# Patient Record
Sex: MALE | Race: WHITE | NOT HISPANIC OR LATINO | Employment: OTHER | URBAN - METROPOLITAN AREA
[De-identification: names, ages, dates, MRNs, and addresses within clinical notes are randomized per-mention and may not be internally consistent; named-entity substitution may affect disease eponyms.]

---

## 2017-01-03 DIAGNOSIS — D33.3 UNILATERAL VESTIBULAR SCHWANNOMA: ICD-10-CM

## 2017-01-03 NOTE — TELEPHONE ENCOUNTER
----- Message from Payal Rodriguez sent at 1/3/2017 12:46 PM CST -----  Refill: tramadol (ULTRAM) 50 mg tablet    Please call patient at [phone] Thank you!

## 2017-01-04 RX ORDER — TRAMADOL HYDROCHLORIDE 50 MG/1
50 TABLET ORAL 3 TIMES DAILY
Qty: 90 TABLET | Refills: 0 | Status: SHIPPED | OUTPATIENT
Start: 2017-01-04 | End: 2017-02-16 | Stop reason: SDUPTHER

## 2017-01-10 ENCOUNTER — OFFICE VISIT (OUTPATIENT)
Dept: FAMILY MEDICINE | Facility: CLINIC | Age: 82
End: 2017-01-10
Payer: MEDICARE

## 2017-01-10 ENCOUNTER — HOSPITAL ENCOUNTER (OUTPATIENT)
Dept: RADIOLOGY | Facility: HOSPITAL | Age: 82
Discharge: HOME | End: 2017-01-10
Attending: INTERNAL MEDICINE
Payer: MEDICARE

## 2017-01-10 VITALS
DIASTOLIC BLOOD PRESSURE: 60 MMHG | WEIGHT: 213 LBS | OXYGEN SATURATION: 98 % | SYSTOLIC BLOOD PRESSURE: 118 MMHG | HEART RATE: 78 BPM | BODY MASS INDEX: 27.34 KG/M2 | TEMPERATURE: 99 F | HEIGHT: 74 IN

## 2017-01-10 DIAGNOSIS — R60.9 EDEMA, UNSPECIFIED TYPE: ICD-10-CM

## 2017-01-10 DIAGNOSIS — R05.3 CHRONIC COUGH: Primary | ICD-10-CM

## 2017-01-10 DIAGNOSIS — F33.0 MAJOR DEPRESSIVE DISORDER, RECURRENT EPISODE, MILD: ICD-10-CM

## 2017-01-10 DIAGNOSIS — E03.9 HYPOTHYROIDISM, UNSPECIFIED TYPE: ICD-10-CM

## 2017-01-10 DIAGNOSIS — R73.9 HYPERGLYCEMIA: ICD-10-CM

## 2017-01-10 DIAGNOSIS — G89.29 CHRONIC INTRACTABLE HEADACHE, UNSPECIFIED HEADACHE TYPE: ICD-10-CM

## 2017-01-10 DIAGNOSIS — D33.3 UNILATERAL VESTIBULAR SCHWANNOMA: ICD-10-CM

## 2017-01-10 DIAGNOSIS — E78.5 HYPERLIPIDEMIA, UNSPECIFIED HYPERLIPIDEMIA TYPE: ICD-10-CM

## 2017-01-10 DIAGNOSIS — G20.C PARKINSONISM, UNSPECIFIED PARKINSONISM TYPE: ICD-10-CM

## 2017-01-10 DIAGNOSIS — F39 MOOD DISORDER: ICD-10-CM

## 2017-01-10 DIAGNOSIS — S39.012A LUMBOSACRAL STRAIN, INITIAL ENCOUNTER: ICD-10-CM

## 2017-01-10 DIAGNOSIS — R51.9 CHRONIC INTRACTABLE HEADACHE, UNSPECIFIED HEADACHE TYPE: ICD-10-CM

## 2017-01-10 DIAGNOSIS — H47.013 OPTIC NEUROPATHY, ISCHEMIC, BILATERAL: ICD-10-CM

## 2017-01-10 DIAGNOSIS — E55.9 VITAMIN D DEFICIENCY DISEASE: ICD-10-CM

## 2017-01-10 DIAGNOSIS — I10 ESSENTIAL HYPERTENSION: ICD-10-CM

## 2017-01-10 PROCEDURE — 1160F RVW MEDS BY RX/DR IN RCRD: CPT | Mod: S$GLB,,, | Performed by: INTERNAL MEDICINE

## 2017-01-10 PROCEDURE — 1157F ADVNC CARE PLAN IN RCRD: CPT | Mod: S$GLB,,, | Performed by: INTERNAL MEDICINE

## 2017-01-10 PROCEDURE — 99215 OFFICE O/P EST HI 40 MIN: CPT | Mod: S$GLB,,, | Performed by: INTERNAL MEDICINE

## 2017-01-10 PROCEDURE — 1125F AMNT PAIN NOTED PAIN PRSNT: CPT | Mod: S$GLB,,, | Performed by: INTERNAL MEDICINE

## 2017-01-10 PROCEDURE — 1159F MED LIST DOCD IN RCRD: CPT | Mod: S$GLB,,, | Performed by: INTERNAL MEDICINE

## 2017-01-10 PROCEDURE — 71020 XR CHEST PA AND LATERAL: CPT | Mod: 26,,, | Performed by: RADIOLOGY

## 2017-01-10 PROCEDURE — 99499 UNLISTED E&M SERVICE: CPT | Mod: S$GLB,,, | Performed by: INTERNAL MEDICINE

## 2017-01-10 PROCEDURE — 99999 PR PBB SHADOW E&M-EST. PATIENT-LVL IV: CPT | Mod: PBBFAC,,, | Performed by: INTERNAL MEDICINE

## 2017-01-10 PROCEDURE — 71020 XR CHEST PA AND LATERAL: CPT | Mod: TC,PO

## 2017-01-10 NOTE — PROGRESS NOTES
Chief complaint, cough, back pain leg swelling, establish care    87-year-old white male who my mother was his treating psychiatrist in the past.  He is here with his wife.  Prior PCP has since retired.  One issue is her chronic cough.  For over 6 months he does produce thick mucus at random times during the day and night.  Milk products seem to worsen the mucus.  He also has a chronic frontal headache.  We discussed the possibility of underlying sinus problems.  Of is not worse when laying down to suggest CHF.  He has not had a chest x-ray in over 2 years.  No shortness of breath.  He also has some pain across the lower back.  It's worse after he exercises at lately he's been doing some exercises and stretching in the bed.  Seems to be better as he moves around or applies heat.  He's had some trace edema lately but not really a big problem.  We reviewed all his labs which are due for manual standpoint regarding his medications, hypothyroidism.  He does continue to have his morning hallucinations and today he did see football players in the room.  Doesn't appear to be in distress to him.  He does not currently really have any neurology follow-up in the last [facility] neurologist was about 2 years ago.    .    Review of systems: No fevers or chills, no wheeze or shortness of breath, no symptoms of CHF, no sciatica, no other current other myalgias or arthralgias.    PAST SURGERIES:  Total knee replacements bilaterally.                                                                                                     PAST MEDICAL HISTORY:    Hypertension.   Hyperlipidemia.    Depression.           Generalized anxiety ([facility]).    Hypothyroidism.    Osteoarthritis.    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  Optic neuropathy, right eye.  Right-sided trigeminal neuralgia, seen by neurology, pain management and neurosurgery  Chronic constipation   Evaluation by [facility] for NPH,  apparently no response to spinal tap   Chronic vertigo   Vitamin D deficiency   Parkinsonism by [facility] evaluation    Morning hallucinations, possibly related to Seroquel                                                                                                           SOCIAL HISTORY:  He is  and lives with his spouse.  They have adult   children in the area and grandchildren.  He has never been a cigarette       smoker.      Vitals as above, general, no distress, walks with a walker, hard of hearing  HEENT: Conjunctivae clear doesn't of clear and moist, neck supple, thyroid nonpalpable  Respiratory effort is good, lungs are clear  Heart regular rate and rhythm without murmurs gallops rubs, +1 ankle edema only equally on both legs.  Musculoskeletal: Subjective location of his pain is in the lower paraspinals bilaterally but not really reproducible today.  He has good full flexion but did not get him to try and bend over and touch his toes given his ataxia and balance problems.  His legs are little bit stiff but hasn't negative straight leg testing on both sides.  His spine is nontender.    Prior labs, x-ray reports, history, problem list and her medical chart notes reviewed and summarized as above    Ifeanyi was seen today for back pain, foot swelling, cough and mucus.    Diagnoses and all orders for this visit:    Chronic cough, Chest x-ray to rule out pulmonary sources which I doubt given the normal chest exam.  Consider reflux but more so consider some chronic sinusitis given the chronic frontal headache, the chronic mucus production and cough etc.  Discussed the possibility of seeing ENT but we will try to find some source on a sinus CT prior to that referral.   -     Brain natriuretic peptide; Future  -     X-Ray Chest PA And Lateral; Future    Lumbosacral strain, initial encounter, minor strain and will have him try to apply heat prior to doing his stretching and minimizes exercises  stretching for a period of time    Edema, unspecified type, minimal at this point, follow, do not think there is any CHF    Unilateral vestibular schwannoma    Major depressive disorder, recurrent episode, mild    Mood disorder, appears chronic and stable    Vitamin D deficiency disease, reassess  -     Vitamin D; Future    Parkinsonism, unspecified Parkinsonism type, noted by his last neurology assessment and given his shuffling gait, might need reassessment.  I don't see any tremor    Hypothyroidism, unspecified type, reassess  -     TSH; Future  -     T4, free; Future    Essential hypertension, chronic and stable  -     Comprehensive metabolic panel; Future  -     CBC auto differential; Future    Hyperlipidemia, unspecified hyperlipidemia type, on statin    Optic neuropathy, ischemic, bilateral, followed by ophthalmology, keep follow-up with ophthalmology given the visual elucidations to make sure there is no developing ocular degeneration etc.    Hyperglycemia  -     Hemoglobin A1c; Future    Chronic intractable headache, unspecified headache type  -     CT Sinuses without Contrast; Future    Total time over 45 minutes with over 50% counseling.

## 2017-01-11 ENCOUNTER — TELEPHONE (OUTPATIENT)
Dept: FAMILY MEDICINE | Facility: CLINIC | Age: 82
End: 2017-01-11

## 2017-01-12 NOTE — TELEPHONE ENCOUNTER
Call wife- recent labs all look great except the Vit D is still low like the last time. Try to get 2000 units of D-3  A day       the cxr is all clear

## 2017-01-14 ENCOUNTER — TELEPHONE (OUTPATIENT)
Dept: FAMILY MEDICINE | Facility: CLINIC | Age: 82
End: 2017-01-14

## 2017-01-14 NOTE — TELEPHONE ENCOUNTER
----- Message from Aliza Short sent at 1/12/2017  1:07 PM CST -----  Contact: Spouse [phone]  Patient spouse would like to speak to a nurse about his test results. Please call thank you

## 2017-01-18 ENCOUNTER — HOSPITAL ENCOUNTER (OUTPATIENT)
Dept: RADIOLOGY | Facility: HOSPITAL | Age: 82
Discharge: HOME | End: 2017-01-18
Attending: INTERNAL MEDICINE
Payer: MEDICARE

## 2017-01-18 ENCOUNTER — TELEPHONE (OUTPATIENT)
Dept: FAMILY MEDICINE | Facility: CLINIC | Age: 82
End: 2017-01-18

## 2017-01-18 DIAGNOSIS — R51.9 HEADACHE, UNSPECIFIED HEADACHE TYPE: ICD-10-CM

## 2017-01-18 DIAGNOSIS — J32.9 SINUSITIS, UNSPECIFIED CHRONICITY, UNSPECIFIED LOCATION: Primary | ICD-10-CM

## 2017-01-18 DIAGNOSIS — G89.29 CHRONIC INTRACTABLE HEADACHE, UNSPECIFIED HEADACHE TYPE: ICD-10-CM

## 2017-01-18 DIAGNOSIS — R51.9 CHRONIC INTRACTABLE HEADACHE, UNSPECIFIED HEADACHE TYPE: ICD-10-CM

## 2017-01-18 DIAGNOSIS — R93.0 ABNORMAL CT SCAN, SINUS: ICD-10-CM

## 2017-01-18 PROCEDURE — 70486 CT MAXILLOFACIAL W/O DYE: CPT | Mod: 26,,, | Performed by: RADIOLOGY

## 2017-01-18 PROCEDURE — 70486 CT MAXILLOFACIAL W/O DYE: CPT | Mod: TC

## 2017-01-18 NOTE — TELEPHONE ENCOUNTER
Call patient and wife        The recent sinus CT DOES show some central sinus infection.  Dr. ROYAL recommends getting the opinion of ENT.  Is there any preference for ENT such as at the main campus?  We may be able to have you see ENT Dr. Steward at the [facility]

## 2017-01-27 ENCOUNTER — NURSE TRIAGE (OUTPATIENT)
Dept: ADMINISTRATIVE | Facility: CLINIC | Age: 82
End: 2017-01-27

## 2017-01-28 ENCOUNTER — HOSPITAL ENCOUNTER (EMERGENCY)
Facility: OTHER | Age: 82
Discharge: HOME | End: 2017-01-28
Attending: EMERGENCY MEDICINE
Payer: MEDICARE

## 2017-01-28 VITALS
HEIGHT: 74 IN | SYSTOLIC BLOOD PRESSURE: 154 MMHG | DIASTOLIC BLOOD PRESSURE: 84 MMHG | BODY MASS INDEX: 27.34 KG/M2 | OXYGEN SATURATION: 99 % | TEMPERATURE: 99 F | HEART RATE: 72 BPM | WEIGHT: 213 LBS | RESPIRATION RATE: 18 BRPM

## 2017-01-28 DIAGNOSIS — R11.2 NON-INTRACTABLE VOMITING WITH NAUSEA, UNSPECIFIED VOMITING TYPE: Primary | ICD-10-CM

## 2017-01-28 DIAGNOSIS — R14.0 ABDOMINAL DISTENSION: ICD-10-CM

## 2017-01-28 DIAGNOSIS — K59.00 CONSTIPATION, UNSPECIFIED CONSTIPATION TYPE: ICD-10-CM

## 2017-01-28 LAB
ALLENS TEST: ABNORMAL
DELSYS: ABNORMAL
HCO3 UR-SCNC: 30.7 MMOL/L (ref 24–28)
LDH SERPL L TO P-CCNC: 1.76 MMOL/L (ref 0.5–2.2)
PCO2 BLDA: 53.4 MMHG (ref 35–45)
PH SMN: 7.37 [PH] (ref 7.35–7.45)
PO2 BLDA: 31 MMHG (ref 40–60)
POC B-TYPE NATRIURETIC PEPTIDE: 303 PG/ML (ref 0–100)
POC BE: 5 MMOL/L
POC CARDIAC TROPONIN I: 0.01 NG/ML
POC PTINR: 1 (ref 0.9–1.2)
POC PTWBT: 12.3 SEC (ref 9.7–14.3)
POC SATURATED O2: 56 % (ref 95–100)
POC TCO2: 32 MMOL/L (ref 24–29)
SAMPLE: ABNORMAL
SAMPLE: NORMAL
SAMPLE: NORMAL
SITE: ABNORMAL

## 2017-01-28 PROCEDURE — 96374 THER/PROPH/DIAG INJ IV PUSH: CPT

## 2017-01-28 PROCEDURE — 25500020 PHARM REV CODE 255: Performed by: EMERGENCY MEDICINE

## 2017-01-28 PROCEDURE — 84484 ASSAY OF TROPONIN QUANT: CPT

## 2017-01-28 PROCEDURE — 82150 ASSAY OF AMYLASE: CPT

## 2017-01-28 PROCEDURE — 25000003 PHARM REV CODE 250: Performed by: EMERGENCY MEDICINE

## 2017-01-28 PROCEDURE — 85025 COMPLETE CBC W/AUTO DIFF WBC: CPT

## 2017-01-28 PROCEDURE — 99284 EMERGENCY DEPT VISIT MOD MDM: CPT | Mod: 25

## 2017-01-28 PROCEDURE — 63600175 PHARM REV CODE 636 W HCPCS: Performed by: EMERGENCY MEDICINE

## 2017-01-28 PROCEDURE — 85610 PROTHROMBIN TIME: CPT

## 2017-01-28 PROCEDURE — 25000003 PHARM REV CODE 250

## 2017-01-28 PROCEDURE — 80053 COMPREHEN METABOLIC PANEL: CPT

## 2017-01-28 PROCEDURE — 83880 ASSAY OF NATRIURETIC PEPTIDE: CPT

## 2017-01-28 PROCEDURE — 96361 HYDRATE IV INFUSION ADD-ON: CPT

## 2017-01-28 RX ORDER — MAG HYDROX/ALUMINUM HYD/SIMETH 200-200-20
SUSPENSION, ORAL (FINAL DOSE FORM) ORAL
Status: COMPLETED
Start: 2017-01-28 | End: 2017-01-28

## 2017-01-28 RX ORDER — SENNOSIDES 8.6 MG/1
1 TABLET ORAL 2 TIMES DAILY
Qty: 30 TABLET | Refills: 1 | Status: SHIPPED | OUTPATIENT
Start: 2017-01-28 | End: 2018-06-14

## 2017-01-28 RX ORDER — ONDANSETRON 2 MG/ML
4 INJECTION INTRAMUSCULAR; INTRAVENOUS
Status: COMPLETED | OUTPATIENT
Start: 2017-01-28 | End: 2017-01-28

## 2017-01-28 RX ORDER — ONDANSETRON 4 MG/1
4 TABLET, ORALLY DISINTEGRATING ORAL EVERY 8 HOURS PRN
Qty: 12 TABLET | Refills: 0 | Status: SHIPPED | OUTPATIENT
Start: 2017-01-28 | End: 2018-06-14

## 2017-01-28 RX ORDER — BUTALBITAL, ACETAMINOPHEN AND CAFFEINE 50; 325; 40 MG/1; MG/1; MG/1
1 CAPSULE ORAL EVERY 6 HOURS PRN
Qty: 20 CAPSULE | Refills: 0 | Status: SHIPPED | OUTPATIENT
Start: 2017-01-28 | End: 2017-02-05 | Stop reason: SDUPTHER

## 2017-01-28 RX ORDER — ONDANSETRON 4 MG/1
4 TABLET, ORALLY DISINTEGRATING ORAL EVERY 6 HOURS PRN
Qty: 15 TABLET | Refills: 0 | Status: SHIPPED | OUTPATIENT
Start: 2017-01-28 | End: 2017-07-20 | Stop reason: SDUPTHER

## 2017-01-28 RX ORDER — LIDOCAINE HYDROCHLORIDE 20 MG/ML
SOLUTION OROPHARYNGEAL
Status: COMPLETED
Start: 2017-01-28 | End: 2017-01-28

## 2017-01-28 RX ORDER — DICYCLOMINE HYDROCHLORIDE 10 MG/5ML
SOLUTION ORAL
Status: COMPLETED
Start: 2017-01-28 | End: 2017-01-28

## 2017-01-28 RX ADMIN — SODIUM CHLORIDE 500 ML: 0.9 INJECTION, SOLUTION INTRAVENOUS at 11:01

## 2017-01-28 RX ADMIN — IOHEXOL 100 ML: 350 INJECTION, SOLUTION INTRAVENOUS at 12:01

## 2017-01-28 RX ADMIN — ALUMINUM HYDROXIDE, MAGNESIUM HYDROXIDE, AND SIMETHICONE: 200; 200; 20 SUSPENSION ORAL at 10:01

## 2017-01-28 RX ADMIN — ONDANSETRON 4 MG: 2 INJECTION, SOLUTION INTRAMUSCULAR; INTRAVENOUS at 09:01

## 2017-01-28 RX ADMIN — Medication: at 10:01

## 2017-01-28 RX ADMIN — DICYCLOMINE HYDROCHLORIDE: 10 SOLUTION ORAL at 10:01

## 2017-01-28 RX ADMIN — LIDOCAINE HYDROCHLORIDE: 20 SOLUTION ORAL; TOPICAL at 09:01

## 2017-01-28 RX ADMIN — SODIUM CHLORIDE, SODIUM LACTATE, POTASSIUM CHLORIDE, AND CALCIUM CHLORIDE 500 ML: .6; .31; .03; .02 INJECTION, SOLUTION INTRAVENOUS at 09:01

## 2017-01-28 NOTE — ED AVS SNAPSHOT
[facility] EMERGENCY DEPARTMENT  [address] [facility] [address] [facility] [address]               Ifeanyi Rutherford   2017  8:17 AM   ED    Description:  Male : 1929   Department:  [facility] Emergency Department           Your Care was Coordinated By:     Provider Role From To    Renny Gillis MD Attending Provider 17 0828 --      Reason for Visit     Vomiting     Constipation           Diagnoses this Visit        Comments    Non-intractable vomiting with nausea, unspecified vomiting type    -  Primary     Abdominal distension         Constipation, unspecified constipation type           ED Disposition     ED Disposition Condition Comment    Discharge             To Do List           Follow-up Information     Follow up with Jason Levy MD. Schedule an appointment as soon as possible for a visit in 2 days.    Specialty:  Internal Medicine    Why:  to follow up ED visit    Contact information:    [address]  [facility] [address]  [phone]          Follow up with [facility] Emergency Department.    Specialty:  Emergency Medicine    Why:  As needed, If symptoms worsen    Contact information:    [address] [facility] [address]  [phone]       These Medications        Disp Refills Start End    ondansetron (ZOFRAN-ODT) 4 MG TbDL 12 tablet 0 2017     Take 1 tablet (4 mg total) by mouth every 8 (eight) hours as needed (nausea/vomiting). - Oral    Pharmacy: [address] Ph #: [phone]       senna (SENNA) 8.6 mg tablet 30 tablet 1 2017     Take 1 tablet by mouth 2 (two) times daily. - Oral    Pharmacy: [address] Ph #: [phone]       ondansetron (ZOFRAN-ODT) 4 MG TbDL 15 tablet 0 2017     Take 1 tablet (4 mg total) by mouth every 6 (six) hours as needed (nausea). - Oral    Pharmacy: [address]  Ph #: [phone]       butalbital-acetaminophen-caff -40 mg (CAPACET) -40 mg Cap 20 capsule 0 1/28/2017 2/27/2017    Take 1 capsule by mouth every 6 (six) hours as needed (headache/facial pain). - Oral    Pharmacy: [facility] [place] - [place], [address] Ph #: [phone]         [facility] On Call     [facility] On Call Nurse Care Line - 24/7 Assistance  Registered nurses in the [facility] On Call Center provide clinical advisement, health education, appointment booking, and other advisory services.  Call for this free service at [phone].             Medications           Message regarding Medications     Verify the changes and/or additions to your medication regime listed below are the same as discussed with your clinician today.  If any of these changes or additions are incorrect, please notify your healthcare provider.        START taking these NEW medications        Refills    ondansetron (ZOFRAN-ODT) 4 MG TbDL 0    Sig: Take 1 tablet (4 mg total) by mouth every 8 (eight) hours as needed (nausea/vomiting).    Class: Print    Route: Oral    senna (SENNA) 8.6 mg tablet 1    Sig: Take 1 tablet by mouth 2 (two) times daily.    Class: Print    Route: Oral    ondansetron (ZOFRAN-ODT) 4 MG TbDL 0    Sig: Take 1 tablet (4 mg total) by mouth every 6 (six) hours as needed (nausea).    Class: Print    Route: Oral    butalbital-acetaminophen-caff -40 mg (CAPACET) -40 mg Cap 0    Sig: Take 1 capsule by mouth every 6 (six) hours as needed (headache/facial pain).    Class: Print    Route: Oral      These medications were administered today        Dose Freq    lactated ringers bolus 500 mL 500 mL ED 1 Time    Sig: Inject 500 mLs into the vein ED 1 Time.    Class: Normal    Route: Intravenous    ondansetron injection 4 mg 4 mg ED 1 Time    Sig: Inject 4 mg into the vein ED 1 Time.    Class: Normal    Route: Intravenous    (pyxis) gi cocktail (mylanta 30 mL, lidocaine 2 % viscous  10 mL, dicyclomine 10 mL) 50 mL  ED 1 Time    Sig: Take by mouth ED 1 Time.    Class: Normal    Route: Oral    aluminum-magnesium hydroxide-simethicone (MAALOX) 200-200-20 mg/5 mL suspension      Notes to Pharmacy: Created by cabinet override    dicyclomine (BENTYL) 10 mg/5 mL syrup      Notes to Pharmacy: Created by cabinet override    lidocaine HCl 2% (XYLOCAINE) 2 % oral solution      Notes to Pharmacy: Created by cabinet override    sodium chloride 0.9% bolus 500 mL 500 mL ED 1 Time    Sig: Inject 500 mLs into the vein ED 1 Time.    Class: Normal    Route: Intravenous    omnipaque 350 iohexol 100 mL 100 mL IMG once as needed    Sig: Inject 100 mLs into the vein ONCE PRN for contrast.    Class: Normal    Route: Intravenous    omnipaque 350 iohexol 350 mg iodine/mL      Notes to Pharmacy: Created by cabinet override           Verify that the below list of medications is an accurate representation of the medications you are currently taking.  If none reported, the list may be blank. If incorrect, please contact your healthcare provider. Carry this list with you in case of emergency.           Current Medications     acetaminophen (TYLENOL) 325 MG tablet Take 2 tablets (650 mg total) by mouth every 6 (six) hours as needed for Pain.    AUROGUARD 5.4-1.4 % Drop Place 3 drops in ear(s) daily as needed (To left ear).     butalbital-acetaminophen-caff -40 mg (CAPACET) -40 mg Cap Take 1 capsule by mouth every 6 (six) hours as needed (headache/facial pain).    fluticasone (FLONASE) 50 mcg/actuation nasal spray 2 sprays by Each Nare route once daily.    gabapentin (NEURONTIN) 300 MG capsule Take 1 capsule (300 mg total) by mouth 3 (three) times daily.    levothyroxine (SYNTHROID) 75 MCG tablet TAKE 1 TABLET BEFORE BREAKFAST    lisinopril (PRINIVIL,ZESTRIL) 40 MG tablet Take 1 tablet (40 mg total) by mouth once daily.    ondansetron (ZOFRAN-ODT) 4 MG TbDL Take 1 tablet (4 mg total) by mouth every 8 (eight) hours  "as needed (nausea/vomiting).    ondansetron (ZOFRAN-ODT) 4 MG TbDL Take 1 tablet (4 mg total) by mouth every 6 (six) hours as needed (nausea).    oxycodone-acetaminophen (PERCOCET) 7.5-325 mg per tablet Take 1 tablet by mouth every 4 (four) hours as needed for Pain.    polyethylene glycol (GLYCOLAX) 17 gram/dose powder Take 17 g by mouth once daily.    pravastatin (PRAVACHOL) 40 MG tablet Take 1 tablet (40 mg total) by mouth once daily.    quetiapine (SEROQUEL) 50 MG tablet Take 1 tablet (50 mg total) by mouth every evening.    senna (SENNA) 8.6 mg tablet Take 1 tablet by mouth 2 (two) times daily.    tramadol (ULTRAM) 50 mg tablet Take 1 tablet (50 mg total) by mouth 3 (three) times daily.    triamcinolone acetonide 0.1% (KENALOG) 0.1 % cream Apply to the affected area twice a day    venlafaxine (EFFEXOR-XR) 75 MG 24 hr capsule Two capsules daily           Clinical Reference Information           Your Vitals Were     BP Pulse Temp Resp Height Weight    168/75 71 99 °F (37.2 °C) (Temporal) 14 6' 2" (1.88 m) 96.6 kg (213 lb)    SpO2 BMI             97% 27.35 kg/m2         Allergies as of 1/28/2017        Reactions    Carbamazepine Swelling    Demerol [Meperidine] Other (See Comments)    hallucinations    Morphine Nausea Only      Immunizations Administered on Date of Encounter - 1/28/2017     None      ED Micro, Lab, POCT     Start Ordered       Status Ordering Provider    01/28/17 1301 01/28/17 1300  POCT Influenza A/B  Once      Completed     01/28/17 1112 01/28/17 1112  POCT B-type natriuretic peptide (BNP)  Once      Final result     01/28/17 1106 01/28/17 1106  Troponin ISTAT  Once      Final result     01/28/17 0944 01/28/17 0944  ISTAT PROCEDURE  Once      Final result     01/28/17 0939 01/28/17 0939  ISTAT PROCEDURE  Once      Final result     01/28/17 0857 01/28/17 0856  POCT URINALYSIS W/O SCOPE  Once      Completed     01/28/17 0856 01/28/17 0856  POCT amylase  Once      Completed     01/28/17 0856 " "01/28/17 0856  POCT Troponin  Once      Completed     01/28/17 0856 01/28/17 0856  POCT B-type natriuretic peptide (BNP)  Once      Completed     01/28/17 0856 01/28/17 0856  POCT Protime-INR  Once      Completed     01/28/17 0855 01/28/17 0856  POCT CBC  Once      Acknowledged     01/28/17 0855 01/28/17 0856  POCT CMP  Once      Completed     01/28/17 0855 01/28/17 0856  POCT urinalysis dipstick (Culture if indicated)  Once      Acknowledged       ED Imaging Orders     Start Ordered       Status Ordering Provider    01/28/17 1128 01/28/17 1128  CT Abdomen Pelvis With Contrast  1 time imaging      Final result     01/28/17 0856 01/28/17 0856    1 time imaging,   Status:  Canceled      Canceled     01/28/17 0855 01/28/17 0856  X-ray chest PA and lateral  1 time imaging     Comments:  Abdominal pain    Final result     01/28/17 0855 01/28/17 0856  X-Ray Abdomen Flat And Erect  1 time imaging      Final result         Discharge Instructions         Vomiting (Adult)  Vomiting is a common symptom that may be due to different causes. These include gastroenteritis ("stomach flu"), food poisoning and gastritis. There are other more serious causes of vomiting which may be hard to diagnose early in the illness. Therefore, it is important to watch for the warning signs listed below.  The main danger from repeated vomiting is dehydration. This is due to excess loss of water and minerals from the body. When this occurs, body fluids must be replaced.  Home care  · If symptoms are severe, rest at home for the next 24 hours.  · Because your symptoms may be from an infection, wash your hands frequently and well, and use alcohol-based  to avoid spreading the infection to others.  · Wash your hands for at least 20 seconds. Hum the happy birthday song twice for the correct length of time.  · Wash your hands after using the toilet, before and after preparing food, before eating food, after changing a diaper, cleaning a wound, " caring for a sick person, and blowing your nose, coughing, or sneezing. You should also wash your hands after caring for someone who is sick, touching pet food, or treats, and touching an animal, or animal waste.  · You may use acetaminophen or NSAID medicines like ibuprofen or naproxen to control fever, unless another medicine was prescribed. If you have chronic liver or kidney disease or ever had a stomach ulcer or GI bleeding, talk with your doctor before using these medicines. Aspirin should never be used in anyone under 18 years of age who is ill with a fever. It may cause severe liver damage. Don't use NSAID medicines if you are already taking one for another condition (like arthritis) or are on aspirin (such as for heart disease, or after a stroke)  · Avoid tobacco and alcohol use, which may worsen your symptoms.  · If medicines for vomiting were prescribed, take as directed.  · Once vomiting stops, then follow these guidelines:  During the first 12 to 24 hours follow the diet below:  · Fruit juices. Apple, grape juice, clear fruit drinks, and electrolyte replacement drinks.  · Beverages. Soft drinks without caffeine; mineral water (plain or flavored), decaffeinated tea and coffee.  · Soups. Clear broth, consommé and bouillon  · Desserts. Plain gelatin, popsicles and fruit juice bars. As you feel better, you may add 6-8 ounces of yogurt per day.  During the next 24 hours you may add the following to the above:  · Hot cereal, plain toast, bread, rolls, crackers  · Plain noodles, rice, mashed potatoes, chicken noodle or rice soup  · Unsweetened canned fruit (avoid pineapple), bananas  · Limit caffeine and chocolate. No spices or seasonings except salt.  During the next 24 hours:  Gradually resume a normal diet, as you feel better and your symptoms lessen.  Follow-up care  Follow up with your healthcare provider, or as advised.  When to seek medical advice  Call your healthcare provider right away if any of  these occur:  · Constant right-sided lower abdominal pain or increasing general abdominal pain  · Continued vomiting (unable to keep liquids down) for 24 hours  · Frequent diarrhea (more than 5 times a day); blood (red or black color) or mucus in diarrhea  · Reduced urine output or extreme thirst  · Weakness, dizziness or fainting  · Unusually drowsy or confused  · Fever of 100.4°F (38°C) oral or higher, or as directed  · Yellow color of the eyes or skin  © 6510-1786 [employer]. [address] [place] [address]. All rights reserved. This information is not intended as a substitute for professional medical care. Always follow your healthcare professional's instructions.      Continue all current constipation treatments.    Constipation (Adult)  Constipation means that you have bowel movements that are less frequent than usual. Stools often become very hard and difficult to pass.  Constipation is very common. At some point in life it affects almost everyone. Since everyone's bowel habits are different, what is constipation to one person may not be to another. Your healthcare provider may do tests to diagnose constipation. It depends on what he or she finds when evaluating you.    Symptoms of constipation include:  · Abdominal pain  · Bloating  · Vomiting  · Painful bowel movements  · Itching, swelling, bleeding, or pain around the anus  Causes  Constipation can have many causes. These include:  · Diet low in fiber  · Too much dairy  · Not drinking enough liquids  · Lack of exercise or physical activity. This is especially true for older adults.  · Changes in lifestyle or daily routine, including pregnancy, aging, work, and travel  · Frequent use or misuse of laxatives  · Ignoring the urge to have a bowel movement or delaying it until later  · Medicines, such as certain prescription pain medicines, iron supplements, antacids, certain antidepressants, and calcium supplements  · Diseases like  irritable bowel syndrome, bowel obstructions, stroke, diabetes, thyroid disease, Parkinson disease, hemorrhoids, and colon cancer  Complications  Potential complications of constipation can include:  · Hemorrhoids  · Rectal bleeding from hemorrhoids or anal fissures (skin tears)  · Hernias  · Dependency on laxatives  · Chronic constipation  · Fecal impaction  · Bowel obstruction or perforation  Home care  All treatment should be done after talking with your healthcare provider. This is especially true if you have another medical problems, are taking prescription medicines, or are an older adult. Treatment most often involves lifestyle changes. You may also need medicines. Your healthcare provider will tell you which will work best for you. Follow the advice below to help avoid this problem in the future.  Lifestyle changes  These lifestyle changes can help prevent constipation:  · Diet. Eat a high-fiber diet, with fresh fruit and vegetables, and reduce dairy intake, meats, and processed foods  · Fluids. It's important to get enough fluids each day. Drink plenty of water when you eat more fiber. If you are on diet that limits the amount of fluid you can have, talk about this with your healthcare provider.  · Regular exercise. Check with your healthcare provider first.  Medications  Take any medicines as directed. Some laxatives are safe to use only every now and then. Others can be taken on a regular basis. Talk with your doctor or pharmacist if you have questions.  Prescription pain medicines can cause constipation. If you are taking this kind of medicine, ask your healthcare provider if you should also take a stool softener.  Medicines you may take to treat constipation include:  · Fiber supplements  · Stool softeners  · Laxatives  · Enemas  · Rectal suppositories  Follow-up care  Follow up with your healthcare provider if symptoms don't get better in the next few days. You may need to have more tests or see a  specialist.  Call 911  Call 911 if any of these occur:  · Trouble breathing  · Stiff, rigid abdomen that is severely painful to touch  · Confusion  · Fainting or loss of consciousness  · Rapid heart rate  · Chest pain  When to seek medical advice  Call your healthcare provider right away if any of these occur:  · Fever over 100.4°F (38°C)  · Failure to resume normal bowel movements  · Pain in your abdomen or back gets worse  · Nausea or vomiting  · Swelling in your abdomen  · Blood in the stool  · Black, tarry stool  · Involuntary weight loss  · Weakness  © 4834-0673 [employer]. [address] [place] [address]. All rights reserved. This information is not intended as a substitute for professional medical care. Always follow your healthcare professional's instructions.          Treating Constipation  Constipation is a common and often uncomfortable problem. Constipation means you have bowel movements fewer than 3 times per week, or strain to pass hard, dry stool. It can last a short time. Or it can be a problem that never seems to go away. The good news is that it can often be treated and controlled.    Eat more fiber  One of the best ways to help treat constipation is to increase your fiber intake. You can do this either through diet or by using fiber supplements. Fiber (in whole grains, fruits, and vegetables) adds bulk and absorbs water to soften the stool. This helps the stool pass through the colon more easily. When you increase your fiber intake, do it slowly to avoid side effects such as bloating. Also increase the amount of water that you drink. Eating more of the following foods can add fiber to your diet.  · High-fiber cereals  · Whole grains, bran, and brown rice  · Vegetables such as carrots, broccoli, and greens  · Fresh fruits (especially apples, pears, and dried fruits like raisins and apricots)  · Nuts and legumes (especially beans such as lentils, kidney beans, and lima  beans)  Get physically active  Exercise helps improve the working of your colon which helps ease constipation. Try to get some physical activity every day. If you havent been active for a while, talk to your healthcare provider before starting again.  Laxatives  Your healthcare provider may suggest an over-the-counter product to help ease your constipation. He or she may suggest the use of bulk-forming agents or laxatives. The use of laxatives, if used as directed, is common and safe. Follow directions carefully when using them. See your healthcare provider for new-onset constipation, or long-term constipation, to rule out other causes such as medicines or thyroid disease.  © 5772-4071 [employer]. [address] [place] [address], [place] [address]. All rights reserved. This information is not intended as a substitute for professional medical care. Always follow your healthcare professional's instructions.          Smoking Cessation     If you would like to quit smoking:   You may be eligible for free services if you are a [place] resident and started smoking cigarettes before September 1, 1988.  Call the Smoking Cessation Trust ([facility]) toll free at [phone] or [phone].   Call 6-388-QUIT-NOW if you do not meet the above criteria.             [facility] Emergency Department complies with applicable Federal civil rights laws and does not discriminate on the basis of race, color, national origin, age, disability, or sex.        Language Assistance Services     ATTENTION: Language assistance services are available, free of charge. Please call [phone].      ATENCIÓN: Si habla español, tiene a melo disposición servicios gratuitos de asistencia lingüística. Llame al [phone].     CHÚ Ý: N?u b?n nói Ti?ng Vi?t, có các d?ch v? h? tr? ngôn ng? mi?n phí dành cho b?n. G?i s? [phone].

## 2017-01-28 NOTE — ED PROVIDER NOTES
Encounter Date: 1/28/2017       History     Chief Complaint   Patient presents with    Vomiting     Vomiting since yesterday and unable to tolerate food or drink. Wife reports persistent vomiting all food material until this Am when he started to vomitied a yellow substance. Patient unable to hold BP medications this AM also. No fever no diarrhea     Constipation     constipation x 3 days     Review of patient's allergies indicates:   Allergen Reactions    Carbamazepine Swelling    Demerol [meperidine] Other (See Comments)     hallucinations    Morphine Nausea Only     HPI Comments: Patient presenting with abdominal distention, nausea and vomiting and no bowel movement for the past 3 days.  His wife reports that his last full meal was 2-1/2 nights ago after which he has had increasing amounts of nausea and vomiting, at least 6-8 episodes yesterday, which progressed from digested food to straight bile.  He also reports abdominal distention, chronic constipation, but does report he passed gas as recent as last night.    Past surgical history for open cholecystectomy.  Past medical history of urinary incontinence, hypertension, hyperlipidemia.    Due to his nausea and vomiting, he was unable to take his blood pressure medication today and is hypertensive on arrival.  He denies any chest pain or shortness of breath associated with his nausea and vomiting.    The history is provided by the patient, the spouse, a relative and medical records.     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    Spinal stenosis      Trigeminal neuralgia pain 1/20/2015    Vertigo 9/11/2012    Vestibular schwannoma 6/4/2015     Past Medical History Pertinent Negatives   Diagnosis Date Noted    Malnutrition 2/27/2015    Parkinson disease 2/27/2015    Renal dialysis status(V45.11) 2/27/2015    Stroke 2/27/2015    Tobacco dependence 2/27/2015    Type II or unspecified type diabetes mellitus without mention of complication, not stated as uncontrolled 2/27/2015     Past Surgical History   Procedure Laterality Date    Bilateral total knee arthroplasties      Eye surgery      Tonsillectomy      Gallbladder surgery      Thyroidectomy      Cholecystectomy       Family History   Problem Relation Age of Onset    Heart disease Mother     Suicide Neg Hx     Schizophrenia Neg Hx      Social History   Substance Use Topics    Smoking status: Former Smoker    Smokeless tobacco: Never Used    Alcohol use No     Review of Systems   Constitutional: Positive for appetite change (Due to vomiting). Negative for fever.   HENT: Positive for sore throat (since vomiting).    Respiratory: Negative for shortness of breath.    Cardiovascular: Negative for chest pain.   Gastrointestinal: Positive for abdominal distention, abdominal pain, nausea and vomiting.   Genitourinary: Positive for enuresis. Negative for dysuria and flank pain.   Musculoskeletal: Negative for back pain.   Skin: Negative for rash.   Neurological: Negative for weakness and headaches.   Hematological: Does not bruise/bleed easily.       Physical Exam   Initial Vitals   BP Pulse Resp Temp SpO2   -- -- -- -- --            Physical Exam    Nursing note and vitals reviewed.  Constitutional: He appears well-developed and well-nourished. He is not diaphoretic.   Hypertensive, but afebrile elderly white male.   HENT:   Head: Normocephalic and atraumatic.   Eyes: EOM are normal.   Neck: Normal range of motion. Neck supple.   Pulmonary/Chest: Breath sounds normal. No respiratory distress.    Abdominal: Soft. Bowel sounds are normal. He exhibits distension. Mass: suprapubic, mid line circular mass. There is no rigidity, no rebound, no guarding and no CVA tenderness.       Musculoskeletal: Normal range of motion. He exhibits no edema.   Neurological: He is alert and oriented to person, place, and time.   Skin: Skin is warm and dry.   Psychiatric: He has a normal mood and affect.         ED Course   Procedures  Labs Reviewed   ISTAT PROCEDURE - Abnormal; Notable for the following:        Result Value    POC PCO2 53.4 (*)     POC PO2 31 (*)     POC HCO3 30.7 (*)     POC SATURATED O2 56 (*)     POC TCO2 32 (*)     All other components within normal limits   TROPONIN ISTAT   POCT URINALYSIS W/O SCOPE   POCT CMP   POCT AMYLASE   POCT TROPONIN   POCT B-TYPE NATRIURETIC PEPTIDE (BNP)   POCT PROTIME-INR   ISTAT PROCEDURE   POCT B-TYPE NATRIURETIC PEPTIDE (BNP)   POCT RAPID INFLUENZA A/B             Medical Decision Making:   Initial Assessment:   Elderly male patient presenting with 3 days of nausea and vomiting and no bowel movement.  Differential Diagnosis:   Acute abdominal obstruction, diverticulitis, mesenteric ischemia, gastroenteritis, ileus, appendicitis, opiate-induced constipation, colitis NOS.  ED Management:  After extensive ED workup, including the CT with results below, there was no serious, life-threatening etiology to the patient's nausea and vomiting.  At the end of his workup, a fluid swab was also sent to rule out that as a potential confounder, which was negative.  He was symptomatically improved with ED interventions.    I discussed at length with the patient, his son and his spouse regarding the importance of a strict bowel regime, the risks of fecal impaction and bowel obstruction, especially in the patient's age demographic and the fact that he uses some pain medication.  I also specifically gave strict return precautions and instructed the patient and his wife to follow up closely with  outpatient care.  He was discharged in stable condition with outpatient follow-up.    Renny Gillis MD,   Emergency Medicine  01/29/2017 7:33 PM    (This note was written with voice recognition software.  Please excuse any grammatical errors.)                    ED Course   Value Comment By Time   X-Ray Abdomen Flat And Erect No evidence of acute obstruction, no air-fluid levels. Renny Gillis MD 01/28 0932    Lactate is 1.76.INR is 1 Renny Gillis MD 01/28 0948    CBC significant for leukocytosis of 11.5 with 81.3% granulocytes Renny Gillis MD 01/28 0958    Urinalysis negative for nitrites and leukocytes, positive for moderate blood and trace ketones Renny Gillis MD 01/28 1138    , troponin not elevated. Renny Gillis MD 01/28 1203     Imaging Results         CT Abdomen Pelvis With Contrast (Final result) Result time:  01/28/17 12:51:53    Final result by Interface, Rad Results In (01/28/17 12:51:53)    Narrative:    Study Desc:   CT ABDOMEN PELVIS WITH CONTRAST  Clinical History: Abdominal pain, evaluate for diverticulitis     TECHNIQUE: Sequential transaxial images were obtained with a multidetector helical CT   after intravenous administration of iodinated contrast.     Coronal and sagittal reconstructions were performed.     Total DLP = Not provided     COMPARISON: None     FINDINGS:     CT ABDOMEN AND PELVIS WITH CONTRAST:     There is mild bibasilar subsegmental atelectasis.  The visualized lung bases are   otherwise clear and there are no effusions.  There is a tiny hiatal hernia.     There are bilateral renal cysts measuring up to 1.7 cm in the right and 4.4 cm of the   left.  The liver, spleen, pancreas, kidneys, and adrenals appear within normal limits.    The patient is status post cholecystectomy.     There is no free intraperitoneal air or fluid.  There are no distended loops of small   bowel.  No mesenteric adenopathy is appreciated.     There are a few scattered  diverticula throughout the colon.  The rectum, sigmoid, and   urinary bladder are otherwise unremarkable.  There is no free pelvic fluid or pelvic   lymphadenopathy.     The appendix is normal in caliber with no surrounding inflammatory changes.     There are degenerative changes throughout the lumbar spine with intervertebral disc space   loss of height and anterior osteophytes.  Bony structures are unremarkable.  Mild soft   atherosclerotic plaque is noted throughout the abdominal aorta.     IMPRESSION:  1.  No CT evidence of acute intraperitoneal process.  2.  Mild diverticulosis.  3.  Bilateral renal cysts as described.  4.  Status post cholecystectomy.  5.  Tiny hiatal hernia.     SL 24;  Signed by: Jakub Bishop M.D.  2017-01-28 12:51:51 [CAST]            X-ray chest PA and lateral (Final result) Result time:  01/28/17 09:36:44    Final result by Interface, Rad Results In (01/28/17 09:36:44)    Narrative:    Study Desc:   XR CHEST PA AND LATERAL  History: Vomiting.     COMPARISON: None.     TECHNIQUE: PA and lateral view(s) of the chest     FINDINGS:  Lungs are clear. No pleural effusion or pneumothorax.     Heart size within normal limits. No significant mediastinal abnormality.     No acute osseous abnormality identified.     IMPRESSION:  No acute cardiopulmonary disease identified.     SL: 24 Signed by: Giovanny Cohen MD  2017-01-28 09:36:42            X-Ray Abdomen Flat And Erect (Final result) Result time:  01/28/17 09:35:54    Final result by Interface, Rad Results In (01/28/17 09:35:54)    Narrative:    Study Desc:   XR ABDOMEN FLAT AND ERECT  History: Vomiting.     Findings:  Supine and upright views the abdomen.  No comparison.     Bowel gas pattern is nonobstructive.  No evidence of pneumoperitoneum.  No radiopaque   foreign bodies.  Diffuse osteopenia.  Degenerative changes in the lumbar spine and hips.     Impression:  No acute radiographic abnormality of the abdomen.     SL: 24 Signed by:  Giovanny Cohen MD  2017-01-28 09:35:48                Clinical Impression:   The primary encounter diagnosis was Non-intractable vomiting with nausea, unspecified vomiting type. Diagnoses of Abdominal distension and Constipation, unspecified constipation type were also pertinent to this visit.          Renny Gillis MD  01/29/17 1933

## 2017-01-28 NOTE — DISCHARGE INSTRUCTIONS
"  Vomiting (Adult)  Vomiting is a common symptom that may be due to different causes. These include gastroenteritis ("stomach flu"), food poisoning and gastritis. There are other more serious causes of vomiting which may be hard to diagnose early in the illness. Therefore, it is important to watch for the warning signs listed below.  The main danger from repeated vomiting is dehydration. This is due to excess loss of water and minerals from the body. When this occurs, body fluids must be replaced.  Home care  · If symptoms are severe, rest at home for the next 24 hours.  · Because your symptoms may be from an infection, wash your hands frequently and well, and use alcohol-based  to avoid spreading the infection to others.  · Wash your hands for at least 20 seconds. Hum the happy birthday song twice for the correct length of time.  · Wash your hands after using the toilet, before and after preparing food, before eating food, after changing a diaper, cleaning a wound, caring for a sick person, and blowing your nose, coughing, or sneezing. You should also wash your hands after caring for someone who is sick, touching pet food, or treats, and touching an animal, or animal waste.  · You may use acetaminophen or NSAID medicines like ibuprofen or naproxen to control fever, unless another medicine was prescribed. If you have chronic liver or kidney disease or ever had a stomach ulcer or GI bleeding, talk with your doctor before using these medicines. Aspirin should never be used in anyone under 18 years of age who is ill with a fever. It may cause severe liver damage. Don't use NSAID medicines if you are already taking one for another condition (like arthritis) or are on aspirin (such as for heart disease, or after a stroke)  · Avoid tobacco and alcohol use, which may worsen your symptoms.  · If medicines for vomiting were prescribed, take as directed.  · Once vomiting stops, then follow these guidelines:  During " the first 12 to 24 hours follow the diet below:  · Fruit juices. Apple, grape juice, clear fruit drinks, and electrolyte replacement drinks.  · Beverages. Soft drinks without caffeine; mineral water (plain or flavored), decaffeinated tea and coffee.  · Soups. Clear broth, consommé and bouillon  · Desserts. Plain gelatin, popsicles and fruit juice bars. As you feel better, you may add 6-8 ounces of yogurt per day.  During the next 24 hours you may add the following to the above:  · Hot cereal, plain toast, bread, rolls, crackers  · Plain noodles, rice, mashed potatoes, chicken noodle or rice soup  · Unsweetened canned fruit (avoid pineapple), bananas  · Limit caffeine and chocolate. No spices or seasonings except salt.  During the next 24 hours:  Gradually resume a normal diet, as you feel better and your symptoms lessen.  Follow-up care  Follow up with your healthcare provider, or as advised.  When to seek medical advice  Call your healthcare provider right away if any of these occur:  · Constant right-sided lower abdominal pain or increasing general abdominal pain  · Continued vomiting (unable to keep liquids down) for 24 hours  · Frequent diarrhea (more than 5 times a day); blood (red or black color) or mucus in diarrhea  · Reduced urine output or extreme thirst  · Weakness, dizziness or fainting  · Unusually drowsy or confused  · Fever of 100.4°F (38°C) oral or higher, or as directed  · Yellow color of the eyes or skin  © 6466-4841 [employer]. [address] [place] [address], [place] [address]. All rights reserved. This information is not intended as a substitute for professional medical care. Always follow your healthcare professional's instructions.      Continue all current constipation treatments.    Constipation (Adult)  Constipation means that you have bowel movements that are less frequent than usual. Stools often become very hard and difficult to pass.  Constipation is very common. At some point in  life it affects almost everyone. Since everyone's bowel habits are different, what is constipation to one person may not be to another. Your healthcare provider may do tests to diagnose constipation. It depends on what he or she finds when evaluating you.    Symptoms of constipation include:  · Abdominal pain  · Bloating  · Vomiting  · Painful bowel movements  · Itching, swelling, bleeding, or pain around the anus  Causes  Constipation can have many causes. These include:  · Diet low in fiber  · Too much dairy  · Not drinking enough liquids  · Lack of exercise or physical activity. This is especially true for older adults.  · Changes in lifestyle or daily routine, including pregnancy, aging, work, and travel  · Frequent use or misuse of laxatives  · Ignoring the urge to have a bowel movement or delaying it until later  · Medicines, such as certain prescription pain medicines, iron supplements, antacids, certain antidepressants, and calcium supplements  · Diseases like irritable bowel syndrome, bowel obstructions, stroke, diabetes, thyroid disease, Parkinson disease, hemorrhoids, and colon cancer  Complications  Potential complications of constipation can include:  · Hemorrhoids  · Rectal bleeding from hemorrhoids or anal fissures (skin tears)  · Hernias  · Dependency on laxatives  · Chronic constipation  · Fecal impaction  · Bowel obstruction or perforation  Home care  All treatment should be done after talking with your healthcare provider. This is especially true if you have another medical problems, are taking prescription medicines, or are an older adult. Treatment most often involves lifestyle changes. You may also need medicines. Your healthcare provider will tell you which will work best for you. Follow the advice below to help avoid this problem in the future.  Lifestyle changes  These lifestyle changes can help prevent constipation:  · Diet. Eat a high-fiber diet, with fresh fruit and vegetables, and reduce  dairy intake, meats, and processed foods  · Fluids. It's important to get enough fluids each day. Drink plenty of water when you eat more fiber. If you are on diet that limits the amount of fluid you can have, talk about this with your healthcare provider.  · Regular exercise. Check with your healthcare provider first.  Medications  Take any medicines as directed. Some laxatives are safe to use only every now and then. Others can be taken on a regular basis. Talk with your doctor or pharmacist if you have questions.  Prescription pain medicines can cause constipation. If you are taking this kind of medicine, ask your healthcare provider if you should also take a stool softener.  Medicines you may take to treat constipation include:  · Fiber supplements  · Stool softeners  · Laxatives  · Enemas  · Rectal suppositories  Follow-up care  Follow up with your healthcare provider if symptoms don't get better in the next few days. You may need to have more tests or see a specialist.  Call 911  Call 911 if any of these occur:  · Trouble breathing  · Stiff, rigid abdomen that is severely painful to touch  · Confusion  · Fainting or loss of consciousness  · Rapid heart rate  · Chest pain  When to seek medical advice  Call your healthcare provider right away if any of these occur:  · Fever over 100.4°F (38°C)  · Failure to resume normal bowel movements  · Pain in your abdomen or back gets worse  · Nausea or vomiting  · Swelling in your abdomen  · Blood in the stool  · Black, tarry stool  · Involuntary weight loss  · Weakness  © 9165-1033 [employer]. [address] [place] [address], [place] [address]. All rights reserved. This information is not intended as a substitute for professional medical care. Always follow your healthcare professional's instructions.          Treating Constipation  Constipation is a common and often uncomfortable problem. Constipation means you have bowel movements fewer than 3 times per week, or  strain to pass hard, dry stool. It can last a short time. Or it can be a problem that never seems to go away. The good news is that it can often be treated and controlled.    Eat more fiber  One of the best ways to help treat constipation is to increase your fiber intake. You can do this either through diet or by using fiber supplements. Fiber (in whole grains, fruits, and vegetables) adds bulk and absorbs water to soften the stool. This helps the stool pass through the colon more easily. When you increase your fiber intake, do it slowly to avoid side effects such as bloating. Also increase the amount of water that you drink. Eating more of the following foods can add fiber to your diet.  · High-fiber cereals  · Whole grains, bran, and brown rice  · Vegetables such as carrots, broccoli, and greens  · Fresh fruits (especially apples, pears, and dried fruits like raisins and apricots)  · Nuts and legumes (especially beans such as lentils, kidney beans, and lima beans)  Get physically active  Exercise helps improve the working of your colon which helps ease constipation. Try to get some physical activity every day. If you havent been active for a while, talk to your healthcare provider before starting again.  Laxatives  Your healthcare provider may suggest an over-the-counter product to help ease your constipation. He or she may suggest the use of bulk-forming agents or laxatives. The use of laxatives, if used as directed, is common and safe. Follow directions carefully when using them. See your healthcare provider for new-onset constipation, or long-term constipation, to rule out other causes such as medicines or thyroid disease.  © 3685-6285 [employer]. [address] [place] [address], [place] [address]. All rights reserved. This information is not intended as a substitute for professional medical care. Always follow your healthcare professional's instructions.

## 2017-01-28 NOTE — TELEPHONE ENCOUNTER
Reason for Disposition   [1] SEVERE vomiting (e.g., 6 or more times/day) AND [2] present > 8 hours    Protocols used: ST VOMITING-A-    Patients wife states Ifeanyi started vomiting in the middle of the night last night after eating a hamburger but assumed it was indigestion. He had 3 more episodes of vomiting this morning and then the vomiting stopped. However, she states this evening he started vomiting again (3 more times) and the last time was a greenish/grey. Advised to have him seen in ED for further evaluation. She states she is going to call one of her children to bring him there. Please contact caller with any further care advice.

## 2017-01-30 LAB
INFLUENZA A ANTIGEN, POC: NEGATIVE
INFLUENZA B ANTIGEN, POC: NEGATIVE

## 2017-01-31 ENCOUNTER — TELEPHONE (OUTPATIENT)
Dept: FAMILY MEDICINE | Facility: CLINIC | Age: 82
End: 2017-01-31

## 2017-02-02 ENCOUNTER — TELEPHONE (OUTPATIENT)
Dept: FAMILY MEDICINE | Facility: CLINIC | Age: 82
End: 2017-02-02

## 2017-02-02 NOTE — TELEPHONE ENCOUNTER
----- Message from Payal Rodriguez sent at 1/31/2017  3:30 PM CST -----  Patient wants to speak with Ms Perez. He was seen in ED and is in pain. Please call at [phone] Thank you!

## 2017-02-02 NOTE — TELEPHONE ENCOUNTER
----- Message from Payal Rodriguez sent at 2/1/2017  8:35 AM CST -----  Patient's wife Krystal is calling. He needs an ED follow up and they only want to see Dr Levy. Please call at [phone] thank you!

## 2017-02-03 ENCOUNTER — TELEPHONE (OUTPATIENT)
Dept: FAMILY MEDICINE | Facility: CLINIC | Age: 82
End: 2017-02-03

## 2017-02-03 ENCOUNTER — OFFICE VISIT (OUTPATIENT)
Dept: FAMILY MEDICINE | Facility: CLINIC | Age: 82
End: 2017-02-03
Payer: MEDICARE

## 2017-02-03 VITALS
SYSTOLIC BLOOD PRESSURE: 128 MMHG | HEIGHT: 74 IN | OXYGEN SATURATION: 97 % | TEMPERATURE: 98 F | WEIGHT: 210.56 LBS | DIASTOLIC BLOOD PRESSURE: 60 MMHG | HEART RATE: 60 BPM | BODY MASS INDEX: 27.02 KG/M2

## 2017-02-03 DIAGNOSIS — K59.00 CONSTIPATION, UNSPECIFIED CONSTIPATION TYPE: Primary | ICD-10-CM

## 2017-02-03 DIAGNOSIS — G50.0 TRIGEMINAL NEURALGIA PAIN: ICD-10-CM

## 2017-02-03 DIAGNOSIS — H47.013 OPTIC NEUROPATHY, ISCHEMIC, BILATERAL: ICD-10-CM

## 2017-02-03 PROCEDURE — 99499 UNLISTED E&M SERVICE: CPT | Mod: S$GLB,,, | Performed by: NURSE PRACTITIONER

## 2017-02-03 PROCEDURE — 99214 OFFICE O/P EST MOD 30 MIN: CPT | Mod: S$GLB,,, | Performed by: NURSE PRACTITIONER

## 2017-02-03 PROCEDURE — 1160F RVW MEDS BY RX/DR IN RCRD: CPT | Mod: S$GLB,,, | Performed by: NURSE PRACTITIONER

## 2017-02-03 PROCEDURE — 1157F ADVNC CARE PLAN IN RCRD: CPT | Mod: S$GLB,,, | Performed by: NURSE PRACTITIONER

## 2017-02-03 PROCEDURE — 1159F MED LIST DOCD IN RCRD: CPT | Mod: S$GLB,,, | Performed by: NURSE PRACTITIONER

## 2017-02-03 PROCEDURE — 99999 PR PBB SHADOW E&M-EST. PATIENT-LVL V: CPT | Mod: PBBFAC,,, | Performed by: NURSE PRACTITIONER

## 2017-02-03 NOTE — PROGRESS NOTES
This dictation has been generated using Dragon Dictation some phonetic errors may occur.     Ifeanyi was seen today for hospital follow up.    Diagnoses and all orders for this visit:    Constipation, unspecified constipation type  -     Ambulatory referral to Gastroenterology    Trigeminal neuralgia pain    Optic neuropathy, ischemic, bilateral       Constipation due to pain med without relief from multiple meds and therapies.  Recent imaging was normal.  Refer to gastroenterology for their input on constipation and further meds.  Neuralgia and optic neuropathy.  Continue with pain meds.  Currently taking Fioricet with some relief.  Follow with primary care physician to discuss continuing.    Return if symptoms worsen or fail to improve.      ________________________________________________________________  ________________________________________________________________        Chief Complaint   Patient presents with    Hospital Follow Up     er     History of present illness  This 87 y.o. presents today for complaint of nausea vomiting and constipation.  Patient went to the emergency room recently.  It was the stand-alone emergency room here locally.  Due to concern about abdominal pain a CT of the abdomen was completed which was normal.  He had an x-ray of the abdomen which does not show free air.  I reviewed the images with him today.  We reviewed his labs which were generally unremarkable.  The BNP was elevated from baseline however patient is not symptomatic.  No shortness of breath or dyspnea on exertion.  His concern is about the ongoing constipation.  Nausea and vomiting has resolved.  Patient did have a bowel movement yesterday however indicates that it had been about 7 days since last bowel movement.  He takes multiple medications over-the-counter therapies and dietary changes to help him with his bowels.  He does of course take the tramadol for pain.  This could be contributing.  Patient takes MiraLAX and  Colace to help and have bowel movements.  In addition to that he has been using senna and eating prunes fruits and vegetables.  He does drink about 2 bottles of water daily discussed increasing his fluid intake.  Review of systems  No fever or chills  No chest pain or shortness of breath.  No dyspnea on exertion.  No nausea vomiting or diarrhea.    Denies new back pain  Patient denies a rash.  Continues with the headache issues.  Fioricet as been helpful.    Past medical and social history reviewed.    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    Spinal stenosis     Trigeminal neuralgia pain 1/20/2015    Vertigo 9/11/2012    Vestibular schwannoma 6/4/2015       Past Surgical History   Procedure Laterality Date    Bilateral total knee arthroplasties      Eye surgery      Tonsillectomy      Gallbladder surgery      Thyroidectomy      Cholecystectomy         Family History   Problem Relation Age of Onset    Heart disease Mother     Suicide Neg Hx     Schizophrenia Neg Hx        Social History     Social History    Marital status:      Spouse name: N/A    Number of children: N/A    Years of education: N/A     Social History Main Topics    Smoking status: Former Smoker    Smokeless tobacco: Never Used    Alcohol use No    Drug use: No    Sexual activity: Yes     Partners: Female     Other Topics Concern    None     Social History Narrative       Current Outpatient Prescriptions   Medication Sig Dispense Refill    acetaminophen (TYLENOL) 325 MG tablet Take 2 tablets (650 mg total) by mouth every 6  (six) hours as needed for Pain.  0    AUROGUARD 5.4-1.4 % Drop Place 3 drops in ear(s) daily as needed (To left ear).       butalbital-acetaminophen-caff -40 mg (CAPACET) -40 mg Cap Take 1 capsule by mouth every 6 (six) hours as needed (headache/facial pain). 20 capsule 0    fluticasone (FLONASE) 50 mcg/actuation nasal spray 2 sprays by Each Nare route once daily. 1 Bottle 6    gabapentin (NEURONTIN) 300 MG capsule Take 1 capsule (300 mg total) by mouth 3 (three) times daily. (Patient taking differently: Take 200 mg by mouth 3 (three) times daily. ) 270 capsule 3    levothyroxine (SYNTHROID) 75 MCG tablet TAKE 1 TABLET BEFORE BREAKFAST 90 tablet 0    lisinopril (PRINIVIL,ZESTRIL) 40 MG tablet Take 1 tablet (40 mg total) by mouth once daily. 90 tablet 0    ondansetron (ZOFRAN-ODT) 4 MG TbDL Take 1 tablet (4 mg total) by mouth every 8 (eight) hours as needed (nausea/vomiting). 12 tablet 0    ondansetron (ZOFRAN-ODT) 4 MG TbDL Take 1 tablet (4 mg total) by mouth every 6 (six) hours as needed (nausea). 15 tablet 0    oxycodone-acetaminophen (PERCOCET) 7.5-325 mg per tablet Take 1 tablet by mouth every 4 (four) hours as needed for Pain. 30 tablet 0    polyethylene glycol (GLYCOLAX) 17 gram/dose powder Take 17 g by mouth once daily. 1530 g 12    pravastatin (PRAVACHOL) 40 MG tablet Take 1 tablet (40 mg total) by mouth once daily. 90 tablet 0    quetiapine (SEROQUEL) 50 MG tablet Take 1 tablet (50 mg total) by mouth every evening. 90 tablet 0    senna (SENNA) 8.6 mg tablet Take 1 tablet by mouth 2 (two) times daily. 30 tablet 1    tramadol (ULTRAM) 50 mg tablet Take 1 tablet (50 mg total) by mouth 3 (three) times daily. 90 tablet 0    triamcinolone acetonide 0.1% (KENALOG) 0.1 % cream Apply to the affected area twice a day 30 g 2    venlafaxine (EFFEXOR-XR) 75 MG 24 hr capsule Two capsules daily 180 capsule 0     No current facility-administered medications for this visit.        Review of  patient's allergies indicates:   Allergen Reactions    Carbamazepine Swelling    Demerol [meperidine] Other (See Comments)     hallucinations    Morphine Nausea Only       Physical examination  Vitals Reviewed  Gen. Well-dressed well-nourished no apparent distress  Skin warm dry and intact.  No rashes noted.  Neck is supple without adenopathy  Chest.  Respirations are even unlabored.  Lungs are clear to auscultation.  Cardiac regular rate and rhythm.  No chest wall adenopathy noted.  Abdomen is soft and not distended.  Bowel sounds are present.  No tenderness during palpation of the abdomen.  No Hepatosplenomegaly noted.  No hernia noted.  No CVA tenderness to percussion.    Neuro. Awake alert oriented x4.  Normal judgment and cognition noted.  Extremities no clubbing cyanosis or edema noted.     Call or return to clinic prn if these symptoms worsen or fail to improve as anticipated.

## 2017-02-03 NOTE — MR AVS SNAPSHOT
[facility]  [address]  [place] [address]  Phone: [phone]  Fax: [phone]                  Ifeanyi Rutherford   2/3/2017 11:00 AM   Office Visit    Description:  Male : 1929   Provider:  Avery Silva NP   Department:  [facility]           Reason for Visit     Hospital Follow Up           Diagnoses this Visit        Comments    Constipation, unspecified constipation type    -  Primary     Trigeminal neuralgia pain         Optic neuropathy, ischemic, bilateral                To Do List           Goals (5 Years of Data)     None      Follow-Up and Disposition     Return if symptoms worsen or fail to improve.      [facility] On Call     [facility] On Call Nurse Care Line -  Assistance  Registered nurses in the [facility] On Call Center provide clinical advisement, health education, appointment booking, and other advisory services.  Call for this free service at [phone].             Medications           Message regarding Medications     Verify the changes and/or additions to your medication regime listed below are the same as discussed with your clinician today.  If any of these changes or additions are incorrect, please notify your healthcare provider.             Verify that the below list of medications is an accurate representation of the medications you are currently taking.  If none reported, the list may be blank. If incorrect, please contact your healthcare provider. Carry this list with you in case of emergency.           Current Medications     acetaminophen (TYLENOL) 325 MG tablet Take 2 tablets (650 mg total) by mouth every 6 (six) hours as needed for Pain.    AUROGUARD 5.4-1.4 % Drop Place 3 drops in ear(s) daily as needed (To left ear).     butalbital-acetaminophen-caff -40 mg (CAPACET) -40 mg Cap Take 1 capsule by mouth every 6 (six) hours as needed (headache/facial pain).    fluticasone (FLONASE) 50 mcg/actuation nasal spray 2  "sprays by Each Nare route once daily.    gabapentin (NEURONTIN) 300 MG capsule Take 1 capsule (300 mg total) by mouth 3 (three) times daily.    levothyroxine (SYNTHROID) 75 MCG tablet TAKE 1 TABLET BEFORE BREAKFAST    lisinopril (PRINIVIL,ZESTRIL) 40 MG tablet Take 1 tablet (40 mg total) by mouth once daily.    ondansetron (ZOFRAN-ODT) 4 MG TbDL Take 1 tablet (4 mg total) by mouth every 8 (eight) hours as needed (nausea/vomiting).    ondansetron (ZOFRAN-ODT) 4 MG TbDL Take 1 tablet (4 mg total) by mouth every 6 (six) hours as needed (nausea).    oxycodone-acetaminophen (PERCOCET) 7.5-325 mg per tablet Take 1 tablet by mouth every 4 (four) hours as needed for Pain.    polyethylene glycol (GLYCOLAX) 17 gram/dose powder Take 17 g by mouth once daily.    pravastatin (PRAVACHOL) 40 MG tablet Take 1 tablet (40 mg total) by mouth once daily.    quetiapine (SEROQUEL) 50 MG tablet Take 1 tablet (50 mg total) by mouth every evening.    senna (SENNA) 8.6 mg tablet Take 1 tablet by mouth 2 (two) times daily.    tramadol (ULTRAM) 50 mg tablet Take 1 tablet (50 mg total) by mouth 3 (three) times daily.    triamcinolone acetonide 0.1% (KENALOG) 0.1 % cream Apply to the affected area twice a day    venlafaxine (EFFEXOR-XR) 75 MG 24 hr capsule Two capsules daily           Clinical Reference Information           Your Vitals Were     BP Pulse Temp Height Weight SpO2    128/60 (BP Location: Left arm, Patient Position: Sitting, BP Method: Manual) 60 97.8 °F (36.6 °C) (Oral) 6' 2" (1.88 m) 95.5 kg (210 lb 8.6 oz) 97%    BMI                27.03 kg/m2          Blood Pressure          Most Recent Value    BP  128/60      Allergies as of 2/3/2017     Carbamazepine    Demerol [Meperidine]    Morphine      Immunizations Administered on Date of Encounter - 2/3/2017     None      Orders Placed During Today's Visit      Normal Orders This Visit    Ambulatory referral to Gastroenterology       Instructions    Increase fluids.        Language " Assistance Services     ATTENTION: Language assistance services are available, free of charge. Please call [phone].      ATENCIÓN: Si habla dustin, tiene a melo disposición servicios gratuitos de asistencia lingüística. Llame al [phone].     CHÚ Ý: N?u b?n nói Ti?ng Vi?t, có các d?ch v? h? tr? ngôn ng? mi?n phí dành cho b?n. G?i s? [phone].         [facility] complies with applicable Federal civil rights laws and does not discriminate on the basis of race, color, national origin, age, disability, or sex.

## 2017-02-06 RX ORDER — BUTALBITAL, ACETAMINOPHEN AND CAFFEINE 50; 325; 40 MG/1; MG/1; MG/1
1 CAPSULE ORAL EVERY 6 HOURS PRN
Qty: 20 CAPSULE | Refills: 12 | Status: CANCELLED | OUTPATIENT
Start: 2017-02-06 | End: 2017-03-08

## 2017-02-06 RX ORDER — BUTALBITAL, ACETAMINOPHEN AND CAFFEINE 50; 325; 40 MG/1; MG/1; MG/1
1 CAPSULE ORAL EVERY 6 HOURS PRN
Qty: 20 CAPSULE | Refills: 12 | Status: SHIPPED | OUTPATIENT
Start: 2017-02-06 | End: 2017-03-08

## 2017-02-06 NOTE — TELEPHONE ENCOUNTER
----- Message from Alma White sent at 2/4/2017  9:09 AM CST -----  Contact: spouse  Pt wife calling to request a refill of butalbital-acetaminophen-caff -40 mg (CAPACET) -40 mg Cap. Pt was prescribed this in hospital. Pt can be reached at [phone].

## 2017-02-07 ENCOUNTER — TELEPHONE (OUTPATIENT)
Dept: FAMILY MEDICINE | Facility: CLINIC | Age: 82
End: 2017-02-07

## 2017-02-07 NOTE — TELEPHONE ENCOUNTER
----- Message from Sadia Handley sent at 2/7/2017  4:33 PM CST -----  Contact: Julia/Wife/[phone]  Julia is following up on patient's prescription:  butalbital-acetaminophen-caff -40 mg (CAPACET) -40 mg Cap. Thank you.

## 2017-02-08 NOTE — TELEPHONE ENCOUNTER
----- Message from Payal Rodriguez sent at 2/8/2017 11:02 AM CST -----  Julia is following up on patient's prescription:  butalbital-acetaminophen-caff -40 mg (CAPACET) -40 mg Cap. She would like to pick this up today. Thank you.

## 2017-02-16 DIAGNOSIS — D33.3 UNILATERAL VESTIBULAR SCHWANNOMA: ICD-10-CM

## 2017-02-17 RX ORDER — TRAMADOL HYDROCHLORIDE 50 MG/1
50 TABLET ORAL 3 TIMES DAILY
Qty: 90 TABLET | Refills: 5 | Status: SHIPPED | OUTPATIENT
Start: 2017-02-17 | End: 2017-03-09 | Stop reason: SDUPTHER

## 2017-02-22 ENCOUNTER — TELEPHONE (OUTPATIENT)
Dept: NEUROSURGERY | Facility: CLINIC | Age: 82
End: 2017-02-22

## 2017-02-22 NOTE — TELEPHONE ENCOUNTER
SW PT & SW MANAGER OF NEUROSURGERY, THE [facility] PHARM[facility] HAS LOCATED A VENDOR AND IS CURRENTLY WORKING OUT THE DETAILS. WILL CONTACT PT WHEN IT IS AVAILABLE.

## 2017-02-22 NOTE — TELEPHONE ENCOUNTER
----- Message from Brigida Lucero sent at 2/21/2017  1:43 PM CST -----  Contact: krystal(wife)[phone]  Krystal would like to speak with nurse regarding an injection for pt.pls call

## 2017-02-24 DIAGNOSIS — I10 ESSENTIAL HYPERTENSION: ICD-10-CM

## 2017-02-26 RX ORDER — LISINOPRIL 40 MG/1
TABLET ORAL
Qty: 90 TABLET | Refills: 1 | Status: SHIPPED | OUTPATIENT
Start: 2017-02-26 | End: 2017-03-21 | Stop reason: SDUPTHER

## 2017-02-27 DIAGNOSIS — G89.29 CHRONIC FACIAL PAIN: Primary | ICD-10-CM

## 2017-02-27 DIAGNOSIS — R51.9 CHRONIC FACIAL PAIN: Primary | ICD-10-CM

## 2017-02-27 RX ORDER — HYDROCODONE BITARTRATE AND ACETAMINOPHEN 5; 325 MG/1; MG/1
1 TABLET ORAL EVERY 6 HOURS PRN
Qty: 120 TABLET | Refills: 0 | Status: SHIPPED | OUTPATIENT
Start: 2017-02-27 | End: 2017-03-09

## 2017-03-09 ENCOUNTER — OFFICE VISIT (OUTPATIENT)
Dept: FAMILY MEDICINE | Facility: CLINIC | Age: 82
End: 2017-03-09
Payer: MEDICARE

## 2017-03-09 VITALS
HEART RATE: 67 BPM | BODY MASS INDEX: 26.85 KG/M2 | WEIGHT: 209.19 LBS | HEIGHT: 74 IN | OXYGEN SATURATION: 97 % | TEMPERATURE: 99 F | DIASTOLIC BLOOD PRESSURE: 60 MMHG | SYSTOLIC BLOOD PRESSURE: 110 MMHG

## 2017-03-09 DIAGNOSIS — R05.9 COUGH: ICD-10-CM

## 2017-03-09 DIAGNOSIS — R53.81 DEBILITY: ICD-10-CM

## 2017-03-09 DIAGNOSIS — G50.0 TRIGEMINAL NEURALGIA PAIN: Primary | ICD-10-CM

## 2017-03-09 DIAGNOSIS — D33.3 UNILATERAL VESTIBULAR SCHWANNOMA: ICD-10-CM

## 2017-03-09 DIAGNOSIS — J30.2 SEASONAL ALLERGIC RHINITIS, UNSPECIFIED ALLERGIC RHINITIS TRIGGER: ICD-10-CM

## 2017-03-09 PROCEDURE — 1157F ADVNC CARE PLAN IN RCRD: CPT | Mod: S$GLB,,, | Performed by: INTERNAL MEDICINE

## 2017-03-09 PROCEDURE — 99499 UNLISTED E&M SERVICE: CPT | Mod: S$GLB,,, | Performed by: INTERNAL MEDICINE

## 2017-03-09 PROCEDURE — 1126F AMNT PAIN NOTED NONE PRSNT: CPT | Mod: S$GLB,,, | Performed by: INTERNAL MEDICINE

## 2017-03-09 PROCEDURE — 99999 PR PBB SHADOW E&M-EST. PATIENT-LVL III: CPT | Mod: PBBFAC,,, | Performed by: INTERNAL MEDICINE

## 2017-03-09 PROCEDURE — 1160F RVW MEDS BY RX/DR IN RCRD: CPT | Mod: S$GLB,,, | Performed by: INTERNAL MEDICINE

## 2017-03-09 PROCEDURE — 1159F MED LIST DOCD IN RCRD: CPT | Mod: S$GLB,,, | Performed by: INTERNAL MEDICINE

## 2017-03-09 PROCEDURE — 99215 OFFICE O/P EST HI 40 MIN: CPT | Mod: S$GLB,,, | Performed by: INTERNAL MEDICINE

## 2017-03-09 RX ORDER — TRAMADOL HYDROCHLORIDE 50 MG/1
50 TABLET ORAL 3 TIMES DAILY
Qty: 90 TABLET | Refills: 5 | Status: SHIPPED | OUTPATIENT
Start: 2017-03-09 | End: 2017-08-30 | Stop reason: SDUPTHER

## 2017-03-09 RX ORDER — BUTALBITAL, ACETAMINOPHEN AND CAFFEINE 50; 325; 40 MG/1; MG/1; MG/1
TABLET ORAL
COMMUNITY
Start: 2017-02-08 | End: 2017-03-09

## 2017-03-09 NOTE — PROGRESS NOTES
Chief complaint, follow-up on headache, cough        87-year-old white male with a chronic cough.  His wife helps clarify the history.  He does not really cough throughout the whole day but several times per day he will cough up some thick mucus.  In between these episodes he does not cough and has no other respiratory symptoms.  He does have rhinitis and suspected ALLERGIES.  He continues on Flonase.  Wife did try 2 days and Zyrtec but we discussed using Claritin or Allegra instead.  He has no glaucoma or urinary retention issues at this time.    He also has a headache between his eyes has been very chronic over a year.  It is worse when he goes in crowds and so he was wondering if it was anxiety.  The headache is there 90% of the day for years.  Seems to be between the eyes and above the nose.  Sometimes his nose will run with the headache.  He does have trigeminal neuralgia and I wonder if there is some involvement with this as well.  His headache is worse at the end of the day.  He only has vision out of the left eye and is supposed to wear a patch on the right eye.  Everything is blurry out of the right eye and we did discuss that perhaps the headache worsens due to vision strain and he should probably try wearing the patch.  We discussed keeping a count at home of how many times he coughs up the mucus without the use of the antihistamine and then compared after the antihistamine is added.  We discussed that will not be resolution of the mucus production but possibly improvement.    For the trigeminal neuralgia he is taking gabapentin 300 mg 3 times per day.  We discussed increasing the nighttime dose to 400, 500 or 600 and the wife does have some 100 mg pills at home.  We may thereafter increase further if indeed there is some response.  Again we are looking for improvement not resolution.  For his headache he was given hydrocodone by neurosurgery but he did not tolerate it well after a few doses.  Also given  Fioricet at one time and did not tolerate that as well.  Only medication he seems to tolerate as the tramadol and I will provide him with refills since he had difficulty getting a refill one time.    For the cough we also did a CT of the sinus which showed ethmoid thickening but I reviewed the ENT note    Did not feel there is any significant ENT or sinusitis issues going on.  We discussed that given that he really does not have a persistent cough I don't think a CT of the chest is indicated since the chest x-ray was normal and they agree.  It does not have the pattern of an infection or bronchitis causing persistent mucus production.  All these issues above reviewed at great length today and patient was counseled.Total time over 45 minutes with over 50% counseling.    .    Review of systems: No fevers or chills, no wheeze or shortness of breath, no symptoms of CHF, no sciatica, no other current other myalgias or arthralgias.    PAST SURGERIES:  Total knee replacements bilaterally.                                                                                                     PAST MEDICAL HISTORY:    Hypertension.   Hyperlipidemia.    Depression.           Generalized anxiety ([facility]).    Hypothyroidism.    Osteoarthritis.    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  Optic neuropathy, right eye.  Right-sided trigeminal neuralgia, seen by neurology, pain management and neurosurgery  Chronic constipation   Evaluation by [place] [facility] for NPH, apparently no response to spinal tap   Chronic vertigo   Vitamin D deficiency   Parkinsonism by [facility] evaluation    Morning hallucinations, possibly related to Seroquel                                                                                                           SOCIAL HISTORY:  He is  and lives with his spouse.  They have adult   children in the area and grandchildren.  He has never been a cigarette        smoker.      Vitals as above, general, no distress, walks with a walker, hard of hearing  HEENT: Conjunctivae clear doesn't of clear and moist, neck supple, thyroid nonpalpable, no reproducible headache pain to percussion across the 4 head      Labs, CT scan, outside ENT records, internal records reviewed as well    Ifeanyi was seen today for cough, nasal congestion and fatigue.    Diagnoses and all orders for this visit:    Trigeminal neuralgia pain, increase gabapentin and if indeed there is improvement in his central 4 head headache, that may suggest there is some involvement with trigeminal neuralgia.  They are in the process of pursuing trigeminal injection.  Continue tramadol for the pain    Unilateral vestibular schwannoma  -     tramadol (ULTRAM) 50 mg tablet; Take 1 tablet (50 mg total) by mouth 3 (three) times daily.    Cough, sporadic mucus production I don't think there is a pulmonary source but consider CT of the chest.  Both rhinitis.    Seasonal allergic rhinitis, unspecified allergic rhinitis trigger    Debility, they need a new rolling walker and a prescription will be sent    Other orders  -     Cancel: Prostate Specific Antigen, Diagnostic; Future

## 2017-03-20 DIAGNOSIS — R21 RASH: ICD-10-CM

## 2017-03-20 RX ORDER — TRIAMCINOLONE ACETONIDE 1 MG/G
CREAM TOPICAL
Qty: 30 G | Refills: 2 | Status: SHIPPED | OUTPATIENT
Start: 2017-03-20 | End: 2019-09-18

## 2017-03-20 NOTE — TELEPHONE ENCOUNTER
----- Message from Griselda Damon sent at 3/20/2017  9:06 AM CDT -----  Contact: Self  Refill : triamcinolone acetonide 0.1% (KENALOG) 0.1 % cream            [place] [place] [address]

## 2017-03-21 DIAGNOSIS — E78.5 HYPERLIPIDEMIA, UNSPECIFIED HYPERLIPIDEMIA TYPE: ICD-10-CM

## 2017-03-21 DIAGNOSIS — I10 ESSENTIAL HYPERTENSION: ICD-10-CM

## 2017-03-21 RX ORDER — LEVOTHYROXINE SODIUM 75 UG/1
TABLET ORAL
Qty: 90 TABLET | Refills: 0 | Status: SHIPPED | OUTPATIENT
Start: 2017-03-21 | End: 2017-03-23 | Stop reason: SDUPTHER

## 2017-03-21 RX ORDER — LISINOPRIL 40 MG/1
40 TABLET ORAL DAILY
Qty: 90 TABLET | Refills: 1 | Status: SHIPPED | OUTPATIENT
Start: 2017-03-21 | End: 2017-03-23 | Stop reason: SDUPTHER

## 2017-03-21 RX ORDER — PRAVASTATIN SODIUM 40 MG/1
40 TABLET ORAL DAILY
Qty: 90 TABLET | Refills: 0 | Status: SHIPPED | OUTPATIENT
Start: 2017-03-21 | End: 2017-03-23 | Stop reason: SDUPTHER

## 2017-03-23 DIAGNOSIS — E78.5 HYPERLIPIDEMIA, UNSPECIFIED HYPERLIPIDEMIA TYPE: ICD-10-CM

## 2017-03-23 DIAGNOSIS — I10 ESSENTIAL HYPERTENSION: ICD-10-CM

## 2017-03-23 NOTE — TELEPHONE ENCOUNTER
----- Message from Luz Loaiza sent at 3/23/2017 11:43 AM CDT -----  Contact: Krystal/angeline  PA required for pts recently sent refills w/ [employer] mail order. Fax number [phone]  Lisinopril  Synthroid  PRAVACHOL    Please contact Krystal at [phone]

## 2017-03-24 RX ORDER — LEVOTHYROXINE SODIUM 75 UG/1
TABLET ORAL
Qty: 90 TABLET | Refills: 0 | Status: SHIPPED | OUTPATIENT
Start: 2017-03-24 | End: 2017-05-31 | Stop reason: SDUPTHER

## 2017-03-24 RX ORDER — LISINOPRIL 40 MG/1
40 TABLET ORAL DAILY
Qty: 90 TABLET | Refills: 1 | Status: SHIPPED | OUTPATIENT
Start: 2017-03-24 | End: 2017-07-10 | Stop reason: SDUPTHER

## 2017-03-24 RX ORDER — PRAVASTATIN SODIUM 40 MG/1
40 TABLET ORAL DAILY
Qty: 90 TABLET | Refills: 0 | Status: SHIPPED | OUTPATIENT
Start: 2017-03-24 | End: 2017-05-31 | Stop reason: SDUPTHER

## 2017-03-24 NOTE — TELEPHONE ENCOUNTER
----- Message from Marla Mckenna sent at 3/24/2017 10:48 AM CDT -----  Contact: self  Pt prescriptions was cancelled at [facility]. Please call [phone]. Thanks

## 2017-03-27 RX ORDER — TRIAMCINOLONE ACETONIDE 1 MG/G
CREAM TOPICAL
Qty: 453.6 G | Refills: 1 | Status: SHIPPED | OUTPATIENT
Start: 2017-03-27 | End: 2018-02-26 | Stop reason: SDUPTHER

## 2017-04-04 ENCOUNTER — TELEPHONE (OUTPATIENT)
Dept: FAMILY MEDICINE | Facility: CLINIC | Age: 82
End: 2017-04-04

## 2017-04-04 NOTE — TELEPHONE ENCOUNTER
----- Message from Luz Loaiza sent at 4/4/2017 11:29 AM CDT -----  Contact: Krystal/spouse  Krystal has a question about pt's gabapentin dosage. Please contact her at [phone] around 3:30p.    Thanks

## 2017-04-04 NOTE — TELEPHONE ENCOUNTER
Stated,  is taking (Gabapentin 100 mg 2 capsule 3 times daily). Total 600 mg. Medication makes him very sleepy. Would like medication decreased. No other symptoms reported. Please advise.

## 2017-04-19 ENCOUNTER — OFFICE VISIT (OUTPATIENT)
Dept: FAMILY MEDICINE | Facility: CLINIC | Age: 82
End: 2017-04-19
Payer: MEDICARE

## 2017-04-19 VITALS
SYSTOLIC BLOOD PRESSURE: 130 MMHG | HEIGHT: 74 IN | HEART RATE: 74 BPM | TEMPERATURE: 98 F | BODY MASS INDEX: 26.86 KG/M2 | WEIGHT: 209.31 LBS | OXYGEN SATURATION: 94 % | DIASTOLIC BLOOD PRESSURE: 62 MMHG

## 2017-04-19 DIAGNOSIS — R60.9 EDEMA, UNSPECIFIED TYPE: ICD-10-CM

## 2017-04-19 DIAGNOSIS — M54.5 MIDLINE LOW BACK PAIN, UNSPECIFIED CHRONICITY, WITH SCIATICA PRESENCE UNSPECIFIED: Primary | ICD-10-CM

## 2017-04-19 DIAGNOSIS — K59.09 OTHER CONSTIPATION: ICD-10-CM

## 2017-04-19 PROCEDURE — 1125F AMNT PAIN NOTED PAIN PRSNT: CPT | Mod: S$GLB,,,

## 2017-04-19 PROCEDURE — 1157F ADVNC CARE PLAN IN RCRD: CPT | Mod: S$GLB,,,

## 2017-04-19 PROCEDURE — 99214 OFFICE O/P EST MOD 30 MIN: CPT | Mod: S$GLB,,,

## 2017-04-19 PROCEDURE — 1159F MED LIST DOCD IN RCRD: CPT | Mod: S$GLB,,,

## 2017-04-19 PROCEDURE — 99999 PR PBB SHADOW E&M-EST. PATIENT-LVL III: CPT | Mod: PBBFAC,,,

## 2017-04-19 PROCEDURE — 1160F RVW MEDS BY RX/DR IN RCRD: CPT | Mod: S$GLB,,,

## 2017-04-19 RX ORDER — BACLOFEN 10 MG/1
10 TABLET ORAL 3 TIMES DAILY
Qty: 45 TABLET | Refills: 11 | Status: SHIPPED | OUTPATIENT
Start: 2017-04-19 | End: 2017-07-20

## 2017-04-19 RX ORDER — LACTULOSE 10 G/15ML
SOLUTION ORAL; RECTAL
Qty: 473 ML | Refills: 5 | Status: SHIPPED | OUTPATIENT
Start: 2017-04-19 | End: 2017-07-20

## 2017-04-19 NOTE — MR AVS SNAPSHOT
[facility]  [address] [place] [address]  Phone: [phone]  Fax: [phone]                  Ifeanyi Rutherford   2017 3:20 PM   Office Visit    Description:  Male : 1929   Provider:  Jose Elias Bender Jr., MD   Department:  [facility]           Reason for Visit     Back Pain     Foot Swelling           Diagnoses this Visit        Comments    Midline low back pain, unspecified chronicity, with sciatica presence unspecified    -  Primary     Other constipation         Edema, unspecified type                To Do List           Future Appointments        Provider Department Dept Phone    2017 1:00 PM Jason Levy MD [facility] [phone]    2017 2:00 PM Avery Salazar MD [facility] [phone]      Goals (5 Years of Data)     None       These Medications        Disp Refills Start End    lactulose (CHRONULAC) 10 gram/15 mL solution 473 mL 5 2017     Two tbsp tid prn constipation    Pharmacy: [address] Ph #: [phone]       baclofen (LIORESAL) 10 MG tablet 45 tablet 11 2017    Take 1 tablet (10 mg total) by mouth 3 (three) times daily. - Oral    Pharmacy: [address] Ph #: [phone]         [facility]     [facility] Care [facility] -  Assistance  Unless otherwise directed by your provider, please contact [facility], our nurse care line that is available for  assistance.     Registered nurses in the [facility] provide: appointment scheduling, clinical advisement, health education, and other advisory services.  Call: [phone] (toll free)               Medications           Message regarding Medications     Verify the changes and/or additions to your medication regime listed below are the same as discussed with your clinician today.  If  any of these changes or additions are incorrect, please notify your healthcare provider.        START taking these NEW medications        Refills    lactulose (CHRONULAC) 10 gram/15 mL solution 5    Sig: Two tbsp tid prn constipation    Class: Normal    baclofen (LIORESAL) 10 MG tablet 11    Sig: Take 1 tablet (10 mg total) by mouth 3 (three) times daily.    Class: Normal    Route: Oral           Verify that the below list of medications is an accurate representation of the medications you are currently taking.  If none reported, the list may be blank. If incorrect, please contact your healthcare provider. Carry this list with you in case of emergency.           Current Medications     acetaminophen (TYLENOL) 325 MG tablet Take 2 tablets (650 mg total) by mouth every 6 (six) hours as needed for Pain.    AUROGUARD 5.4-1.4 % Drop Place 3 drops in ear(s) daily as needed (To left ear).     gabapentin (NEURONTIN) 300 MG capsule Take 1 capsule (300 mg total) by mouth 3 (three) times daily.    levothyroxine (SYNTHROID) 75 MCG tablet TAKE 1 TABLET BEFORE BREAKFAST    lisinopril (PRINIVIL,ZESTRIL) 40 MG tablet Take 1 tablet (40 mg total) by mouth once daily.    ondansetron (ZOFRAN-ODT) 4 MG TbDL Take 1 tablet (4 mg total) by mouth every 8 (eight) hours as needed (nausea/vomiting).    polyethylene glycol (GLYCOLAX) 17 gram/dose powder Take 17 g by mouth once daily.    pravastatin (PRAVACHOL) 40 MG tablet Take 1 tablet (40 mg total) by mouth once daily.    quetiapine (SEROQUEL) 50 MG tablet Take 1 tablet (50 mg total) by mouth every evening.    senna (SENNA) 8.6 mg tablet Take 1 tablet by mouth 2 (two) times daily.    tramadol (ULTRAM) 50 mg tablet Take 1 tablet (50 mg total) by mouth 3 (three) times daily.    triamcinolone acetonide 0.1% (KENALOG) 0.1 % cream Apply to the affected area twice a day    triamcinolone acetonide 0.1% (KENALOG) 0.1 % cream APPLY TO AFFECTED AREA TWICE DAILY    venlafaxine (EFFEXOR-XR) 75 MG 24 hr  "capsule Two capsules daily    baclofen (LIORESAL) 10 MG tablet Take 1 tablet (10 mg total) by mouth 3 (three) times daily.    fluticasone (FLONASE) 50 mcg/actuation nasal spray 2 sprays by Each Nare route once daily.    lactulose (CHRONULAC) 10 gram/15 mL solution Two tbsp tid prn constipation    ondansetron (ZOFRAN-ODT) 4 MG TbDL Take 1 tablet (4 mg total) by mouth every 6 (six) hours as needed (nausea).           Clinical Reference Information           Your Vitals Were     BP Pulse Temp Height Weight SpO2    130/62 (BP Location: Right arm, Patient Position: Sitting, BP Method: Manual) 74 97.9 °F (36.6 °C) 6' 2" (1.88 m) 95 kg (209 lb 5.2 oz) 94%    BMI                26.88 kg/m2          Blood Pressure          Most Recent Value    BP  130/62      Allergies as of 4/19/2017     Carbamazepine    Demerol [Meperidine]    Morphine      Immunizations Administered on Date of Encounter - 4/19/2017     None      Language Assistance Services     ATTENTION: Language assistance services are available, free of charge. Please call [phone].      ATENCIÓN: Si habla español, tiene a melo disposición servicios gratuitos de asistencia lingüística. Llame al [phone].     SHARON Ý: N?u b?n nói Ti?ng Vi?t, có các d?ch v? h? tr? ngôn ng? mi?n phí dành cho b?n. G?i s? [phone].         [facility] complies with applicable Federal civil rights laws and does not discriminate on the basis of race, color, national origin, age, disability, or sex.        "

## 2017-04-19 NOTE — PROGRESS NOTES
Chief Complaint   Patient presents with    Back Pain    Foot Swelling       HPI  Ifeanyi Rutherford is a 87 y.o. male who presents to the office today mostly because he has constipation.  This has been a chronic problem for a while, he does have a couple of pain problems, that is he has spinal stenosis, he also has acoustic neuroma of the right ear that gives him some facial pain as well as headache.  The patient uses tramadol, and very occasionally might use Norco.  Patient has tried multiple products for the constipation, including MiraLAX, which he takes twice a day but it doesn't help him very much.  No blood per rectum.    Also, the patient had some excruciating back pain recently, wonders if this could possibly be related to any constipation.  Having spinal stenosis alone may be enough of a problem not withstanding bowel problems.  There is been no specific traumatic injury.  Patient has never had back surgery.  There is no radiation of the pain to either lower extremity.  Patient has, by the way, had bilateral knee replacements.    Patient has been having a little bit more lower extremity edema that usual, there is no pain, patient does not have shortness of breath, CHF or significant renal dysfunction.    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    Chronic headache 9/12/2014    Dementia     worked up for NPH at  and had taps with no improvement    Depression     Hyperlipidemia     Hypertension     Hypothyroidism     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    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    Spinal stenosis     Trigeminal neuralgia pain 1/20/2015    Vertigo 9/11/2012    Vestibular schwannoma 6/4/2015       PAST SURGICAL HISTORY:  Past Surgical History:   Procedure Laterality Date    bilateral total knee arthroplasties       CHOLECYSTECTOMY      EYE SURGERY      GALLBLADDER SURGERY      THYROIDECTOMY      TONSILLECTOMY         SOCIAL HISTORY:  Social History     Social History    Marital status:      Spouse name: N/A    Number of children: N/A    Years of education: N/A     Occupational History    Not on file.     Social History Main Topics    Smoking status: Former Smoker    Smokeless tobacco: Never Used    Alcohol use No    Drug use: No    Sexual activity: Yes     Partners: Female     Other Topics Concern    Not on file     Social History Narrative    No narrative on file       FAMILY HISTORY:  Family History   Problem Relation Age of Onset    Heart disease Mother     Suicide Neg Hx     Schizophrenia Neg Hx        ALLERGIES AND MEDICATIONS: updated and reviewed.  Review of patient's allergies indicates:   Allergen Reactions    Carbamazepine Swelling    Demerol [meperidine] Other (See Comments)     hallucinations    Morphine Nausea Only     Current Outpatient Prescriptions   Medication Sig Dispense Refill    acetaminophen (TYLENOL) 325 MG tablet Take 2 tablets (650 mg total) by mouth every 6 (six) hours as needed for Pain.  0    AUROGUARD 5.4-1.4 % Drop Place 3 drops in ear(s) daily as needed (To left ear).       gabapentin (NEURONTIN) 300 MG capsule Take 1 capsule (300 mg total) by mouth 3 (three) times daily. (Patient taking differently: Take 200 mg by mouth 3 (three) times daily. ) 270 capsule 3    levothyroxine (SYNTHROID) 75 MCG tablet TAKE 1 TABLET BEFORE BREAKFAST 90 tablet 0    lisinopril (PRINIVIL,ZESTRIL) 40 MG tablet Take 1 tablet (40 mg total) by mouth once daily. 90 tablet 1    ondansetron (ZOFRAN-ODT) 4 MG TbDL Take 1 tablet (4 mg total) by mouth every 8 (eight) hours as needed (nausea/vomiting). 12 tablet 0    polyethylene glycol (GLYCOLAX) 17 gram/dose powder Take 17 g by mouth once daily. 1530 g 12    pravastatin (PRAVACHOL) 40 MG tablet Take 1 tablet (40 mg total) by mouth once  "daily. 90 tablet 0    quetiapine (SEROQUEL) 50 MG tablet Take 1 tablet (50 mg total) by mouth every evening. 90 tablet 0    senna (SENNA) 8.6 mg tablet Take 1 tablet by mouth 2 (two) times daily. 30 tablet 1    tramadol (ULTRAM) 50 mg tablet Take 1 tablet (50 mg total) by mouth 3 (three) times daily. 90 tablet 5    triamcinolone acetonide 0.1% (KENALOG) 0.1 % cream Apply to the affected area twice a day 30 g 2    triamcinolone acetonide 0.1% (KENALOG) 0.1 % cream APPLY TO AFFECTED AREA TWICE DAILY 453.6 g 1    venlafaxine (EFFEXOR-XR) 75 MG 24 hr capsule Two capsules daily 180 capsule 0    baclofen (LIORESAL) 10 MG tablet Take 1 tablet (10 mg total) by mouth 3 (three) times daily. 45 tablet 11    fluticasone (FLONASE) 50 mcg/actuation nasal spray 2 sprays by Each Nare route once daily. 1 Bottle 6    lactulose (CHRONULAC) 10 gram/15 mL solution Two tbsp tid prn constipation 473 mL 5    ondansetron (ZOFRAN-ODT) 4 MG TbDL Take 1 tablet (4 mg total) by mouth every 6 (six) hours as needed (nausea). 15 tablet 0     No current facility-administered medications for this visit.        ROS  Review of Systems    Physical Exam  Vitals:    04/19/17 1530   BP: 130/62   Pulse: 74   Temp: 97.9 °F (36.6 °C)    Body mass index is 26.88 kg/(m^2).  Weight: 95 kg (209 lb 5.2 oz)   Height: 6' 2" (188 cm)     Physical Exam   Constitutional: He is oriented to person, place, and time. He appears well-developed and well-nourished. No distress.   Abdominal: Soft. Bowel sounds are normal. He exhibits no distension and no mass. There is no tenderness.   Musculoskeletal: He exhibits edema (there is probably 1-2 plus lower extremity edema.).   Patient uses a walker in order to try to keep his balance and not fall.   Neurological: He is alert and oriented to person, place, and time.   Psychiatric: He has a normal mood and affect. His behavior is normal.       Health Maintenance       Date Due Completion Date    TETANUS VACCINE 7/4/1947 " ---    Zoster Vaccine 7/4/1989 ---    PROSTATE-SPECIFIC ANTIGEN 4/26/2013 4/26/2012    Pneumococcal (65+) (2 of 2 - PPSV23) 4/12/2017 4/12/2016    Lipid Panel 12/3/2020 12/3/2015          Assessment & Plan    1. Midline low back pain, unspecified chronicity, with sciatica presence unspecified  Patient has plenty of pain medication, I'm suggesting some muscle relaxant, heat/ice, avoid aggravating activity, but the most important thing is that the patient does not fall.  His safety is of utmost importance.  - baclofen (LIORESAL) 10 MG tablet; Take 1 tablet (10 mg total) by mouth 3 (three) times daily.  Dispense: 45 tablet; Refill: 11    2. Other constipation  Patient asked if Metamucil might be helpful.  It might be.  I would continue with the MiraLAX, for 2 or 3 times a week we will try the following:  - lactulose (CHRONULAC) 10 gram/15 mL solution; Two tbsp tid prn constipation  Dispense: 473 mL; Refill: 5  One of the newer medications, such as Linzess, might be helpful in the future when the patient follows up with his PCP.    3. Edema, unspecified type  Reassured.  There is no reason to add more medications for the patient's regimen, nor do I want to have the patient traveling to the bathroom too much given his significant dizziness/vertigo and gait impairments.      No Follow-up on file.   The patient's wife, Krystal, agreed with this plan of care.  The patient will be best served follow-up with his primary care physician for further evaluation if needed.

## 2017-05-02 ENCOUNTER — LAB VISIT (OUTPATIENT)
Dept: LAB | Facility: HOSPITAL | Age: 82
End: 2017-05-02
Attending: INTERNAL MEDICINE
Payer: MEDICARE

## 2017-05-02 ENCOUNTER — OFFICE VISIT (OUTPATIENT)
Dept: FAMILY MEDICINE | Facility: CLINIC | Age: 82
End: 2017-05-02
Payer: MEDICARE

## 2017-05-02 VITALS
SYSTOLIC BLOOD PRESSURE: 128 MMHG | DIASTOLIC BLOOD PRESSURE: 78 MMHG | TEMPERATURE: 98 F | HEIGHT: 74 IN | WEIGHT: 207.88 LBS | BODY MASS INDEX: 26.68 KG/M2 | HEART RATE: 78 BPM | OXYGEN SATURATION: 96 %

## 2017-05-02 DIAGNOSIS — I70.0 ATHEROSCLEROSIS OF AORTA: ICD-10-CM

## 2017-05-02 DIAGNOSIS — E03.9 HYPOTHYROIDISM, UNSPECIFIED TYPE: ICD-10-CM

## 2017-05-02 DIAGNOSIS — F33.0 MAJOR DEPRESSIVE DISORDER, RECURRENT EPISODE, MILD: ICD-10-CM

## 2017-05-02 DIAGNOSIS — N18.3 CKD (CHRONIC KIDNEY DISEASE), STAGE 3 (MODERATE): ICD-10-CM

## 2017-05-02 DIAGNOSIS — E55.9 VITAMIN D DEFICIENCY DISEASE: ICD-10-CM

## 2017-05-02 DIAGNOSIS — I10 ESSENTIAL HYPERTENSION: ICD-10-CM

## 2017-05-02 DIAGNOSIS — F39 MOOD DISORDER: ICD-10-CM

## 2017-05-02 DIAGNOSIS — R05.3 CHRONIC COUGH: ICD-10-CM

## 2017-05-02 DIAGNOSIS — Z00.00 ROUTINE MEDICAL EXAM: Primary | ICD-10-CM

## 2017-05-02 DIAGNOSIS — K59.09 CHRONIC CONSTIPATION: ICD-10-CM

## 2017-05-02 DIAGNOSIS — G20.C PARKINSONISM, UNSPECIFIED PARKINSONISM TYPE: ICD-10-CM

## 2017-05-02 DIAGNOSIS — G50.0 TRIGEMINAL NEURALGIA: ICD-10-CM

## 2017-05-02 LAB
25(OH)D3+25(OH)D2 SERPL-MCNC: 24 NG/ML
ANION GAP SERPL CALC-SCNC: 11 MMOL/L
BUN SERPL-MCNC: 19 MG/DL
CALCIUM SERPL-MCNC: 8.9 MG/DL
CHLORIDE SERPL-SCNC: 106 MMOL/L
CO2 SERPL-SCNC: 27 MMOL/L
CREAT SERPL-MCNC: 1.3 MG/DL
EST. GFR  (AFRICAN AMERICAN): 56.7 ML/MIN/1.73 M^2
EST. GFR  (NON AFRICAN AMERICAN): 49.1 ML/MIN/1.73 M^2
GLUCOSE SERPL-MCNC: 100 MG/DL
POTASSIUM SERPL-SCNC: 4.5 MMOL/L
SODIUM SERPL-SCNC: 144 MMOL/L

## 2017-05-02 PROCEDURE — 99397 PER PM REEVAL EST PAT 65+ YR: CPT | Mod: 25,S$GLB,, | Performed by: INTERNAL MEDICINE

## 2017-05-02 PROCEDURE — 1160F RVW MEDS BY RX/DR IN RCRD: CPT | Mod: S$GLB,,, | Performed by: INTERNAL MEDICINE

## 2017-05-02 PROCEDURE — 1157F ADVNC CARE PLAN IN RCRD: CPT | Mod: S$GLB,,, | Performed by: INTERNAL MEDICINE

## 2017-05-02 PROCEDURE — 99214 OFFICE O/P EST MOD 30 MIN: CPT | Mod: 25,S$GLB,, | Performed by: INTERNAL MEDICINE

## 2017-05-02 PROCEDURE — 99999 PR PBB SHADOW E&M-EST. PATIENT-LVL III: CPT | Mod: PBBFAC,,, | Performed by: INTERNAL MEDICINE

## 2017-05-02 PROCEDURE — 99499 UNLISTED E&M SERVICE: CPT | Mod: S$GLB,,, | Performed by: INTERNAL MEDICINE

## 2017-05-02 PROCEDURE — 36415 COLL VENOUS BLD VENIPUNCTURE: CPT | Mod: PO

## 2017-05-02 PROCEDURE — 82306 VITAMIN D 25 HYDROXY: CPT

## 2017-05-02 PROCEDURE — 80048 BASIC METABOLIC PNL TOTAL CA: CPT

## 2017-05-02 PROCEDURE — 1159F MED LIST DOCD IN RCRD: CPT | Mod: S$GLB,,, | Performed by: INTERNAL MEDICINE

## 2017-05-02 RX ORDER — MONTELUKAST SODIUM 10 MG/1
10 TABLET ORAL NIGHTLY
Qty: 30 TABLET | Refills: 12 | Status: SHIPPED | OUTPATIENT
Start: 2017-05-02 | End: 2017-06-01

## 2017-05-02 RX ORDER — FLUTICASONE PROPIONATE 50 MCG
2 SPRAY, SUSPENSION (ML) NASAL DAILY
Qty: 1 BOTTLE | Refills: 12 | Status: SHIPPED | OUTPATIENT
Start: 2017-05-02 | End: 2017-06-01

## 2017-05-02 RX ORDER — LUBIPROSTONE 24 UG/1
24 CAPSULE ORAL 2 TIMES DAILY WITH MEALS
Qty: 60 CAPSULE | Refills: 6 | Status: SHIPPED | OUTPATIENT
Start: 2017-05-02 | End: 2017-07-20

## 2017-05-02 NOTE — PROGRESS NOTES
Chief complaint, physical exam        87-year-old white male here for his physical exam.  He is here with his wife.  He's active as his medical issues allowed using a walker.  Regarding cancer screening, based on his age prostate and colon screening are no longer age-appropriate    ROS:   CONST: weight stable. EYES: no vision change. ENT: no sore throat. CV: no chest pain w/ exertion. RESP: no shortness of breath. GI: no nausea, vomiting, diarrhea. No dysphagia. : no urinary issues. MUSCULOSKELETAL: no new myalgias or arthralgias. SKIN: no new changes. NEURO: no focal deficits. PSYCH: no new issues. ENDOCRINE: no polyuria. HEME: no lymph nodes. ALLERGY: no general pruritis.    PAST SURGERIES:  Total knee replacements bilaterally.                                                                                                     PAST MEDICAL HISTORY:    Hypertension.   Hyperlipidemia.    Depression.           Generalized anxiety ([facility]).    Hypothyroidism.    Osteoarthritis.    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  Optic neuropathy, right eye.  Right-sided trigeminal neuralgia, seen by neurology, pain management and neurosurgery  Chronic constipation   Evaluation by [facility] for NPH, apparently no response to spinal tap   Chronic vertigo   Vitamin D deficiency   Parkinsonism by [facility] evaluation    Morning hallucinations, possibly related to Seroquel                                                                                                           SOCIAL HISTORY:  He is  and lives with his spouse.  They have adult   children in the area and grandchildren.  He has never been a cigarette       smoker.      Vitals as above,  Gen: no distress  EYES: conjunctiva clear, non-icteric, PERRL  ENT: nose clear, nasal mucosa normal, oropharynx clear and moist, teeth good  NECK:supple, thyroid non-palpable  RESP: effort is good, lungs clear  CV: heart RRR w/o  murmur, gallops or rubs; no carotid bruits, no edema  GI: abdomen soft, non-distended, non-tender, no hepatosplenomegaly  MS: gait with walker, no clubbing or cyanosis of the digits  SKIN: no rashes, warm to touch      Labs reviewed    Ifeanyi was seen today for cough.    Diagnoses and all orders for this visit:    Routine medical exam, labs reviewed, patient to remain as active as possible, currently still no indication to pursue any prostate or colon cancer screening                                              Additional evaluation and management issues:    Additionally, patient has numerous other medical issues to address.  We reviewed his prior labs from within the last 6 months and he is euthyroid on current thyroid supplement.  His blood pressure runs excellent.  His depression is under good control with current medications.  He has parkinsonism which does slow him down as well as chronic vertigo and he is doing well using a walker.  He's taking some vitamin D supplements and we will reassess next    Regarding his chronic cough, he continues to have mucus collecting in the back of the throat leading to a cough but is not a chronic respiratory cough or shortness of breath.  Apparently sinusitis was ruled out by an ENT evaluation is CT scan.  His chest x-rays unremarkable.  It still sounds very much like ALLERGIES and he thinks it's postnasal drip.  He's currently taking Claritin.  He tolerated Flonase in the past but ran out and we will restart.  Does not appear he tried Singulair and we discussed triple therapy and assessing for improvement.    Regarding trigeminal neuralgia he's taking the gabapentin morning and evening and is sleepy all day until about 3 PM so we discussed holding the morning gabapentin and assess.  He takes tramadol for the pain and we might need to assess if that's causing sedation.    He has atherosclerosis of aorta noted.  He has chronic kidney disease which I will reassess  today.    Another major issue is chronic constipation.  He has a bowel movement every 4 days.  This is been a lifelong problem.  They're doing the MiraLAX twice a day but then on the fourth 80 does still need to take a Dulcolax laxative to go to the bathroom.  We discussed using the MiraLAX 3 times per day.  He was given lactulose but that caused cramps.  It looks like he was on a prescription in the past twice daily  in September but they can't remember there actually tried her to not so we will represcribed and see if it's covered--amitiza.  All these issues reviewed and patient counseled an evaluation and management we based upon time counselingTotal time over 25 minutes with over 50% counseling.             assessment and plan:      Hypothyroidism, unspecified type, Euthyroid    Essential hypertension, Chronic and stable    Major depressive disorder, recurrent episode, mild, Chronic and stable    Parkinsonism, unspecified Parkinsonism type    Vitamin D deficiency disease  -     Vitamin D; Future    Chronic cough, Suspect ALLERGIES, restart triple therapy    Trigeminal neuralgia, Possible side effects of gabapentin    Atherosclerosis of aorta    Mood disorder    CKD (chronic kidney disease), stage 3 (moderate)Reassess  -     Basic metabolic panel; Future    Chronic constipation, Chronic problem, escalate MiraLAX and try other prescription medication        Other orders  -     lubiprostone (AMITIZA) 24 MCG Cap; Take 1 capsule (24 mcg total) by mouth 2 (two) times daily with meals.  -     montelukast (SINGULAIR) 10 mg tablet; Take 1 tablet (10 mg total) by mouth every evening.  -     fluticasone (FLONASE) 50 mcg/actuation nasal spray; 2 sprays by Each Nare route once daily.

## 2017-05-03 ENCOUNTER — TELEPHONE (OUTPATIENT)
Dept: FAMILY MEDICINE | Facility: CLINIC | Age: 82
End: 2017-05-03

## 2017-05-03 NOTE — TELEPHONE ENCOUNTER
Call wife in patient with results    The recent lab work showed that the kidneys and electrolytes are all normal.    The vitamin D level is still low so you need to increase whatever supplement you are taking.  Typically we can go up by 2193-7726 units above what you are taking

## 2017-05-18 ENCOUNTER — TELEPHONE (OUTPATIENT)
Dept: FAMILY MEDICINE | Facility: CLINIC | Age: 82
End: 2017-05-18

## 2017-05-18 NOTE — TELEPHONE ENCOUNTER
----- Message from Vivian Rodriguez sent at 5/18/2017  9:10 AM CDT -----  Contact: Self   Patient says he has stomach pains . Please call to advise at [phone]

## 2017-05-18 NOTE — TELEPHONE ENCOUNTER
Call wife, if indeed had a recent bowel movement and feels that he cannot pass the bowels at this point, likely needs to attack the constipation from the bottom, may be getting a degree of impaction.  They may want to try enemas and glycerin suppositories or Dulcolax suppositories but definitely probably try the enema      If in severe pain at the bottom and cannot pass bowels, also sometimes need to go to the emergency room to be disimpacted

## 2017-05-18 NOTE — TELEPHONE ENCOUNTER
----- Message from Sadia Handley sent at 5/18/2017 10:31 AM CDT -----  Contact: Self/[phone]  Patient would like the staff to call him after two today regarding his previous message. Thank you.

## 2017-05-18 NOTE — TELEPHONE ENCOUNTER
Stated, still constipated. Last BM was this morning (not formed. Just small amount of liquid). Taking Miralax and a stool softener twice daily. He rated his stomach pain a 10. He has been constipated for over a week. Has been hydrating as much as he can. He simply can not go. Feels that something is wrong. No other symptoms reported. Please advise.

## 2017-05-31 DIAGNOSIS — E78.5 HYPERLIPIDEMIA, UNSPECIFIED HYPERLIPIDEMIA TYPE: ICD-10-CM

## 2017-06-01 RX ORDER — PRAVASTATIN SODIUM 40 MG/1
TABLET ORAL
Qty: 90 TABLET | Refills: 0 | Status: SHIPPED | OUTPATIENT
Start: 2017-06-01 | End: 2017-07-10 | Stop reason: SDUPTHER

## 2017-06-01 RX ORDER — LEVOTHYROXINE SODIUM 75 UG/1
TABLET ORAL
Qty: 90 TABLET | Refills: 0 | Status: SHIPPED | OUTPATIENT
Start: 2017-06-01 | End: 2017-07-10 | Stop reason: SDUPTHER

## 2017-06-02 ENCOUNTER — OFFICE VISIT (OUTPATIENT)
Dept: OPHTHALMOLOGY | Facility: CLINIC | Age: 82
End: 2017-06-02
Payer: MEDICARE

## 2017-06-02 DIAGNOSIS — Z96.1 PSEUDOPHAKIA: ICD-10-CM

## 2017-06-02 DIAGNOSIS — H04.123 DRY EYE SYNDROME, BILATERAL: Primary | ICD-10-CM

## 2017-06-02 DIAGNOSIS — H52.7 REFRACTIVE ERROR: ICD-10-CM

## 2017-06-02 DIAGNOSIS — H52.531: ICD-10-CM

## 2017-06-02 DIAGNOSIS — H47.013 OPTIC NEUROPATHY, ISCHEMIC, BILATERAL: ICD-10-CM

## 2017-06-02 DIAGNOSIS — D31.31 CHOROIDAL NEVUS OF RIGHT EYE: ICD-10-CM

## 2017-06-02 PROCEDURE — 92014 COMPRE OPH EXAM EST PT 1/>: CPT | Mod: S$GLB,,, | Performed by: OPHTHALMOLOGY

## 2017-06-02 PROCEDURE — 99999 PR PBB SHADOW E&M-EST. PATIENT-LVL II: CPT | Mod: PBBFAC,,, | Performed by: OPHTHALMOLOGY

## 2017-06-03 PROBLEM — H52.531: Status: ACTIVE | Noted: 2017-06-03

## 2017-06-03 NOTE — PROGRESS NOTES
Subjective:       Patient ID: Ifeanyi Rutherford is a 87 y.o. male.    Chief Complaint: Dry Eye (SULLY)    HPI  Review of Systems    Objective:      Physical Exam    Assessment:       1. Dry eye syndrome, bilateral    2. Accommodation spasm, right    3. Optic neuropathy, ischemic, bilateral    4. Choroidal nevus of right eye    5. Refractive error    6. Pseudophakia        Plan:       SULLY-Causing blurry vision OS. Needs more AT's.     Accommodative spasm OD-Causing HA's around OD.  NAION OD>OS-Stable.   C. Nevus OD-Stable.  RE-Pt wants MRx.        AT's.  Give MRx OS.  RTC 1 yr.

## 2017-06-15 ENCOUNTER — OFFICE VISIT (OUTPATIENT)
Dept: FAMILY MEDICINE | Facility: CLINIC | Age: 82
End: 2017-06-15
Payer: MEDICARE

## 2017-06-15 VITALS
WEIGHT: 206.38 LBS | DIASTOLIC BLOOD PRESSURE: 70 MMHG | HEART RATE: 70 BPM | OXYGEN SATURATION: 97 % | TEMPERATURE: 99 F | SYSTOLIC BLOOD PRESSURE: 114 MMHG | HEIGHT: 74 IN | BODY MASS INDEX: 26.49 KG/M2

## 2017-06-15 DIAGNOSIS — G50.0 TRIGEMINAL NEURALGIA PAIN: Primary | ICD-10-CM

## 2017-06-15 DIAGNOSIS — R51.9 CHRONIC INTRACTABLE HEADACHE, UNSPECIFIED HEADACHE TYPE: ICD-10-CM

## 2017-06-15 DIAGNOSIS — G89.29 CHRONIC INTRACTABLE HEADACHE, UNSPECIFIED HEADACHE TYPE: ICD-10-CM

## 2017-06-15 DIAGNOSIS — D33.3 VESTIBULAR SCHWANNOMA: ICD-10-CM

## 2017-06-15 PROCEDURE — 1159F MED LIST DOCD IN RCRD: CPT | Mod: S$GLB,,, | Performed by: NURSE PRACTITIONER

## 2017-06-15 PROCEDURE — 1125F AMNT PAIN NOTED PAIN PRSNT: CPT | Mod: S$GLB,,, | Performed by: NURSE PRACTITIONER

## 2017-06-15 PROCEDURE — 99999 PR PBB SHADOW E&M-EST. PATIENT-LVL V: CPT | Mod: PBBFAC,,, | Performed by: NURSE PRACTITIONER

## 2017-06-15 PROCEDURE — 99214 OFFICE O/P EST MOD 30 MIN: CPT | Mod: S$GLB,,, | Performed by: NURSE PRACTITIONER

## 2017-06-15 PROCEDURE — 3008F BODY MASS INDEX DOCD: CPT | Mod: S$GLB,,, | Performed by: NURSE PRACTITIONER

## 2017-06-15 PROCEDURE — 1157F ADVNC CARE PLAN IN RCRD: CPT | Mod: S$GLB,,, | Performed by: NURSE PRACTITIONER

## 2017-06-15 NOTE — PATIENT INSTRUCTIONS
Trigeminal Neuralgia  You have trigeminal neuralgia. This is pain caused by irritation of the trigeminal nerve on your face. Symptoms include sudden, sharp pain in your head or face. It may feel like an electric shock. It can last for several seconds or minutes. It usually happens on only 1 side of your face. Pain may be triggered by things like moving your jaw or a touch on the skin of your face. The pain may be caused by something irritating the trigeminal nerve, such as a blood vessel pressing against it. But the exact cause of this problem often isnt known. Although it can be quite painful, the condition isnt dangerous.  Trigeminal neuralgia is often treated with medicines. These include anti-seizure medicines or antidepressants. Certain other treatments may also help. In some cases, you may need surgery.    Home care  Your healthcare provider may prescribe medicines to help relieve and prevent pain. Take all medicines as directed. Please note that it may take several changes in dose and medicines before the right combination is found that controls the pain.  General care:  · Plan to rest at home today.  · Avoid any specific activities that seem to trigger the pain.  · Over the next few weeks, keep a pain diary. Write down when your symptoms happen and how they feel. Certain activities such as touching your face, chewing, talking, or brushing your teeth may bring on the pain. Cold air can also trigger the pain. Make sure you write down any triggers and discuss these with your healthcare provider. This will help guide treatment.  Follow-up care  Follow up with your healthcare provider, or as advised. If you were referred to a neurologist, be sure to make an appointment.  For more information on your condition, visit:  · [employer] www.fpa-support.org  When to seek medical advice  Call your healthcare provider right away if any of these occur:  · Fever of 100.4°F (38°C) or higher, or as  advised  · Headache with very stiff neck  · You arent able to keep liquids down (repeated vomiting)  · Extreme drowsiness or confusion  · Dizziness or fainting  · A new feeling of weakness or numbness or tingling in your arm, leg, or face  · Difficulty speaking or seeing  Date Last Reviewed: 8/1/2016  © 3031-2668 [employer]. [address] [place] [address], [place] [address]. All rights reserved. This information is not intended as a substitute for professional medical care. Always follow your healthcare professional's instructions.

## 2017-07-10 DIAGNOSIS — I10 ESSENTIAL HYPERTENSION: ICD-10-CM

## 2017-07-10 DIAGNOSIS — E78.5 HYPERLIPIDEMIA, UNSPECIFIED HYPERLIPIDEMIA TYPE: ICD-10-CM

## 2017-07-10 RX ORDER — LEVOTHYROXINE SODIUM 75 UG/1
TABLET ORAL
Qty: 90 TABLET | Refills: 0 | Status: SHIPPED | OUTPATIENT
Start: 2017-07-10 | End: 2017-07-10 | Stop reason: SDUPTHER

## 2017-07-10 RX ORDER — PRAVASTATIN SODIUM 40 MG/1
TABLET ORAL
Qty: 90 TABLET | Refills: 0 | Status: SHIPPED | OUTPATIENT
Start: 2017-07-10 | End: 2017-07-10 | Stop reason: SDUPTHER

## 2017-07-10 RX ORDER — LISINOPRIL 40 MG/1
TABLET ORAL
Qty: 90 TABLET | Refills: 1 | Status: SHIPPED | OUTPATIENT
Start: 2017-07-10 | End: 2018-01-15 | Stop reason: SDUPTHER

## 2017-07-10 RX ORDER — PRAVASTATIN SODIUM 40 MG/1
TABLET ORAL
Qty: 90 TABLET | Refills: 0 | Status: SHIPPED | OUTPATIENT
Start: 2017-07-10 | End: 2017-08-28 | Stop reason: SDUPTHER

## 2017-07-10 RX ORDER — LEVOTHYROXINE SODIUM 75 UG/1
TABLET ORAL
Qty: 90 TABLET | Refills: 0 | Status: SHIPPED | OUTPATIENT
Start: 2017-07-10 | End: 2017-08-28 | Stop reason: SDUPTHER

## 2017-07-13 ENCOUNTER — TELEPHONE (OUTPATIENT)
Dept: FAMILY MEDICINE | Facility: CLINIC | Age: 82
End: 2017-07-13

## 2017-07-13 NOTE — TELEPHONE ENCOUNTER
----- Message from Marcellus Matos sent at 7/12/2017  5:29 PM CDT -----  Contact: Self   Pt called to get an urgent appt to discuss medication change and would like a callback [phone].

## 2017-07-13 NOTE — TELEPHONE ENCOUNTER
Patient has been taking the tramadol and gabapentin together and wakes up in the morning with a really bad headache. He is asking if these two should be taken together and now he is having the sinus issue and does not know what he can take for it. Please advise

## 2017-07-13 NOTE — TELEPHONE ENCOUNTER
Sometimes tramadol can trigger a headache. With sinus issues also consider sinus headache. He needs an apt for assessment to determine.

## 2017-07-15 DIAGNOSIS — K59.09 CHRONIC CONSTIPATION: Primary | ICD-10-CM

## 2017-07-15 NOTE — TELEPHONE ENCOUNTER
----- Message from Iesha Vasques sent at 7/14/2017  1:07 PM CDT -----  Contact: [facility] mail order  Pt requesting a  script for Polyethylene powder 4 cans. Pls call [facility] order. Thanks.....CHELY

## 2017-07-16 RX ORDER — POLYETHYLENE GLYCOL 3350 17 G/17G
17 POWDER, FOR SOLUTION ORAL DAILY
Qty: 1530 G | Refills: 4 | Status: SHIPPED | OUTPATIENT
Start: 2017-07-16 | End: 2018-04-10 | Stop reason: SDUPTHER

## 2017-07-20 ENCOUNTER — OFFICE VISIT (OUTPATIENT)
Dept: NEUROLOGY | Facility: CLINIC | Age: 82
End: 2017-07-20
Payer: MEDICARE

## 2017-07-20 VITALS
HEIGHT: 74 IN | HEART RATE: 64 BPM | BODY MASS INDEX: 26.49 KG/M2 | SYSTOLIC BLOOD PRESSURE: 130 MMHG | WEIGHT: 206.38 LBS | DIASTOLIC BLOOD PRESSURE: 70 MMHG

## 2017-07-20 DIAGNOSIS — M79.2 NEURALGIA: Primary | ICD-10-CM

## 2017-07-20 DIAGNOSIS — G50.0 TRIGEMINAL NEURALGIA PAIN: ICD-10-CM

## 2017-07-20 DIAGNOSIS — K13.79 MOUTH PAIN: ICD-10-CM

## 2017-07-20 PROCEDURE — 99215 OFFICE O/P EST HI 40 MIN: CPT | Mod: S$GLB,,, | Performed by: NEUROLOGICAL SURGERY

## 2017-07-20 PROCEDURE — 1157F ADVNC CARE PLAN IN RCRD: CPT | Mod: S$GLB,,, | Performed by: NEUROLOGICAL SURGERY

## 2017-07-20 PROCEDURE — 1126F AMNT PAIN NOTED NONE PRSNT: CPT | Mod: S$GLB,,, | Performed by: NEUROLOGICAL SURGERY

## 2017-07-20 PROCEDURE — 1159F MED LIST DOCD IN RCRD: CPT | Mod: S$GLB,,, | Performed by: NEUROLOGICAL SURGERY

## 2017-07-20 PROCEDURE — 99499 UNLISTED E&M SERVICE: CPT | Mod: S$GLB,,, | Performed by: NEUROLOGICAL SURGERY

## 2017-07-20 PROCEDURE — 99999 PR PBB SHADOW E&M-EST. PATIENT-LVL II: CPT | Mod: PBBFAC,,, | Performed by: NEUROLOGICAL SURGERY

## 2017-07-20 RX ORDER — OXYCODONE AND ACETAMINOPHEN 7.5; 325 MG/1; MG/1
1 TABLET ORAL EVERY 4 HOURS PRN
COMMUNITY
End: 2018-02-26

## 2017-07-20 RX ORDER — LIDOCAINE HYDROCHLORIDE 20 MG/ML
SOLUTION OROPHARYNGEAL EVERY 6 HOURS
Qty: 100 ML | Refills: 3 | Status: SHIPPED | OUTPATIENT
Start: 2017-07-20 | End: 2020-08-13 | Stop reason: SDUPTHER

## 2017-07-20 NOTE — LETTER
July 23, 2017      Ashleigh Garcia, NP  [address] [facility] [address]           [facility] [address] [facility] [address]  [facility] [address]  Phone: [phone]  Fax: [phone]          Patient: Ifeanyi Rutherford   MR Number: [id number]   YOB: 1929   Date of Visit: 7/20/2017       Dear Ashleigh Garcia:    Thank you for referring Ifeanyi Rutherford to me for evaluation. Attached you will find relevant portions of my assessment and plan of care.    If you have questions, please do not hesitate to call me. I look forward to following Ifeanyi Rutherford along with you.    Sincerely,        Enclosure  CC:  No Recipients    If you would like to receive this communication electronically, please contact [email or web address] or [phone] to request more information on [employer] access.    For providers and/or their staff who would like to refer a patient to [facility], please contact us through our one-stop-shop provider referral line, Jerilyn Millan, at [phone].    If you feel you have received this communication in error or would no longer like to receive these types of communications, please e-mail [email or web address]

## 2017-07-21 ENCOUNTER — TELEPHONE (OUTPATIENT)
Dept: NEUROLOGY | Facility: CLINIC | Age: 82
End: 2017-07-21

## 2017-07-21 RX ORDER — PREGABALIN 75 MG/1
75 CAPSULE ORAL 2 TIMES DAILY
Qty: 60 CAPSULE | Refills: 6 | Status: SHIPPED | OUTPATIENT
Start: 2017-07-21 | End: 2017-09-01

## 2017-07-21 NOTE — TELEPHONE ENCOUNTER
----- Message from Milena Esparza sent at 7/21/2017  2:39 PM CDT -----  Contact: Mrs Rutherford-spouse  Pt's spouse states the pharmacy never received the request for the Lyrica    Mrs RutherfordCsxbms-[phone]

## 2017-07-26 RX ORDER — QUETIAPINE FUMARATE 50 MG/1
50 TABLET, FILM COATED ORAL NIGHTLY
Qty: 90 TABLET | Refills: 0 | Status: SHIPPED | OUTPATIENT
Start: 2017-07-26 | End: 2017-08-28 | Stop reason: SDUPTHER

## 2017-07-30 PROBLEM — K13.79 MOUTH PAIN: Status: ACTIVE | Noted: 2017-07-30

## 2017-07-30 NOTE — PROGRESS NOTES
Chief Complaint   Patient presents with    Facial Pain        Ifeanyi Rutherford is a 88 y.o. male with a history of multiple medical diagnoses as listed below that presents for evaluation of pain in the mouth. He says that he has been diagnosed with trigeminal neuraliga in the past but has felt that in the last several months the pain has been in the area of the gums that has been different than the pain that he has in the past. He feels that the pain that he now has feels likea soreness and and aching sensation in the mouth. He feels the pain constantly but feels some mild relief in the pain that he has when he chews or when he grinds his dentures along his gums. He says that he has been seen by a dentist in the past and has not had any recommendations as to how to control the pain that he has been experiencing. He has a history of DLB which his wife says overall is stable.    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    Spinal stenosis     Trigeminal neuralgia pain 1/20/2015    Vertigo 9/11/2012    Vestibular schwannoma 6/4/2015       PAST SURGICAL HISTORY:  Past Surgical History:   Procedure Laterality Date    bilateral total knee arthroplasties      CATARACT EXTRACTION W/  INTRAOCULAR LENS IMPLANT Bilateral     CHOLECYSTECTOMY      EYE SURGERY      GALLBLADDER SURGERY      THYROIDECTOMY      TONSILLECTOMY         SOCIAL HISTORY:  Social History     Social History    Marital status:      Spouse name: N/A    Number of children: N/A     Years of education: N/A     Occupational History    Not on file.     Social History Main Topics    Smoking status: Former Smoker    Smokeless tobacco: Never Used    Alcohol use No    Drug use: No    Sexual activity: Yes     Partners: Female     Other Topics Concern    Not on file     Social History Narrative    No narrative on file       FAMILY HISTORY:  Family History   Problem Relation Age of Onset    Heart disease Mother     Suicide Neg Hx     Schizophrenia Neg Hx     Amblyopia Neg Hx     Blindness Neg Hx     Cataracts Neg Hx     Glaucoma Neg Hx     Macular degeneration Neg Hx     Retinal detachment Neg Hx     Strabismus Neg Hx        ALLERGIES AND MEDICATIONS: updated and reviewed.  Review of patient's allergies indicates:   Allergen Reactions    Carbamazepine Swelling    Demerol [meperidine] Other (See Comments)     hallucinations    Morphine Nausea Only     Current Outpatient Prescriptions   Medication Sig Dispense Refill    oxycodone-acetaminophen (PERCOCET) 7.5-325 mg per tablet Take 1 tablet by mouth every 4 (four) hours as needed for Pain.      acetaminophen (TYLENOL) 325 MG tablet Take 2 tablets (650 mg total) by mouth every 6 (six) hours as needed for Pain.  0    AUROGUARD 5.4-1.4 % Drop Place 3 drops in ear(s) daily as needed (To left ear).       fluticasone (FLONASE) 50 mcg/actuation nasal spray 2 sprays by Each Nare route once daily. 1 Bottle 6    gabapentin (NEURONTIN) 300 MG capsule Take 1 capsule (300 mg total) by mouth 3 (three) times daily. (Patient taking differently: Take 200 mg by mouth 3 (three) times daily. ) 270 capsule 3    levothyroxine (SYNTHROID) 75 MCG tablet TAKE 1 TABLET BEFORE BREAKFAST 90 tablet 0    lidocaine HCl 2% (LIDOCAINE VISCOUS) 2 % Soln by Mucous Membrane route every 6 (six) hours. 100 mL 3    lisinopril (PRINIVIL,ZESTRIL) 40 MG tablet TAKE 1 TABLET EVERY DAY 90 tablet 1    ondansetron (ZOFRAN-ODT) 4 MG TbDL Take 1 tablet (4 mg total) by  mouth every 8 (eight) hours as needed (nausea/vomiting). 12 tablet 0    polyethylene glycol (GLYCOLAX) 17 gram/dose powder Take 17 g by mouth once daily. 1530 g 4    pravastatin (PRAVACHOL) 40 MG tablet TAKE 1 TABLET EVERY DAY 90 tablet 0    pregabalin (LYRICA) 75 MG capsule Take 1 capsule (75 mg total) by mouth 2 (two) times daily. 60 capsule 6    quetiapine (SEROQUEL) 50 MG tablet Take 1 tablet (50 mg total) by mouth every evening. 90 tablet 0    senna (SENNA) 8.6 mg tablet Take 1 tablet by mouth 2 (two) times daily. 30 tablet 1    tramadol (ULTRAM) 50 mg tablet Take 1 tablet (50 mg total) by mouth 3 (three) times daily. 90 tablet 5    triamcinolone acetonide 0.1% (KENALOG) 0.1 % cream Apply to the affected area twice a day 30 g 2    triamcinolone acetonide 0.1% (KENALOG) 0.1 % cream APPLY TO AFFECTED AREA TWICE DAILY 453.6 g 1    venlafaxine (EFFEXOR-XR) 75 MG 24 hr capsule Two capsules daily 180 capsule 0     No current facility-administered medications for this visit.        Review of Systems   Constitutional: Negative for activity change, fatigue and unexpected weight change.   HENT: Positive for mouth sores. Negative for trouble swallowing and voice change.    Eyes: Negative for photophobia, pain and visual disturbance.   Respiratory: Negative for apnea and shortness of breath.    Cardiovascular: Negative for chest pain and palpitations.   Gastrointestinal: Negative for constipation, nausea and vomiting.   Genitourinary: Negative for difficulty urinating.   Musculoskeletal: Negative for arthralgias, back pain, gait problem, myalgias and neck pain.   Skin: Negative for color change and rash.   Neurological: Negative for dizziness, seizures, syncope, speech difficulty, weakness, light-headedness, numbness and headaches.   Psychiatric/Behavioral: Negative for agitation, behavioral problems and confusion.       Neurologic Exam     Mental Status   Oriented to person, place, and time.   Registration:  recalls 3 of 3 objects.   Attention: normal. Concentration: normal.   Speech: speech is normal   Level of consciousness: alert  Knowledge: good.     Cranial Nerves     CN II   Visual fields full to confrontation.   Right visual field deficit: none  Left visual field deficit: none     CN III, IV, VI   Pupils are equal, round, and reactive to light.  Extraocular motions are normal.   Right pupil: Size: 3 mm. Shape: regular. Accommodation: intact.   Left pupil: Size: 3 mm. Shape: regular. Accommodation: intact.   CN III: no CN III palsy  CN VI: no CN VI palsy  Nystagmus: none   Diplopia: none  Ophthalmoparesis: none  Upgaze: normal  Downgaze: normal  Conjugate gaze: present    CN V   Facial sensation intact.   Right facial sensation deficit: none  Left facial sensation deficit: none    CN VII   Facial expression full, symmetric.   Right facial weakness: none  Left facial weakness: none    CN VIII   CN VIII normal.     CN IX, X   CN IX normal.   CN X normal.   Palate: symmetric    CN XI   CN XI normal.   Right sternocleidomastoid strength: normal  Left sternocleidomastoid strength: normal  Right trapezius strength: normal  Left trapezius strength: normal    CN XII   CN XII normal.   Tongue deviation: none    Motor Exam   Muscle bulk: normal  Overall muscle tone: normal  Right arm tone: normal  Left arm tone: normal  Right leg tone: normal  Left leg tone: normal    Strength   Strength 5/5 throughout.     Sensory Exam   Right arm light touch: normal  Left arm light touch: normal  Right leg light touch: normal  Left leg light touch: normal  Right arm vibration: normal  Left arm vibration: normal  Right leg vibration: normal  Left leg vibration: normal  Right arm proprioception: normal  Left arm proprioception: normal  Right leg proprioception: normal  Left leg proprioception: normal  Right arm pinprick: normal  Left arm pinprick: normal  Right leg pinprick: normal  Left leg pinprick: normal    Gait, Coordination, and  "Reflexes     Gait  Gait: normal    Coordination   Romberg: negative  Finger to nose coordination: normal  Heel to shin coordination: normal  Tandem walking coordination: normal    Tremor   Resting tremor: absent    Reflexes   Right brachioradialis: 2+  Left brachioradialis: 2+  Right biceps: 2+  Left biceps: 2+  Right triceps: 2+  Left triceps: 2+  Right patellar: 2+  Left patellar: 2+  Right achilles: 2+  Left achilles: 2+  Right plantar: normal  Left plantar: normal      Physical Exam   Constitutional: He is oriented to person, place, and time. He appears well-developed and well-nourished.   HENT:   Head: Normocephalic and atraumatic.   Eyes: EOM are normal. Pupils are equal, round, and reactive to light.   Cardiovascular: Intact distal pulses.    Pulmonary/Chest: Effort normal. No respiratory distress.   Musculoskeletal: Normal range of motion.   Neurological: He is alert and oriented to person, place, and time. He has normal strength. He has a normal Finger-Nose-Finger Test, a normal Heel to Shin Test, a normal Romberg Test and a normal Tandem Gait Test. Gait normal.   Reflex Scores:       Tricep reflexes are 2+ on the right side and 2+ on the left side.       Bicep reflexes are 2+ on the right side and 2+ on the left side.       Brachioradialis reflexes are 2+ on the right side and 2+ on the left side.       Patellar reflexes are 2+ on the right side and 2+ on the left side.       Achilles reflexes are 2+ on the right side and 2+ on the left side.  Skin: Skin is warm and dry.   Psychiatric: He has a normal mood and affect. His speech is normal and behavior is normal.       Vitals:    07/20/17 1411   BP: 130/70   BP Location: Right arm   Patient Position: Sitting   BP Method: Manual   Pulse: 64   Weight: 93.6 kg (206 lb 5.6 oz)   Height: 6' 2" (1.88 m)       Assessment & Plan:    Problem List Items Addressed This Visit     Trigeminal neuralgia pain    Mouth pain    Overview     Patient has a frequent movement in " "the mouth (?tic) where he chews and grind his teeth. The movement he says is the only thing that he can do to relieve his pain in his mouth. Very likely that these movements in his dentures are causing the "sores" and pain in his mouth. This coupled with his TN is his primary concern today.         Relevant Medications    lidocaine HCl 2% (LIDOCAINE VISCOUS) 2 % Soln    pregabalin (LYRICA) 75 MG capsule      Other Visit Diagnoses     Neuralgia    -  Primary    Relevant Medications    pregabalin (LYRICA) 75 MG capsule          Follow-up: Return in about 3 months (around 10/20/2017).  More than 50% of this 45 minute encounter was spent in counseling and coordinating care.    "

## 2017-08-04 ENCOUNTER — CLINICAL SUPPORT (OUTPATIENT)
Dept: FAMILY MEDICINE | Facility: CLINIC | Age: 82
End: 2017-08-04
Payer: MEDICARE

## 2017-08-04 DIAGNOSIS — Z23 NEED FOR VACCINATION: Primary | ICD-10-CM

## 2017-08-04 PROCEDURE — 90471 IMMUNIZATION ADMIN: CPT | Mod: GA,S$GLB,, | Performed by: INTERNAL MEDICINE

## 2017-08-04 PROCEDURE — 90715 TDAP VACCINE 7 YRS/> IM: CPT | Mod: GA,S$GLB,, | Performed by: INTERNAL MEDICINE

## 2017-08-17 ENCOUNTER — TELEPHONE (OUTPATIENT)
Dept: FAMILY MEDICINE | Facility: CLINIC | Age: 82
End: 2017-08-17

## 2017-08-17 NOTE — TELEPHONE ENCOUNTER
----- Message from Payal Rodriguez sent at 8/17/2017 10:09 AM CDT -----  Patient is wanting to be seen tomorrow. He states he cant sleep at night with his tumor. Please call at [phone] thank you!

## 2017-08-28 DIAGNOSIS — D33.3 UNILATERAL VESTIBULAR SCHWANNOMA: ICD-10-CM

## 2017-08-28 DIAGNOSIS — E78.5 HYPERLIPIDEMIA, UNSPECIFIED HYPERLIPIDEMIA TYPE: ICD-10-CM

## 2017-08-28 RX ORDER — LEVOTHYROXINE SODIUM 75 UG/1
TABLET ORAL
Qty: 90 TABLET | Refills: 0 | Status: SHIPPED | OUTPATIENT
Start: 2017-08-28 | End: 2017-12-06 | Stop reason: SDUPTHER

## 2017-08-28 RX ORDER — TRAMADOL HYDROCHLORIDE 50 MG/1
TABLET ORAL
Qty: 90 TABLET | Status: CANCELLED | OUTPATIENT
Start: 2017-08-28

## 2017-08-28 RX ORDER — QUETIAPINE FUMARATE 50 MG/1
TABLET, FILM COATED ORAL
Qty: 90 TABLET | Refills: 0 | Status: SHIPPED | OUTPATIENT
Start: 2017-08-28 | End: 2017-12-06 | Stop reason: SDUPTHER

## 2017-08-28 RX ORDER — PRAVASTATIN SODIUM 40 MG/1
TABLET ORAL
Qty: 90 TABLET | Refills: 0 | Status: SHIPPED | OUTPATIENT
Start: 2017-08-28 | End: 2017-09-11 | Stop reason: SDUPTHER

## 2017-08-29 DIAGNOSIS — D33.3 UNILATERAL VESTIBULAR SCHWANNOMA: ICD-10-CM

## 2017-08-29 DIAGNOSIS — G50.0 TRIGEMINAL NEURALGIA: ICD-10-CM

## 2017-08-29 RX ORDER — GABAPENTIN 300 MG/1
300 CAPSULE ORAL 3 TIMES DAILY
Qty: 270 CAPSULE | Refills: 3 | Status: CANCELLED | OUTPATIENT
Start: 2017-08-29 | End: 2018-08-29

## 2017-08-30 RX ORDER — TRAMADOL HYDROCHLORIDE 50 MG/1
50 TABLET ORAL 3 TIMES DAILY
Qty: 90 TABLET | Refills: 5 | Status: CANCELLED | OUTPATIENT
Start: 2017-08-30

## 2017-08-30 RX ORDER — TRAMADOL HYDROCHLORIDE 50 MG/1
50 TABLET ORAL 3 TIMES DAILY
Qty: 90 TABLET | Refills: 5 | Status: SHIPPED | OUTPATIENT
Start: 2017-08-30 | End: 2018-02-06 | Stop reason: SDUPTHER

## 2017-08-30 NOTE — TELEPHONE ENCOUNTER
----- Message from Ivet Nathan sent at 8/30/2017  1:06 PM CDT -----  Contact: Mrs. Rutherford  Following up on patient refill for tramadol. Patient can be reached at [phone].    thanks

## 2017-08-30 NOTE — TELEPHONE ENCOUNTER
----- Message from Sadia Handley sent at 8/29/2017  3:32 PM CDT -----  Contact: Spouse/[phone]  Patient's spouse is following up on a refill request for tramadol (ULTRAM) 50 mg tablet. Thank you.

## 2017-09-01 ENCOUNTER — OFFICE VISIT (OUTPATIENT)
Dept: FAMILY MEDICINE | Facility: CLINIC | Age: 82
End: 2017-09-01
Payer: MEDICARE

## 2017-09-01 ENCOUNTER — TELEPHONE (OUTPATIENT)
Dept: FAMILY MEDICINE | Facility: CLINIC | Age: 82
End: 2017-09-01

## 2017-09-01 VITALS
HEIGHT: 74 IN | TEMPERATURE: 98 F | OXYGEN SATURATION: 96 % | BODY MASS INDEX: 26.14 KG/M2 | SYSTOLIC BLOOD PRESSURE: 132 MMHG | DIASTOLIC BLOOD PRESSURE: 62 MMHG | WEIGHT: 203.69 LBS | HEART RATE: 66 BPM

## 2017-09-01 DIAGNOSIS — R35.0 URINE FREQUENCY: ICD-10-CM

## 2017-09-01 DIAGNOSIS — E03.9 HYPOTHYROIDISM, UNSPECIFIED TYPE: ICD-10-CM

## 2017-09-01 DIAGNOSIS — R51.9 CHRONIC INTRACTABLE HEADACHE, UNSPECIFIED HEADACHE TYPE: Primary | ICD-10-CM

## 2017-09-01 DIAGNOSIS — F39 MOOD DISORDER: ICD-10-CM

## 2017-09-01 DIAGNOSIS — G89.29 CHRONIC INTRACTABLE HEADACHE, UNSPECIFIED HEADACHE TYPE: Primary | ICD-10-CM

## 2017-09-01 DIAGNOSIS — R32 URINARY INCONTINENCE, UNSPECIFIED TYPE: ICD-10-CM

## 2017-09-01 DIAGNOSIS — I10 ESSENTIAL HYPERTENSION: ICD-10-CM

## 2017-09-01 PROCEDURE — 1126F AMNT PAIN NOTED NONE PRSNT: CPT | Mod: S$GLB,,, | Performed by: INTERNAL MEDICINE

## 2017-09-01 PROCEDURE — 1159F MED LIST DOCD IN RCRD: CPT | Mod: S$GLB,,, | Performed by: INTERNAL MEDICINE

## 2017-09-01 PROCEDURE — 99499 UNLISTED E&M SERVICE: CPT | Mod: S$GLB,,, | Performed by: INTERNAL MEDICINE

## 2017-09-01 PROCEDURE — 99214 OFFICE O/P EST MOD 30 MIN: CPT | Mod: S$GLB,,, | Performed by: INTERNAL MEDICINE

## 2017-09-01 PROCEDURE — 3008F BODY MASS INDEX DOCD: CPT | Mod: S$GLB,,, | Performed by: INTERNAL MEDICINE

## 2017-09-01 PROCEDURE — 99999 PR PBB SHADOW E&M-EST. PATIENT-LVL IV: CPT | Mod: PBBFAC,,, | Performed by: INTERNAL MEDICINE

## 2017-09-01 PROCEDURE — 1157F ADVNC CARE PLAN IN RCRD: CPT | Mod: S$GLB,,, | Performed by: INTERNAL MEDICINE

## 2017-09-01 NOTE — PROGRESS NOTES
Chief complaint, discuss several issues        88-year-old white male   He is here with his wife.  Patient has a long history of urinary incontinence and wears diapers.  Has been an increase strong odor.  He does not have urgency.  He has been producing a lot more urine than usual more so at night and sometimes wetting the bed.  We discussed checking for UTI.  He has been having some morning headaches but does have a long history of chronic headaches.  Does not appear to relate any new medications or withdrawal of medications.  3 of 4 nights lately he's been very restless and had a get up and walk around but this has not yet developed into a pattern we discussed possible restless leg syndrome and it doesn't appear to be the case at this time.  He has back on the gabapentin having tried 2 doses of Lyrica and he felt it made him more shaky but that did not correlate with the nighttime restlessness as above.  He does continue to take 12.5 mg of Seroquel at night which was given to him when he has some delirium and they thought he had dementia which was not the case.  It seems to help him sleep and we discussed trying 25 mg but watching for sedation and other side effects.  He also continues to have his morning hallucinations but it is when he first awakens in the morning and in the secondary to they're gone.  He sees people in his room on occasion but is not disturbing.  He does have poor vision and we discussed it can sometimes be associated with that but since they resolve so promptly I suspect this is somewhat related to hypnagogic hallucinations.  All these issues discussed in great detail with patient and the wifeTotal time over 25 minutes with over 50% counseling.    ROS:   CONST: weight stable. EYES: no vision change. ENT: no sore throat. CV: no chest pain w/ exertion. RESP: no shortness of breath. GI: no nausea, vomiting, diarrhea. No dysphagia. : no urinary issues. MUSCULOSKELETAL: no new myalgias or  arthralgias. SKIN: no new changes. NEURO: no focal deficits. PSYCH: no new issues. ENDOCRINE: no polyuria. HEME: no lymph nodes. ALLERGY: no general pruritis.    PAST SURGERIES:  Total knee replacements bilaterally.                                                                                                     PAST MEDICAL HISTORY:    Hypertension.   Hyperlipidemia.    Depression.           Generalized anxiety ([facility]).    Hypothyroidism.    Osteoarthritis.    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  Optic neuropathy, right eye.  Right-sided trigeminal neuralgia, seen by neurology, pain management and neurosurgery  Chronic constipation   Evaluation by [facility] for NPH, apparently no response to spinal tap   Chronic vertigo   Vitamin D deficiency   Parkinsonism by [facility] evaluation    Morning hallucinations, possibly related to Seroquel, possibly hypnagogic                                                                                                           SOCIAL HISTORY:  He is  and lives with his spouse.  They have adult   children in the area and grandchildren.  He has never been a cigarette       smoker.      Vitals as above,  Gen: no distress  Exam otherwise deferred            Ifeanyi was seen today for questions.    Diagnoses and all orders for this visit:    Chronic intractable headache, unspecified headache type, recent morning headaches are somewhat nonspecific, consider evaluation for sleep apnea, patient does report he was referred for sleep study in the past but could not sleep    Essential hypertension, chronic and stable    Hypothyroidism, unspecified type, labs reviewed and stable    Mood disorder, stable at present, we will increase the Seroquel to assess if indeed he can sleep better, less nighttime agitation and so forth but not for any psychiatric knee.  They will watch for worsening hallucinations in the morning which could be a  side effect but not quite sure.  Apparently in the history of may well have been thought to have been the Seroquel at night    Urine frequency, with the increased volume, increase odor we will assess for UTI and they were given the proper equipment to obtain a sample at home and bring back next week  -     Urinalysis; Future  -     Urine culture; Future    Urinary incontinence, unspecified type

## 2017-09-05 ENCOUNTER — LAB VISIT (OUTPATIENT)
Dept: LAB | Facility: HOSPITAL | Age: 82
End: 2017-09-05
Attending: INTERNAL MEDICINE
Payer: MEDICARE

## 2017-09-05 ENCOUNTER — TELEPHONE (OUTPATIENT)
Dept: FAMILY MEDICINE | Facility: CLINIC | Age: 82
End: 2017-09-05

## 2017-09-05 DIAGNOSIS — R35.0 URINE FREQUENCY: ICD-10-CM

## 2017-09-05 LAB
BILIRUB UR QL STRIP: NEGATIVE
CLARITY UR REFRACT.AUTO: CLEAR
COLOR UR AUTO: YELLOW
GLUCOSE UR QL STRIP: NEGATIVE
HGB UR QL STRIP: ABNORMAL
KETONES UR QL STRIP: NEGATIVE
LEUKOCYTE ESTERASE UR QL STRIP: NEGATIVE
NITRITE UR QL STRIP: NEGATIVE
PH UR STRIP: 5 [PH] (ref 5–8)
PROT UR QL STRIP: NEGATIVE
SP GR UR STRIP: 1.02 (ref 1–1.03)
URN SPEC COLLECT METH UR: ABNORMAL
UROBILINOGEN UR STRIP-ACNC: NEGATIVE EU/DL

## 2017-09-05 PROCEDURE — 87086 URINE CULTURE/COLONY COUNT: CPT

## 2017-09-05 PROCEDURE — 81003 URINALYSIS AUTO W/O SCOPE: CPT

## 2017-09-05 NOTE — TELEPHONE ENCOUNTER
Wife dropped off urine specimen over the weekend to check for UTI so she will be anxious about results    The recent urine looks good and clear of infection.  Only a trace amount of blood was seen and this is actually less than he has had previously.      Dr. ROYAL says there likely is NO benefit to treating for a urinary tract infection at this time.  Has there been any change in the urination over the weekend

## 2017-09-06 LAB
BACTERIA UR CULT: NORMAL
BACTERIA UR CULT: NORMAL

## 2017-09-11 DIAGNOSIS — F32.A DEPRESSION, UNSPECIFIED DEPRESSION TYPE: ICD-10-CM

## 2017-09-11 DIAGNOSIS — E78.5 HYPERLIPIDEMIA, UNSPECIFIED HYPERLIPIDEMIA TYPE: ICD-10-CM

## 2017-09-11 RX ORDER — PRAVASTATIN SODIUM 40 MG/1
TABLET ORAL
Qty: 90 TABLET | Refills: 0 | Status: SHIPPED | OUTPATIENT
Start: 2017-09-11 | End: 2017-12-06 | Stop reason: SDUPTHER

## 2017-09-12 RX ORDER — VENLAFAXINE HYDROCHLORIDE 75 MG/1
CAPSULE, EXTENDED RELEASE ORAL
Qty: 180 CAPSULE | Refills: 3 | Status: SHIPPED | OUTPATIENT
Start: 2017-09-12 | End: 2018-06-11 | Stop reason: SDUPTHER

## 2017-09-12 NOTE — TELEPHONE ENCOUNTER
----- Message from Alma White sent at 9/12/2017 10:25 AM CDT -----  Contact: spouse  Pt wife calling to request a refill of venlafaxine (EFFEXOR-XR) 75 MG 24 hr capsule. Please send to [facility]. Pt can be reached at [phone].

## 2017-09-27 DIAGNOSIS — G50.0 TRIGEMINAL NEURALGIA: ICD-10-CM

## 2017-09-28 RX ORDER — GABAPENTIN 300 MG/1
300 CAPSULE ORAL 3 TIMES DAILY
Qty: 270 CAPSULE | Refills: 3 | Status: SHIPPED | OUTPATIENT
Start: 2017-09-28 | End: 2018-09-18 | Stop reason: SDUPTHER

## 2017-10-09 ENCOUNTER — OFFICE VISIT (OUTPATIENT)
Dept: FAMILY MEDICINE | Facility: CLINIC | Age: 82
End: 2017-10-09
Payer: MEDICARE

## 2017-10-09 ENCOUNTER — LAB VISIT (OUTPATIENT)
Dept: LAB | Facility: HOSPITAL | Age: 82
End: 2017-10-09
Payer: MEDICARE

## 2017-10-09 ENCOUNTER — TELEPHONE (OUTPATIENT)
Dept: FAMILY MEDICINE | Facility: CLINIC | Age: 82
End: 2017-10-09

## 2017-10-09 VITALS
SYSTOLIC BLOOD PRESSURE: 142 MMHG | TEMPERATURE: 98 F | HEIGHT: 74 IN | HEART RATE: 68 BPM | DIASTOLIC BLOOD PRESSURE: 60 MMHG | WEIGHT: 205.69 LBS | BODY MASS INDEX: 26.4 KG/M2 | OXYGEN SATURATION: 96 %

## 2017-10-09 DIAGNOSIS — M79.89 SWELLING OF LOWER EXTREMITY: Primary | ICD-10-CM

## 2017-10-09 DIAGNOSIS — M79.604 ACUTE PAIN OF RIGHT LOWER EXTREMITY: ICD-10-CM

## 2017-10-09 DIAGNOSIS — M79.604 LOWER EXTREMITY PAIN, RIGHT: Primary | ICD-10-CM

## 2017-10-09 DIAGNOSIS — M79.604 LOWER EXTREMITY PAIN, RIGHT: ICD-10-CM

## 2017-10-09 DIAGNOSIS — M79.89 SWELLING OF RIGHT LOWER EXTREMITY: ICD-10-CM

## 2017-10-09 LAB
BASOPHILS # BLD AUTO: 0.07 K/UL
BASOPHILS NFR BLD: 1.1 %
D DIMER PPP IA.FEU-MCNC: 0.59 MG/L FEU
DIFFERENTIAL METHOD: NORMAL
EOSINOPHIL # BLD AUTO: 0.4 K/UL
EOSINOPHIL NFR BLD: 6.6 %
ERYTHROCYTE [DISTWIDTH] IN BLOOD BY AUTOMATED COUNT: 12.3 %
HCT VFR BLD AUTO: 44 %
HGB BLD-MCNC: 14.2 G/DL
LYMPHOCYTES # BLD AUTO: 1.8 K/UL
LYMPHOCYTES NFR BLD: 27.6 %
MCH RBC QN AUTO: 30 PG
MCHC RBC AUTO-ENTMCNC: 32.3 G/DL
MCV RBC AUTO: 93 FL
MONOCYTES # BLD AUTO: 0.7 K/UL
MONOCYTES NFR BLD: 11 %
NEUTROPHILS # BLD AUTO: 3.5 K/UL
NEUTROPHILS NFR BLD: 53.5 %
PLATELET # BLD AUTO: 215 K/UL
PMV BLD AUTO: 10.3 FL
RBC # BLD AUTO: 4.73 M/UL
WBC # BLD AUTO: 6.52 K/UL

## 2017-10-09 PROCEDURE — 85379 FIBRIN DEGRADATION QUANT: CPT

## 2017-10-09 PROCEDURE — 99999 PR PBB SHADOW E&M-EST. PATIENT-LVL IV: CPT | Mod: PBBFAC,,, | Performed by: NURSE PRACTITIONER

## 2017-10-09 PROCEDURE — 36415 COLL VENOUS BLD VENIPUNCTURE: CPT | Mod: PO

## 2017-10-09 PROCEDURE — 99214 OFFICE O/P EST MOD 30 MIN: CPT | Mod: S$GLB,,, | Performed by: NURSE PRACTITIONER

## 2017-10-09 PROCEDURE — 85025 COMPLETE CBC W/AUTO DIFF WBC: CPT

## 2017-10-09 NOTE — TELEPHONE ENCOUNTER
Minimal elevation of his labs.  This might be within normal limits for this patient however we are obligated to do an ultrasound of his right leg.  I will order that STAT. Please call and arrange.

## 2017-10-09 NOTE — PROGRESS NOTES
This dictation has been generated using Dragon Dictation some phonetic errors may occur.     Ifeanyi was seen today for leg pain.    Diagnoses and all orders for this visit:    Lower extremity pain, right  -     CBC auto differential; Future  -     D dimer, quantitative; Future    Swelling of right lower extremity  -     CBC auto differential; Future  -     D dimer, quantitative; Future      Right leg pain.  No evidence of cellulitis.  Rule out DVT d-dimer as above.  I'll review results and address accordingly.  Continue tramadol and take Tylenol for pain.    Return if symptoms worsen or fail to improve.      ________________________________________________________________  ________________________________________________________________        Chief Complaint   Patient presents with    Leg Pain     History of present illness  This 88 y.o. presents today for complaint of right lower leg pain.  Patient indicates right lower leg pain.  His wife gives part of the history due to dementia and hard of hearing.  Patient notes pain was more severe this a.m. but improved after taking tramadol.  Patient denies a history of injury.  His wife also notes no history of injury.  She is concerned about a blood clot.  Lower extremity edema noted.  Review of systems  No fever or chills  No chest pain or shortness of breath  No abnormal bruising or bleeding.    Past medical and social history reviewed.    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    Spinal stenosis     Trigeminal neuralgia  pain 1/20/2015    Vertigo 9/11/2012    Vestibular schwannoma 6/4/2015       Past Surgical History:   Procedure Laterality Date    bilateral total knee arthroplasties      CATARACT EXTRACTION W/  INTRAOCULAR LENS IMPLANT Bilateral     CHOLECYSTECTOMY      EYE SURGERY      GALLBLADDER SURGERY      THYROIDECTOMY      TONSILLECTOMY         Family History   Problem Relation Age of Onset    Heart disease Mother     Suicide Neg Hx     Schizophrenia Neg Hx     Amblyopia Neg Hx     Blindness Neg Hx     Cataracts Neg Hx     Glaucoma Neg Hx     Macular degeneration Neg Hx     Retinal detachment Neg Hx     Strabismus Neg Hx        Social History     Social History    Marital status:      Spouse name: N/A    Number of children: N/A    Years of education: N/A     Social History Main Topics    Smoking status: Former Smoker    Smokeless tobacco: Never Used    Alcohol use No    Drug use: No    Sexual activity: Yes     Partners: Female     Other Topics Concern    None     Social History Narrative    None       Current Outpatient Prescriptions   Medication Sig Dispense Refill    acetaminophen (TYLENOL) 325 MG tablet Take 2 tablets (650 mg total) by mouth every 6 (six) hours as needed for Pain.  0    AUROGUARD 5.4-1.4 % Drop Place 3 drops in ear(s) daily as needed (To left ear).       fluticasone (FLONASE) 50 mcg/actuation nasal spray 2 sprays by Each Nare route once daily. 1 Bottle 6    gabapentin (NEURONTIN) 300 MG capsule Take 1 capsule (300 mg total) by mouth 3 (three) times daily. 270 capsule 3    levothyroxine (SYNTHROID) 75 MCG tablet TAKE 1 TABLET BEFORE BREAKFAST 90 tablet 0    lidocaine HCl 2% (LIDOCAINE VISCOUS) 2 % Soln by Mucous Membrane route every 6 (six) hours. 100 mL 3    lisinopril (PRINIVIL,ZESTRIL) 40 MG tablet TAKE 1 TABLET EVERY DAY 90 tablet 1    ondansetron (ZOFRAN-ODT) 4 MG TbDL Take 1 tablet (4 mg total) by mouth every 8 (eight) hours as needed (nausea/vomiting). 12  tablet 0    oxycodone-acetaminophen (PERCOCET) 7.5-325 mg per tablet Take 1 tablet by mouth every 4 (four) hours as needed for Pain.      polyethylene glycol (GLYCOLAX) 17 gram/dose powder Take 17 g by mouth once daily. 1530 g 4    pravastatin (PRAVACHOL) 40 MG tablet TAKE 1 TABLET EVERY DAY 90 tablet 0    quetiapine (SEROQUEL) 50 MG tablet TAKE 1 TABLET EVERY EVENING 90 tablet 0    senna (SENNA) 8.6 mg tablet Take 1 tablet by mouth 2 (two) times daily. 30 tablet 1    tramadol (ULTRAM) 50 mg tablet Take 1 tablet (50 mg total) by mouth 3 (three) times daily. 90 tablet 5    triamcinolone acetonide 0.1% (KENALOG) 0.1 % cream Apply to the affected area twice a day 30 g 2    triamcinolone acetonide 0.1% (KENALOG) 0.1 % cream APPLY TO AFFECTED AREA TWICE DAILY 453.6 g 1    venlafaxine (EFFEXOR-XR) 75 MG 24 hr capsule Two capsules daily 180 capsule 3     No current facility-administered medications for this visit.        Review of patient's allergies indicates:   Allergen Reactions    Carbamazepine Swelling    Demerol [meperidine] Other (See Comments)     hallucinations    Morphine Nausea Only       Physical examination  Vitals Reviewed  Gen. Well-dressed well-nourished  Skin warm dry and intact.  No rashes noted.  HEENT.  TM intact bilateral with normal light reflex.  No mastoid tenderness during percussion.  Nares patent bilateral.  Pharynx is unremarkable.  No maxillary or frontal sinus tenderness when percussed.    Neck is supple without adenopathy  Chest.  Respirations are even unlabored.  Lungs are clear to auscultation.  Cardiac regular rate and rhythm.  No chest wall adenopathy noted.  No respiratory distress.  Neuro. Awake alert oriented x4.  Normal judgment and cognition noted.  Extremities no clubbing or cyanosis noted.  Trace edema noted bilateral lower extremity slightly more on the right than the left.  Homans sign is negative.  Some effusion noted to the right knee.  No bruising observed.  No  localized warmth palpable.  Using wheelchair for transfer.    Call or return to clinic prn if these symptoms worsen or fail to improve as anticipated.

## 2017-10-10 ENCOUNTER — HOSPITAL ENCOUNTER (OUTPATIENT)
Dept: RADIOLOGY | Facility: HOSPITAL | Age: 82
Discharge: HOME | End: 2017-10-10
Attending: NURSE PRACTITIONER
Payer: MEDICARE

## 2017-10-10 ENCOUNTER — TELEPHONE (OUTPATIENT)
Dept: FAMILY MEDICINE | Facility: CLINIC | Age: 82
End: 2017-10-10

## 2017-10-10 DIAGNOSIS — M79.89 SWELLING OF LOWER EXTREMITY: ICD-10-CM

## 2017-10-10 DIAGNOSIS — M79.604 ACUTE PAIN OF RIGHT LOWER EXTREMITY: ICD-10-CM

## 2017-10-10 PROCEDURE — 93970 EXTREMITY STUDY: CPT | Mod: TC

## 2017-10-10 PROCEDURE — 93970 EXTREMITY STUDY: CPT | Mod: 26,,, | Performed by: RADIOLOGY

## 2017-10-10 NOTE — TELEPHONE ENCOUNTER
Call patient.  As I had anticipated, the Ultrasound did not show a blood clot.  That is good news.  Continue Tylenol and tramadol for the pain.  He needs to prop up his feet for an hour a day above the heart level.  He can do this in a recliner with pillows or lay down in the bed with pillows and prop up his legs.

## 2017-10-20 ENCOUNTER — OFFICE VISIT (OUTPATIENT)
Dept: FAMILY MEDICINE | Facility: CLINIC | Age: 82
End: 2017-10-20
Payer: MEDICARE

## 2017-10-20 VITALS
WEIGHT: 206.13 LBS | DIASTOLIC BLOOD PRESSURE: 58 MMHG | OXYGEN SATURATION: 96 % | HEIGHT: 74 IN | SYSTOLIC BLOOD PRESSURE: 122 MMHG | HEART RATE: 72 BPM | TEMPERATURE: 98 F | BODY MASS INDEX: 26.45 KG/M2

## 2017-10-20 DIAGNOSIS — R51.9 CHRONIC NONINTRACTABLE HEADACHE, UNSPECIFIED HEADACHE TYPE: ICD-10-CM

## 2017-10-20 DIAGNOSIS — R40.0 SOMNOLENCE: Primary | ICD-10-CM

## 2017-10-20 DIAGNOSIS — G50.0 TRIGEMINAL NEURALGIA PAIN: ICD-10-CM

## 2017-10-20 DIAGNOSIS — I10 ESSENTIAL HYPERTENSION: ICD-10-CM

## 2017-10-20 DIAGNOSIS — G89.29 CHRONIC NONINTRACTABLE HEADACHE, UNSPECIFIED HEADACHE TYPE: ICD-10-CM

## 2017-10-20 DIAGNOSIS — R32 URINARY INCONTINENCE, UNSPECIFIED TYPE: ICD-10-CM

## 2017-10-20 PROCEDURE — 99999 PR PBB SHADOW E&M-EST. PATIENT-LVL III: CPT | Mod: PBBFAC,,, | Performed by: INTERNAL MEDICINE

## 2017-10-20 PROCEDURE — 99499 UNLISTED E&M SERVICE: CPT | Mod: S$GLB,,, | Performed by: INTERNAL MEDICINE

## 2017-10-20 PROCEDURE — 99215 OFFICE O/P EST HI 40 MIN: CPT | Mod: S$GLB,,, | Performed by: INTERNAL MEDICINE

## 2017-10-20 NOTE — PROGRESS NOTES
Chief complaint, discuss several issues        88-year-old white male   He is here with his wife.  Primary issue is feeling very tired in the morning.  This is a very chronic problem.  Somewhat difficult for him to describe.  He does have a long history of headaches in the morning but that's not a problem he is referring to.  It's more of a sleepiness and sometimes he goes back to bed.  Last all day until the early afternoon.  He is on multiple medications that could be contributing and we discussed all these issues at length.  It was entertained that it could be the gabapentin 300 mg again 3 times per day.  Neurology had suggested Lyrica but after one dose he had generalized very shakiness even while lying in bed and so has never taken it again.  He does take tramadol 2-3 times per day on a regular basis even when not having pain because he was told not to wait and really gets into pain.  We discussed this could be contributing as well and may well easily be able to be discontinued.  We will start by holding the nighttime Seroquel 25 mg.  This was given to him when they thought he was having dementia issues all hospitalized but that turned out not to be dementia.  Some of his morning hallucinations were thought to be the Seroquel as well and so these are all good reasons we can try to discontinue.  The Seroquel is possibly helping him with insomnia which is a very frequent problem.  If indeed he gets worsening insomnia written instructions for all this was given but also to try some Benadryl at night for which he has not had trouble in the past.  Lastly, we may try holding the gabapentin but it is less likely to be the cause since he does continue to take the same dose at noon and in the evening not associated with any tiredness or fatigue.  If gabapentin is healthy may need to watch for the return of his neuropathy/neuralgia.  All of these changes can be temporary.  Wife will give each change of couple of days to  assess.    We also did openly discussed all of his symptoms of morning fatigue and morning headaches could easily be sleep apnea.  Apparently he was thought that he might have sleep apnea was sent for sleep study was unable to sleep at all during sleep study his granddaughter works in the pulmonary/sleep clinic and we discussed with her today in the [place].  If needed, we possibly could attempt to get him a home sleep study.  He was worried about wearing the mask but he was advised today there is some nasal mass that he may well tolerate.    All these issues reviewed in great length today and we discussed it with other members of the medical staff and his family today.Total time over 45 minutes with over 50% counseling.      ROS:   CONST: weight stable.  Eating well.  Does continue to have incontinence where he cannot feel the urine coming out at night.  We discussed this could be BPH related and apparently he had tried Flomax in the past and cannot remember any particular problem with Flomax and this might be something we start in the future.  We also may have him seen by urology again since he may have seen one in the very distant past.    PAST SURGERIES:  Total knee replacements bilaterally.                                                                                                     PAST MEDICAL HISTORY:    Hypertension.   Hyperlipidemia.    Depression.           Generalized anxiety ([facility]).    Hypothyroidism.    Osteoarthritis.    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  Optic neuropathy, right eye.  Right-sided trigeminal neuralgia, seen by neurology, pain management and neurosurgery  Chronic constipation   Evaluation by [place] [facility] for NPH, apparently no response to spinal tap   Chronic vertigo   Vitamin D deficiency   Parkinsonism by [facility] evaluation    Morning hallucinations, possibly related to Seroquel, possibly hypnagogic                                                                                                            SOCIAL HISTORY:  He is  and lives with his spouse.  They have adult   children in the area and grandchildren.  He has never been a cigarette       smoker.      Vitals as above,  Gen: no distress  Exam otherwise deferred        Ifeanyi was seen today for fatigue.    Diagnoses and all orders for this visit:    Somnolence, multifactorial causes as above, consider underlying sleep apnea which may need testing but first we will try sequentially holding several medications.  First we will try eliminating the Seroquel 25 mg and also the tramadol which may or may not be needed at this time.  Thereafter we might try holding the gabapentin but that is less likely.    Chronic nonintractable headache, unspecified headache type, chronic, possibly sleep apnea related    Trigeminal neuralgia pain, potential issues with gabapentin    Essential hypertension, chronic and stable    Urinary incontinence, unspecified type, potential multifactorial causes, potential underlying BPH, may need urological workup, may relate to the multitude of medications and will assess while we adjust these medicines

## 2017-10-24 ENCOUNTER — TELEPHONE (OUTPATIENT)
Dept: FAMILY MEDICINE | Facility: CLINIC | Age: 82
End: 2017-10-24

## 2017-10-24 NOTE — TELEPHONE ENCOUNTER
----- Message from Iesha Vasques sent at 10/24/2017 12:29 PM CDT -----  Contact: wife  [phone]  Pt [phone] Thanks.......Juli

## 2017-10-24 NOTE — TELEPHONE ENCOUNTER
Okay to call in 5 refills of tramadol however it is listed in his current med list--looks like it was approved back in August and probably should've had 5 refills on it so may well have active refills

## 2017-11-29 ENCOUNTER — OFFICE VISIT (OUTPATIENT)
Dept: FAMILY MEDICINE | Facility: CLINIC | Age: 82
End: 2017-11-29
Payer: MEDICARE

## 2017-11-29 VITALS
WEIGHT: 205 LBS | HEART RATE: 70 BPM | OXYGEN SATURATION: 96 % | DIASTOLIC BLOOD PRESSURE: 68 MMHG | BODY MASS INDEX: 26.31 KG/M2 | TEMPERATURE: 98 F | HEIGHT: 74 IN | SYSTOLIC BLOOD PRESSURE: 118 MMHG

## 2017-11-29 DIAGNOSIS — R44.1 VISUAL HALLUCINATIONS: ICD-10-CM

## 2017-11-29 DIAGNOSIS — I10 ESSENTIAL HYPERTENSION: ICD-10-CM

## 2017-11-29 DIAGNOSIS — R51.9 CHRONIC NONINTRACTABLE HEADACHE, UNSPECIFIED HEADACHE TYPE: Primary | ICD-10-CM

## 2017-11-29 DIAGNOSIS — Z23 NEED FOR INFLUENZA VACCINATION: ICD-10-CM

## 2017-11-29 DIAGNOSIS — G89.29 CHRONIC NONINTRACTABLE HEADACHE, UNSPECIFIED HEADACHE TYPE: Primary | ICD-10-CM

## 2017-11-29 PROCEDURE — 99499 UNLISTED E&M SERVICE: CPT | Mod: S$GLB,,, | Performed by: INTERNAL MEDICINE

## 2017-11-29 PROCEDURE — 99215 OFFICE O/P EST HI 40 MIN: CPT | Mod: S$GLB,,, | Performed by: INTERNAL MEDICINE

## 2017-11-29 PROCEDURE — 99999 PR PBB SHADOW E&M-EST. PATIENT-LVL III: CPT | Mod: PBBFAC,,, | Performed by: INTERNAL MEDICINE

## 2017-11-29 NOTE — PROGRESS NOTES
Chief complaint, discuss several issues        88-year-old white male   He is here with his wife.  Primary issue is feeling very tired in the morning.  This is a very chronic problem.  Somewhat difficult for him to describe.  He does have a long history of headaches in the morning but that's not a problem he is referring to.  It's more of a sleepiness and sometimes he goes back to bed.  Last all day until the early afternoon.  Initially it was thought he perhaps had sleep apnea but was never able to sleep for a sleep study.  When he wakes up it's never actually a pain.  It actually feels like he needs to put his head down and it only occurs after taking his morning meds.  He does not wake up with the feeling.  He gets up he is practicing this takes his medications including gabapentin lisinopril Effexor.  He is on multiple medications that could be contributing and we discussed all these issues at length.  Being that it now seems that he cannot keep his head up I wonder if he is not having hypotension and they will check blood pressure before and after.  I gave him written instructions to spread all morning medicines out by 30-60 minutes and we will assess which one may be causing problem.  It seems less likely to be sleep apnea at this point.  It's never really a pain it's more of a funny feeling in the head.  .      At last visit we also discussed reducing the Seroquel which they've reduced to 12 point 5 at night.  He does feel that his morning hallucinations have worsened.  He sees people as if there are shadows.  They never speak to him.  They do go away in just a few seconds and only occur just upon awakening.  We discussed it could be an ocular problem since he only has vision out of one eye and does have an ophthalmology appointment and we discussed making sure it's not related to an ocular problem such as macular degeneration.  We discussed keeping the lights on in the morning.  Next    Patient thought he  might be having dementia but reassured him of the hallucination issues are likely dementia.  Wife notes he has repetitive at times but not in a fashion suspicious for Alzheimer's but we did discuss these to monitor.  One day in the evening he was somewhat confused about who his family members weren't where they lived.  He is very involved with his family and this is the unusual for him.  It has not persisted and discussed sundowning and to assess this becomes a pattern.    Given that symptoms appeared to be possibly worse with the reduction in Seroquel he discussed increasing back up to 25 mg and even going to 50 mg and assess if there is some him proven in the morning hallucinations..    All these issues reviewed in great length today and we discussed it with other members of the medical staff and his family today.Total time over 45 minutes with over 50% counseling.      ROS:   CONST: weight stable.  Eating well.  Does continue to have incontinence where he cannot feel the urine coming out at night.  We discussed this could be BPH related and apparently he had tried Flomax in the past and cannot remember any particular problem with Flomax and this might be something we start in the future.  We also may have him seen by urology again since he may have seen one in the very distant past.    PAST SURGERIES:  Total knee replacements bilaterally.                                                                                                     PAST MEDICAL HISTORY:    Hypertension.   Hyperlipidemia.    Depression.           Generalized anxiety ([facility]).    Hypothyroidism.    Osteoarthritis.    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  Optic neuropathy, right eye.  Right-sided trigeminal neuralgia, seen by neurology, pain management and neurosurgery  Chronic constipation   Evaluation by [place] [facility] for NPH, apparently no response to spinal tap   Chronic vertigo   Vitamin D deficiency    Parkinsonism by [facility] evaluation    Morning hallucinations, possibly related to Seroquel, possibly hypnagogic                                                                                                           SOCIAL HISTORY:  He is  and lives with his spouse.  They have adult   children in the area and grandchildren.  He has never been a cigarette       smoker.      Vitals as above,  Gen: no distress  Exam otherwise deferred    Ifeanyi was seen today for headache.    Diagnoses and all orders for this visit:    Chronic nonintractable headache, unspecified headache type, patient does need to keep the gabapentin for the trigeminal neuralgia.  His morning symptoms are not really a headache pain but more of a heavy feeling in the head and I think it could relate to some of his medication since it occurs not upon awakening but after taking meds.  It may well be hypotension and we will adjust accordingly.    Visual hallucinations, perhaps slightly worse with a reduction in [employer] also will try to increase his Seroquel and assess response.  He lab ocular problems ruled out.  Does not appear to be a psychiatric and clearly related to the few seconds upon awakening and may well be hypnagogic hallucinations    Essential hypertension, assess for overtreatment    Need for influenza vaccination  -     Influenza - High Dose (65+) (PF) (IM)    Other orders  -     Cancel: Pneumococcal Polysaccharide Vaccine (23 Valent) (SQ/IM) presumably is received his 23 in the past according to his wife.  It appears by records he is received a Prevnar

## 2017-12-06 DIAGNOSIS — E78.5 HYPERLIPIDEMIA, UNSPECIFIED HYPERLIPIDEMIA TYPE: ICD-10-CM

## 2017-12-06 RX ORDER — QUETIAPINE FUMARATE 50 MG/1
TABLET, FILM COATED ORAL
Qty: 90 TABLET | Refills: 0 | Status: SHIPPED | OUTPATIENT
Start: 2017-12-06 | End: 2018-02-07 | Stop reason: SDUPTHER

## 2017-12-06 RX ORDER — LEVOTHYROXINE SODIUM 75 UG/1
TABLET ORAL
Qty: 90 TABLET | Refills: 0 | Status: SHIPPED | OUTPATIENT
Start: 2017-12-06 | End: 2018-02-07 | Stop reason: SDUPTHER

## 2017-12-06 RX ORDER — PRAVASTATIN SODIUM 40 MG/1
TABLET ORAL
Qty: 90 TABLET | Refills: 0 | Status: SHIPPED | OUTPATIENT
Start: 2017-12-06 | End: 2018-02-07 | Stop reason: SDUPTHER

## 2018-01-15 DIAGNOSIS — I10 ESSENTIAL HYPERTENSION: ICD-10-CM

## 2018-01-16 RX ORDER — LISINOPRIL 40 MG/1
TABLET ORAL
Qty: 90 TABLET | Refills: 1 | Status: SHIPPED | OUTPATIENT
Start: 2018-01-16 | End: 2018-06-11 | Stop reason: SDUPTHER

## 2018-01-24 ENCOUNTER — OFFICE VISIT (OUTPATIENT)
Dept: FAMILY MEDICINE | Facility: CLINIC | Age: 83
End: 2018-01-24
Payer: MEDICARE

## 2018-01-24 VITALS
HEART RATE: 80 BPM | DIASTOLIC BLOOD PRESSURE: 72 MMHG | SYSTOLIC BLOOD PRESSURE: 130 MMHG | BODY MASS INDEX: 25.93 KG/M2 | WEIGHT: 202 LBS | OXYGEN SATURATION: 97 % | HEIGHT: 74 IN | TEMPERATURE: 98 F

## 2018-01-24 DIAGNOSIS — G20.C PARKINSONISM, UNSPECIFIED PARKINSONISM TYPE: ICD-10-CM

## 2018-01-24 DIAGNOSIS — G89.29 CHRONIC INTRACTABLE HEADACHE, UNSPECIFIED HEADACHE TYPE: ICD-10-CM

## 2018-01-24 DIAGNOSIS — F32.0 MILD MAJOR DEPRESSION: ICD-10-CM

## 2018-01-24 DIAGNOSIS — I70.0 ATHEROSCLEROSIS OF AORTA: ICD-10-CM

## 2018-01-24 DIAGNOSIS — R44.1 VISUAL HALLUCINATIONS: Primary | ICD-10-CM

## 2018-01-24 DIAGNOSIS — R32 URINARY INCONTINENCE, UNSPECIFIED TYPE: ICD-10-CM

## 2018-01-24 DIAGNOSIS — R51.9 CHRONIC INTRACTABLE HEADACHE, UNSPECIFIED HEADACHE TYPE: ICD-10-CM

## 2018-01-24 DIAGNOSIS — D33.3 VESTIBULAR SCHWANNOMA: ICD-10-CM

## 2018-01-24 PROCEDURE — 99214 OFFICE O/P EST MOD 30 MIN: CPT | Mod: S$GLB,,, | Performed by: INTERNAL MEDICINE

## 2018-01-24 PROCEDURE — 99999 PR PBB SHADOW E&M-EST. PATIENT-LVL III: CPT | Mod: PBBFAC,,, | Performed by: INTERNAL MEDICINE

## 2018-01-24 PROCEDURE — 99499 UNLISTED E&M SERVICE: CPT | Mod: S$GLB,,, | Performed by: INTERNAL MEDICINE

## 2018-01-24 RX ORDER — TRAZODONE HYDROCHLORIDE 50 MG/1
TABLET ORAL
Qty: 60 TABLET | Refills: 11 | Status: SHIPPED | OUTPATIENT
Start: 2018-01-24 | End: 2018-02-05

## 2018-01-24 NOTE — PROGRESS NOTES
Chief complaint, discuss medications        88-year-old white male   He is here with his wife.  . Previously we had recommended a trial off the Seroquel related to his morning hallucinations.  Many times will need weeks he sees people in the room.  Often this is a distressful situation and sometimes he has a look of fear on his face and his wife says it can sometimes distress him for up to 15 minutes.  He did hold the Seroquel 25 mg for 2 nights.  He was not able to sleep however and his blood pressures the subsequent 2 mornings were 212/76 and 194/87.  He had to take his Seroquel last night.  He slept late.  He can't remember if he had hallucinations this morning before the 2 nights that he did not sleep well off the Seroquel he did not have morning hallucinations.  Patient does find it very distressful not to sleep.  I did repeatedly discussed with him however that we need to find out if the morning hallucinations are related to the Seroquel before we can make other management decisions.  We can always give him something else for the insomnia.  He had tried and does not remember any problems with trazodone in the past so we will try a titrated dose of trazodone while again holding the Seroquel and making special note of whether hallucinations in the morning resolve.  If so, we can stop the Seroquel and use other medications for his insomnia.  It hallucinations continue, then we might need to get him back to see psychiatry to pick the next best occasion such as Risperdal and so forth.  I did openly discussed that that is not my best area of expertise to pick such medications especially coinciding with other medications for insomnia.    Patient also has a medicine taste in his mouth.  It sounds like he has mucus and dry mouth in the morning.  I reassured him about that.  He does seem to believe that the acoustic neuroma causing a lot of his problems.  I reassured him that the tumor is in the inner ear and is not  secreting things into his mouth or drying his mouth out.  I reassured him that the neuroma is not causing his hallucinations.    Another issue was that he would take all his medications in the morning and then feel very bad and a very unspecified way would have to go back to sleep.  The wife is now spreading out all his medications and his symptoms are improving.  He had some suspicion that he might have been having some hypotension related to all his medications at one time.      All these issues reviewed in great length today .Total time over 25 minutes with over 50% counseling.     the other issue pointed out is his continued increased urine amount in the diaper.  This is not new problem.  No symptoms of a UTI and we also again discussed obtaining urine would be difficult but we did obtain some in the past which was negative.      ROS:   CONST: weight stable.  Eating well.  Does continue to have incontinence where he cannot feel the urine coming out at night.  We discussed this could be BPH related and apparently he had tried Flomax in the past and cannot remember any particular problem with Flomax and this might be something we start in the future.  We also may have him seen by urology again since he may have seen one in the very distant past.    PAST SURGERIES:  Total knee replacements bilaterally.                                                                                                     PAST MEDICAL HISTORY:    Hypertension.   Hyperlipidemia.    Depression.           Generalized anxiety ([facility]).    Hypothyroidism.    Osteoarthritis.    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  Optic neuropathy, right eye.  Right-sided trigeminal neuralgia, seen by neurology, pain management and neurosurgery  Chronic constipation   Evaluation by [place] [facility] for NPH, apparently no response to spinal tap   Chronic vertigo   Vitamin D deficiency   Parkinsonism by [facility]  evaluation    Morning hallucinations, possibly related to Seroquel, possibly hypnagogic                                                                                                           SOCIAL HISTORY:  He is  and lives with his spouse.  They have adult   children in the area and grandchildren.  He has never been a cigarette       smoker.      Vitals as above,  Gen: no distress  Exam otherwise deferred    Ifeanyi was seen today for headache, blood pressure check and hallucinations.    Diagnoses and all orders for this visit:    Visual hallucinations, it is been suspected that this could be caused by the Seroquel.  Resolution without 2 nights of Seroquel I don't think his definitive and I would like him to hold the Seroquel for a longer period of time if possible and hopefully the trazodone will allow us to treat his insomnia and do this.  Discussed and clarify this plan of action at length and repeatedly.    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

## 2018-01-26 ENCOUNTER — NURSE TRIAGE (OUTPATIENT)
Dept: ADMINISTRATIVE | Facility: CLINIC | Age: 83
End: 2018-01-26

## 2018-01-26 NOTE — TELEPHONE ENCOUNTER
Patient's spouse stated that the patient was recently seen in clinic. PCP stopped Seroquel and started Trazadone but now patient haPs nausea and vomiting. Attempted to call the office however the office is closed. Informed the caller that I would send a message for the office to followup on Monday when they reopen. Instructed to call back with any additional problems and/or concerns    Reason for Disposition   Caller has URGENT medication question about med that PCP prescribed and triager unable to answer question    Protocols used: ST MEDICATION QUESTION CALL-A-AH

## 2018-01-27 NOTE — TELEPHONE ENCOUNTER
Reason for Disposition   Taking prescription medication that could cause nausea (e.g., narcotics/opiates, antibiotics, OCPs, many others)    Protocols used: [facility]-    Ifeanyi's wife calling on his behalf stating that Ifeanyi had taken trazadone for the first time last night and woke up this morning and vomited one time and has had nausea ever since today. She is wondering if she should give him trazadone tonight or if she should give him seroquel which he was taking every night for years prior to a day ago. She states pcp stopped seroquel due to patient's increasing hallucinations. On call provider, Dr. Yeung, recommended patient take 12.5 mg of seroquel instead of the 25 mg he was taking nightly before to see if that helps him sleep and hopefully not produce the hallucination symptoms. Wife agrees to plan and feels comfortable with this plan since she is worried the trazadone made him sick. On call provider further recommended that patient would need to be seen if nausea/vomiting persists despite stopping trazadone. Advised wife and she agrees to plan. Please contact caller with any further care advice.

## 2018-01-30 ENCOUNTER — TELEPHONE (OUTPATIENT)
Dept: FAMILY MEDICINE | Facility: CLINIC | Age: 83
End: 2018-01-30

## 2018-01-30 NOTE — TELEPHONE ENCOUNTER
Spoke with patient wife, she said they spoke to the doctor on call, who advised the patient to return to taking the seroquel.  On call doc suggested a quarter tablet when started the seroquel and then increase to a half tablet.  Wife reports that patient is sleeping well with the half tablet dose of seroquel.   Patient took the trazadone once and experienced vomitting and was nauseated the day after. Patient has discontinued use.

## 2018-01-30 NOTE — TELEPHONE ENCOUNTER
Please track down why message not addressed or message over and for 4 days.      Someone specific needs to be assigned to messaging next    Since his been 4 days, please call wife and apologize and ask current symptoms, ask how many doses of trazodone were tried and if nausea and vomiting were associated with each trial Or did patient only try the trazodone one time?    When he did try it, did the trazodone assist with sleep?

## 2018-01-30 NOTE — TELEPHONE ENCOUNTER
----- Message from Mariama Mac sent at 1/26/2018  9:05 AM CST -----  Contact: [phone]  The trazodone cause the pt nausea and vomiting and the pt wife wants to know what they need to do next Please call  at your earliest convenience.  Thanks !

## 2018-02-02 ENCOUNTER — TELEPHONE (OUTPATIENT)
Dept: NEUROSURGERY | Facility: CLINIC | Age: 83
End: 2018-02-02

## 2018-02-02 NOTE — TELEPHONE ENCOUNTER
----- Message from Kristyn Thompson sent at 2/2/2018 12:26 PM CST -----  Contact: Krystal (Wife) [phone]  Krystal called to speak to someone regarding the patient. He's in extreme pain. Please contact the patient to discuss further.

## 2018-02-02 NOTE — TELEPHONE ENCOUNTER
SW PTS WIFE, PAIN SYNDROME HAS RETURNED, APPT MADE FOR Monday AT 10:30AM TO DISCUSS & SCHED GLYCEROL INJECTION.

## 2018-02-05 ENCOUNTER — OFFICE VISIT (OUTPATIENT)
Dept: NEUROSURGERY | Facility: CLINIC | Age: 83
End: 2018-02-05
Payer: MEDICARE

## 2018-02-05 VITALS
TEMPERATURE: 98 F | HEIGHT: 74 IN | DIASTOLIC BLOOD PRESSURE: 71 MMHG | BODY MASS INDEX: 25.93 KG/M2 | SYSTOLIC BLOOD PRESSURE: 133 MMHG | WEIGHT: 202 LBS | HEART RATE: 70 BPM

## 2018-02-05 DIAGNOSIS — G50.0 TRIGEMINAL NEURALGIA OF RIGHT SIDE OF FACE: Primary | ICD-10-CM

## 2018-02-05 DIAGNOSIS — D33.3 RIGHT ACOUSTIC NEUROMA: ICD-10-CM

## 2018-02-05 PROCEDURE — 99213 OFFICE O/P EST LOW 20 MIN: CPT | Mod: S$GLB,,, | Performed by: NEUROLOGICAL SURGERY

## 2018-02-05 PROCEDURE — 1159F MED LIST DOCD IN RCRD: CPT | Mod: S$GLB,,, | Performed by: NEUROLOGICAL SURGERY

## 2018-02-05 PROCEDURE — 99499 UNLISTED E&M SERVICE: CPT | Mod: S$GLB,,, | Performed by: NEUROLOGICAL SURGERY

## 2018-02-05 PROCEDURE — 1125F AMNT PAIN NOTED PAIN PRSNT: CPT | Mod: S$GLB,,, | Performed by: NEUROLOGICAL SURGERY

## 2018-02-05 PROCEDURE — 3008F BODY MASS INDEX DOCD: CPT | Mod: S$GLB,,, | Performed by: NEUROLOGICAL SURGERY

## 2018-02-05 PROCEDURE — 99999 PR PBB SHADOW E&M-EST. PATIENT-LVL III: CPT | Mod: PBBFAC,,, | Performed by: NEUROLOGICAL SURGERY

## 2018-02-05 NOTE — PROGRESS NOTES
This office note has been dictated.  Ifeanyi Rutherford returned in neurosurgical followup to the office this morning.  I   last saw him on 08/15/16.  He is a now 88-year-old man who was diagnosed in 2009   with a right acoustic neuroma.  He underwent stereotactic radiosurgery in 2012   and then again in 2015.  The tumor reaches the right trigeminal nerve and he has   secondary trigeminal neuralgia associated with this primarily into the lower   jaw and sometimes into the maxilla.  He has continued to have facial pain.  This   is often somewhat sharp and lancinating but also has a more constant component.    Intensity of pain varies.  He has been taking gabapentin and tramadol and very   occasionally oxycodone for pain management.    On brief examination today, he is alert and cooperative.  He is quite hard of   hearing.  He has blindness in the right eye.  Speech and understanding are   generally good.    I again discussed trigeminal glycerol ganglionotomy with the patient and his   wife.  The patient does not have idiopathic trigeminal neuralgia and secondary   trigeminal neuralgia is generally not as responsive to intervention. However,   the procedure is fairly easily done and may provide some relief for him.  He has   not had an MRI scan to assess the acoustic neuroma in over two years.  I would   like to have this done first to be sure there are no adverse changes.  I will   see him back with the MRI of the brain and we can plan from there.      ROSALIA/KERA  dd: 02/05/2018 11:19:43 (CST)  td: 02/06/2018 06:58:22 (CST)  Doc ID   #[id number]  Job ID #[id number]    CC: Ifeanyi Rutherford

## 2018-02-06 DIAGNOSIS — D33.3 UNILATERAL VESTIBULAR SCHWANNOMA: ICD-10-CM

## 2018-02-06 DIAGNOSIS — G50.0 TRIGEMINAL NEURALGIA PAIN: ICD-10-CM

## 2018-02-06 DIAGNOSIS — D33.3 VESTIBULAR SCHWANNOMA: Primary | ICD-10-CM

## 2018-02-06 RX ORDER — TRAZODONE HYDROCHLORIDE 50 MG/1
TABLET ORAL
COMMUNITY
Start: 2018-01-24 | End: 2018-02-26

## 2018-02-06 NOTE — TELEPHONE ENCOUNTER
Spoke with patient's wife. She states that patient was seen by DR. Rodriguez in Neurosurgery. Patient has seen Dr. Rodriguez in the past and rather than see someone new (pain management), patient would rather see Dr. Rodriguez since he is familiar with him     Patient was seen yesterday 2-5-18 and will have glycerol injections     She is also requesting a refill for patient's Tramadol. He has enough medication for 3 days left    Please advise

## 2018-02-06 NOTE — TELEPHONE ENCOUNTER
----- Message from Mariama Mac sent at 2/2/2018 10:52 AM CST -----  Contact: [phone] Krystal   Pt wife has a question about pain management for the pt Please call  at your earliest convenience.  Thanks !

## 2018-02-07 DIAGNOSIS — D33.3 UNILATERAL VESTIBULAR SCHWANNOMA: ICD-10-CM

## 2018-02-07 DIAGNOSIS — E78.5 HYPERLIPIDEMIA, UNSPECIFIED HYPERLIPIDEMIA TYPE: ICD-10-CM

## 2018-02-07 RX ORDER — TRAMADOL HYDROCHLORIDE 50 MG/1
50 TABLET ORAL 3 TIMES DAILY
Qty: 90 TABLET | Refills: 5 | Status: SHIPPED | OUTPATIENT
Start: 2018-02-07 | End: 2018-09-04 | Stop reason: SDUPTHER

## 2018-02-07 NOTE — TELEPHONE ENCOUNTER
Call wife, please call in the tramadol with 5 refills so that she does not need to come in or double check if they wondered it to mail-order for local?    Advise that referral to Dr. Rodriguez and neurosurgery was put in

## 2018-02-07 NOTE — TELEPHONE ENCOUNTER
----- Message from Luz Loaiza sent at 2/6/2018  8:03 AM CST -----  Contact: Krystal/spouse  Refill request: tramadol (ULTRAM) 50 mg tablet    Sent to [facility] [address] [facility]    Krystal [phone]    Thanks

## 2018-02-08 ENCOUNTER — HOSPITAL ENCOUNTER (OUTPATIENT)
Dept: RADIOLOGY | Facility: HOSPITAL | Age: 83
Discharge: HOME | End: 2018-02-08
Attending: NEUROLOGICAL SURGERY
Payer: MEDICARE

## 2018-02-08 DIAGNOSIS — G50.0 TRIGEMINAL NEURALGIA OF RIGHT SIDE OF FACE: ICD-10-CM

## 2018-02-08 DIAGNOSIS — D33.3 RIGHT ACOUSTIC NEUROMA: ICD-10-CM

## 2018-02-08 PROCEDURE — A9585 GADOBUTROL INJECTION: HCPCS | Performed by: NEUROLOGICAL SURGERY

## 2018-02-08 PROCEDURE — 70553 MRI BRAIN STEM W/O & W/DYE: CPT | Mod: 26,,, | Performed by: RADIOLOGY

## 2018-02-08 PROCEDURE — 25500020 PHARM REV CODE 255: Performed by: NEUROLOGICAL SURGERY

## 2018-02-08 PROCEDURE — 70553 MRI BRAIN STEM W/O & W/DYE: CPT | Mod: TC

## 2018-02-08 RX ORDER — TRAMADOL HYDROCHLORIDE 50 MG/1
TABLET ORAL
Qty: 270 TABLET | Refills: 1 | OUTPATIENT
Start: 2018-02-08

## 2018-02-08 RX ORDER — GADOBUTROL 604.72 MG/ML
10 INJECTION INTRAVENOUS
Status: COMPLETED | OUTPATIENT
Start: 2018-02-08 | End: 2018-02-08

## 2018-02-08 RX ORDER — QUETIAPINE FUMARATE 50 MG/1
TABLET, FILM COATED ORAL
Qty: 90 TABLET | Refills: 0 | Status: SHIPPED | OUTPATIENT
Start: 2018-02-08 | End: 2019-01-03 | Stop reason: SDUPTHER

## 2018-02-08 RX ORDER — PRAVASTATIN SODIUM 40 MG/1
TABLET ORAL
Qty: 90 TABLET | Refills: 0 | Status: SHIPPED | OUTPATIENT
Start: 2018-02-08 | End: 2018-04-11 | Stop reason: SDUPTHER

## 2018-02-08 RX ORDER — LEVOTHYROXINE SODIUM 75 UG/1
TABLET ORAL
Qty: 90 TABLET | Refills: 0 | Status: SHIPPED | OUTPATIENT
Start: 2018-02-08 | End: 2018-09-11 | Stop reason: SDUPTHER

## 2018-02-08 RX ADMIN — GADOBUTROL 10 ML: 604.72 INJECTION INTRAVENOUS at 01:02

## 2018-02-08 NOTE — TELEPHONE ENCOUNTER
Spoke with pts wife she states that the rx for tramadol should have been sent to [facility], please resend.

## 2018-02-12 DIAGNOSIS — D33.3 UNILATERAL VESTIBULAR SCHWANNOMA: ICD-10-CM

## 2018-02-12 RX ORDER — TRAMADOL HYDROCHLORIDE 50 MG/1
TABLET ORAL
Qty: 270 TABLET | OUTPATIENT
Start: 2018-02-12

## 2018-02-19 ENCOUNTER — TELEPHONE (OUTPATIENT)
Dept: NEUROSURGERY | Facility: CLINIC | Age: 83
End: 2018-02-19

## 2018-02-19 ENCOUNTER — OFFICE VISIT (OUTPATIENT)
Dept: NEUROSURGERY | Facility: CLINIC | Age: 83
End: 2018-02-19
Payer: MEDICARE

## 2018-02-19 VITALS
WEIGHT: 202 LBS | BODY MASS INDEX: 25.93 KG/M2 | HEIGHT: 74 IN | DIASTOLIC BLOOD PRESSURE: 71 MMHG | HEART RATE: 64 BPM | SYSTOLIC BLOOD PRESSURE: 126 MMHG

## 2018-02-19 DIAGNOSIS — G50.0 TRIGEMINAL NEURALGIA OF RIGHT SIDE OF FACE: Primary | ICD-10-CM

## 2018-02-19 DIAGNOSIS — D33.3 RIGHT ACOUSTIC NEUROMA: ICD-10-CM

## 2018-02-19 PROCEDURE — 1125F AMNT PAIN NOTED PAIN PRSNT: CPT | Mod: S$GLB,,, | Performed by: NEUROLOGICAL SURGERY

## 2018-02-19 PROCEDURE — 99999 PR PBB SHADOW E&M-EST. PATIENT-LVL III: CPT | Mod: PBBFAC,,, | Performed by: NEUROLOGICAL SURGERY

## 2018-02-19 PROCEDURE — 3008F BODY MASS INDEX DOCD: CPT | Mod: S$GLB,,, | Performed by: NEUROLOGICAL SURGERY

## 2018-02-19 PROCEDURE — 1159F MED LIST DOCD IN RCRD: CPT | Mod: S$GLB,,, | Performed by: NEUROLOGICAL SURGERY

## 2018-02-19 PROCEDURE — 99499 UNLISTED E&M SERVICE: CPT | Mod: S$GLB,,, | Performed by: NEUROLOGICAL SURGERY

## 2018-02-19 PROCEDURE — 99212 OFFICE O/P EST SF 10 MIN: CPT | Mod: S$GLB,,, | Performed by: NEUROLOGICAL SURGERY

## 2018-02-19 NOTE — PROGRESS NOTES
This office note has been dictated.  Ifeanyi Rutherford was seen in neurosurgical followup at the office this morning.  He   has continued with right jaw and gum pain, which he says is present much of the   time.  MRI of the brain was repeated at [facility] on 02/08/2018 and   compared to his previous study of 12/01/2015.  Again, is noted the right   acoustic neuroma.  This has shown some shrinkage in response to stereotactic   radiosurgery and is now 2 or 3 mm smaller.  However, it continues to abut the   root entry zone of the right trigeminal nerve and is the presumed cause of his   right trigeminal neuralgia.    With his advanced age, surgical resection of the tumor is not a reasonable   option.  After discussion with him and his family, we will plan percutaneous   glycerol trigeminal ganglionotomy to see if this will provide some pain relief   for him.      RDS/HN  dd: 02/19/2018 12:41:18 (CST)  td: 02/20/2018 06:52:38 (CST)  Doc ID   #[id number]  Job ID #[id number]    CC: Ifeanyi Rutherford

## 2018-02-20 ENCOUNTER — HOSPITAL ENCOUNTER (OUTPATIENT)
Dept: RADIOLOGY | Facility: HOSPITAL | Age: 83
Discharge: HOME | End: 2018-02-20
Attending: NURSE PRACTITIONER
Payer: MEDICARE

## 2018-02-20 ENCOUNTER — OFFICE VISIT (OUTPATIENT)
Dept: FAMILY MEDICINE | Facility: CLINIC | Age: 83
End: 2018-02-20
Payer: MEDICARE

## 2018-02-20 VITALS
DIASTOLIC BLOOD PRESSURE: 58 MMHG | BODY MASS INDEX: 26 KG/M2 | TEMPERATURE: 98 F | HEIGHT: 74 IN | OXYGEN SATURATION: 94 % | WEIGHT: 202.63 LBS | HEART RATE: 82 BPM | SYSTOLIC BLOOD PRESSURE: 122 MMHG

## 2018-02-20 DIAGNOSIS — E03.9 HYPOTHYROIDISM, UNSPECIFIED TYPE: ICD-10-CM

## 2018-02-20 DIAGNOSIS — I10 HYPERTENSION: ICD-10-CM

## 2018-02-20 DIAGNOSIS — D33.3 VESTIBULAR SCHWANNOMA: ICD-10-CM

## 2018-02-20 DIAGNOSIS — Z01.818 PREOPERATIVE EXAMINATION: ICD-10-CM

## 2018-02-20 DIAGNOSIS — G50.0 TRIGEMINAL NEURALGIA: ICD-10-CM

## 2018-02-20 DIAGNOSIS — G50.0 TRIGEMINAL NEURALGIA: Primary | ICD-10-CM

## 2018-02-20 DIAGNOSIS — R94.5 ABNORMAL RESULTS OF LIVER FUNCTION STUDIES: ICD-10-CM

## 2018-02-20 DIAGNOSIS — I70.0 ATHEROSCLEROSIS OF AORTA: ICD-10-CM

## 2018-02-20 DIAGNOSIS — I10 HYPERTENSION, UNSPECIFIED TYPE: ICD-10-CM

## 2018-02-20 PROCEDURE — 93010 ELECTROCARDIOGRAM REPORT: CPT | Mod: S$GLB,,, | Performed by: INTERNAL MEDICINE

## 2018-02-20 PROCEDURE — 99214 OFFICE O/P EST MOD 30 MIN: CPT | Mod: S$GLB,,, | Performed by: NURSE PRACTITIONER

## 2018-02-20 PROCEDURE — 71046 X-RAY EXAM CHEST 2 VIEWS: CPT | Mod: TC,FY,PO

## 2018-02-20 PROCEDURE — 93005 ELECTROCARDIOGRAM TRACING: CPT | Mod: S$GLB,,, | Performed by: NURSE PRACTITIONER

## 2018-02-20 PROCEDURE — 71046 X-RAY EXAM CHEST 2 VIEWS: CPT | Mod: 26,,, | Performed by: RADIOLOGY

## 2018-02-20 PROCEDURE — 1159F MED LIST DOCD IN RCRD: CPT | Mod: S$GLB,,, | Performed by: NURSE PRACTITIONER

## 2018-02-20 PROCEDURE — 99999 PR PBB SHADOW E&M-EST. PATIENT-LVL IV: CPT | Mod: PBBFAC,,, | Performed by: NURSE PRACTITIONER

## 2018-02-20 PROCEDURE — 3008F BODY MASS INDEX DOCD: CPT | Mod: S$GLB,,, | Performed by: NURSE PRACTITIONER

## 2018-02-20 PROCEDURE — 99499 UNLISTED E&M SERVICE: CPT | Mod: S$GLB,,, | Performed by: NURSE PRACTITIONER

## 2018-02-20 NOTE — PROGRESS NOTES
This dictation has been generated using Dragon Dictation some phonetic errors may occur.     Ifeanyi was seen today for pre-op exam.    Diagnoses and all orders for this visit:    Trigeminal neuralgia  Comments:  right   Orders:  -     CBC auto differential; Future  -     Comprehensive metabolic panel; Future  -     Urinalysis; Future  -     EKG 12-lead  -     X-Ray Chest PA And Lateral; Future  -     Protime-INR; Future    Preoperative examination  -     CBC auto differential; Future  -     Comprehensive metabolic panel; Future  -     Urinalysis; Future  -     EKG 12-lead  -     X-Ray Chest PA And Lateral; Future  -     Protime-INR; Future    Atherosclerosis of aorta    Hypothyroidism, unspecified type    Hypertension, unspecified type    Vestibular schwannoma  -     Protime-INR; Future    Abnormal results of liver function studies   -     Protime-INR; Future    Hypertension  -     EKG 12-lead      Pre-op.  Surgery request labs as above.  Patient scheduled for low risk procedure.  He does not have a history of cardiovascular disease.  He is cleared for surgery with MAC anesthesia the labs are pending as listed above.    Follow-up if symptoms worsen or fail to improve.      ________________________________________________________________  ________________________________________________________________        Chief Complaint   Patient presents with    Pre-op Exam     History of present illness  This 88 y.o. presents today for complaint of pre-op clearance.  This patient does have an acoustic neuroma and trigeminal neuralgia.  Neurology intends to complete a percutaneous glycerol trigeminal ganglionotomy as outpatient for the TN.  MAC anesthesia is planned.  Surgery is requesting labs EKG and chest x-ray.  No history of cardiovascular disease or heart failure.  No history of diabetes.  He does have aortic atherosclerosis hypertension and hypothyroidism.  All are stable and controlled.  His exercise is difficult to  assess as he does have multiple issues which interfere with his ability to ambulate.  He certainly can't go up and down stairs.  His wife states that he hasn't had a change in his exercise tolerance.  ROS:   CONST: weight stable.  EYES: no vision change.  ENT: No sore throat, headache, or earache.  CV: no chest pain w/ exertion.  RESP: No shortness of breath.  GI: no nausea, vomiting, diarrhea. No dysphagia. Appetite good.   : no urinary issues.  MUSCULOSKELETAL: no new myalgias or arthralgias.  SKIN: no new changes.  NEURO: no focal deficits. Denies headache.  PSYCH: no new issues.  ENDOCRINE: no polyuria.  HEME: no lymph nodes.  ALLERGY: no general pruritis.        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    Spinal stenosis     Trigeminal neuralgia pain 1/20/2015    Vertigo 9/11/2012    Vestibular schwannoma 6/4/2015       Past Surgical History:   Procedure Laterality Date    bilateral total knee arthroplasties      CATARACT EXTRACTION W/  INTRAOCULAR LENS IMPLANT Bilateral     CHOLECYSTECTOMY      EYE SURGERY      GALLBLADDER SURGERY      THYROIDECTOMY      TONSILLECTOMY         Family History   Problem Relation Age of Onset    Heart disease Mother     Suicide Neg Hx     Schizophrenia Neg Hx     Amblyopia Neg Hx     Blindness Neg Hx     Cataracts Neg Hx     Glaucoma Neg Hx     Macular degeneration Neg Hx     Retinal detachment Neg Hx     Strabismus Neg Hx        Social History     Social History    Marital status:      Spouse name: N/A     Number of children: N/A    Years of education: N/A     Social History Main Topics    Smoking status: Former Smoker    Smokeless tobacco: Never Used    Alcohol use No    Drug use: No    Sexual activity: Yes     Partners: Female     Other Topics Concern    None     Social History Narrative    None       Current Outpatient Prescriptions   Medication Sig Dispense Refill    acetaminophen (TYLENOL) 325 MG tablet Take 2 tablets (650 mg total) by mouth every 6 (six) hours as needed for Pain.  0    AUROGUARD 5.4-1.4 % Drop Place 3 drops in ear(s) daily as needed (To left ear).       fluticasone (FLONASE) 50 mcg/actuation nasal spray 2 sprays by Each Nare route once daily. 1 Bottle 6    gabapentin (NEURONTIN) 300 MG capsule Take 1 capsule (300 mg total) by mouth 3 (three) times daily. 270 capsule 3    levothyroxine (SYNTHROID) 75 MCG tablet TAKE 1 TABLET BEFORE BREAKFAST 90 tablet 0    lidocaine HCl 2% (LIDOCAINE VISCOUS) 2 % Soln by Mucous Membrane route every 6 (six) hours. 100 mL 3    lisinopril (PRINIVIL,ZESTRIL) 40 MG tablet TAKE 1 TABLET EVERY DAY 90 tablet 1    ondansetron (ZOFRAN-ODT) 4 MG TbDL Take 1 tablet (4 mg total) by mouth every 8 (eight) hours as needed (nausea/vomiting). 12 tablet 0    oxycodone-acetaminophen (PERCOCET) 7.5-325 mg per tablet Take 1 tablet by mouth every 4 (four) hours as needed for Pain.      polyethylene glycol (GLYCOLAX) 17 gram/dose powder Take 17 g by mouth once daily. 1530 g 4    pravastatin (PRAVACHOL) 40 MG tablet TAKE 1 TABLET EVERY DAY 90 tablet 0    QUEtiapine (SEROQUEL) 50 MG tablet TAKE 1 TABLET EVERY EVENING 90 tablet 0    senna (SENNA) 8.6 mg tablet Take 1 tablet by mouth 2 (two) times daily. 30 tablet 1    traMADol (ULTRAM) 50 mg tablet Take 1 tablet (50 mg total) by mouth 3 (three) times daily. 90 tablet 5    traZODone (DESYREL) 50 MG tablet       triamcinolone acetonide 0.1% (KENALOG) 0.1 % cream Apply to the affected area twice a day 30 g 2     triamcinolone acetonide 0.1% (KENALOG) 0.1 % cream APPLY TO AFFECTED AREA TWICE DAILY 453.6 g 1    venlafaxine (EFFEXOR-XR) 75 MG 24 hr capsule Two capsules daily 180 capsule 3     No current facility-administered medications for this visit.        Review of patient's allergies indicates:   Allergen Reactions    Carbamazepine Swelling    Demerol [meperidine] Other (See Comments)     hallucinations    Morphine Nausea Only       Physical examination  Vitals Reviewed  Gen. Well-dressed well-nourished no apparent distress. [place].   Skin warm dry and intact.  No rashes noted.  Neck is supple without adenopathy  Chest.  Respirations are even unlabored.  Lungs are clear to auscultation.  Cardiac regular rate and rhythm.  No chest wall adenopathy noted.  Abdomen is soft and not distended.  Bowel sounds are present.  No tenderness during palpation of the abdomen.  No Hepatosplenomegaly noted.  No hernia noted.  No CVA tenderness to percussion.    Neuro. Awake alert oriented x2.  Normal judgment and cognition noted.    Extremities no clubbing cyanosis or edema noted.  He does walk using a walker.    Call or return to clinic prn if these symptoms worsen or fail to improve as anticipated.

## 2018-02-21 ENCOUNTER — TELEPHONE (OUTPATIENT)
Dept: FAMILY MEDICINE | Facility: CLINIC | Age: 83
End: 2018-02-21

## 2018-02-21 ENCOUNTER — LAB VISIT (OUTPATIENT)
Dept: LAB | Facility: HOSPITAL | Age: 83
End: 2018-02-21
Attending: NURSE PRACTITIONER
Payer: MEDICARE

## 2018-02-21 DIAGNOSIS — G50.0 TRIGEMINAL NEURALGIA: ICD-10-CM

## 2018-02-21 DIAGNOSIS — R94.5 ABNORMAL RESULTS OF LIVER FUNCTION STUDIES: ICD-10-CM

## 2018-02-21 DIAGNOSIS — Z01.818 PREOPERATIVE EXAMINATION: ICD-10-CM

## 2018-02-21 DIAGNOSIS — D33.3 VESTIBULAR SCHWANNOMA: ICD-10-CM

## 2018-02-21 LAB
ALBUMIN SERPL BCP-MCNC: 3.3 G/DL
ALP SERPL-CCNC: 57 U/L
ALT SERPL W/O P-5'-P-CCNC: 12 U/L
ANION GAP SERPL CALC-SCNC: 10 MMOL/L
AST SERPL-CCNC: 18 U/L
BASOPHILS # BLD AUTO: 0.09 K/UL
BASOPHILS NFR BLD: 1.3 %
BILIRUB SERPL-MCNC: 0.6 MG/DL
BUN SERPL-MCNC: 19 MG/DL
CALCIUM SERPL-MCNC: 8.9 MG/DL
CHLORIDE SERPL-SCNC: 105 MMOL/L
CO2 SERPL-SCNC: 27 MMOL/L
CREAT SERPL-MCNC: 1 MG/DL
DIFFERENTIAL METHOD: NORMAL
EOSINOPHIL # BLD AUTO: 0.4 K/UL
EOSINOPHIL NFR BLD: 6.1 %
ERYTHROCYTE [DISTWIDTH] IN BLOOD BY AUTOMATED COUNT: 12.3 %
EST. GFR  (AFRICAN AMERICAN): >60 ML/MIN/1.73 M^2
EST. GFR  (NON AFRICAN AMERICAN): >60 ML/MIN/1.73 M^2
GLUCOSE SERPL-MCNC: 93 MG/DL
HCT VFR BLD AUTO: 45 %
HGB BLD-MCNC: 14.4 G/DL
IMM GRANULOCYTES # BLD AUTO: 0.03 K/UL
IMM GRANULOCYTES NFR BLD AUTO: 0.4 %
INR PPP: 1
LYMPHOCYTES # BLD AUTO: 2 K/UL
LYMPHOCYTES NFR BLD: 30.4 %
MCH RBC QN AUTO: 29.8 PG
MCHC RBC AUTO-ENTMCNC: 32 G/DL
MCV RBC AUTO: 93 FL
MONOCYTES # BLD AUTO: 0.9 K/UL
MONOCYTES NFR BLD: 12.9 %
NEUTROPHILS # BLD AUTO: 3.3 K/UL
NEUTROPHILS NFR BLD: 48.9 %
NRBC BLD-RTO: 0 /100 WBC
PLATELET # BLD AUTO: 248 K/UL
PMV BLD AUTO: 10.5 FL
POTASSIUM SERPL-SCNC: 4.3 MMOL/L
PROT SERPL-MCNC: 7 G/DL
PROTHROMBIN TIME: 10.3 SEC
RBC # BLD AUTO: 4.84 M/UL
SODIUM SERPL-SCNC: 142 MMOL/L
WBC # BLD AUTO: 6.68 K/UL

## 2018-02-21 PROCEDURE — 85610 PROTHROMBIN TIME: CPT

## 2018-02-21 PROCEDURE — 36415 COLL VENOUS BLD VENIPUNCTURE: CPT | Mod: PO

## 2018-02-21 PROCEDURE — 80053 COMPREHEN METABOLIC PANEL: CPT

## 2018-02-21 PROCEDURE — 85025 COMPLETE CBC W/AUTO DIFF WBC: CPT

## 2018-02-26 ENCOUNTER — ANESTHESIA EVENT (OUTPATIENT)
Dept: SURGERY | Facility: HOSPITAL | Age: 83
End: 2018-02-26
Payer: MEDICARE

## 2018-02-26 NOTE — ANESTHESIA PREPROCEDURE EVALUATION
[facility]  Anesthesia Pre-Operative Evaluation         Patient Name: Ifeanyi Rutherford  YOB: 1929  MRN: [id number]    SUBJECTIVE:     Pre-operative evaluation for Procedure(s) (LRB):  PERCUTANEOUS INJECTION-TRIGEMINAL NERVE (Right)     02/26/2018    Ifeanyi Rutherford is a 88 y.o. male w/ a significant PMHx of hypothyroidism, HTN, HLD, depression and acoustic neuroma (diagnoised in 2009) which has been treated with stereotactic radiosurgery causing trigeminal neuralgia.    Patient now presents for the above procedure(s).    Prev airway: None documented    Patient Active Problem List   Diagnosis    Hypertension    Vertigo    Fatigue    Mild major depression    Optic neuropathy, ischemic - Both Eyes    Choroidal nevus of right eye    Pseudophakia - Both Eyes    Refractive error - Left Eye    Debility    Hypothyroidism    Chronic headache    Parkinsonism    Trigeminal neuralgia pain    Vestibular schwannoma    Insufficiency of tear film of both eyes    Atherosclerosis of aorta    Ciliary muscle spasm of right eye    Mouth pain       Review of patient's allergies indicates:   Allergen Reactions    Carbamazepine Swelling    Tramadol Anxiety and Hallucinations    Demerol [meperidine] Hallucinations    Morphine Nausea Only       No current facility-administered medications for this encounter.     Current Outpatient Prescriptions:     acetaminophen (TYLENOL) 325 MG tablet, Take 2 tablets (650 mg total) by mouth every 6 (six) hours as needed for Pain., Disp: , Rfl: 0    gabapentin (NEURONTIN) 300 MG capsule, Take 1 capsule (300 mg total) by mouth 3 (three) times daily., Disp: 270 capsule, Rfl: 3    levothyroxine (SYNTHROID) 75 MCG tablet, TAKE 1 TABLET BEFORE BREAKFAST, Disp: 90 tablet, Rfl: 0    lisinopril (PRINIVIL,ZESTRIL) 40 MG tablet, TAKE 1 TABLET EVERY DAY, Disp: 90 tablet, Rfl: 1    polyethylene glycol (GLYCOLAX) 17 gram/dose powder, Take 17 g by mouth once daily.,  Disp: 1530 g, Rfl: 4    pravastatin (PRAVACHOL) 40 MG tablet, TAKE 1 TABLET EVERY DAY (Patient taking differently: TAKE 1 TABLET EVERY NIGHT), Disp: 90 tablet, Rfl: 0    QUEtiapine (SEROQUEL) 50 MG tablet, TAKE 1 TABLET EVERY EVENING (Patient taking differently: TAKE 1/2 TABLET EVERY EVENING), Disp: 90 tablet, Rfl: 0    senna (SENNA) 8.6 mg tablet, Take 1 tablet by mouth 2 (two) times daily., Disp: 30 tablet, Rfl: 1    traMADol (ULTRAM) 50 mg tablet, Take 1 tablet (50 mg total) by mouth 3 (three) times daily., Disp: 90 tablet, Rfl: 5    venlafaxine (EFFEXOR-XR) 75 MG 24 hr capsule, Two capsules daily, Disp: 180 capsule, Rfl: 3    AUROGUARD 5.4-1.4 % Drop, Place 3 drops in ear(s) daily as needed (To left ear). , Disp: , Rfl:     fluticasone (FLONASE) 50 mcg/actuation nasal spray, 2 sprays by Each Nare route once daily., Disp: 1 Bottle, Rfl: 6    lidocaine HCl 2% (LIDOCAINE VISCOUS) 2 % Soln, by Mucous Membrane route every 6 (six) hours., Disp: 100 mL, Rfl: 3    ondansetron (ZOFRAN-ODT) 4 MG TbDL, Take 1 tablet (4 mg total) by mouth every 8 (eight) hours as needed (nausea/vomiting)., Disp: 12 tablet, Rfl: 0    triamcinolone acetonide 0.1% (KENALOG) 0.1 % cream, Apply to the affected area twice a day, Disp: 30 g, Rfl: 2    No current facility-administered medications on file prior to encounter.      Current Outpatient Prescriptions on File Prior to Encounter   Medication Sig Dispense Refill    acetaminophen (TYLENOL) 325 MG tablet Take 2 tablets (650 mg total) by mouth every 6 (six) hours as needed for Pain.  0    gabapentin (NEURONTIN) 300 MG capsule Take 1 capsule (300 mg total) by mouth 3 (three) times daily. 270 capsule 3    levothyroxine (SYNTHROID) 75 MCG tablet TAKE 1 TABLET BEFORE BREAKFAST 90 tablet 0    lisinopril (PRINIVIL,ZESTRIL) 40 MG tablet TAKE 1 TABLET EVERY DAY 90 tablet 1    polyethylene glycol (GLYCOLAX) 17 gram/dose powder Take 17 g by mouth once daily. 1530 g 4    pravastatin  (PRAVACHOL) 40 MG tablet TAKE 1 TABLET EVERY DAY (Patient taking differently: TAKE 1 TABLET EVERY NIGHT) 90 tablet 0    QUEtiapine (SEROQUEL) 50 MG tablet TAKE 1 TABLET EVERY EVENING (Patient taking differently: TAKE 1/2 TABLET EVERY EVENING) 90 tablet 0    senna (SENNA) 8.6 mg tablet Take 1 tablet by mouth 2 (two) times daily. 30 tablet 1    traMADol (ULTRAM) 50 mg tablet Take 1 tablet (50 mg total) by mouth 3 (three) times daily. 90 tablet 5    venlafaxine (EFFEXOR-XR) 75 MG 24 hr capsule Two capsules daily 180 capsule 3    AUROGUARD 5.4-1.4 % Drop Place 3 drops in ear(s) daily as needed (To left ear).       fluticasone (FLONASE) 50 mcg/actuation nasal spray 2 sprays by Each Nare route once daily. 1 Bottle 6    lidocaine HCl 2% (LIDOCAINE VISCOUS) 2 % Soln by Mucous Membrane route every 6 (six) hours. 100 mL 3    ondansetron (ZOFRAN-ODT) 4 MG TbDL Take 1 tablet (4 mg total) by mouth every 8 (eight) hours as needed (nausea/vomiting). 12 tablet 0    triamcinolone acetonide 0.1% (KENALOG) 0.1 % cream Apply to the affected area twice a day 30 g 2       Past Surgical History:   Procedure Laterality Date    bilateral total knee arthroplasties      CATARACT EXTRACTION W/  INTRAOCULAR LENS IMPLANT Bilateral     CHOLECYSTECTOMY      EYE SURGERY      GALLBLADDER SURGERY      THYROIDECTOMY      TONSILLECTOMY         Social History     Social History    Marital status:      Spouse name: N/A    Number of children: N/A    Years of education: N/A     Occupational History    Not on file.     Social History Main Topics    Smoking status: Former Smoker    Smokeless tobacco: Never Used    Alcohol use No    Drug use: No    Sexual activity: Yes     Partners: Female     Other Topics Concern    Not on file     Social History Narrative    No narrative on file       OBJECTIVE:     Vital Signs Range (Last 24H):         Significant Labs:  Lab Results   Component Value Date    WBC 6.68 02/21/2018    HGB 14.4  2018    HCT 45.0 2018     2018    CHOL 221 (H) 2015    TRIG 150 2015    HDL 47 2015    ALT 12 2018    AST 18 2018     2018    K 4.3 2018     2018    CREATININE 1.0 2018    BUN 19 2018    CO2 27 2018    TSH 1.894 01/10/2017    PSA 5.13 (H) 2012    INR 1.0 2018    GLUF 86 2004    HGBA1C 5.8 01/10/2017       Diagnostic Studies: No relevant studies.    EK2018  Vent. Rate : 073 BPM     Atrial Rate : 073 BPM     P-R Int : 256 ms          QRS Dur : 130 ms      QT Int : 438 ms       P-R-T Axes : 073 060 031 degrees     QTc Int : 482 ms    Sinus rhythm with 1st degree A-V block  Right bundle branch block  Abnormal ECG  When compared with ECG of 19-SEP-2016 13:02,  No significant change was found  Confirmed by Giovanny Duong MD ([id number]) on 2018 12:08:06 PM    2D ECHO:  No results found for this or any previous visit.       ASSESSMENT/PLAN:         Anesthesia Evaluation    I have reviewed the Patient Summary Reports.    I have reviewed the Nursing Notes.   I have reviewed the Medications.     Review of Systems  Anesthesia Hx:  History of prior surgery of interest to airway management or planning:   Hematology/Oncology:     Oncology Normal     Cardiovascular:   Hypertension ECG has been reviewed.    Pulmonary:  Pulmonary Normal    Hepatic/GI:  Hepatic/GI Normal    Neurological:   Neuromuscular Disease, Headaches Acoustic neuroma   Endocrine:   Hypothyroidism    Psych:   Psychiatric History depression          Physical Exam  General:  Well nourished       Chest/Lungs:  Chest/Lungs Findings: Clear to auscultation, Normal Respiratory Rate     Heart/Vascular:  Heart Findings: Rate: Normal  Rhythm: Regular Rhythm  Sounds: Normal        Mental Status:  Mental Status Findings:  Cooperative         Anesthesia Plan  Type of Anesthesia, risks & benefits discussed:  Anesthesia Type:  general, MAC  Patient's  Preference:   Intra-op Monitoring Plan: standard ASA monitors  Intra-op Monitoring Plan Comments:   Post Op Pain Control Plan: per primary service following discharge from PACU  Post Op Pain Control Plan Comments:   Induction:   IV  Beta Blocker:  Patient is on a Beta-Blocker and has received one dose within the past 24 hours (No further documentation required).       Informed Consent:    ASA Score: 3     Day of Surgery Review of History & Physical:  There are no significant changes.  H&P update referred to the surgeon.         Ready For Surgery From Anesthesia Perspective.

## 2018-02-26 NOTE — PRE-PROCEDURE INSTRUCTIONS
Preop instructions: NPO after midnight or 8 hours prior to procedure time, shower instructions, directions, leave all valuables at home, medication instructions for PM prior & am of procedure explained. Wife stated an understanding.  Wife denies any side effects or issues with anesthesia or sedation.

## 2018-02-27 ENCOUNTER — ANESTHESIA (OUTPATIENT)
Dept: SURGERY | Facility: HOSPITAL | Age: 83
End: 2018-02-27
Payer: MEDICARE

## 2018-02-27 ENCOUNTER — HOSPITAL ENCOUNTER (OUTPATIENT)
Facility: HOSPITAL | Age: 83
Discharge: HOME | End: 2018-02-27
Attending: NEUROLOGICAL SURGERY | Admitting: NEUROLOGICAL SURGERY
Payer: MEDICARE

## 2018-02-27 VITALS
SYSTOLIC BLOOD PRESSURE: 176 MMHG | HEART RATE: 68 BPM | HEIGHT: 74 IN | TEMPERATURE: 98 F | BODY MASS INDEX: 25.93 KG/M2 | DIASTOLIC BLOOD PRESSURE: 80 MMHG | OXYGEN SATURATION: 97 % | RESPIRATION RATE: 18 BRPM | WEIGHT: 202 LBS

## 2018-02-27 DIAGNOSIS — D33.3 ACOUSTIC NEUROMA: ICD-10-CM

## 2018-02-27 DIAGNOSIS — G50.0 TRIGEMINAL NEURALGIA OF RIGHT SIDE OF FACE: Primary | ICD-10-CM

## 2018-02-27 LAB
ABO + RH BLD: NORMAL
BLD GP AB SCN CELLS X3 SERPL QL: NORMAL

## 2018-02-27 PROCEDURE — 37000009 HC ANESTHESIA EA ADD 15 MINS: Performed by: NEUROLOGICAL SURGERY

## 2018-02-27 PROCEDURE — 63600175 PHARM REV CODE 636 W HCPCS: Performed by: NEUROLOGICAL SURGERY

## 2018-02-27 PROCEDURE — 71000044 HC DOSC ROUTINE RECOVERY FIRST HOUR: Performed by: NEUROLOGICAL SURGERY

## 2018-02-27 PROCEDURE — 63600175 PHARM REV CODE 636 W HCPCS: Performed by: ANESTHESIOLOGY

## 2018-02-27 PROCEDURE — 36000704 HC OR TIME LEV I 1ST 15 MIN: Performed by: NEUROLOGICAL SURGERY

## 2018-02-27 PROCEDURE — 64610 INJECTION TREATMENT OF NERVE: CPT | Mod: ,,, | Performed by: NEUROLOGICAL SURGERY

## 2018-02-27 PROCEDURE — 25000003 PHARM REV CODE 250: Performed by: NEUROLOGICAL SURGERY

## 2018-02-27 PROCEDURE — D9220A PRA ANESTHESIA: Mod: ,,, | Performed by: ANESTHESIOLOGY

## 2018-02-27 PROCEDURE — 71000015 HC POSTOP RECOV 1ST HR: Performed by: NEUROLOGICAL SURGERY

## 2018-02-27 PROCEDURE — 37000008 HC ANESTHESIA 1ST 15 MINUTES: Performed by: NEUROLOGICAL SURGERY

## 2018-02-27 PROCEDURE — 63600175 PHARM REV CODE 636 W HCPCS: Performed by: STUDENT IN AN ORGANIZED HEALTH CARE EDUCATION/TRAINING PROGRAM

## 2018-02-27 PROCEDURE — 36000705 HC OR TIME LEV I EA ADD 15 MIN: Performed by: NEUROLOGICAL SURGERY

## 2018-02-27 PROCEDURE — 25000003 PHARM REV CODE 250

## 2018-02-27 PROCEDURE — 25000003 PHARM REV CODE 250: Performed by: STUDENT IN AN ORGANIZED HEALTH CARE EDUCATION/TRAINING PROGRAM

## 2018-02-27 PROCEDURE — 71000045 HC DOSC ROUTINE RECOVERY EA ADD'L HR: Performed by: NEUROLOGICAL SURGERY

## 2018-02-27 PROCEDURE — 63600175 PHARM REV CODE 636 W HCPCS: Performed by: NURSE ANESTHETIST, CERTIFIED REGISTERED

## 2018-02-27 PROCEDURE — 86901 BLOOD TYPING SEROLOGIC RH(D): CPT

## 2018-02-27 RX ORDER — TRAMADOL HYDROCHLORIDE 50 MG/1
50 TABLET ORAL EVERY 6 HOURS PRN
Status: DISCONTINUED | OUTPATIENT
Start: 2018-02-27 | End: 2018-02-27 | Stop reason: HOSPADM

## 2018-02-27 RX ORDER — LIDOCAINE HYDROCHLORIDE 10 MG/ML
1 INJECTION, SOLUTION EPIDURAL; INFILTRATION; INTRACAUDAL; PERINEURAL ONCE
Status: COMPLETED | OUTPATIENT
Start: 2018-02-27 | End: 2018-02-27

## 2018-02-27 RX ORDER — LABETALOL HYDROCHLORIDE 5 MG/ML
10 INJECTION, SOLUTION INTRAVENOUS ONCE
Status: DISCONTINUED | OUTPATIENT
Start: 2018-02-27 | End: 2018-02-27 | Stop reason: HOSPADM

## 2018-02-27 RX ORDER — METOPROLOL TARTRATE 1 MG/ML
INJECTION, SOLUTION INTRAVENOUS
Status: DISCONTINUED | OUTPATIENT
Start: 2018-02-27 | End: 2018-02-27

## 2018-02-27 RX ORDER — SODIUM CHLORIDE 9 MG/ML
INJECTION, SOLUTION INTRAVENOUS CONTINUOUS
Status: DISCONTINUED | OUTPATIENT
Start: 2018-02-27 | End: 2018-02-27 | Stop reason: HOSPADM

## 2018-02-27 RX ORDER — FENTANYL CITRATE 50 UG/ML
25 INJECTION, SOLUTION INTRAMUSCULAR; INTRAVENOUS EVERY 5 MIN PRN
Status: COMPLETED | OUTPATIENT
Start: 2018-02-27 | End: 2018-02-27

## 2018-02-27 RX ORDER — PROPOFOL 10 MG/ML
VIAL (ML) INTRAVENOUS
Status: DISCONTINUED | OUTPATIENT
Start: 2018-02-27 | End: 2018-02-27

## 2018-02-27 RX ORDER — GABAPENTIN 300 MG/1
CAPSULE ORAL
Status: COMPLETED
Start: 2018-02-27 | End: 2018-02-27

## 2018-02-27 RX ORDER — ONDANSETRON 2 MG/ML
4 INJECTION INTRAMUSCULAR; INTRAVENOUS ONCE
Status: COMPLETED | OUTPATIENT
Start: 2018-02-27 | End: 2018-02-27

## 2018-02-27 RX ORDER — HYDROCODONE BITARTRATE AND ACETAMINOPHEN 5; 325 MG/1; MG/1
TABLET ORAL
Status: COMPLETED
Start: 2018-02-27 | End: 2018-02-27

## 2018-02-27 RX ORDER — GABAPENTIN 300 MG/1
300 CAPSULE ORAL 3 TIMES DAILY
Status: DISCONTINUED | OUTPATIENT
Start: 2018-02-27 | End: 2018-02-27 | Stop reason: HOSPADM

## 2018-02-27 RX ORDER — TRAMADOL HYDROCHLORIDE 50 MG/1
TABLET ORAL
Status: COMPLETED
Start: 2018-02-27 | End: 2018-02-27

## 2018-02-27 RX ORDER — MUPIROCIN 20 MG/G
OINTMENT TOPICAL
Status: DISCONTINUED | OUTPATIENT
Start: 2018-02-27 | End: 2018-02-27 | Stop reason: HOSPADM

## 2018-02-27 RX ORDER — LIDOCAINE HCL/PF 100 MG/5ML
SYRINGE (ML) INTRAVENOUS
Status: DISCONTINUED | OUTPATIENT
Start: 2018-02-27 | End: 2018-02-27

## 2018-02-27 RX ORDER — MUPIROCIN 20 MG/G
1 OINTMENT TOPICAL
Status: COMPLETED | OUTPATIENT
Start: 2018-02-27 | End: 2018-02-27

## 2018-02-27 RX ORDER — LABETALOL HYDROCHLORIDE 5 MG/ML
INJECTION, SOLUTION INTRAVENOUS
Status: DISCONTINUED | OUTPATIENT
Start: 2018-02-27 | End: 2018-02-27

## 2018-02-27 RX ORDER — LIDOCAINE HYDROCHLORIDE AND EPINEPHRINE 10; 10 MG/ML; UG/ML
INJECTION, SOLUTION INFILTRATION; PERINEURAL
Status: DISCONTINUED | OUTPATIENT
Start: 2018-02-27 | End: 2018-02-27 | Stop reason: HOSPADM

## 2018-02-27 RX ORDER — METOCLOPRAMIDE HYDROCHLORIDE 5 MG/ML
10 INJECTION INTRAMUSCULAR; INTRAVENOUS EVERY 10 MIN PRN
Status: DISCONTINUED | OUTPATIENT
Start: 2018-02-27 | End: 2018-02-27 | Stop reason: HOSPADM

## 2018-02-27 RX ORDER — FENTANYL CITRATE 50 UG/ML
INJECTION, SOLUTION INTRAMUSCULAR; INTRAVENOUS
Status: DISCONTINUED | OUTPATIENT
Start: 2018-02-27 | End: 2018-02-27

## 2018-02-27 RX ORDER — PROPOFOL 10 MG/ML
VIAL (ML) INTRAVENOUS CONTINUOUS PRN
Status: DISCONTINUED | OUTPATIENT
Start: 2018-02-27 | End: 2018-02-27

## 2018-02-27 RX ORDER — HYDROCODONE BITARTRATE AND ACETAMINOPHEN 5; 325 MG/1; MG/1
1 TABLET ORAL EVERY 6 HOURS PRN
Qty: 15 TABLET | Refills: 0 | Status: SHIPPED | OUTPATIENT
Start: 2018-02-27 | End: 2018-03-16 | Stop reason: SDUPTHER

## 2018-02-27 RX ADMIN — CEFTRIAXONE 2 G: 2 INJECTION, SOLUTION INTRAVENOUS at 02:02

## 2018-02-27 RX ADMIN — GABAPENTIN 300 MG: 300 CAPSULE ORAL at 05:02

## 2018-02-27 RX ADMIN — LIDOCAINE HYDROCHLORIDE 60 MG: 20 INJECTION, SOLUTION INTRAVENOUS at 02:02

## 2018-02-27 RX ADMIN — LABETALOL HYDROCHLORIDE 5 MG: 5 INJECTION, SOLUTION INTRAVENOUS at 03:02

## 2018-02-27 RX ADMIN — HYDROCODONE BITARTRATE AND ACETAMINOPHEN 1 TABLET: 5; 325 TABLET ORAL at 05:02

## 2018-02-27 RX ADMIN — MUPIROCIN 1 G: 20 OINTMENT TOPICAL at 01:02

## 2018-02-27 RX ADMIN — METOPROLOL TARTRATE 1 MG: 5 INJECTION, SOLUTION INTRAVENOUS at 03:02

## 2018-02-27 RX ADMIN — TRAMADOL HYDROCHLORIDE 50 MG: 50 TABLET, FILM COATED ORAL at 05:02

## 2018-02-27 RX ADMIN — FENTANYL CITRATE 25 MCG: 50 INJECTION, SOLUTION INTRAMUSCULAR; INTRAVENOUS at 04:02

## 2018-02-27 RX ADMIN — FENTANYL CITRATE 50 MCG: 50 INJECTION, SOLUTION INTRAMUSCULAR; INTRAVENOUS at 03:02

## 2018-02-27 RX ADMIN — CEFTRIAXONE SODIUM 2 G: 2 INJECTION, POWDER, FOR SOLUTION INTRAMUSCULAR; INTRAVENOUS at 06:02

## 2018-02-27 RX ADMIN — LIDOCAINE HYDROCHLORIDE 0.2 MG: 10 INJECTION, SOLUTION EPIDURAL; INFILTRATION; INTRACAUDAL; PERINEURAL at 01:02

## 2018-02-27 RX ADMIN — PROPOFOL 75 MCG/KG/MIN: 10 INJECTION, EMULSION INTRAVENOUS at 02:02

## 2018-02-27 RX ADMIN — SODIUM CHLORIDE: 0.9 INJECTION, SOLUTION INTRAVENOUS at 03:02

## 2018-02-27 RX ADMIN — SODIUM CHLORIDE: 0.9 INJECTION, SOLUTION INTRAVENOUS at 01:02

## 2018-02-27 RX ADMIN — PROPOFOL 20 MG: 10 INJECTION, EMULSION INTRAVENOUS at 02:02

## 2018-02-27 RX ADMIN — METOPROLOL TARTRATE 2.5 MG: 5 INJECTION, SOLUTION INTRAVENOUS at 03:02

## 2018-02-27 RX ADMIN — TRAMADOL HYDROCHLORIDE 50 MG: 50 TABLET ORAL at 05:02

## 2018-02-27 RX ADMIN — ONDANSETRON 4 MG: 2 INJECTION INTRAMUSCULAR; INTRAVENOUS at 04:02

## 2018-02-27 NOTE — DISCHARGE INSTRUCTIONS
Trigeminal Neuralgia  You have trigeminal neuralgia. This is pain caused by irritation of the trigeminal nerve on your face. Symptoms include sudden, sharp pain in your head or face. It may feel like an electric shock. It can last for several seconds or minutes. It usually happens on only 1 side of your face. Pain may be triggered by things like moving your jaw or a touch on the skin of your face. The pain may be caused by something irritating the trigeminal nerve, such as a blood vessel pressing against it. But the exact cause of this problem often isnt known. Although it can be quite painful, the condition isnt dangerous.  Trigeminal neuralgia is often treated with medicines. These include anti-seizure medicines or antidepressants. Certain other treatments may also help. In some cases, you may need surgery.    Home care  Your healthcare provider may prescribe medicines to help relieve and prevent pain. Take all medicines as directed. Please note that it may take several changes in dose and medicines before the right combination is found that controls the pain.  General care:  · Plan to rest at home today.  · Avoid any specific activities that seem to trigger the pain.  · Over the next few weeks, keep a pain diary. Write down when your symptoms happen and how they feel. Certain activities such as touching your face, chewing, talking, or brushing your teeth may bring on the pain. Cold air can also trigger the pain. Make sure you write down any triggers and discuss these with your healthcare provider. This will help guide treatment.  Follow-up care  Follow up with your healthcare provider, or as advised. If you were referred to a neurologist, be sure to make an appointment.  For more information on your condition, visit:  · [employer] www.fpa-support.org  When to seek medical advice  Call your healthcare provider right away if any of these occur:  · Fever of 100.4°F (38°C) or higher, or as  advised  · Headache with very stiff neck  · You arent able to keep liquids down (repeated vomiting)  · Extreme drowsiness or confusion  · Dizziness or fainting  · A new feeling of weakness or numbness or tingling in your arm, leg, or face  · Difficulty speaking or seeing      Incision Care  Remember: Follow-up visits allow your healthcare provider to make sure your incision is healing well. Be sure to keep your appointments.     Stitches (sutures), surgical staples, special strips of surgical tape, or surgical skin glue may be used to close incisions. They also help stop bleeding and speed healing. To help your incision heal, follow the tips on this handout.  Home care  Tips for home care include the following:  · Always wash your hands before touching your incision.  · Keep your incision clean and dry.  · Avoid doing things that could cause dirt or sweat to get on your incision.  · Dont pick at scabs. They help protect the wound.  · Keep your incision out of water.  · Take a sponge bath to avoid getting your incision wet, unless your healthcare provider tells you otherwise.  · Ask your provider when can you take a shower or bathe.  · Ask your provider about the best way to keep your incision dry when bathing or showering.  · Pat stitches dry if they get wet. Dont rub.  · Leave the bandage (dressing) in place until you are told to remove it or change it. Change it only as directed, using clean hands.  · After the first 12 hours, change your dressing every 24 hours, or as directed by your healthcare provider.  · Change your dressing if it gets wet or soiled.  Care for specific closures  Follow these guidelines unless your healthcare provider tells you otherwise:  · Stitches or staples. Once you no longer need to keep these dry, clean the wound daily. First remove the bandage using clean hands. Then wash the area gently with soap and warm water. Use a wet cotton swab to loosen and remove any blood or crust that  forms. After cleaning, put a thin layer of antibiotic ointment on. Then put on a new bandage.  · Skin glue. Dont put liquid, ointment, or cream on your wound while the glue is in place. Avoid activities that cause heavy sweating. Protect the wound from sunlight. Do not scratch, rub, or pick at the glue. Do not put tape directly over the glue. The glue should peel off within 5 to 10 days.  · Surgical tape. Keep the area dry. If it gets wet, blot the area dry with a clean towel. Surgical tape usually falls off within 7 to 10 days. If it has not fallen off after 10 days, contact your healthcare provider before taking it off yourself. If you are told to remove the tape, put mineral oil or petroleum jelly on a cotton ball. Gently rub the tape until it is removed.  Changing your dressing  Leave the dressing (bandage) in place until you are told to remove it or change it. Follow the instructions below unless told otherwise by your healthcare provider:  · Always wash your hands before changing your dressing.  · After the first 48 hours, the incision wound usually will have closed. At this point, leave the incision uncovered and open to the air. If the incision has not closed keep it covered.  · Cover your incision only if your clothing is rubbing it or causing irritation.  · Change your dressing if it gets wet or soiled.  Follow-up care  Follow up with your healthcare provider to ask how long sutures or staples should be left in place. Be sure to return for stitch or staple removal as directed. If dissolving stitches were used in your mouth, these will not need to be removed. They should fall out or dissolve on their own.  If tape closures were used, remove them yourself when your provider recommends if they have not fallen off on their own. If skin glue was used, the glue will wear off by itself.  When to seek medical care  Call your healthcare provider if you have any of the following:  · More pain, redness, swelling,  bleeding, or foul-smelling discharge around the incision area  · Fever of 100.4°F (38ºC) or higher, or as directed by your healthcare provider  · Shaking chills  · Vomiting or nausea that doesn't go away  · Numbness, coldness, or tingling around the incision area, or changes in skin color  · Opening of the sutures or wound  · Stitches or staples come apart or fall out or surgical tape falls off before 7 days or as directed by your healthcare provider

## 2018-02-27 NOTE — INTERVAL H&P NOTE
The patient has been examined and the H&P has been reviewed:    I concur with the findings and no changes have occurred since H&P was written.    Anesthesia/Surgery risks, benefits and alternative options discussed and understood by patient/family.          Active Hospital Problems    Diagnosis  POA    Acoustic neuroma [D33.3]  Yes      Resolved Hospital Problems    Diagnosis Date Resolved POA   No resolved problems to display.

## 2018-02-27 NOTE — DISCHARGE SUMMARY
[facility]  Neurosurgery  Discharge Summary      Patient Name: Ifeanyi Rutherford  MRN: [id number]  Admission Date: 2/27/2018  Hospital Length of Stay: 0 days  Discharge Date and Time:  02/27/2018 4:00 PM  Attending Physician: Mahendra Rodriguez MD   Discharging Provider: Elma Raymundo MD  Primary Care Provider: Jason Levy MD     HPI: Ifeanyi Rutherford is a 88 y.o. male who was found to be symptomatic from right trigeminal pain 2/2 compression of the nerve by acoustic neuroma. He elects for percutaneous injection of glycerol     Procedure(s) (LRB):  PERCUTANEOUS INJECTION-TRIGEMINAL NERVE (Right)     Hospital Course: Ifeanyi Rutherford is a 88 y.o. male who was admitted for trigeminal injection of glycerol. He tolerated the procedure well and was discharged home in stable condition after an additional round of antibiotics. He was found to be hypertensive, so follow up with PCP is advised for outpatient management.   Consults:         Pending Diagnostic Studies:     Procedure Component Value Units Date/Time    SURG FL Surgery Fluoro Less Than 1 Hour [[id number]] Resulted:  02/27/18 1441    Order Status:  Sent Lab Status:  In process Updated:  02/27/18 1441        Final Active Diagnoses:    Diagnosis Date Noted POA    PRINCIPAL PROBLEM:  Trigeminal neuralgia of right side of face [G50.0] 01/20/2015 Yes    Acoustic neuroma [D33.3] 02/27/2018 Yes      Problems Resolved During this Admission:    Diagnosis Date Noted Date Resolved POA      Discharged Condition: stable    Disposition: Home or Self Care    Follow Up:  Follow-up Information     Mahendra Rodriguez MD.    Specialty:  Neurosurgery  Why:  As scheduled for wound check   Contact information:  [address]  [place] [address]  [phone]             Jason Levy MD.    Specialty:  Internal Medicine  Why:  Within 1-2 weeks for blood pressure check   Contact information:  [address] [address] [phone]                  Patient Instructions:     Diet Adult Regular     Activity as tolerated     Lifting restrictions   Order Comments: No lifting > 10lbs     Notify your health care provider if you experience any of the following:  temperature >100.4     Notify your health care provider if you experience any of the following:  persistent nausea and vomiting or diarrhea     Notify your health care provider if you experience any of the following:  severe uncontrolled pain     Notify your health care provider if you experience any of the following:  redness, tenderness, or signs of infection (pain, swelling, redness, odor or green/yellow discharge around incision site)     Notify your health care provider if you experience any of the following:  difficulty breathing or increased cough     Notify your health care provider if you experience any of the following:  severe persistent headache     Notify your health care provider if you experience any of the following:  worsening rash     Notify your health care provider if you experience any of the following:  persistent dizziness, light-headedness, or visual disturbances     Notify your health care provider if you experience any of the following:  increased confusion or weakness     Remove dressing in 24 hours       Medications:  Reconciled Home Medications:   Current Discharge Medication List      START taking these medications    Details   hydrocodone-acetaminophen 5-325mg (NORCO) 5-325 mg per tablet Take 1 tablet by mouth every 6 (six) hours as needed for Pain.  Qty: 15 tablet, Refills: 0         CONTINUE these medications which have NOT CHANGED    Details   acetaminophen (TYLENOL) 325 MG tablet Take 2 tablets (650 mg total) by mouth every 6 (six) hours as needed for Pain.  Refills: 0      gabapentin (NEURONTIN) 300 MG capsule Take 1 capsule (300 mg total) by mouth 3 (three) times daily.  Qty: 270 capsule, Refills: 3    Associated Diagnoses: Trigeminal neuralgia      levothyroxine  (SYNTHROID) 75 MCG tablet TAKE 1 TABLET BEFORE BREAKFAST  Qty: 90 tablet, Refills: 0      lidocaine HCl 2% (LIDOCAINE VISCOUS) 2 % Soln by Mucous Membrane route every 6 (six) hours.  Qty: 100 mL, Refills: 3    Associated Diagnoses: Mouth pain      lisinopril (PRINIVIL,ZESTRIL) 40 MG tablet TAKE 1 TABLET EVERY DAY  Qty: 90 tablet, Refills: 1    Associated Diagnoses: Essential hypertension      polyethylene glycol (GLYCOLAX) 17 gram/dose powder Take 17 g by mouth once daily.  Qty: 1530 g, Refills: 4    Associated Diagnoses: Chronic constipation      pravastatin (PRAVACHOL) 40 MG tablet TAKE 1 TABLET EVERY DAY  Qty: 90 tablet, Refills: 0    Associated Diagnoses: Hyperlipidemia, unspecified hyperlipidemia type      QUEtiapine (SEROQUEL) 50 MG tablet TAKE 1 TABLET EVERY EVENING  Qty: 90 tablet, Refills: 0      traMADol (ULTRAM) 50 mg tablet Take 1 tablet (50 mg total) by mouth 3 (three) times daily.  Qty: 90 tablet, Refills: 5    Associated Diagnoses: Unilateral vestibular schwannoma      triamcinolone acetonide 0.1% (KENALOG) 0.1 % cream Apply to the affected area twice a day  Qty: 30 g, Refills: 2    Associated Diagnoses: Rash      venlafaxine (EFFEXOR-XR) 75 MG 24 hr capsule Two capsules daily  Qty: 180 capsule, Refills: 3    Associated Diagnoses: Depression, unspecified depression type      AUROGUARD 5.4-1.4 % Drop Place 3 drops in ear(s) daily as needed (To left ear).       fluticasone (FLONASE) 50 mcg/actuation nasal spray 2 sprays by Each Nare route once daily.  Qty: 1 Bottle, Refills: 6    Associated Diagnoses: Vasomotor rhinitis      ondansetron (ZOFRAN-ODT) 4 MG TbDL Take 1 tablet (4 mg total) by mouth every 8 (eight) hours as needed (nausea/vomiting).  Qty: 12 tablet, Refills: 0      senna (SENNA) 8.6 mg tablet Take 1 tablet by mouth 2 (two) times daily.  Qty: 30 tablet, Refills: 1             Elma Raymundo MD  Neurosurgery  [facility]

## 2018-02-27 NOTE — TRANSFER OF CARE
"Anesthesia Transfer of Care Note    Patient: Ifeanyi Rutherford    Procedure(s) Performed: Procedure(s) (LRB):  PERCUTANEOUS INJECTION-TRIGEMINAL NERVE (Right)    Patient location: PACU    Anesthesia Type: general    Transport from OR: Transported from OR on 2-3 L/min O2 by NC with adequate spontaneous ventilation    Post pain: adequate analgesia    Post assessment: no apparent anesthetic complications    Post vital signs: stable    Level of consciousness: awake and alert    Nausea/Vomiting: no nausea/vomiting    Complications: none    Transfer of care protocol was followed      Last vitals:   Visit Vitals  BP (!) 167/49 (BP Location: Right arm, Patient Position: Lying)   Pulse 62   Temp 36.6 °C (97.8 °F) (Oral)   Resp 18   Ht 6' 2" (1.88 m)   Wt 91.6 kg (202 lb)   SpO2 99%   BMI 25.94 kg/m²     "

## 2018-02-27 NOTE — ANESTHESIA RELEASE NOTE
"Anesthesia Release from PACU Note    Patient: Ifeanyi Rutherford    Procedure(s) Performed: Procedure(s) (LRB):  PERCUTANEOUS INJECTION-TRIGEMINAL NERVE (Right)    Anesthesia type: GEN    Post pain: Adequate analgesia reported    Post assessment: no apparent anesthetic complications, tolerated procedure well and no evidence of recall    Post vital signs: BP (!) 191/82   Pulse 64   Temp 36.7 °C (98.1 °F) (Temporal)   Resp 18   Ht 6' 2" (1.88 m)   Wt 91.6 kg (202 lb)   SpO2 98%   BMI 25.94 kg/m²     Level of consciousness: awake, alert and oriented    Nausea/Vomiting: no nausea/no vomiting    Complications: none    Airway Patency: patent    Respiratory: unassisted, spontaneous ventilation, room air    Cardiovascular: stable and blood pressure at baseline    Hydration: euvolemic    "

## 2018-02-28 ENCOUNTER — TELEPHONE (OUTPATIENT)
Dept: NEUROSURGERY | Facility: CLINIC | Age: 83
End: 2018-02-28

## 2018-02-28 NOTE — TELEPHONE ENCOUNTER
SW PT'S WIFE, DISCUSSED PO APPT DATE & TIME, PO CARE, EATING AND SWELLING. WILL CALL ME WITH ANY FURTHER QUESTIONS.

## 2018-02-28 NOTE — OP NOTE
DATE OF PROCEDURE:  02/27/2018.    ATTENDING PHYSICIAN:  Mahendra Rodriguez M.D.    PREOPERATIVE DIAGNOSIS:  Right trigeminal neuralgia.    POSTOPERATIVE DIAGNOSIS:  Right trigeminal neuralgia.    PROCEDURES PERFORMED:  Right percutaneous glycerol trigeminal ganglionotomy.    SURGEON:  Mahendra Rodriguez M.D.    ASSISTANT:  Elma Raymundo M.D. (RES) ([facility] Resident Neurosurgery).    ANESTHESIA:  1% Xylocaine with epinephrine and IV sedation.    ESTIMATED BLOOD LOSS:  Less than 2 mL.    CONDITION AT THE END OF PROCEDURE:  Satisfactory.    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  However, the tumor compresses the root   entry zone of the trigeminal nerve and he has had longstanding right facial pain   involving primarily V2 and V3.  This is not typical tic [place], but   medications and other treatments have not been effective in helping him with   pain and it was decided to proceed with a percutaneous ganglionotomy.    PROCEDURE IN DETAIL:  The patient was placed in the supine position on the   operating table and given IV sedation.  Sequential compression devices were   applied to the legs and various intravenous lines started.  The chin was   somewhat elevated and the head allowed to rest on a donut.  The lower portion of   the right face was prepped with Betadine and draped in a sterile fashion.  A   small wheal of 1% Xylocaine with epinephrine was raised about 1 cm lateral to   the right corner of the mouth.  The C-arm fluoroscope was brought into position   to provide submental vertex and lateral views of the skull.  A 20-gauge needle   was then inserted through the cheek and toward the foramen ovale.  This took   some manipulation to find the target, but the needlepoint entered the Meckel's   cave with good return of cerebrospinal fluid.  Confirmation was  confirmed with   fluoroscopy of good position.  The patient was then transferred back to his   [place] and put in the full upright position.  Next, 1.0 mL of pure glycerol was   injected in 0.2 mL increments over about 4 minutes.  He did require some   antihypertensive medication.  Following injection of the glycerol, the spinal   needle was removed and pressure applied to the cheek.  A small Band-Aid was   placed over the puncture site.  The patient's head was wrapped to the bed with a   Kerlix to keep him in the upright position for the next 2 hours.  He was then   transferred to the outpatient recovery area in satisfactory condition.  He   received 2 g of Rocephin at the beginning of the procedure.      RDS/HN  dd: 02/28/2018 09:17:39 (CST)  td: 02/28/2018 11:28:25 (CST)  Doc ID   #[id number]  Job ID #[id number]    CC:

## 2018-02-28 NOTE — ANESTHESIA POSTPROCEDURE EVALUATION
"Anesthesia Post Evaluation    Patient: Ifeanyi Rutherford    Procedure(s) Performed: Procedure(s) (LRB):  PERCUTANEOUS INJECTION-TRIGEMINAL NERVE (Right)    Final Anesthesia Type: general  Patient location during evaluation: [facility]  Patient participation: Yes- Able to Participate  Level of consciousness: awake and alert and oriented  Post-procedure vital signs: reviewed and stable  Pain management: adequate  Airway patency: patent  PONV status at discharge: No PONV  Anesthetic complications: no      Cardiovascular status: stable  Respiratory status: unassisted, spontaneous ventilation and room air  Hydration status: euvolemic  Follow-up not needed.        Visit Vitals  BP (!) 176/80   Pulse 68   Temp 36.8 °C (98.2 °F) (Temporal)   Resp 18   Ht 6' 2" (1.88 m)   Wt 91.6 kg (202 lb)   SpO2 97%   BMI 25.94 kg/m²       Pain/Savannah Score: Pain Assessment Performed: Yes (2/27/2018  7:20 PM)  Presence of Pain: complains of pain/discomfort (2/27/2018  7:20 PM)  Pain Rating Prior to Med Admin: 8 (2/27/2018  5:24 PM)  Sav[place] Score: 10 (2/27/2018  7:20 PM)  Modified Savannah Score: 20 (2/27/2018  7:20 PM)      "

## 2018-02-28 NOTE — TELEPHONE ENCOUNTER
----- Message from Ja Harden sent at 2/28/2018  9:50 AM CST -----  Contact: Krystal (wife) @ [phone]  Krystal is calling to schedule post op from surgery 02/27. Pls call.

## 2018-02-28 NOTE — PLAN OF CARE
Pt is AAOx4. VSS. NAD. IV discontinued. Discharge instructions given. Verbalized understanding. Discharged home with family.

## 2018-03-02 ENCOUNTER — TELEPHONE (OUTPATIENT)
Dept: NEUROSURGERY | Facility: CLINIC | Age: 83
End: 2018-03-02

## 2018-03-02 ENCOUNTER — NURSE TRIAGE (OUTPATIENT)
Dept: ADMINISTRATIVE | Facility: CLINIC | Age: 83
End: 2018-03-02

## 2018-03-02 NOTE — TELEPHONE ENCOUNTER
PT TOOK TRAMADOL EARLY THIS AM, ABOUT 7:30AM OR SO, NO BENEFIT, SW DR BIANCHI CAN TAKE NORCO ANYTIME, IT IS 12:00 NOON. CAN TAKE GABAPENTIN ALSO, ALTHOUGH HE SHOULD NOT NEED IT. SHE UNDERSTOOD.

## 2018-03-02 NOTE — TELEPHONE ENCOUNTER
Reason for Disposition   Nursing judgment    Protocols used: ST NO PROTOCOL CALL: INFORMATION ONLY-A-[place]    Wife calling regarding questions about compatibility of tramadol and hydrocodone and also if these can be taken together. Directed to call Pharmacist and informed message will be sent to MD.  Verbalized understanding.  Please contact caller directly with any further care advice.

## 2018-03-02 NOTE — TELEPHONE ENCOUNTER
----- Message from Keegan Pathak sent at 3/2/2018  8:30 AM CST -----  Contact: Krystal ( spouse ) @ [phone]  Caller is requesting a return phone call about pain medication, pls call

## 2018-03-16 DIAGNOSIS — G50.0 TRIGEMINAL NEURALGIA: Primary | ICD-10-CM

## 2018-03-16 DIAGNOSIS — D33.3 ACOUSTIC NEUROMA: ICD-10-CM

## 2018-03-16 RX ORDER — HYDROCODONE BITARTRATE AND ACETAMINOPHEN 5; 325 MG/1; MG/1
1 TABLET ORAL EVERY 6 HOURS PRN
Qty: 30 TABLET | Refills: 0 | Status: SHIPPED | OUTPATIENT
Start: 2018-03-16 | End: 2019-09-18

## 2018-03-16 NOTE — TELEPHONE ENCOUNTER
----- Message from Kristyn Thompson sent at 3/16/2018  9:39 AM CDT -----  Contact: Krystal (Wife) [phone]  Hydrocodone 5-325 refill request     [facility] - [address] [address]  Phone: [phone] Fax: [phone]

## 2018-04-10 ENCOUNTER — PES CALL (OUTPATIENT)
Dept: ADMINISTRATIVE | Facility: CLINIC | Age: 83
End: 2018-04-10

## 2018-04-10 DIAGNOSIS — K59.09 CHRONIC CONSTIPATION: ICD-10-CM

## 2018-04-10 RX ORDER — POLYETHYLENE GLYCOL 3350 17 G/17G
17 POWDER, FOR SOLUTION ORAL DAILY
Qty: 1530 G | Refills: 0 | Status: SHIPPED | OUTPATIENT
Start: 2018-04-10 | End: 2018-08-03 | Stop reason: SDUPTHER

## 2018-04-11 DIAGNOSIS — E78.5 HYPERLIPIDEMIA, UNSPECIFIED HYPERLIPIDEMIA TYPE: ICD-10-CM

## 2018-04-11 RX ORDER — PRAVASTATIN SODIUM 40 MG/1
TABLET ORAL
Qty: 90 TABLET | Refills: 0 | Status: SHIPPED | OUTPATIENT
Start: 2018-04-11 | End: 2018-06-14 | Stop reason: SDUPTHER

## 2018-05-02 ENCOUNTER — OFFICE VISIT (OUTPATIENT)
Dept: FAMILY MEDICINE | Facility: CLINIC | Age: 83
End: 2018-05-02
Payer: MEDICARE

## 2018-05-02 VITALS
BODY MASS INDEX: 25.93 KG/M2 | WEIGHT: 202 LBS | DIASTOLIC BLOOD PRESSURE: 62 MMHG | HEIGHT: 74 IN | SYSTOLIC BLOOD PRESSURE: 128 MMHG | OXYGEN SATURATION: 94 % | HEART RATE: 75 BPM | TEMPERATURE: 99 F

## 2018-05-02 DIAGNOSIS — G89.29 CHRONIC INTRACTABLE HEADACHE, UNSPECIFIED HEADACHE TYPE: ICD-10-CM

## 2018-05-02 DIAGNOSIS — R53.81 DEBILITY: ICD-10-CM

## 2018-05-02 DIAGNOSIS — E55.9 VITAMIN D DEFICIENCY DISEASE: ICD-10-CM

## 2018-05-02 DIAGNOSIS — F39 MOOD DISORDER: ICD-10-CM

## 2018-05-02 DIAGNOSIS — I10 HYPERTENSION, UNSPECIFIED TYPE: ICD-10-CM

## 2018-05-02 DIAGNOSIS — R44.1 VISUAL HALLUCINATIONS: ICD-10-CM

## 2018-05-02 DIAGNOSIS — E03.9 HYPOTHYROIDISM, UNSPECIFIED TYPE: ICD-10-CM

## 2018-05-02 DIAGNOSIS — R32 URINARY INCONTINENCE, UNSPECIFIED TYPE: ICD-10-CM

## 2018-05-02 DIAGNOSIS — D33.3 VESTIBULAR SCHWANNOMA: ICD-10-CM

## 2018-05-02 DIAGNOSIS — R40.0 SOMNOLENCE: ICD-10-CM

## 2018-05-02 DIAGNOSIS — I70.0 ATHEROSCLEROSIS OF AORTA: ICD-10-CM

## 2018-05-02 DIAGNOSIS — Z00.00 ROUTINE MEDICAL EXAM: Primary | ICD-10-CM

## 2018-05-02 DIAGNOSIS — R51.9 CHRONIC INTRACTABLE HEADACHE, UNSPECIFIED HEADACHE TYPE: ICD-10-CM

## 2018-05-02 DIAGNOSIS — G50.0 TRIGEMINAL NEURALGIA: ICD-10-CM

## 2018-05-02 PROCEDURE — 99499 UNLISTED E&M SERVICE: CPT | Mod: S$GLB,,, | Performed by: INTERNAL MEDICINE

## 2018-05-02 PROCEDURE — 99397 PER PM REEVAL EST PAT 65+ YR: CPT | Mod: 25,S$GLB,, | Performed by: INTERNAL MEDICINE

## 2018-05-02 PROCEDURE — 99214 OFFICE O/P EST MOD 30 MIN: CPT | Mod: 25,S$GLB,, | Performed by: INTERNAL MEDICINE

## 2018-05-02 PROCEDURE — 99999 PR PBB SHADOW E&M-EST. PATIENT-LVL III: CPT | Mod: PBBFAC,,, | Performed by: INTERNAL MEDICINE

## 2018-05-02 NOTE — PROGRESS NOTES
Chief complaint, physical exam        88-year-old white male here for his physical exam.  He is here with his wife.  He's active as his medical issues allowed using a walker.  Regarding cancer screening, based on his age prostate and colon screening are no longer age-appropriate    ROS:   CONST: weight stable. EYES: no vision change. ENT: no sore throat. CV: no chest pain w/ exertion. RESP: no shortness of breath. GI: no nausea, vomiting, diarrhea. No dysphagia. : no urinary issues. MUSCULOSKELETAL: no new myalgias or arthralgias. SKIN: no new changes. NEURO: no focal deficits. PSYCH: no new issues. ENDOCRINE: no polyuria. HEME: no lymph nodes. ALLERGY: no general pruritis.    PAST SURGERIES:  Total knee replacements bilaterally.                                                                                                     PAST MEDICAL HISTORY:    Hypertension.   Hyperlipidemia.    Depression.           Generalized anxiety ([facility]).    Hypothyroidism.    Osteoarthritis.    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  Optic neuropathy, right eye.  Right-sided trigeminal neuralgia, seen by neurology, pain management and neurosurgery  Chronic constipation   Evaluation by [place] [facility] for NPH, apparently no response to spinal tap   Chronic vertigo   Vitamin D deficiency   Parkinsonism by [facility] evaluation    Morning hallucinations, possibly related to Seroquel                                                                                                           SOCIAL HISTORY:  He is  and lives with his spouse.  They have adult   children in the area and grandchildren.  He has never been a cigarette       smoker.      Vitals as above,  Gen: no distress  EYES: conjunctiva clear, non-icteric, PERRL  ENT: nose clear, nasal mucosa normal, oropharynx clear and moist, teeth good  NECK:supple, thyroid non-palpable  RESP: effort is good, lungs clear  CV: heart RRR w/o  murmur, gallops or rubs; no carotid bruits, no edema  GI: abdomen soft, non-distended, non-tender, no hepatosplenomegaly  MS: gait with walker, no clubbing or cyanosis of the digits  SKIN: no rashes, warm to touch      Labs reviewed    Ifeanyi was seen today for cough.    Diagnoses and all orders for this visit:    Routine medical exam, labs reviewed, patient to remain as active as possible, currently still no indication to pursue any prostate or colon cancer screening                                              Additional evaluation and management issues:    Additionally, patient has numerous other medical issues to address. He is here with his wife.  Some of his complaints are recurrent.  He cannot sleep without the Seroquel at night.  He also appears to have suppressed morning hallucinations of seeing people in the room when taking the circumflex.  He does complain about being he is sedated in the morning.  His wife did reduce the Seroquel from 50 mg to 25.  Still remains somewhat sleepy in the morning.  It actually appears that he is more sleepy after he takes his morning adds we have attempted to figure out which one of these medications at night. He does take an Effexor twice a day but I doubt that.  He takes gabapentin 3 times per day and we discussed and gave him written instructions to hold that a little bit later.  I have taken his blood pressure when he feels bad and his blood pressures been okay sometimes actually high.  We've and discussed holding all of his morning medicationsfor now or 2 or 3 and assess if indeed this relates to his medications at all.  We discussed reducing the Seroquel 12.5 mg possibly at a separate time.  Next    He had treatment for his trigeminal neuralgia with no change in his right facial pain. He plans to see an oral surgeon as her may well be some issue with one of his nerves in his teeth.    Still with a lot of anxiety.  We discussed continue the Effexor but holding off on an  increase.  We discussed how he used to see my mother  as a psychiatrist.     . All these issues reviewed and patient counseled an evaluation and management we based upon time counselingTotal time over 25 minutes with over 50% counseling.             assessment and plan:          Trigeminal neuralgia , reviewed the interval procedure notes and clinical notes.ongoing pain which now may have a dental or neurological source otherwise. Continue gabapentin    Somnolence, discussed at great length his morning somnolence and the multitude of causes it could be    Hypothyroidism, unspecified type, euthyroid a year ago and whenever he is due for next lab work we will add thyroid function but very doubtful to be dysregulation    Hypertension, unspecified type, chronic and stable    Atherosclerosis of aorta, noted in the records    Vestibular schwannoma, noted    Chronic intractable headache, unspecified headache type, patient does report that whenever he is anxious his headache worsens    Visual hallucinations,monitor off and on Seroquel    Mood disorder    Debility    Vitamin D deficiency disease, continue supplement    Urinary incontinence, unspecified type, wife also reports that is how long standing urinary incontinence problem.  Sometimes he says he can feel he needs to urinateotherwise not.  They do inquire about seeing urology and will Agustin her on the [place]. Discussed potential underlying issues such as BPH but also possibly neurogenic bladder to forth.some of these things may have treatment  -     Ambulatory consult to Urology

## 2018-05-14 ENCOUNTER — TELEPHONE (OUTPATIENT)
Dept: NEUROSURGERY | Facility: CLINIC | Age: 83
End: 2018-05-14

## 2018-05-14 NOTE — TELEPHONE ENCOUNTER
----- Message from Mariana Lobo sent at 5/14/2018  8:19 AM CDT -----  Contact: Wife  Wife is calling to speak with Mr. GONZALEZ regarding scheduling a Post-Op appt for the pt.    She can be reached at [phone].    Thank you.

## 2018-05-21 ENCOUNTER — OFFICE VISIT (OUTPATIENT)
Dept: NEUROSURGERY | Facility: CLINIC | Age: 83
End: 2018-05-21
Payer: MEDICARE

## 2018-05-21 VITALS
HEIGHT: 74 IN | SYSTOLIC BLOOD PRESSURE: 183 MMHG | DIASTOLIC BLOOD PRESSURE: 82 MMHG | BODY MASS INDEX: 26.56 KG/M2 | WEIGHT: 207 LBS | HEART RATE: 64 BPM

## 2018-05-21 DIAGNOSIS — D33.3 RIGHT ACOUSTIC NEUROMA: ICD-10-CM

## 2018-05-21 DIAGNOSIS — G50.0 RIGHT TRIGEMINAL NEURALGIA: Primary | ICD-10-CM

## 2018-05-21 PROCEDURE — 99024 POSTOP FOLLOW-UP VISIT: CPT | Mod: S$GLB,,, | Performed by: NEUROLOGICAL SURGERY

## 2018-05-21 PROCEDURE — 99999 PR PBB SHADOW E&M-EST. PATIENT-LVL III: CPT | Mod: PBBFAC,,, | Performed by: NEUROLOGICAL SURGERY

## 2018-05-21 PROCEDURE — 99499 UNLISTED E&M SERVICE: CPT | Mod: S$GLB,,, | Performed by: NEUROLOGICAL SURGERY

## 2018-05-21 NOTE — LETTER
May 21, 2018      Jason Levy MD  [address]  [place] [address]           [facility] - Neurosurgery 7th Fl  [address]  [place] [address]  Phone: [phone]          Patient: Ifeanyi Rutherford   MR Number: [id number]   YOB: 1929   Date of Visit: 5/21/2018       Dear Dr. Jason Levy:    Thank you for referring Ifeanyi Rutherford to me for evaluation. Attached you will find relevant portions of my assessment and plan of care.    If you have questions, please do not hesitate to call me. I look forward to following Ifeanyi Rutherford along with you.    Sincerely,    Mahednra Rodriguez MD    Enclosure  CC:  No Recipients    If you would like to receive this communication electronically, please contact [email or web address] or [phone] to request more information on [employer] access.    For providers and/or their staff who would like to refer a patient to [facility], please contact us through our one-stop-shop provider referral line, [facility], at [phone].    If you feel you have received this communication in error or would no longer like to receive these types of communications, please e-mail [email or web address]

## 2018-05-21 NOTE — PROGRESS NOTES
This office note has been dictated.  Ifeanyi Rutherford returned in neurosurgical followup to the office today.  He was   admitted as an outpatient to [facility] on 02/27/18 and underwent right   percutaneous glycerol trigeminal ganglionotomy.  This was uncomplicated and he   was discharged to home.  He says he had some continued pain for the next two or   three weeks, but since then pain has diminished considerably, although he still   has occasional pain around his mouth.  He has been able to eat and chew.  He has   had no new untoward effects.    On brief examination today, he is alert and in good spirits.  He is smiling and   I can touch his face without pain.  Sensation seems about the same on both sides   of his face.  This is a little hard to determine with the presence of the   acoustic neuroma.    I am happy to see him doing well.  He says that his son was diagnosed with   acoustic neuroma also and this was treated.  He does describe a lot of anxiety.    He says when he has anxiety that pain is worse.  He is currently taking Effexor   and Seroquel.  Perhaps a mild benzodiazepine would provide some relief for him.    I will leave this to Dr. Levy.  I would like to see him back in about six   months in followup.      RDS/HN  dd: 05/21/2018 12:35:27 (CDT)  td: 05/22/2018 08:26:48 (CDT)  Doc ID   #[id number]  Job ID #[id number]    CC: Ifeanyi Rutherford

## 2018-05-22 ENCOUNTER — TELEPHONE (OUTPATIENT)
Dept: FAMILY MEDICINE | Facility: CLINIC | Age: 83
End: 2018-05-22

## 2018-05-22 NOTE — TELEPHONE ENCOUNTER
----- Message from Vivian Rodriguez sent at 5/22/2018  2:06 PM CDT -----  Contact: Wife- Krystal  Patient's wife says he saw Dr. Hutton yesterday and he suggest they call Dr. Levy to put patient on a mild nerve medication he also told them he documented it in his note. Please call patient at   [phone]

## 2018-05-22 NOTE — TELEPHONE ENCOUNTER
Wife states patient is very anxious and needs something for nerves,, states patient has  been anxious x 1 month. Seen Dr Hutton today who made suggestion.please advise

## 2018-05-23 ENCOUNTER — TELEPHONE (OUTPATIENT)
Dept: FAMILY MEDICINE | Facility: CLINIC | Age: 83
End: 2018-05-23

## 2018-05-23 RX ORDER — DIAZEPAM 5 MG/1
TABLET ORAL
Qty: 90 TABLET | Refills: 3 | Status: SHIPPED | OUTPATIENT
Start: 2018-05-23 | End: 2020-08-13 | Stop reason: SDUPTHER

## 2018-05-23 NOTE — TELEPHONE ENCOUNTER
----- Message from Griselda Damno sent at 5/23/2018  8:34 AM CDT -----  Contact: Self  Pt would like to let his Doctor know that he can not take ativan. Please call [phone].

## 2018-05-23 NOTE — TELEPHONE ENCOUNTER
Had a Ganglionectomy. Tumor pressing against face. Went for his follow appointment with Dr Rodriguez suggest that patient contact PCP for medication Xanax or any medication to help with anxiety. Spouse said, Ativan is too strong. Patient did not have a allergic reaction, medication just did not work well with patient. Reviewed patient chart and he as taken Valium before. Medication attached if you agree on this particular medication. Requesting a script to go to [facility].

## 2018-06-06 ENCOUNTER — OFFICE VISIT (OUTPATIENT)
Dept: FAMILY MEDICINE | Facility: CLINIC | Age: 83
End: 2018-06-06
Payer: MEDICARE

## 2018-06-06 VITALS
BODY MASS INDEX: 26.53 KG/M2 | WEIGHT: 206.69 LBS | HEART RATE: 69 BPM | SYSTOLIC BLOOD PRESSURE: 122 MMHG | HEIGHT: 74 IN | OXYGEN SATURATION: 97 % | DIASTOLIC BLOOD PRESSURE: 76 MMHG

## 2018-06-06 DIAGNOSIS — R53.81 DEBILITY: ICD-10-CM

## 2018-06-06 DIAGNOSIS — D33.3 ACOUSTIC NEUROMA: ICD-10-CM

## 2018-06-06 DIAGNOSIS — I70.0 ATHEROSCLEROSIS OF AORTA: ICD-10-CM

## 2018-06-06 DIAGNOSIS — B35.1 ONYCHOMYCOSIS: ICD-10-CM

## 2018-06-06 DIAGNOSIS — Z00.00 ENCOUNTER FOR PREVENTIVE HEALTH EXAMINATION: Primary | ICD-10-CM

## 2018-06-06 DIAGNOSIS — G20.C PARKINSONISM, UNSPECIFIED PARKINSONISM TYPE: ICD-10-CM

## 2018-06-06 DIAGNOSIS — F32.0 MILD MAJOR DEPRESSION: ICD-10-CM

## 2018-06-06 DIAGNOSIS — D33.3 VESTIBULAR SCHWANNOMA: ICD-10-CM

## 2018-06-06 PROCEDURE — 99499 UNLISTED E&M SERVICE: CPT | Mod: S$GLB,,, | Performed by: NURSE PRACTITIONER

## 2018-06-06 PROCEDURE — G0439 PPPS, SUBSEQ VISIT: HCPCS | Mod: S$GLB,,, | Performed by: NURSE PRACTITIONER

## 2018-06-06 PROCEDURE — 99999 PR PBB SHADOW E&M-EST. PATIENT-LVL V: CPT | Mod: PBBFAC,,, | Performed by: NURSE PRACTITIONER

## 2018-06-06 NOTE — PATIENT INSTRUCTIONS
Counseling and Referral of Other Preventative  (Italic type indicates deductible and co-insurance are waived)    Patient Name: Ifeanyi Rutherford  Today's Date: 6/6/2018    Health Maintenance       Date Due Completion Date    Zoster Vaccine 07/04/1989 Patient aware of recommendation for zoster vaccine.     PROSTATE-SPECIFIC ANTIGEN 04/26/2013 4/26/2012    Influenza Vaccine 08/01/2018 10/12/2016    Override on 11/11/2014: Done    Lipid Panel 12/03/2020 12/3/2015    TETANUS VACCINE 08/04/2027 8/4/2017        No orders of the defined types were placed in this encounter.    The following information is provided to all patients.  This information is to help you find resources for any of the problems found today that may be affecting your health:                Living healthy guide: www.[facility].[place].gov      Understanding Diabetes: www.diabetes.org      Eating healthy: www.cdc.gov/healthyweight      [facility] home safety checklist: www.cdc.gov/steadi/patient.html      Agency on Aging: www.goea.[place].gov      Alcoholics anonymous (AA): www.aa.org      Physical Activity: www.kath.nih.gov/gs7wlcq      Tobacco use: www.quitwithusla.org

## 2018-06-06 NOTE — PROGRESS NOTES
"Ifeanyi Rutherford presented for a  Medicare AWV and comprehensive Health Risk Assessment today. The following components were reviewed and updated:    · Medical history  · Family History  · Social history  · Allergies and Current Medications  · Health Risk Assessment  · Health Maintenance  · Care Team     ** See Completed Assessments for Annual Wellness Visit within the encounter summary.**       The following assessments were completed:  · Living Situation  · CAGE  · Depression Screening  · Timed Get Up and Go  · Whisper Test  · Cognitive Function Screening  · Nutrition Screening  · ADL Screening  · PAQ Screening    Vitals:    06/06/18 1032   BP: 122/76   BP Location: Left arm   Patient Position: Sitting   BP Method: Large (Manual)   Pulse: 69   SpO2: 97%   Weight: 93.8 kg (206 lb 10.9 oz)   Height: 6' 2" (1.88 m)     Body mass index is 26.54 kg/m².  Physical Exam   Constitutional: He is oriented to person, place, and time.   HENT:   Right Ear: Tympanic membrane normal. No middle ear effusion.   Left Ear: Tympanic membrane normal.  No middle ear effusion.   Cardiovascular: Normal rate and regular rhythm.    Pulses:       Dorsalis pedis pulses are 2+ on the right side, and 2+ on the left side.        Posterior tibial pulses are 2+ on the right side, and 2+ on the left side.   Pulmonary/Chest: Effort normal and breath sounds normal.   Musculoskeletal:        Right foot: There is normal range of motion and no deformity.        Left foot: There is normal range of motion and no deformity.   Feet:   Right Foot:   Skin Integrity: Negative for ulcer, blister, skin breakdown, erythema, warmth, callus or dry skin.   Left Foot:   Skin Integrity: Negative for ulcer, blister, skin breakdown, erythema, warmth, callus or dry skin.   Neurological: He is alert and oriented to person, place, and time.   Skin: Skin is warm.        Psychiatric: He has a normal mood and affect.   Vitals reviewed.        Diagnoses and health risks identified " today and associated recommendations/orders:    1. Encounter for preventive health examination  Education provided about preventive health examinations and procedures; addressed and discussed patient's health concerns. Additionally, reviewed medical record for risk factors and documented the results during this encounter.    2. Acoustic neuroma  Stable, patient evaluated/monitored by [facility]; continue as advised.     3. Vestibular schwannoma  Stable, patient evaluated/monitored by [facility]; continue as advised.     4. Atherosclerosis of aorta  This is a chronic medical condition that is stable under the current regimen.    5. Mild major depression   Denies homicidal or suicidal ideation, we discussed family and social dynamics, stress relieving activities. Continue as advised.     6. Parkinsonism, unspecified Parkinsonism type  Stable, patient evaluated/monitored by [facility]; continue as advised.     7. Onychomycosis  - Ambulatory referral to Podiatry    8. Debility  Patient walking with walker. We discussed home environment, frequent falls.     Reviewed health maintenance with patient, educated about recommended examinations, procedures (labs & images), and immunizations. Patient's wife are of zoster vaccine.     Provided [place] with a 5-10 year written screening schedule and personal prevention plan. Recommendations were developed using the USPSTF age appropriate recommendations. Education, counseling, and referrals were provided as needed. After Visit Summary printed and given to patient which includes a list of additional screenings\tests needed.    Follow-up in about 1 year (around 6/6/2019) for assessment .    Sandoval Amezcua Jr, NP

## 2018-06-11 DIAGNOSIS — I10 ESSENTIAL HYPERTENSION: ICD-10-CM

## 2018-06-11 DIAGNOSIS — F32.A DEPRESSION, UNSPECIFIED DEPRESSION TYPE: ICD-10-CM

## 2018-06-12 ENCOUNTER — TELEPHONE (OUTPATIENT)
Dept: ADMINISTRATIVE | Facility: HOSPITAL | Age: 83
End: 2018-06-12

## 2018-06-12 ENCOUNTER — TELEPHONE (OUTPATIENT)
Dept: FAMILY MEDICINE | Facility: CLINIC | Age: 83
End: 2018-06-12

## 2018-06-12 RX ORDER — VENLAFAXINE HYDROCHLORIDE 75 MG/1
CAPSULE, EXTENDED RELEASE ORAL
Qty: 180 CAPSULE | Refills: 3 | Status: SHIPPED | OUTPATIENT
Start: 2018-06-12 | End: 2019-03-18 | Stop reason: SDUPTHER

## 2018-06-12 RX ORDER — LISINOPRIL 40 MG/1
TABLET ORAL
Qty: 90 TABLET | Refills: 1 | Status: SHIPPED | OUTPATIENT
Start: 2018-06-12 | End: 2018-10-17 | Stop reason: SDUPTHER

## 2018-06-12 NOTE — TELEPHONE ENCOUNTER
----- Message from Alma White sent at 6/12/2018 10:56 AM CDT -----  Contact: wife  Pt wife called to discuss elevated b/p. Pt reading this morning was 192/91 shortly after medication then again it was 187/87. Please call pt discuss to [phone].

## 2018-06-12 NOTE — TELEPHONE ENCOUNTER
Stated, last BP readin/77;68. Medication was taken 45 minutes ago. BP is trending downward since initial phone call. No other symptoms reported. Instructed to contact office if further assistance is needed.

## 2018-06-14 ENCOUNTER — OFFICE VISIT (OUTPATIENT)
Dept: OPHTHALMOLOGY | Facility: CLINIC | Age: 83
End: 2018-06-14
Payer: MEDICARE

## 2018-06-14 DIAGNOSIS — E78.5 HYPERLIPIDEMIA, UNSPECIFIED HYPERLIPIDEMIA TYPE: ICD-10-CM

## 2018-06-14 DIAGNOSIS — H52.7 REFRACTIVE ERROR: ICD-10-CM

## 2018-06-14 DIAGNOSIS — D31.31 CHOROIDAL NEVUS OF RIGHT EYE: ICD-10-CM

## 2018-06-14 DIAGNOSIS — Z96.1 PSEUDOPHAKIA: ICD-10-CM

## 2018-06-14 DIAGNOSIS — H47.013 OPTIC NEUROPATHY, ISCHEMIC, BILATERAL: Primary | ICD-10-CM

## 2018-06-14 DIAGNOSIS — H04.123 DRY EYE SYNDROME, BILATERAL: ICD-10-CM

## 2018-06-14 PROCEDURE — 92014 COMPRE OPH EXAM EST PT 1/>: CPT | Mod: S$GLB,,, | Performed by: OPHTHALMOLOGY

## 2018-06-14 PROCEDURE — 99999 PR PBB SHADOW E&M-EST. PATIENT-LVL II: CPT | Mod: PBBFAC,,, | Performed by: OPHTHALMOLOGY

## 2018-06-14 RX ORDER — PRAVASTATIN SODIUM 40 MG/1
TABLET ORAL
Qty: 90 TABLET | Refills: 0 | Status: SHIPPED | OUTPATIENT
Start: 2018-06-14 | End: 2018-08-15 | Stop reason: SDUPTHER

## 2018-06-14 NOTE — PROGRESS NOTES
Subjective:       Patient ID: Ifeanyi Rutherford is a 88 y.o. male.    Chief Complaint: Hypertensive Eye Exam    HPI     Last Eye Exam: 6/2/2017 w/ Dr. Avery Salazar.    Pt here for hypertensive eye exam.    (+) Eye pain/discomfort: ou all the time; pt states that he feels like   he's going blind.  (+) Blurry Vision only at distance; pt states that he sees purple objects.  (--) Double Vision  (+) Itchy Eyes  (+) Watery Eyes  (--) Dry Eyes  (--) Floaters/Black Spots  (+) Headaches  (+) Photophobia: pm at night only    Eye Meds: none  Glasses: bifocals  Contacts: none      Last edited by Beronica Damon on 6/14/2018  1:53 PM. (History)             Assessment:       1. Optic neuropathy, ischemic, bilateral    2. Dry eye syndrome, bilateral    3. Choroidal nevus of right eye    4. Refractive error    5. Pseudophakia        Plan:       NAION OD>OS-Stable.  SULLY-Doing well.  C. Nevus OD-Stable.  RE-No need to change Rx.      AT's.  RTC 1 yr.

## 2018-07-23 ENCOUNTER — OFFICE VISIT (OUTPATIENT)
Dept: PODIATRY | Facility: CLINIC | Age: 83
End: 2018-07-23
Payer: MEDICARE

## 2018-07-23 DIAGNOSIS — M79.674 PAIN DUE TO ONYCHOMYCOSIS OF TOENAILS OF BOTH FEET: Primary | ICD-10-CM

## 2018-07-23 DIAGNOSIS — B35.1 PAIN DUE TO ONYCHOMYCOSIS OF TOENAILS OF BOTH FEET: Primary | ICD-10-CM

## 2018-07-23 DIAGNOSIS — M79.675 PAIN DUE TO ONYCHOMYCOSIS OF TOENAILS OF BOTH FEET: Primary | ICD-10-CM

## 2018-07-23 PROCEDURE — 99999 PR PBB SHADOW E&M-EST. PATIENT-LVL II: CPT | Mod: PBBFAC,,, | Performed by: PODIATRIST

## 2018-07-23 PROCEDURE — 99203 OFFICE O/P NEW LOW 30 MIN: CPT | Mod: S$GLB,,, | Performed by: PODIATRIST

## 2018-07-23 NOTE — LETTER
July 23, 2018      Sandoval Amezcua Jr., NP  [address]  [place] [address]           [facility]  [address] [facility]  [place] [address]  Phone: [phone]          Patient: Ifeanyi Rutherford   MR Number: [id number]   YOB: 1929   Date of Visit: 7/23/2018       Dear Sandoval Amezcua Jr.:    Thank you for referring Ifeanyi Rutherford to me for evaluation. Attached you will find relevant portions of my assessment and plan of care.    If you have questions, please do not hesitate to call me. I look forward to following Ifeanyi Rutherford along with you.    Sincerely,    Keiko Galan DPM    Enclosure  CC:  No Recipients    If you would like to receive this communication electronically, please contact [email or web address] or [phone] to request more information on [employer] Link access.    For providers and/or their staff who would like to refer a patient to [facility], please contact us through our one-stop-shop provider referral line, [facility], at [phone].    If you feel you have received this communication in error or would no longer like to receive these types of communications, please e-mail [email or web address]

## 2018-07-23 NOTE — PROGRESS NOTES
Subjective:      Patient ID: Ifeanyi Rutherford is a 89 y.o. male.    Chief Complaint: No chief complaint on file.    Ifeanyi Rutherford is a 89 y.o. male who presents to the clinic complaining of painful, thick, discolored, misshapen toenails. Patient has attempted self treatment with OTC antifungals without satisfactory resultsThis is a persistent problem. Patient denies history of trauma. Patient  denies purulent drainage.    Current shoe gear:  Casual shoes    Patient Active Problem List   Diagnosis    Hypertension    Vertigo    Fatigue    Mild major depression    Optic neuropathy, ischemic, bilateral    Choroidal nevus of right eye    Pseudophakia - Both Eyes    Refractive error - Left Eye    Debility    Hypothyroidism    Chronic headache    Parkinsonism    Trigeminal neuralgia of right side of face    Vestibular schwannoma    Insufficiency of tear film of both eyes    Atherosclerosis of aorta    Ciliary muscle spasm of right eye    Mouth pain    Acoustic neuroma    Dry eye syndrome, bilateral       Current Outpatient Prescriptions on File Prior to Visit   Medication Sig Dispense Refill    acetaminophen (TYLENOL) 325 MG tablet Take 2 tablets (650 mg total) by mouth every 6 (six) hours as needed for Pain.  0    diazePAM (VALIUM) 5 MG tablet Half to whole pill every 8 hours as needed for anxiety 90 tablet 3    gabapentin (NEURONTIN) 300 MG capsule Take 1 capsule (300 mg total) by mouth 3 (three) times daily. 270 capsule 3    hydrocodone-acetaminophen 5-325mg (NORCO) 5-325 mg per tablet Take 1 tablet by mouth every 6 (six) hours as needed for Pain. 30 tablet 0    levothyroxine (SYNTHROID) 75 MCG tablet TAKE 1 TABLET BEFORE BREAKFAST 90 tablet 0    lidocaine HCl 2% (LIDOCAINE VISCOUS) 2 % Soln by Mucous Membrane route every 6 (six) hours. 100 mL 3    lisinopril (PRINIVIL,ZESTRIL) 40 MG tablet TAKE 1 TABLET EVERY DAY 90 tablet 1    polyethylene glycol (GLYCOLAX) 17 gram/dose powder Take 17 g by  mouth once daily. 1530 g 0    pravastatin (PRAVACHOL) 40 MG tablet TAKE 1 TABLET EVERY DAY 90 tablet 0    QUEtiapine (SEROQUEL) 50 MG tablet TAKE 1 TABLET EVERY EVENING (Patient taking differently: TAKE 1/2 TABLET EVERY EVENING) 90 tablet 0    traMADol (ULTRAM) 50 mg tablet Take 1 tablet (50 mg total) by mouth 3 (three) times daily. 90 tablet 5    triamcinolone acetonide 0.1% (KENALOG) 0.1 % cream Apply to the affected area twice a day 30 g 2    venlafaxine (EFFEXOR-XR) 75 MG 24 hr capsule TAKE 2 CAPSULES EVERY  capsule 3     No current facility-administered medications on file prior to visit.        Review of patient's allergies indicates:   Allergen Reactions    Carbamazepine Swelling    Trazodone Anxiety    Demerol [meperidine] Hallucinations    Morphine Nausea Only       Past Surgical History:   Procedure Laterality Date    bilateral total knee arthroplasties      CATARACT EXTRACTION W/  INTRAOCULAR LENS IMPLANT Bilateral     CHOLECYSTECTOMY      EYE SURGERY      GALLBLADDER SURGERY      THYROIDECTOMY      TONSILLECTOMY         Family History   Problem Relation Age of Onset    Heart disease Mother     No Known Problems Father     No Known Problems Sister     No Known Problems Brother     No Known Problems Daughter     No Known Problems Son     No Known Problems Sister     No Known Problems Brother     No Known Problems Brother     No Known Problems Brother     No Known Problems Maternal Aunt     No Known Problems Maternal Uncle     No Known Problems Paternal Aunt     No Known Problems Paternal Uncle     No Known Problems Maternal Grandmother     No Known Problems Maternal Grandfather     No Known Problems Paternal Grandmother     No Known Problems Paternal Grandfather     Suicide Neg Hx     Schizophrenia Neg Hx     Amblyopia Neg Hx     Blindness Neg Hx     Cataracts Neg Hx     Glaucoma Neg Hx     Macular degeneration Neg Hx     Retinal detachment Neg Hx      Strabismus Neg Hx     Cancer Neg Hx     Diabetes Neg Hx     Hypertension Neg Hx     Stroke Neg Hx     Thyroid disease Neg Hx        Social History     Social History    Marital status:      Spouse name: N/A    Number of children: N/A    Years of education: N/A     Occupational History    Not on file.     Social History Main Topics    Smoking status: Former Smoker    Smokeless tobacco: Never Used    Alcohol use No    Drug use: No    Sexual activity: Yes     Partners: Female     Other Topics Concern    Not on file     Social History Narrative    No narrative on file       Review of Systems   Constitution: Negative for chills, fever and weakness.   Cardiovascular: Positive for leg swelling. Negative for claudication.   Respiratory: Negative for cough and shortness of breath.    Skin: Positive for dry skin and nail changes. Negative for itching and rash.   Musculoskeletal: Positive for arthritis, joint pain and myalgias. Negative for falls, joint swelling and muscle weakness.   Gastrointestinal: Negative for diarrhea, nausea and vomiting.   Neurological: Positive for loss of balance and paresthesias. Negative for numbness and tremors.   Psychiatric/Behavioral: Negative for altered mental status and hallucinations.           Objective:      There were no vitals filed for this visit.    Physical Exam   Constitutional:  Non-toxic appearance. He does not have a sickly appearance. No distress.   Cardiovascular:   Pulses:       Dorsalis pedis pulses are 2+ on the right side, and 2+ on the left side.        Posterior tibial pulses are 2+ on the right side, and 2+ on the left side.   Pulmonary/Chest: No respiratory distress.   Musculoskeletal:        Right ankle: No tenderness. No lateral malleolus, no medial malleolus, no AITFL, no CF ligament and no posterior TFL tenderness found. Achilles tendon exhibits no pain, no defect and normal Lassiter's test results.        Left ankle: No tenderness. No lateral  malleolus, no medial malleolus, no AITFL, no CF ligament and no posterior TFL tenderness found. Achilles tendon exhibits no pain, no defect and normal Lassiter's test results.        Right foot: There is no bony tenderness.        Left foot: There is no bony tenderness.   Skin: Skin is warm, dry and intact. No abrasion, no bruising, no burn, no ecchymosis and no rash noted. He is not diaphoretic. No cyanosis. No pallor. Nails show no clubbing.   Toenails 1-5 bilaterally are elongated by 2-3 mm, thickened by 2-3 mm, discolored/yellowed, dystrophic, brittle with subungual debris. Tender to distal nail plate pressure, without periungual skin abnormality of each.     Psychiatric: His mood appears not anxious. His affect is not inappropriate. His speech is not slurred. He is not combative. He is communicative. He is attentive.   Nursing note reviewed.            Assessment:       Encounter Diagnosis   Name Primary?    Pain due to onychomycosis of toenails of both feet Yes         Plan:       Diagnoses and all orders for this visit:    Pain due to onychomycosis of toenails of both feet      Discussed condition and treatment options with pt, as well as poor results with most treatments. Discussed filing nails, using antifungal topical ointment vs oral treatment options. Instructed patient on the importance of keeping fungus off of skin while treating nails. Patient instructed to use absorbent cotton socks and change them if they become sweaty; or wear an open-toe shoe or sandal. Wash the feet at least once a day with soap and water. Patient wants to try topical. Rx compound pharmacy nail soaks. Instructed patient that it takes time for symptoms to completely dissipate. Patient instructed to use lysol or over-the-counter antifungal powders or sprays to shoes daily and allow them to air dry, switching shoes from every other day would be optimal. Patient is to avoid barefoot walking in  high-risk environments (public  showers, gyms and locker rooms) may prevent future infections.     Informed patient that many nail problems can be prevented by wearing the right shoes and trimming your nails properly.   The right shoes: Feet were measured.  Patient is to wear shoes that are supportive and roomy enough for toes to wiggle. Look for shoes made of natural materials such as leather, which allow  feet to breathe.   Proper trimming: To avoid problems, instructed to trim toenails straight across without cutting down into the corners.      RTC PRN for Proc B    Procedures

## 2018-07-26 ENCOUNTER — OFFICE VISIT (OUTPATIENT)
Dept: FAMILY MEDICINE | Facility: CLINIC | Age: 83
End: 2018-07-26
Payer: MEDICARE

## 2018-07-26 VITALS
SYSTOLIC BLOOD PRESSURE: 126 MMHG | HEIGHT: 74 IN | TEMPERATURE: 98 F | OXYGEN SATURATION: 98 % | WEIGHT: 205 LBS | DIASTOLIC BLOOD PRESSURE: 66 MMHG | HEART RATE: 73 BPM | BODY MASS INDEX: 26.31 KG/M2

## 2018-07-26 DIAGNOSIS — G50.0 TRIGEMINAL NEURALGIA PAIN: ICD-10-CM

## 2018-07-26 DIAGNOSIS — G20.C PARKINSONISM, UNSPECIFIED PARKINSONISM TYPE: ICD-10-CM

## 2018-07-26 DIAGNOSIS — E03.9 HYPOTHYROIDISM, UNSPECIFIED TYPE: ICD-10-CM

## 2018-07-26 DIAGNOSIS — G89.29 CHRONIC INTRACTABLE HEADACHE, UNSPECIFIED HEADACHE TYPE: ICD-10-CM

## 2018-07-26 DIAGNOSIS — R40.0 SOMNOLENCE: ICD-10-CM

## 2018-07-26 DIAGNOSIS — R51.9 CHRONIC INTRACTABLE HEADACHE, UNSPECIFIED HEADACHE TYPE: ICD-10-CM

## 2018-07-26 DIAGNOSIS — K59.09 CHRONIC CONSTIPATION: Primary | ICD-10-CM

## 2018-07-26 DIAGNOSIS — F39 MOOD DISORDER: ICD-10-CM

## 2018-07-26 DIAGNOSIS — I10 HYPERTENSION, UNSPECIFIED TYPE: ICD-10-CM

## 2018-07-26 DIAGNOSIS — H47.013 OPTIC NEUROPATHY, ISCHEMIC, BILATERAL: ICD-10-CM

## 2018-07-26 DIAGNOSIS — R44.1 VISUAL HALLUCINATIONS: ICD-10-CM

## 2018-07-26 PROCEDURE — 99999 PR PBB SHADOW E&M-EST. PATIENT-LVL III: CPT | Mod: PBBFAC,,, | Performed by: INTERNAL MEDICINE

## 2018-07-26 PROCEDURE — 99214 OFFICE O/P EST MOD 30 MIN: CPT | Mod: S$GLB,,, | Performed by: INTERNAL MEDICINE

## 2018-07-26 RX ORDER — LACTULOSE 10 G/15ML
10 SOLUTION ORAL DAILY PRN
Qty: 450 ML | Refills: 12 | Status: SHIPPED | OUTPATIENT
Start: 2018-07-26 | End: 2018-08-05

## 2018-07-26 RX ORDER — TEMAZEPAM 15 MG/1
15 CAPSULE ORAL NIGHTLY PRN
Qty: 30 CAPSULE | Refills: 3 | Status: SHIPPED | OUTPATIENT
Start: 2018-07-26 | End: 2018-08-25

## 2018-07-26 NOTE — PROGRESS NOTES
Chief complaint, constipation and daytime fatigue        89-year-old white male  He is here with his wife.  Some of his complaints are recurrent.  He cannot sleep without the Seroquel at night.  He also appears to have suppressed morning hallucinations of seeing people in the room .  He does complain about being he is sedated in the morning.  His wife did reduce the Seroquel from 50 mg to 25.  Still remains somewhat sleepy in the morning.  It actually appears that he is more sleepy after he takes his morning meds and we have attempted to figure out which one of these medications at night. He does take an Effexor twice a day but I doubt that.  He takes gabapentin 3 times per day and we discussed and gave him written instructions to hold that a little bit later.  wife has taken his blood pressure when he feels bad and his blood pressures been okay sometimes actually high.  We've and discussed holding all of his morning medicationsfor now or 2 or 3 and assess if indeed this relates to his medications at all.  We discussed reducing the Seroquel 12.5 mg possibly at a separate time.  They did reduce the Seroquel at 12 point 5 at night is still having the daytime tiredness into the afternoon.  They take the Effexor twice a day but reassured it's probably not the Effexor.  When he does not take the Seroquel at night he can't sleep and so therefore is tired the next day.  Our plan at present will be to try a separate medication for sleep such as Restoril chart reviewed and it does not appear he has been on that.  May not be covered by his insurance.  We will temporarily assess if we can put in the sleep off the Seroquel and assess of daytime tiredness improves but we need to watch that morning hallucinations do not worsen.  He continues to have them but they're not that bothersome.    The other issue is chronic constipation.  His been a little bit worse lately.  He does use stool softeners and Dulcolax and does have some  hard bowel movements.  He has been chronically using the MiraLAX one cap twice a day according to the wife.  He has never tried lactulose which we discussed titrating the dose.  He has had occasions where medications have worked excessively knees had diarrhea and incontinence accident's.  They will continue on the MiraLAX and we will add small doses of lactulose continuing stool softening.     He had treatment for his trigeminal neuralgia with no change in his right facial pain. He plans to see an oral surgeon as her may well be some issue with one of his nerves in his teeth.  He did see an oral surgeon that was felt to be more of a gum problem.  He was told possibly get new dentures but if it persists they might do a CT scan.         . All these issues reviewed and patient counseled an evaluation and management we based upon time counselingTotal time over 25 minutes with over 50% counseling.    ROS:   CONST: weight stable. EYES: no vision change. ENT: no sore throat. CV: no chest pain w/ exertion. RESP: no shortness of breath. GI: no nausea, vomiting, diarrhea. No dysphagia. : no urinary issues. MUSCULOSKELETAL: no new myalgias or arthralgias. SKIN: no new changes. NEURO: no focal deficits. PSYCH: no new issues. ENDOCRINE: no polyuria. HEME: no lymph nodes. ALLERGY: no general pruritis.    PAST SURGERIES:  Total knee replacements bilaterally.                                                                                                     PAST MEDICAL HISTORY:    Hypertension.   Hyperlipidemia.    Depression.           Generalized anxiety ([facility]).    Hypothyroidism.    Osteoarthritis.    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  Optic neuropathy, right eye.  Right-sided trigeminal neuralgia, seen by neurology, pain management and neurosurgery  Chronic constipation   Evaluation by [facility] for NPH, apparently no response to spinal tap   Chronic vertigo   Vitamin D  deficiency   Parkinsonism by [facility] evaluation    Morning hallucinations, possibly related to Seroquel                                                                                                           SOCIAL HISTORY:  He is  and lives with his spouse.  They have adult   children in the area and grandchildren.  He has never been a cigarette       smoker.      Vitals as above,  Gen: no distress  Abdomen soft and benign    Labs reviewed    Ifeanyi was seen today for constipation.    Diagnoses and all orders for this visit:    Chronic constipation, acute on chronic exacerbation, continue MiraLAX and add lactulose as above    Somnolence, possibly due to the Seroquel and will add another medication for his acute insomnia so that we can hold the Seroquel and assess response as discussed at great length above    Visual hallucinations    Mood disorder    Parkinsonism, unspecified Parkinsonism type    Hypothyroidism, unspecified type, do not suspect contributing to the above    Hypertension, unspecified type, chronic and stable    Chronic intractable headache, unspecified headache type, chronic and stable    Trigeminal neuralgia pain, keep follow-up with oral surgery to now believes this could be a gum issue    Optic neuropathy, ischemic, bilateral, reviewed ophthalmology note, doesn't appear to have documentation of macular degeneration which could be contributing to hallucinations    Other orders  -     lactulose (CHRONULAC) 20 gram/30 mL Soln; Take 15 mLs (10 g total) by mouth daily as needed.  -     temazepam (RESTORIL) 15 mg Cap; Take 1 capsule (15 mg total) by mouth nightly as needed.

## 2018-08-03 DIAGNOSIS — K59.09 CHRONIC CONSTIPATION: ICD-10-CM

## 2018-08-03 RX ORDER — POLYETHYLENE GLYCOL 3350 17 G/17G
POWDER, FOR SOLUTION ORAL
Qty: 1530 G | Refills: 0 | Status: SHIPPED | OUTPATIENT
Start: 2018-08-03 | End: 2018-09-11 | Stop reason: SDUPTHER

## 2018-08-08 ENCOUNTER — TELEPHONE (OUTPATIENT)
Dept: NEUROSURGERY | Facility: CLINIC | Age: 83
End: 2018-08-08

## 2018-08-08 NOTE — TELEPHONE ENCOUNTER
SW PTS WIFE, AND DISCUSSED CONDITION WITH DR BIANCHI, DR GANT RECOMMENDED TO TAKE UP TO 2700mg GABAPENTIN DAILY. HOWEVER THE PATIENT IS NOW TAKING ONLY 900mg DAILY. HE WAS ADVISED TO TAKE 1 ADDITIONAL TABLET DAILY INCREASING TO  1200mg DAILY. HE WILL TRY THIS DOSAGE INCREASE FOR 1 WEEK AND SEE IF IT HELPS. HE WILL GET BACK TO ME PRN.

## 2018-08-08 NOTE — TELEPHONE ENCOUNTER
----- Message from Emily Dickinson sent at 8/8/2018 11:12 AM CDT -----  Contact: self @ [phone]  Pt is calling to schedule a f/u appt with Dr Rodriguez.  Pls call.

## 2018-08-15 DIAGNOSIS — E78.5 HYPERLIPIDEMIA, UNSPECIFIED HYPERLIPIDEMIA TYPE: ICD-10-CM

## 2018-08-16 RX ORDER — PRAVASTATIN SODIUM 40 MG/1
TABLET ORAL
Qty: 90 TABLET | Refills: 0 | Status: SHIPPED | OUTPATIENT
Start: 2018-08-16 | End: 2018-10-22 | Stop reason: SDUPTHER

## 2018-08-31 DIAGNOSIS — D33.3 UNILATERAL VESTIBULAR SCHWANNOMA: ICD-10-CM

## 2018-09-02 RX ORDER — TRAMADOL HYDROCHLORIDE 50 MG/1
TABLET ORAL
Qty: 90 TABLET | Refills: 1 | Status: CANCELLED | OUTPATIENT
Start: 2018-09-02

## 2018-09-04 DIAGNOSIS — D33.3 UNILATERAL VESTIBULAR SCHWANNOMA: ICD-10-CM

## 2018-09-04 NOTE — TELEPHONE ENCOUNTER
----- Message from Sadia Handley sent at 9/4/2018  9:27 AM CDT -----  Contact: Tami/[phone]  Refill:  traMADol (ULTRAM) 50 mg tablet    [facility] - [address] [address]  Phone: [phone] Fax: [phone]    Thank you.

## 2018-09-06 ENCOUNTER — TELEPHONE (OUTPATIENT)
Dept: FAMILY MEDICINE | Facility: CLINIC | Age: 83
End: 2018-09-06

## 2018-09-06 RX ORDER — TRAMADOL HYDROCHLORIDE 50 MG/1
50 TABLET ORAL 3 TIMES DAILY
Qty: 90 TABLET | Refills: 5 | Status: SHIPPED | OUTPATIENT
Start: 2018-09-06 | End: 2019-03-25 | Stop reason: SDUPTHER

## 2018-09-06 NOTE — TELEPHONE ENCOUNTER
----- Message from Griselda Damon sent at 9/6/2018  9:33 AM CDT -----  Contact: Self   Refill : traMADol (ULTRAM) 50 mg tablet      Pharmacy     [facility] - [facility] [address]

## 2018-09-11 DIAGNOSIS — K59.09 CHRONIC CONSTIPATION: ICD-10-CM

## 2018-09-12 RX ORDER — LEVOTHYROXINE SODIUM 75 UG/1
TABLET ORAL
Qty: 90 TABLET | Refills: 0 | Status: SHIPPED | OUTPATIENT
Start: 2018-09-12 | End: 2018-11-14 | Stop reason: SDUPTHER

## 2018-09-12 RX ORDER — POLYETHYLENE GLYCOL 3350 17 G/17G
POWDER, FOR SOLUTION ORAL
Qty: 1530 G | Refills: 0 | Status: SHIPPED | OUTPATIENT
Start: 2018-09-12 | End: 2018-12-19 | Stop reason: SDUPTHER

## 2018-09-18 DIAGNOSIS — G50.0 TRIGEMINAL NEURALGIA: ICD-10-CM

## 2018-09-18 RX ORDER — GABAPENTIN 300 MG/1
300 CAPSULE ORAL 3 TIMES DAILY
Qty: 270 CAPSULE | Refills: 3 | Status: SHIPPED | OUTPATIENT
Start: 2018-09-18 | End: 2019-05-07 | Stop reason: SDUPTHER

## 2018-09-18 NOTE — TELEPHONE ENCOUNTER
----- Message from Iesha Vasques sent at 9/18/2018  9:37 AM CDT -----  Contact: wife  [phone]  Pt needs refill on      gabapentin (NEURONTIN) 300 MG capsule     Pls call ...      [facility] - [facility] [address]  [address] [facility] [address]  Phone: [phone] Fax: [phone]    Thanks......Matthew

## 2018-10-09 ENCOUNTER — TELEPHONE (OUTPATIENT)
Dept: OPTOMETRY | Facility: CLINIC | Age: 83
End: 2018-10-09

## 2018-10-11 ENCOUNTER — TELEPHONE (OUTPATIENT)
Dept: OPHTHALMOLOGY | Facility: CLINIC | Age: 83
End: 2018-10-11

## 2018-10-17 DIAGNOSIS — I10 ESSENTIAL HYPERTENSION: ICD-10-CM

## 2018-10-18 RX ORDER — LISINOPRIL 40 MG/1
TABLET ORAL
Qty: 90 TABLET | Refills: 1 | Status: SHIPPED | OUTPATIENT
Start: 2018-10-18 | End: 2019-03-05 | Stop reason: SDUPTHER

## 2018-10-22 DIAGNOSIS — E78.5 HYPERLIPIDEMIA, UNSPECIFIED HYPERLIPIDEMIA TYPE: ICD-10-CM

## 2018-10-23 RX ORDER — PRAVASTATIN SODIUM 40 MG/1
TABLET ORAL
Qty: 90 TABLET | Refills: 0 | Status: SHIPPED | OUTPATIENT
Start: 2018-10-23 | End: 2018-12-24 | Stop reason: SDUPTHER

## 2018-11-01 ENCOUNTER — OFFICE VISIT (OUTPATIENT)
Dept: FAMILY MEDICINE | Facility: CLINIC | Age: 83
End: 2018-11-01
Payer: MEDICARE

## 2018-11-01 VITALS
WEIGHT: 205 LBS | OXYGEN SATURATION: 94 % | HEIGHT: 74 IN | SYSTOLIC BLOOD PRESSURE: 122 MMHG | BODY MASS INDEX: 26.31 KG/M2 | DIASTOLIC BLOOD PRESSURE: 62 MMHG | TEMPERATURE: 98 F | HEART RATE: 72 BPM

## 2018-11-01 DIAGNOSIS — G89.29 CHRONIC INTRACTABLE HEADACHE, UNSPECIFIED HEADACHE TYPE: ICD-10-CM

## 2018-11-01 DIAGNOSIS — G50.0 TRIGEMINAL NEURALGIA PAIN: ICD-10-CM

## 2018-11-01 DIAGNOSIS — G20.C PARKINSONISM, UNSPECIFIED PARKINSONISM TYPE: ICD-10-CM

## 2018-11-01 DIAGNOSIS — F39 MOOD DISORDER: ICD-10-CM

## 2018-11-01 DIAGNOSIS — R44.1 VISUAL HALLUCINATIONS: ICD-10-CM

## 2018-11-01 DIAGNOSIS — I10 HYPERTENSION, UNSPECIFIED TYPE: ICD-10-CM

## 2018-11-01 DIAGNOSIS — F32.0 MILD MAJOR DEPRESSION: ICD-10-CM

## 2018-11-01 DIAGNOSIS — R51.9 CHRONIC INTRACTABLE HEADACHE, UNSPECIFIED HEADACHE TYPE: ICD-10-CM

## 2018-11-01 DIAGNOSIS — R40.0 SOMNOLENCE: Primary | ICD-10-CM

## 2018-11-01 DIAGNOSIS — K59.09 CHRONIC CONSTIPATION: ICD-10-CM

## 2018-11-01 PROCEDURE — 99215 OFFICE O/P EST HI 40 MIN: CPT | Mod: S$GLB,,, | Performed by: INTERNAL MEDICINE

## 2018-11-01 PROCEDURE — 99999 PR PBB SHADOW E&M-EST. PATIENT-LVL IV: CPT | Mod: PBBFAC,,, | Performed by: INTERNAL MEDICINE

## 2018-11-01 PROCEDURE — 99214 OFFICE O/P EST MOD 30 MIN: CPT | Mod: PBBFAC,PO | Performed by: INTERNAL MEDICINE

## 2018-11-01 PROCEDURE — 1101F PT FALLS ASSESS-DOCD LE1/YR: CPT | Mod: CPTII,S$GLB,, | Performed by: INTERNAL MEDICINE

## 2018-11-01 NOTE — PROGRESS NOTES
Chief complaint, constipation and daytime fatigue        89-year-old white male  He is here with his wife.  Some of his complaints are recurrent.  He cannot sleep without the Seroquel at night.  He also appears to have suppressed morning hallucinations of seeing people in the room .  He does complain about being he is sedated in the morning.  His wife did reduce the Seroquel from 50 mg to 25.  Still remains somewhat sleepy in the morning.  It actually appears that he is more sleepy after he takes his morning meds and we have attempted to figure out which one of these medications at night. He does take an Effexor twice a day but I doubt that.  He takes gabapentin 3 times per day and we discussed and gave him written instructions to hold that a little bit later.  wife has taken his blood pressure when he feels bad and his blood pressures been okay sometimes actually high.  We've and discussed holding all of his morning medicationsfor now or 2 or 3 and assess if indeed this relates to his medications at all.  We discussed reducing the Seroquel 12.5 mg possibly at a separate time.  They did reduce the Seroquel at 12 point 5 at night is still having the daytime tiredness into the afternoon.  They take the Effexor twice a day but reassured it's probably not the Effexor.  When he does not take the Seroquel at night he can't sleep and so therefore is tired the next day.  Plan at last visit was to try him on Restoril.  He took it for only 2 times.  It did not get him to sleep so there after he slept all day the next day.      We again discussed that he awakes feeling fine so it is very doubtful that this is the nighttime Seroquel.  About an hour later when he takes his meds he gets the somnolence.  Again which we have discussed before we discussed holding the medications for an hour to and assess if indeed he feels fine and it is 1 of his morning meds and will have to go through the process of holding 1 medication at a time.   I am pretty sure we may have done this already.  The wife remembers doing so but cannot clarify if indeed they figured out if there was any particular medicine so we will repeat the process.      Still complains about his chronic headaches.  His acoustic neuroma apparently has been treated.  He had a trigeminal neuralgia treatment and apparently did not work.  His headaches are really unchanged and he is more resolved to just live with it    More off balance for 2 weeks.  No orthostasis.  Blood pressure is running good.  Had a little depth perception issue is he has poor vision and missed a step but did not fall.  I did stand him up today and he felt more like he needed assistance.  He kept his knees bent but then I asked him to straighten up and he could do so.  We then let him go any stood perfectly fine although just a little bit shaky and then had him sit back down.  No ataxia or any other disequilibrium suspected.  He spends most of his day sitting.  He stands and very quickly gets back pain and knee pain and so I think this is more of related to his stiffness and arthralgias and so forth and wife agrees.  Counseled at length regarding the multitude of all these issues         . All these issues reviewed and patient counseled an evaluation and management we based upon time counselingTotal time over 45 minutes with over 50% counseling.    ROS:   CONST: weight stable. EYES: no vision change. ENT: no sore throat. CV: no chest pain w/ exertion. RESP: no shortness of breath. GI: no nausea, vomiting, diarrhea. No dysphagia. : no urinary issues. MUSCULOSKELETAL: no new myalgias or arthralgias. SKIN: no new changes. NEURO: no focal deficits. PSYCH: no new issues. ENDOCRINE: no polyuria. HEME: no lymph nodes. ALLERGY: no general pruritis.    PAST SURGERIES:  Total knee replacements bilaterally.                                                                                                     PAST MEDICAL HISTORY:     Hypertension.   Hyperlipidemia.    Depression.           Generalized anxiety ([facility]).    Hypothyroidism.    Osteoarthritis.    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  Optic neuropathy, right eye.  Right-sided trigeminal neuralgia, seen by neurology, pain management and neurosurgery  Chronic constipation   Evaluation by [place] [facility] for NPH, apparently no response to spinal tap   Chronic vertigo   Vitamin D deficiency   Parkinsonism by [facility] evaluation    Morning hallucinations, possibly related to Seroquel                                                                                                           SOCIAL HISTORY:  He is  and lives with his spouse.  They have adult   children in the area and grandchildren.  He has never been a cigarette       smoker.      Vitals as above,  Gen: no distress  Examined as above    Labs reviewed    Ifeanyi was seen today for discuss meds.    Diagnoses and all orders for this visit:    Somnolence, discussed differential as above    Visual hallucinations, continue Seroquel at night, problems in the morning do not appear to relate to Seroquel    Mood disorder    Parkinsonism, unspecified Parkinsonism type, do not suspect any ataxia orthostasis associated with his recent instability suspected to be more mechanical, try stretching the legs before standing    Chronic constipation, under control at present    Trigeminal neuralgia pain, chronic and stable unfortunately    Chronic intractable headache, unspecified headache type    Hypertension, unspecified type , chronic and stable    Mild major depression

## 2018-11-12 ENCOUNTER — TELEPHONE (OUTPATIENT)
Dept: FAMILY MEDICINE | Facility: CLINIC | Age: 83
End: 2018-11-12

## 2018-11-12 DIAGNOSIS — R09.81 SINUS CONGESTION: Primary | ICD-10-CM

## 2018-11-12 NOTE — TELEPHONE ENCOUNTER
Please 1st call wife and clarify.  Any particular ENT desired and is he having some recent symptoms that might simply need to be treated versus waiting to see ENT?    If these are chronic and ongoing issues then, yes, likely good to see ENT

## 2018-11-12 NOTE — TELEPHONE ENCOUNTER
----- Message from Jolynn Bauer sent at 11/12/2018 12:52 PM CST -----  Contact: Self  Pt is calling to speak with staff to go see an ENT . Please call pt at [phone].

## 2018-11-13 NOTE — TELEPHONE ENCOUNTER
Referral to our [facility] Dr. Sorensen was put in but apparently he may be very backed up since he just joined [facility] and his partner retired.      [facility] Dr[facility] Sorensen and perhaps they can also call over there and try to see if they can be worked in depending upon how urgent he needs an appointment    I am not sure how urgent his symptoms are at this time and do not want him to wait weeks with bad symptoms

## 2018-11-15 RX ORDER — LEVOTHYROXINE SODIUM 75 UG/1
TABLET ORAL
Qty: 90 TABLET | Refills: 0 | Status: SHIPPED | OUTPATIENT
Start: 2018-11-15 | End: 2019-01-03 | Stop reason: SDUPTHER

## 2018-11-16 ENCOUNTER — TELEPHONE (OUTPATIENT)
Dept: FAMILY MEDICINE | Facility: CLINIC | Age: 83
End: 2018-11-16

## 2018-12-19 ENCOUNTER — TELEPHONE (OUTPATIENT)
Dept: FAMILY MEDICINE | Facility: CLINIC | Age: 83
End: 2018-12-19

## 2018-12-19 DIAGNOSIS — K59.09 CHRONIC CONSTIPATION: ICD-10-CM

## 2018-12-19 RX ORDER — POLYETHYLENE GLYCOL 3350 17 G/17G
POWDER, FOR SOLUTION ORAL
Qty: 1530 G | Refills: 12 | Status: SHIPPED | OUTPATIENT
Start: 2018-12-19 | End: 2020-08-12

## 2018-12-19 NOTE — TELEPHONE ENCOUNTER
----- Message from Genevieve Dao sent at 12/19/2018 10:07 AM CST -----  Contact: wife ph. [phone]  Requesting a referral to ENT

## 2018-12-19 NOTE — TELEPHONE ENCOUNTER
Spoke to patients wife she states they saw Dr Gomez but are receiving a bill because they didn't have a referral. Nurse informed patient referral noted in system and would be faxed to providers office for billing.  Referral faxed to [phone] via [employer].

## 2018-12-21 ENCOUNTER — NURSE TRIAGE (OUTPATIENT)
Dept: ADMINISTRATIVE | Facility: CLINIC | Age: 83
End: 2018-12-21

## 2018-12-21 NOTE — TELEPHONE ENCOUNTER
Wife called to report the following:     -bp 184/94 HR 71 earlier today  -patient fell a little off balance   -took am blood pressure medications  -has unsteady gait all the time  -bp at the time of call  180/91  HR 74   -denies chest pain, difficulty breathing, blurred vision, headache, weakness, numbness   -advised to f/u with provider within 24 hours and when to joshua back       Reason for Disposition   BP  >= 180/110    Protocols used: ST HIGH BLOOD PRESSURE-A-AH

## 2018-12-24 DIAGNOSIS — E78.5 HYPERLIPIDEMIA, UNSPECIFIED HYPERLIPIDEMIA TYPE: ICD-10-CM

## 2018-12-26 RX ORDER — PRAVASTATIN SODIUM 40 MG/1
TABLET ORAL
Qty: 90 TABLET | Refills: 0 | Status: SHIPPED | OUTPATIENT
Start: 2018-12-26 | End: 2019-06-07 | Stop reason: SDUPTHER

## 2019-01-03 RX ORDER — QUETIAPINE FUMARATE 50 MG/1
TABLET, FILM COATED ORAL
Qty: 90 TABLET | Refills: 0 | Status: SHIPPED | OUTPATIENT
Start: 2019-01-03 | End: 2019-03-06 | Stop reason: SDUPTHER

## 2019-01-03 RX ORDER — LEVOTHYROXINE SODIUM 75 UG/1
TABLET ORAL
Qty: 90 TABLET | Refills: 0 | Status: SHIPPED | OUTPATIENT
Start: 2019-01-03 | End: 2019-03-18 | Stop reason: SDUPTHER

## 2019-01-18 ENCOUNTER — OFFICE VISIT (OUTPATIENT)
Dept: FAMILY MEDICINE | Facility: CLINIC | Age: 84
End: 2019-01-18
Payer: MEDICARE

## 2019-01-18 VITALS
WEIGHT: 200.38 LBS | TEMPERATURE: 98 F | OXYGEN SATURATION: 94 % | SYSTOLIC BLOOD PRESSURE: 138 MMHG | BODY MASS INDEX: 25.72 KG/M2 | DIASTOLIC BLOOD PRESSURE: 64 MMHG | HEIGHT: 74 IN | HEART RATE: 72 BPM

## 2019-01-18 DIAGNOSIS — I10 ESSENTIAL HYPERTENSION: ICD-10-CM

## 2019-01-18 DIAGNOSIS — K59.09 CHRONIC CONSTIPATION: ICD-10-CM

## 2019-01-18 DIAGNOSIS — G50.0 TRIGEMINAL NEURALGIA PAIN: ICD-10-CM

## 2019-01-18 DIAGNOSIS — I70.0 ATHEROSCLEROSIS OF AORTA: ICD-10-CM

## 2019-01-18 DIAGNOSIS — G89.29 CHRONIC NONINTRACTABLE HEADACHE, UNSPECIFIED HEADACHE TYPE: ICD-10-CM

## 2019-01-18 DIAGNOSIS — D33.3 VESTIBULAR SCHWANNOMA: ICD-10-CM

## 2019-01-18 DIAGNOSIS — G47.00 INSOMNIA, UNSPECIFIED TYPE: Primary | ICD-10-CM

## 2019-01-18 DIAGNOSIS — R51.9 CHRONIC NONINTRACTABLE HEADACHE, UNSPECIFIED HEADACHE TYPE: ICD-10-CM

## 2019-01-18 DIAGNOSIS — F39 MOOD DISORDER: ICD-10-CM

## 2019-01-18 DIAGNOSIS — R40.0 SOMNOLENCE: ICD-10-CM

## 2019-01-18 DIAGNOSIS — R44.1 VISUAL HALLUCINATIONS: ICD-10-CM

## 2019-01-18 DIAGNOSIS — G20.C PARKINSONISM, UNSPECIFIED PARKINSONISM TYPE: ICD-10-CM

## 2019-01-18 DIAGNOSIS — E03.9 HYPOTHYROIDISM, UNSPECIFIED TYPE: ICD-10-CM

## 2019-01-18 DIAGNOSIS — D33.3 ACOUSTIC NEUROMA: ICD-10-CM

## 2019-01-18 PROCEDURE — 99215 PR OFFICE/OUTPT VISIT, EST, LEVL V, 40-54 MIN: ICD-10-PCS | Mod: S$GLB,,, | Performed by: INTERNAL MEDICINE

## 2019-01-18 PROCEDURE — 99999 PR PBB SHADOW E&M-EST. PATIENT-LVL III: ICD-10-PCS | Mod: PBBFAC,,, | Performed by: INTERNAL MEDICINE

## 2019-01-18 PROCEDURE — 99999 PR PBB SHADOW E&M-EST. PATIENT-LVL III: CPT | Mod: PBBFAC,,, | Performed by: INTERNAL MEDICINE

## 2019-01-18 PROCEDURE — 99215 OFFICE O/P EST HI 40 MIN: CPT | Mod: S$GLB,,, | Performed by: INTERNAL MEDICINE

## 2019-01-18 PROCEDURE — 1101F PT FALLS ASSESS-DOCD LE1/YR: CPT | Mod: CPTII,S$GLB,, | Performed by: INTERNAL MEDICINE

## 2019-01-18 PROCEDURE — 1101F PR PT FALLS ASSESS DOC 0-1 FALLS W/OUT INJ PAST YR: ICD-10-PCS | Mod: CPTII,S$GLB,, | Performed by: INTERNAL MEDICINE

## 2019-01-18 NOTE — PROGRESS NOTES
Chief complaint, HA, constipation and daytime fatigue        89-year-old white male  He is here with his wife.  Some of his complaints are recurrent.  He cannot sleep without the Seroquel at night.  He also appears to have suppressed morning hallucinations of seeing people in the room .  He does complain about being he is sedated in the morning.  His wife did reduce the Seroquel from 50 mg to 25.  Still remains somewhat sleepy in the morning.  It actually appears that he is more sleepy after he takes his morning meds and we have attempted to figure out which one of these medications at night. He does take an Effexor twice a day but I doubt that.  He takes gabapentin 3 times per day and we discussed and gave him written instructions to hold that a little bit later.  wife has taken his blood pressure when he feels bad and his blood pressures been okay sometimes actually high.  We've and discussed holding all of his morning medicationsfor now or 2 or 3 and assess if indeed this relates to his medications at all.  We discussed reducing the Seroquel 12.5 mg possibly at a separate time.  They did reduce the Seroquel at 12 point 5 at night is still having the daytime tiredness into the afternoon.  They take the Effexor twice a day but reassured it's probably not the Effexor.  When he does not take the Seroquel at night he can't sleep and so therefore is tired the next day.  Plan at last visit was to try him on Restoril.  He took it for only 2 times.  It did not get him to sleep so there after he slept all day the next day.      We again discussed that he awakes feeling fine so it is very doubtful that this is the nighttime Seroquel.  About an hour later when he takes his meds he gets the somnolence.  Again which we have discussed before we discussed holding the medications for an hour to and assess if indeed he feels fine and it is 1 of his morning meds and will have to go through the process of holding 1 medication at a  time.  I am pretty sure we may have done this already.  The wife remembers doing so but cannot clarify if indeed they figured out if there was any particular medicine so we will repeat the process.      Still complains about his chronic headaches.  His acoustic neuroma apparently has been treated.  He had a trigeminal neuralgia treatment and apparently did not work.  His headaches are really unchanged and he is more resolved to just live with it    In the interval, 1 night he could not fall asleep and at about 3:00 a.m. he did take Valium 5 mg.  He felt that this put him to sleep any awoke feeling better.  Previously they will use the Valium on occasion for anxiety.  We did discuss that perhaps he can try using the Valium in the evening in place of the Seroquel and again trying to find an option where upon he can find something that puts him to sleep and allows him to go a few days without taking the Seroquel to assess if indeed it is the Seroquel at makes him feel so bad the next day.  His wife manages medication and she is very reliable.  She has clear understanding that we can try 5 mg, 7.5 per even 10 mg at night as long as there is no perceived problems or side effects the next day.    She has found that the gabapentin does make him a little bit sleepy during the day and so we discussed that the gabapentin is not essential and that doses can be held to assess if indeed he can have less sedation but needs to watch for the return of any suppressed neurological symptoms.  We will need to balance the good in the bad.    Other interval issues is he is hearing some loud popping wearing his hearing aid in the left ear.  Apparently had some adjustments to the hearing aid.  His wife says that she can actually hear his neck popping and she wonders if this correlates with his symptoms which is likely.  There were reassured.  She will need to explain him ongoing so that he can try to assess if indeed what he is hearing is  from his neck.  One option is to see if indeed he still hears the same sensation without the hearing aid which would clearly support that it is coming from crepitus from his cervical spine.  No particular new neck pain.     . All these issues reviewed and patient counseled an evaluation and management we based upon time counselingTotal time over 45 minutes with over 50% counseling.  Wife does verbalize it is very therapeutic for him to come in and discuss all these issues even though many of them are not that much changed.    ROS:   CONST: weight stable.  No new myalgias arthralgias, no new neurological symptoms, no new psychiatric symptoms, no new hallucinations or any worsening    PAST SURGERIES:  Total knee replacements bilaterally.                                                                                                     PAST MEDICAL HISTORY:    Hypertension.   Hyperlipidemia.    Depression.           Generalized anxiety ([facility]).    Hypothyroidism.    Osteoarthritis.    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  Optic neuropathy, right eye.  Right-sided trigeminal neuralgia, seen by neurology, pain management and neurosurgery  Chronic constipation   Evaluation by [facility] for NPH, apparently no response to spinal tap   Chronic vertigo   Vitamin D deficiency   Parkinsonism by [facility] evaluation    Morning hallucinations, possibly related to Seroquel                                                                                                           SOCIAL HISTORY:  He is  and lives with his spouse.  They have adult   children in the area and grandchildren.  He has never been a cigarette       smoker.      Vitals as above,  Gen: no distress  Examined as above    Labs reviewed  Ifeanyi was seen today for headache and insomnia.    Diagnoses and all orders for this visit:    Insomnia, unspecified type, plan as above, complicated case, still trying  to assess if indeed there are negative issues with the Seroquel but yet have not been able to find a replacement for the Seroquel as it pertains to allowing him to sleep.  Perhaps the Valium will be of assistance.  If indeed he is able to stay off of the Seroquel we will need to watch for any worsening psychiatric symptoms, worsening morning hallucinations and so forth.    Chronic nonintractable headache, unspecified headache type, no change in headache frequency or severity to suggest any underlying worsening, relationship to the tumor and so forth    Acoustic neuroma, by review of MRI, appears to actually be of smaller size by the last MRI    Essential hypertension, chronic and stable    Somnolence, multifactorial which could relate to insomnia and or some of the direct effect of his medications are we are still trying to figure this out.  Issues related to gabapentin and trying to reduce dosing and so forth as above    Visual hallucinations    Mood disorder    Parkinsonism, unspecified Parkinsonism type, no clinical worsening    Chronic constipation    Trigeminal neuralgia pain, continued pain    Hypothyroidism, unspecified type    Vestibular schwannoma    Atherosclerosis of aorta, noted

## 2019-01-21 ENCOUNTER — TELEPHONE (OUTPATIENT)
Dept: FAMILY MEDICINE | Facility: CLINIC | Age: 84
End: 2019-01-21

## 2019-01-21 NOTE — TELEPHONE ENCOUNTER
Spoke with pt wife , pt states she needed clarification in regards to the Seroquel and the Valium for the patient. Informed pt in reading  last OV note for the patient on 1/18/19.  He stated the patient should stop the Seroquel for now since the Valium was helping him more to fall asleep better and waking up better the next day. Pt wife states the Valium is helping him and just needed more clarification so she could make sure she wasn't giving him so much medication. Pt verbalized full understanding.

## 2019-01-21 NOTE — TELEPHONE ENCOUNTER
----- Message from Belen Talamantes sent at 1/21/2019  1:09 PM CST -----  Contact: Krystal-Wife  Krystal called to request a call back from the nurse to discuss the time frame between medications. Please call at  [phone]

## 2019-01-31 ENCOUNTER — PES CALL (OUTPATIENT)
Dept: ADMINISTRATIVE | Facility: CLINIC | Age: 84
End: 2019-01-31

## 2019-02-05 ENCOUNTER — TELEPHONE (OUTPATIENT)
Dept: FAMILY MEDICINE | Facility: CLINIC | Age: 84
End: 2019-02-05

## 2019-02-05 DIAGNOSIS — R32 URINARY INCONTINENCE, UNSPECIFIED TYPE: Primary | ICD-10-CM

## 2019-02-05 NOTE — TELEPHONE ENCOUNTER
----- Message from Jolynn Bauer sent at 2/5/2019  2:45 PM CST -----  Contact: Self  Pt is calling to speak with staff about getting a referral to urology. Please call at [phone]

## 2019-02-05 NOTE — TELEPHONE ENCOUNTER
"Spoken to patient's wife. Wife reports patient is having urinary incontinence. Has to wear adult "pullups" at night. Requesting an referral to urology. Please advise.  "

## 2019-02-26 ENCOUNTER — OFFICE VISIT (OUTPATIENT)
Dept: FAMILY MEDICINE | Facility: CLINIC | Age: 84
End: 2019-02-26
Payer: MEDICARE

## 2019-02-26 ENCOUNTER — TELEPHONE (OUTPATIENT)
Dept: FAMILY MEDICINE | Facility: CLINIC | Age: 84
End: 2019-02-26

## 2019-02-26 VITALS
OXYGEN SATURATION: 97 % | WEIGHT: 207.13 LBS | SYSTOLIC BLOOD PRESSURE: 136 MMHG | DIASTOLIC BLOOD PRESSURE: 76 MMHG | HEIGHT: 74 IN | BODY MASS INDEX: 26.58 KG/M2 | HEART RATE: 71 BPM

## 2019-02-26 DIAGNOSIS — D33.3 VESTIBULAR SCHWANNOMA: ICD-10-CM

## 2019-02-26 DIAGNOSIS — I10 HYPERTENSION, UNSPECIFIED TYPE: ICD-10-CM

## 2019-02-26 DIAGNOSIS — E03.9 HYPOTHYROIDISM, UNSPECIFIED TYPE: ICD-10-CM

## 2019-02-26 DIAGNOSIS — D33.3 ACOUSTIC NEUROMA: ICD-10-CM

## 2019-02-26 DIAGNOSIS — Z00.00 ENCOUNTER FOR PREVENTIVE HEALTH EXAMINATION: Primary | ICD-10-CM

## 2019-02-26 DIAGNOSIS — G20.C PARKINSONISM, UNSPECIFIED PARKINSONISM TYPE: ICD-10-CM

## 2019-02-26 DIAGNOSIS — Z23 NEED FOR VACCINATION: ICD-10-CM

## 2019-02-26 DIAGNOSIS — I70.0 ATHEROSCLEROSIS OF AORTA: ICD-10-CM

## 2019-02-26 DIAGNOSIS — F32.0 MILD MAJOR DEPRESSION: ICD-10-CM

## 2019-02-26 PROCEDURE — 99499 UNLISTED E&M SERVICE: CPT | Mod: S$GLB,,, | Performed by: NURSE PRACTITIONER

## 2019-02-26 PROCEDURE — G0439 PR MEDICARE ANNUAL WELLNESS SUBSEQUENT VISIT: ICD-10-PCS | Mod: HCNC,S$GLB,, | Performed by: NURSE PRACTITIONER

## 2019-02-26 PROCEDURE — 99999 PR PBB SHADOW E&M-EST. PATIENT-LVL IV: ICD-10-PCS | Mod: PBBFAC,HCNC,, | Performed by: NURSE PRACTITIONER

## 2019-02-26 PROCEDURE — 99999 PR PBB SHADOW E&M-EST. PATIENT-LVL IV: CPT | Mod: PBBFAC,HCNC,, | Performed by: NURSE PRACTITIONER

## 2019-02-26 PROCEDURE — G0439 PPPS, SUBSEQ VISIT: HCPCS | Mod: HCNC,S$GLB,, | Performed by: NURSE PRACTITIONER

## 2019-02-26 PROCEDURE — 99499 RISK ADDL DX/OHS AUDIT: ICD-10-PCS | Mod: S$GLB,,, | Performed by: NURSE PRACTITIONER

## 2019-02-26 NOTE — PROGRESS NOTES
"Ifeanyi Rutherford presented for a  Medicare AWV and comprehensive Health Risk Assessment today. The following components were reviewed and updated:    · Medical history  · Family History  · Social history  · Allergies and Current Medications  · Health Risk Assessment  · Health Maintenance  · Care Team     ** See Completed Assessments for Annual Wellness Visit within the encounter summary.**       The following assessments were completed:  · Living Situation  · CAGE  · Depression Screening  · Timed Get Up and Go  · Whisper Test  · Cognitive Function Screening  ·   ·   · Nutrition Screening  · ADL Screening  · PAQ Screening    Vitals:    02/26/19 1029   BP: 136/76   BP Location: Left arm   Patient Position: Sitting   BP Method: Large (Manual)   Pulse: 71   SpO2: 97%   Weight: 93.9 kg (207 lb 2 oz)   Height: 6' 2" (1.88 m)     Body mass index is 26.59 kg/m².  Physical Exam   Cardiovascular: Normal rate and regular rhythm.   Pulmonary/Chest: Effort normal.   Neurological: He is alert.   Skin: Skin is warm. Capillary refill takes less than 2 seconds.   Psychiatric: He has a normal mood and affect. His behavior is normal. Thought content normal.         Diagnoses and health risks identified today and associated recommendations/orders:    1. Encounter for preventive health examination  Education provided about preventive health examinations and procedures; addressed and discussed patient's health concerns. Additionally, reviewed medical record for risk factors and documented the results during this encounter.    2. Atherosclerosis of aorta  Stable, asymptomatic; monitor.     3. Mild major depression  Denies homicidal or suicidal ideation, we discussed family and social dynamics, stress relieving activities. Continue as advised.     4. Parkinsonism, unspecified Parkinsonism type  Stable, patient evaluated/monitored by [facility]; continue as advised.     5. Acoustic neuroma  Stable, patient evaluated/monitored by " [facility]; continue as advised.     6. Vestibular schwannoma  Stable, patient evaluated/monitored by [facility]; continue as advised.     7. Hypertension, unspecified type  Presently at goal. Continue as advised regarding dietary and lifestyle modifications.     8. Hypothyroidism, unspecified type  Stable and controlled. Continue current treatment plan as previously prescribed with your PCP.     9. Need for vaccination  Patient aware of recommendation for updated influenza vaccine.     Reviewed health maintenance, educated about recommended examinations, procedures (labs [PSA] & images), and immunizations.     Provided [place] with a 5-10 year written screening schedule and personal prevention plan. Recommendations were developed using the USPSTF age appropriate recommendations. Education, counseling, and referrals were provided as needed. After Visit Summary printed and given to patient which includes a list of additional screenings\tests needed.    Follow-up in about 1 year (around 2/26/2020) for assessment .    Sandoval Amezcua Jr, NP

## 2019-02-26 NOTE — PATIENT INSTRUCTIONS
Counseling and Referral of Other Preventative  (Italic type indicates deductible and co-insurance are waived)    Patient Name: Ifeanyi Rutherford  Today's Date: 2/26/2019    Health Maintenance       Date Due Completion Date    PROSTATE-SPECIFIC ANTIGEN 04/26/2013 4/26/2012    Influenza Vaccine 08/01/2018 10/12/2016    Override on 11/11/2014: Done    Lipid Panel 12/03/2020 12/3/2015    TETANUS VACCINE 08/04/2027 8/4/2017        No orders of the defined types were placed in this encounter.    The following information is provided to all patients.  This information is to help you find resources for any of the problems found today that may be affecting your health:                Living healthy guide: www.[facility].[place].gov      Understanding Diabetes: www.diabetes.org      Eating healthy: www.cdc.gov/healthyweight      CDC home safety checklist: www.cdc.gov/steadi/patient.html      Agency on Aging: www.goea.[place].[facility]      Alcoholics anonymous (AA): www.aa.org      Physical Activity: www.kath.nih.gov/sc6ibzm      Tobacco use: www.quitwithusla.org

## 2019-02-27 ENCOUNTER — TELEPHONE (OUTPATIENT)
Dept: UROLOGY | Facility: CLINIC | Age: 84
End: 2019-02-27

## 2019-02-27 ENCOUNTER — OFFICE VISIT (OUTPATIENT)
Dept: UROLOGY | Facility: CLINIC | Age: 84
End: 2019-02-27
Payer: MEDICARE

## 2019-02-27 VITALS — BODY MASS INDEX: 26.56 KG/M2 | HEIGHT: 74 IN | WEIGHT: 207 LBS

## 2019-02-27 DIAGNOSIS — N13.8 BPH WITH OBSTRUCTION/LOWER URINARY TRACT SYMPTOMS: Primary | ICD-10-CM

## 2019-02-27 DIAGNOSIS — N39.44 URINARY INCONTINENCE, NOCTURNAL ENURESIS: ICD-10-CM

## 2019-02-27 DIAGNOSIS — N40.1 BPH WITH OBSTRUCTION/LOWER URINARY TRACT SYMPTOMS: Primary | ICD-10-CM

## 2019-02-27 DIAGNOSIS — R39.15 URINARY URGENCY: ICD-10-CM

## 2019-02-27 DIAGNOSIS — N13.8 BPH WITH URINARY OBSTRUCTION: Primary | ICD-10-CM

## 2019-02-27 DIAGNOSIS — N40.1 BPH WITH URINARY OBSTRUCTION: Primary | ICD-10-CM

## 2019-02-27 DIAGNOSIS — R35.1 NOCTURIA MORE THAN TWICE PER NIGHT: ICD-10-CM

## 2019-02-27 LAB
BILIRUB SERPL-MCNC: NORMAL MG/DL
BLOOD URINE, POC: NORMAL
COLOR, POC UA: YELLOW
GLUCOSE UR QL STRIP: NORMAL
KETONES UR QL STRIP: NORMAL
LEUKOCYTE ESTERASE URINE, POC: NORMAL
NITRITE, POC UA: NORMAL
PH, POC UA: 5
PROTEIN, POC: NORMAL
SPECIFIC GRAVITY, POC UA: 1000
UROBILINOGEN, POC UA: NORMAL

## 2019-02-27 PROCEDURE — 1100F PTFALLS ASSESS-DOCD GE2>/YR: CPT | Mod: HCNC,CPTII,S$GLB, | Performed by: UROLOGY

## 2019-02-27 PROCEDURE — 99999 PR PBB SHADOW E&M-EST. PATIENT-LVL III: CPT | Mod: PBBFAC,HCNC,, | Performed by: UROLOGY

## 2019-02-27 PROCEDURE — 81001 URINALYSIS AUTO W/SCOPE: CPT | Mod: HCNC,S$GLB,, | Performed by: UROLOGY

## 2019-02-27 PROCEDURE — 3288F FALL RISK ASSESSMENT DOCD: CPT | Mod: HCNC,CPTII,S$GLB, | Performed by: UROLOGY

## 2019-02-27 PROCEDURE — 99204 OFFICE O/P NEW MOD 45 MIN: CPT | Mod: 25,HCNC,S$GLB, | Performed by: UROLOGY

## 2019-02-27 PROCEDURE — 87088 URINE BACTERIA CULTURE: CPT | Mod: HCNC

## 2019-02-27 PROCEDURE — 87077 CULTURE AEROBIC IDENTIFY: CPT | Mod: HCNC

## 2019-02-27 PROCEDURE — 99999 PR PBB SHADOW E&M-EST. PATIENT-LVL III: ICD-10-PCS | Mod: PBBFAC,HCNC,, | Performed by: UROLOGY

## 2019-02-27 PROCEDURE — 1100F PR PT FALLS ASSESS DOC 2+ FALLS/FALL W/INJURY/YR: ICD-10-PCS | Mod: HCNC,CPTII,S$GLB, | Performed by: UROLOGY

## 2019-02-27 PROCEDURE — 87186 SC STD MICRODIL/AGAR DIL: CPT | Mod: HCNC

## 2019-02-27 PROCEDURE — 3288F PR FALLS RISK ASSESSMENT DOCUMENTED: ICD-10-PCS | Mod: HCNC,CPTII,S$GLB, | Performed by: UROLOGY

## 2019-02-27 PROCEDURE — 87086 URINE CULTURE/COLONY COUNT: CPT | Mod: HCNC

## 2019-02-27 PROCEDURE — 81001 POCT URINALYSIS, DIPSTICK OR TABLET REAGENT, AUTOMATED, WITH MICROSCOP: ICD-10-PCS | Mod: HCNC,S$GLB,, | Performed by: UROLOGY

## 2019-02-27 PROCEDURE — 99204 PR OFFICE/OUTPT VISIT, NEW, LEVL IV, 45-59 MIN: ICD-10-PCS | Mod: 25,HCNC,S$GLB, | Performed by: UROLOGY

## 2019-02-27 RX ORDER — ALFUZOSIN HYDROCHLORIDE 10 MG/1
10 TABLET, EXTENDED RELEASE ORAL DAILY
Qty: 30 TABLET | Refills: 1 | Status: SHIPPED | OUTPATIENT
Start: 2019-02-27 | End: 2019-03-29

## 2019-02-27 RX ORDER — ALFUZOSIN HYDROCHLORIDE 10 MG/1
10 TABLET, EXTENDED RELEASE ORAL DAILY
Qty: 30 TABLET | Refills: 0 | Status: SHIPPED | OUTPATIENT
Start: 2019-02-27 | End: 2019-04-02 | Stop reason: SINTOL

## 2019-02-27 NOTE — PROGRESS NOTES
Subjective:       Patient ID: Ifeanyi Rutherford is a 89 y.o. male who was referred by Jason Levy MD    Chief Complaint:   Chief Complaint   Patient presents with    Urinary Incontinence     new pt coming in for incontinence pt is wearing diaper an they are hoping to get medication for this        Urinary Incontinence  Patient complains of urinary incontinence. This has been present for several years. He leaks urine with with urge, with a full bladder, during the night. Patient describes the symptoms as frequent urination (severalx per day), nocturia several times per night, the urge to urinate recurs again shortly following micturition, urge to urinate with little or no warning and urine leaking unpredictably. Factors associated with symptoms include none known. Evaluation to date includes none. Treatment to date includes none.      ACTIVE MEDICAL ISSUES:  Patient Active Problem List   Diagnosis    Hypertension    Vertigo    Fatigue    Mild major depression    Optic neuropathy, ischemic, bilateral    Choroidal nevus of right eye    Pseudophakia - Both Eyes    Refractive error - Left Eye    Debility    Hypothyroidism    Chronic headache    Parkinsonism    Trigeminal neuralgia of right side of face    Vestibular schwannoma    Insufficiency of tear film of both eyes    Atherosclerosis of aorta    Ciliary muscle spasm of right eye    Mouth pain    Acoustic neuroma    Dry eye syndrome, bilateral       PAST MEDICAL HISTORY  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    Chronic headache 9/12/2014    Dementia     worked up for NPH at  and had taps with no improvement    Depression     Hyperlipidemia     Hypertension     Hypothyroidism     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    Mild major  depression 11/27/2012    Spinal stenosis     Trigeminal neuralgia pain 1/20/2015    Trouble in sleeping     Vertigo 9/11/2012    Vestibular schwannoma 6/4/2015       PAST SURGICAL HISTORY:  Past Surgical History:   Procedure Laterality Date    bilateral total knee arthroplasties      CATARACT EXTRACTION W/  INTRAOCULAR LENS IMPLANT Bilateral     CHOLECYSTECTOMY      EYE SURGERY      GALLBLADDER SURGERY      PERCUTANEOUS INJECTION-TRIGEMINAL NERVE Right 2/27/2018    Performed by [facility] at [facility] OR [address] [place] [address]    THYROIDECTOMY      TONSILLECTOMY         SOCIAL HISTORY:  Social History     Tobacco Use    Smoking status: Former Smoker    Smokeless tobacco: Never Used   Substance Use Topics    Alcohol use: No     Alcohol/week: 0.0 oz    Drug use: No       FAMILY HISTORY:  Family History   Problem Relation Age of Onset    Heart disease Mother     No Known Problems Father     No Known Problems Sister     No Known Problems Brother     No Known Problems Daughter     No Known Problems Son     No Known Problems Sister     No Known Problems Brother     No Known Problems Brother     No Known Problems Brother     No Known Problems Maternal Aunt     No Known Problems Maternal Uncle     No Known Problems Paternal Aunt     No Known Problems Paternal Uncle     No Known Problems Maternal Grandmother     No Known Problems Maternal Grandfather     No Known Problems Paternal Grandmother     No Known Problems Paternal Grandfather     Suicide Neg Hx     Schizophrenia Neg Hx     Amblyopia Neg Hx     Blindness Neg Hx     Cataracts Neg Hx     Glaucoma Neg Hx     Macular degeneration Neg Hx     Retinal detachment Neg Hx     Strabismus Neg Hx     Cancer Neg Hx     Diabetes Neg Hx     Hypertension Neg Hx     Stroke Neg Hx     Thyroid disease Neg Hx        ALLERGIES AND MEDICATIONS: updated and reviewed.  Review of patient's allergies indicates:   Allergen Reactions    Carbamazepine  Swelling    Trazodone Anxiety    Demerol [meperidine] Hallucinations    Morphine Nausea Only     Current Outpatient Medications   Medication Sig    acetaminophen (TYLENOL) 325 MG tablet Take 2 tablets (650 mg total) by mouth every 6 (six) hours as needed for Pain.    diazePAM (VALIUM) 5 MG tablet Half to whole pill every 8 hours as needed for anxiety    gabapentin (NEURONTIN) 300 MG capsule Take 1 capsule (300 mg total) by mouth 3 (three) times daily.    hydrocodone-acetaminophen 5-325mg (NORCO) 5-325 mg per tablet Take 1 tablet by mouth every 6 (six) hours as needed for Pain.    levothyroxine (SYNTHROID) 75 MCG tablet TAKE 1 TABLET BEFORE BREAKFAST    lidocaine HCl 2% (LIDOCAINE VISCOUS) 2 % Soln by Mucous Membrane route every 6 (six) hours.    lisinopril (PRINIVIL,ZESTRIL) 40 MG tablet TAKE 1 TABLET EVERY DAY    polyethylene glycol (GLYCOLAX) 17 gram/dose powder MIX 1 CAPFUL (17GM) IN LIQUID AND DRINK ONE TIME DAILY    pravastatin (PRAVACHOL) 40 MG tablet TAKE 1 TABLET EVERY DAY    QUEtiapine (SEROQUEL) 50 MG tablet TAKE 1 TABLET EVERY EVENING    traMADol (ULTRAM) 50 mg tablet Take 1 tablet (50 mg total) by mouth 3 (three) times daily.    triamcinolone acetonide 0.1% (KENALOG) 0.1 % cream Apply to the affected area twice a day    venlafaxine (EFFEXOR-XR) 75 MG 24 hr capsule TAKE 2 CAPSULES EVERY DAY    alfuzosin (UROXATRAL) 10 mg Tb24 Take 1 tablet (10 mg total) by mouth once daily.     No current facility-administered medications for this visit.        Review of Systems   Constitutional: Negative for activity change, fatigue, fever and unexpected weight change.   HENT: Negative for congestion.    Eyes: Negative for redness.   Respiratory: Negative for chest tightness and shortness of breath.    Cardiovascular: Negative for chest pain and leg swelling.   Gastrointestinal: Negative for abdominal pain, constipation, diarrhea, nausea and vomiting.   Genitourinary: Negative for dysuria, flank pain,  "frequency, hematuria, penile pain, penile swelling, scrotal swelling, testicular pain and urgency.   Musculoskeletal: Negative for arthralgias and back pain.   Neurological: Negative for dizziness and light-headedness.   Psychiatric/Behavioral: Negative for behavioral problems and confusion. The patient is not nervous/anxious.    All other systems reviewed and are negative.      Objective:      Vitals:    02/27/19 1444   Weight: 93.9 kg (207 lb)   Height: 6' 2" (1.88 m)     Physical Exam   Nursing note and vitals reviewed.  Constitutional: He is oriented to person, place, and time. He appears well-developed and well-nourished.   HENT:   Head: Normocephalic.   Eyes: Conjunctivae are normal.   Neck: Normal range of motion. No tracheal deviation present. No thyromegaly present.   Cardiovascular: Normal rate and normal heart sounds.    Pulmonary/Chest: Effort normal and breath sounds normal. No respiratory distress. He has no wheezes.   Abdominal: Soft. He exhibits no distension and no mass. There is no hepatosplenomegaly. There is no tenderness. There is no rebound, no guarding and no CVA tenderness. No hernia. Hernia confirmed negative in the right inguinal area and confirmed negative in the left inguinal area.   Genitourinary: Rectum normal, testes normal and penis normal. Rectal exam shows no external hemorrhoid, no mass and no tenderness. Prostate is enlarged. Prostate is not tender. Right testis shows no mass and no tenderness. Left testis shows no mass and no tenderness.       Musculoskeletal: He exhibits no edema or tenderness.   Lymphadenopathy: No inguinal adenopathy noted on the right or left side.   Neurological: He is alert and oriented to person, place, and time.   Skin: Skin is warm and dry. No rash noted. No erythema.     Psychiatric: He has a normal mood and affect. His behavior is normal. Judgment and thought content normal.       Urine dipstick shows negative for all components.  Micro exam: negative " for WBC's or RBC's.    Assessment:       1. BPH with obstruction/lower urinary tract symptoms    2. Nocturia more than twice per night    3. Urinary urgency    4. Urinary incontinence, nocturnal enuresis          Plan:       1. BPH with obstruction/lower urinary tract symptoms    - US Retroperitoneal Complete (Kidney and; Future  - alfuzosin (UROXATRAL) 10 mg Tb24; Take 1 tablet (10 mg total) by mouth once daily.  Dispense: 30 tablet; Refill: 1    2. Nocturia more than twice per night  Limit evening fluids  - POCT urinalysis, dipstick or tablet reag  - Urine culture    3. Urinary urgency  He may need anticholinergics, but I would be cautious with his dementia.    4. Urinary incontinence, nocturnal enuresis  As above            No Follow-up on file.

## 2019-02-27 NOTE — LETTER
February 27, 2019      Jason Levy MD  [address] [facility]  [place] [address]           [facility]  [address] [facility] [address]  [place] [address]  Phone: [phone]  Fax: [phone]          Patient: Ifeanyi Rutherford   MR Number: [id number]   YOB: 1929   Date of Visit: 2/27/2019       Dear Dr. Jason Levy:    Thank you for referring Ifeanyi Rutherford to me for evaluation. Attached you will find relevant portions of my assessment and plan of care.    If you have questions, please do not hesitate to call me. I look forward to following Ifeanyi Rutherford along with you.    Sincerely,    AYAD Motley MD    Enclosure  CC:  No Recipients    If you would like to receive this communication electronically, please contact [email or web address] or [phone] to request more information on [employer] Link access.    For providers and/or their staff who would like to refer a patient to [facility], please contact us through our one-stop-shop provider referral line, [facility], at [phone].    If you feel you have received this communication in error or would no longer like to receive these types of communications, please e-mail [email or web address]

## 2019-02-27 NOTE — TELEPHONE ENCOUNTER
Pt is asking can a 15 day supply of uroxatral be sent to [facility] until full supply comes mail order. Please advise-keiko

## 2019-03-01 ENCOUNTER — TELEPHONE (OUTPATIENT)
Dept: UROLOGY | Facility: CLINIC | Age: 84
End: 2019-03-01

## 2019-03-01 LAB — BACTERIA UR CULT: NORMAL

## 2019-03-01 RX ORDER — CIPROFLOXACIN 500 MG/1
500 TABLET ORAL 2 TIMES DAILY
Qty: 14 TABLET | Refills: 0 | Status: SHIPPED | OUTPATIENT
Start: 2019-03-01 | End: 2019-03-01

## 2019-03-01 RX ORDER — CIPROFLOXACIN 500 MG/1
500 TABLET ORAL 2 TIMES DAILY
Qty: 14 TABLET | Refills: 0 | Status: SHIPPED | OUTPATIENT
Start: 2019-03-01 | End: 2019-03-08

## 2019-03-01 NOTE — TELEPHONE ENCOUNTER
Spoke to pt's wife advised urine culture positive for uti antibiotics sent to pharmacy (cipro).-keiko

## 2019-03-05 DIAGNOSIS — I10 ESSENTIAL HYPERTENSION: ICD-10-CM

## 2019-03-06 RX ORDER — QUETIAPINE FUMARATE 50 MG/1
TABLET, FILM COATED ORAL
Qty: 90 TABLET | Refills: 0 | Status: SHIPPED | OUTPATIENT
Start: 2019-03-06 | End: 2019-06-07 | Stop reason: SDUPTHER

## 2019-03-06 RX ORDER — LISINOPRIL 40 MG/1
TABLET ORAL
Qty: 90 TABLET | Refills: 1 | Status: SHIPPED | OUTPATIENT
Start: 2019-03-06 | End: 2019-09-25 | Stop reason: SDUPTHER

## 2019-03-06 NOTE — TELEPHONE ENCOUNTER
Patient's spouse informed that PCP is out until Monday 3/11/19. Stated, it is no hurry to complete form. Form placed on PCP desk.

## 2019-03-15 ENCOUNTER — TELEPHONE (OUTPATIENT)
Dept: UROLOGY | Facility: CLINIC | Age: 84
End: 2019-03-15

## 2019-03-15 NOTE — TELEPHONE ENCOUNTER
Spoke to pt's wife () she states since pt started taking uroxatral 10mg he has been having hallucinations. She states he has not had any new medications since being placed on uroxatral. I advised her to have pt stop the medication which she states she fully understands but also wanted me to ask  should pt still have ultrasound since he will be stopping uroxatral. Please advise-keiko

## 2019-03-15 NOTE — TELEPHONE ENCOUNTER
Spoke to pt's wife () per  yes keep ultrasound appt so we can asset if pt is emptying bladder.  states she fully understands.-keiko

## 2019-03-15 NOTE — TELEPHONE ENCOUNTER
----- Message from Gigi Mata sent at 3/15/2019  9:37 AM CDT -----  Contact: Krystal [phone]  Type: Patient Call Back    Who called:Krystal     What is the request in detail: The patient's wife is requesting a call back from the staff. She has a question to ask in regards to medication    Best call back number:[phone]

## 2019-03-18 DIAGNOSIS — F32.A DEPRESSION, UNSPECIFIED DEPRESSION TYPE: ICD-10-CM

## 2019-03-19 RX ORDER — VENLAFAXINE HYDROCHLORIDE 75 MG/1
CAPSULE, EXTENDED RELEASE ORAL
Qty: 180 CAPSULE | Refills: 3 | Status: SHIPPED | OUTPATIENT
Start: 2019-03-19

## 2019-03-19 RX ORDER — LEVOTHYROXINE SODIUM 75 UG/1
TABLET ORAL
Qty: 90 TABLET | Refills: 0 | Status: SHIPPED | OUTPATIENT
Start: 2019-03-19 | End: 2019-06-07 | Stop reason: SDUPTHER

## 2019-03-25 DIAGNOSIS — D33.3 UNILATERAL VESTIBULAR SCHWANNOMA: ICD-10-CM

## 2019-03-27 ENCOUNTER — HOSPITAL ENCOUNTER (OUTPATIENT)
Dept: RADIOLOGY | Facility: HOSPITAL | Age: 84
Discharge: HOME | End: 2019-03-27
Attending: UROLOGY
Payer: MEDICARE

## 2019-03-27 DIAGNOSIS — N40.1 BPH WITH OBSTRUCTION/LOWER URINARY TRACT SYMPTOMS: ICD-10-CM

## 2019-03-27 DIAGNOSIS — N13.8 BPH WITH OBSTRUCTION/LOWER URINARY TRACT SYMPTOMS: ICD-10-CM

## 2019-03-27 PROCEDURE — 76770 US RETROPERITONEAL COMPLETE: ICD-10-PCS | Mod: 26,HCNC,, | Performed by: RADIOLOGY

## 2019-03-27 PROCEDURE — 76770 US EXAM ABDO BACK WALL COMP: CPT | Mod: 26,HCNC,, | Performed by: RADIOLOGY

## 2019-03-27 PROCEDURE — 76770 US EXAM ABDO BACK WALL COMP: CPT | Mod: TC,HCNC

## 2019-03-27 RX ORDER — TRAMADOL HYDROCHLORIDE 50 MG/1
TABLET ORAL
Qty: 90 TABLET | Refills: 5 | Status: SHIPPED | OUTPATIENT
Start: 2019-03-27 | End: 2019-09-25 | Stop reason: SDUPTHER

## 2019-04-02 ENCOUNTER — OFFICE VISIT (OUTPATIENT)
Dept: UROLOGY | Facility: CLINIC | Age: 84
End: 2019-04-02
Payer: MEDICARE

## 2019-04-02 VITALS — HEIGHT: 74 IN | BODY MASS INDEX: 26.56 KG/M2 | WEIGHT: 207 LBS

## 2019-04-02 DIAGNOSIS — N39.41 URGENCY INCONTINENCE: ICD-10-CM

## 2019-04-02 DIAGNOSIS — R39.15 URINARY URGENCY: Primary | ICD-10-CM

## 2019-04-02 DIAGNOSIS — R35.0 URINARY FREQUENCY: ICD-10-CM

## 2019-04-02 PROCEDURE — 99999 PR PBB SHADOW E&M-EST. PATIENT-LVL III: ICD-10-PCS | Mod: PBBFAC,HCNC,, | Performed by: UROLOGY

## 2019-04-02 PROCEDURE — 1100F PTFALLS ASSESS-DOCD GE2>/YR: CPT | Mod: HCNC,CPTII,S$GLB, | Performed by: UROLOGY

## 2019-04-02 PROCEDURE — 99213 PR OFFICE/OUTPT VISIT, EST, LEVL III, 20-29 MIN: ICD-10-PCS | Mod: HCNC,S$GLB,, | Performed by: UROLOGY

## 2019-04-02 PROCEDURE — 3288F PR FALLS RISK ASSESSMENT DOCUMENTED: ICD-10-PCS | Mod: HCNC,CPTII,S$GLB, | Performed by: UROLOGY

## 2019-04-02 PROCEDURE — 99999 PR PBB SHADOW E&M-EST. PATIENT-LVL III: CPT | Mod: PBBFAC,HCNC,, | Performed by: UROLOGY

## 2019-04-02 PROCEDURE — 99213 OFFICE O/P EST LOW 20 MIN: CPT | Mod: HCNC,S$GLB,, | Performed by: UROLOGY

## 2019-04-02 PROCEDURE — 3288F FALL RISK ASSESSMENT DOCD: CPT | Mod: HCNC,CPTII,S$GLB, | Performed by: UROLOGY

## 2019-04-02 PROCEDURE — 1100F PR PT FALLS ASSESS DOC 2+ FALLS/FALL W/INJURY/YR: ICD-10-PCS | Mod: HCNC,CPTII,S$GLB, | Performed by: UROLOGY

## 2019-04-02 RX ORDER — TOLTERODINE 4 MG/1
4 CAPSULE, EXTENDED RELEASE ORAL DAILY
Qty: 90 CAPSULE | Refills: 1 | Status: SHIPPED | OUTPATIENT
Start: 2019-04-02 | End: 2019-09-18

## 2019-04-02 NOTE — PROGRESS NOTES
Subjective:       Patient ID: Ifeanyi Rutherford is a 89 y.o. male who was last seen in this office 2/27/2019    Chief Complaint:   Chief Complaint   Patient presents with    Benign Prostatic Hypertrophy     4 week f/u with ultrasound pt had to stop uroxatrol do to increase in mental status         Urinary Incontinence  Patient complains of urinary incontinence. This has been present for several years. He leaks urine with with urge, with a full bladder, during the night. Patient describes the symptoms as frequent urination (severalx per day), nocturia several times per night, the urge to urinate recurs again shortly following micturition, urge to urinate with little or no warning and urine leaking unpredictably. Factors associated with symptoms include none known. Evaluation to date includes none. Treatment to date includes Uroxatral but this caused twitching and did not seem to help his irritative symptoms.  He is back with an ultrasound.      ACTIVE MEDICAL ISSUES:  Patient Active Problem List   Diagnosis    Hypertension    Vertigo    Fatigue    Mild major depression    Optic neuropathy, ischemic, bilateral    Choroidal nevus of right eye    Pseudophakia - Both Eyes    Refractive error - Left Eye    Debility    Hypothyroidism    Chronic headache    Parkinsonism    Trigeminal neuralgia of right side of face    Vestibular schwannoma    Insufficiency of tear film of both eyes    Atherosclerosis of aorta    Ciliary muscle spasm of right eye    Mouth pain    Acoustic neuroma    Dry eye syndrome, bilateral       ALLERGIES AND MEDICATIONS: updated and reviewed.  Review of patient's allergies indicates:   Allergen Reactions    Carbamazepine Swelling    Trazodone Anxiety    Demerol [meperidine] Hallucinations    Morphine Nausea Only     Current Outpatient Medications   Medication Sig    acetaminophen (TYLENOL) 325 MG tablet Take 2 tablets (650 mg total) by mouth every 6 (six) hours as needed for Pain.     diazePAM (VALIUM) 5 MG tablet Half to whole pill every 8 hours as needed for anxiety    gabapentin (NEURONTIN) 300 MG capsule Take 1 capsule (300 mg total) by mouth 3 (three) times daily.    hydrocodone-acetaminophen 5-325mg (NORCO) 5-325 mg per tablet Take 1 tablet by mouth every 6 (six) hours as needed for Pain.    levothyroxine (SYNTHROID) 75 MCG tablet TAKE 1 TABLET BEFORE BREAKFAST    lidocaine HCl 2% (LIDOCAINE VISCOUS) 2 % Soln by Mucous Membrane route every 6 (six) hours.    lisinopril (PRINIVIL,ZESTRIL) 40 MG tablet TAKE 1 TABLET EVERY DAY    polyethylene glycol (GLYCOLAX) 17 gram/dose powder MIX 1 CAPFUL (17GM) IN LIQUID AND DRINK ONE TIME DAILY    pravastatin (PRAVACHOL) 40 MG tablet TAKE 1 TABLET EVERY DAY    QUEtiapine (SEROQUEL) 50 MG tablet TAKE 1 TABLET EVERY EVENING    traMADol (ULTRAM) 50 mg tablet TAKE ONE TABLET BY MOUTH THREE TIMES DAILY    triamcinolone acetonide 0.1% (KENALOG) 0.1 % cream Apply to the affected area twice a day    venlafaxine (EFFEXOR-XR) 75 MG 24 hr capsule TAKE 2 CAPSULES EVERY DAY    tolterodine (DETROL LA) 4 MG 24 hr capsule Take 1 capsule (4 mg total) by mouth once daily.     No current facility-administered medications for this visit.        Review of Systems   Constitutional: Negative for activity change, fatigue, fever and unexpected weight change.   HENT: Negative for congestion.    Eyes: Negative for redness.   Respiratory: Negative for chest tightness and shortness of breath.    Cardiovascular: Negative for chest pain and leg swelling.   Gastrointestinal: Negative for abdominal pain, constipation, diarrhea, nausea and vomiting.   Genitourinary: Negative for dysuria, flank pain, frequency, hematuria, penile pain, penile swelling, scrotal swelling, testicular pain and urgency.   Musculoskeletal: Negative for arthralgias and back pain.   Neurological: Negative for dizziness and light-headedness.   Psychiatric/Behavioral: Negative for behavioral problems  "and confusion. The patient is not nervous/anxious.    All other systems reviewed and are negative.      Objective:      Vitals:    04/02/19 1426   Weight: 93.9 kg (207 lb)   Height: 6' 2" (1.88 m)     Physical Exam   Nursing note and vitals reviewed.  Constitutional: He is oriented to person, place, and time. He appears well-developed and well-nourished.   HENT:   Head: Normocephalic.   Eyes: Conjunctivae are normal.   Neck: Normal range of motion. Neck supple. No tracheal deviation present. No thyromegaly present.   Cardiovascular: Normal rate and normal heart sounds.    Pulmonary/Chest: Effort normal and breath sounds normal. No respiratory distress. He has no wheezes.   Abdominal: Soft. Bowel sounds are normal. There is no hepatosplenomegaly. There is no tenderness. There is no rebound and no CVA tenderness. No hernia.   Musculoskeletal: Normal range of motion. He exhibits no edema or tenderness.   Lymphadenopathy:     He has no cervical adenopathy.   Neurological: He is alert and oriented to person, place, and time.   Skin: Skin is warm and dry. No rash noted. No erythema.     Psychiatric: He has a normal mood and affect. His behavior is normal. Judgment and thought content normal.       Urine dipstick shows negative for all components.  Micro exam: negative for WBC's or RBC's.      US Retroperitoneal Complete (Kidney and   Order: [id number]   Status:  Final result   Visible to patient:  No (Not Released)   Next appt:  04/04/2019 at 01:15 PM in Optometry (Linda Vo, OD)   Dx:  BPH with obstruction/lower urinary tr...   Details     Reading Physician Reading Date Result Priority   Nicho Agrawal MD 3/27/2019       Narrative     EXAMINATION:  US RETROPERITONEAL COMPLETE    CLINICAL HISTORY:  Benign prostatic hyperplasia with lower urinary tract symptoms    TECHNIQUE:  Ultrasound of the kidneys and urinary bladder was performed including color flow and Doppler evaluation of the kidneys.    COMPARISON:  CT " abdominal pelvis 01/28/2017    FINDINGS:  Right kidney: The right kidney measures 10.8 cm. No cortical thinning. No loss of corticomedullary distinction. Resistive index measures 0.81.  No mass. No renal stone. No hydronephrosis.  Upper pole cyst 1.3, mid pole 1.1 and 1.4 cm and arch extra renal pelvis.    Left kidney: The left kidney measures 12.1 cm. No cortical thinning. No loss of corticomedullary distinction. Resistive index measures 0.8.  No mass. No renal stone. No hydronephrosis.  Upper pole cyst 4.9 cm, and extra renal pelvis.    Prostate gland volume 47.5 mL pre void bladder volume 173.9 mL and post void bladder 11.7 mL.  The bladder is partially distended at the time of scanning and has an unremarkable appearance.      Impression       Renal cyst.    Decreased renal vascular flow, chronic renal medical disease change.      Electronically signed by: Nicho Agrawal MD  Date: 03/27/2019  Time: 14:34            Last Resulted: 03/27/19 14:34             Assessment:       1. Urinary urgency    2. Urinary frequency    3. Urgency incontinence          Plan:       1. Urinary urgency  He may need Myrbetriq but we will try Detrol.  We discussed confusion in dementia patients.  His wife will monitor.    - tolterodine (DETROL LA) 4 MG 24 hr capsule; Take 1 capsule (4 mg total) by mouth once daily.  Dispense: 90 capsule; Refill: 1    2. Urinary frequency  As above    3. Urgency incontinence  As above            Follow up in about 3 months (around 7/2/2019) for Follow up, Review X-ray.

## 2019-04-08 ENCOUNTER — LAB VISIT (OUTPATIENT)
Dept: LAB | Facility: HOSPITAL | Age: 84
End: 2019-04-08
Attending: INTERNAL MEDICINE
Payer: MEDICARE

## 2019-04-08 ENCOUNTER — OFFICE VISIT (OUTPATIENT)
Dept: FAMILY MEDICINE | Facility: CLINIC | Age: 84
End: 2019-04-08
Payer: MEDICARE

## 2019-04-08 VITALS
SYSTOLIC BLOOD PRESSURE: 150 MMHG | HEIGHT: 74 IN | WEIGHT: 201.25 LBS | BODY MASS INDEX: 25.83 KG/M2 | HEART RATE: 81 BPM | DIASTOLIC BLOOD PRESSURE: 70 MMHG | OXYGEN SATURATION: 96 % | TEMPERATURE: 97 F

## 2019-04-08 DIAGNOSIS — R44.1 VISUAL HALLUCINATIONS: ICD-10-CM

## 2019-04-08 DIAGNOSIS — E55.9 VITAMIN D DEFICIENCY DISEASE: ICD-10-CM

## 2019-04-08 DIAGNOSIS — F03.91 DEMENTIA WITH BEHAVIORAL DISTURBANCE, UNSPECIFIED DEMENTIA TYPE: ICD-10-CM

## 2019-04-08 DIAGNOSIS — E03.9 HYPOTHYROIDISM, UNSPECIFIED TYPE: ICD-10-CM

## 2019-04-08 DIAGNOSIS — K59.09 CHRONIC CONSTIPATION: ICD-10-CM

## 2019-04-08 DIAGNOSIS — G89.29 CHRONIC NONINTRACTABLE HEADACHE, UNSPECIFIED HEADACHE TYPE: ICD-10-CM

## 2019-04-08 DIAGNOSIS — R51.9 CHRONIC NONINTRACTABLE HEADACHE, UNSPECIFIED HEADACHE TYPE: ICD-10-CM

## 2019-04-08 DIAGNOSIS — F39 MOOD DISORDER: Primary | ICD-10-CM

## 2019-04-08 DIAGNOSIS — I10 ESSENTIAL HYPERTENSION: ICD-10-CM

## 2019-04-08 DIAGNOSIS — G50.0 TRIGEMINAL NEURALGIA: ICD-10-CM

## 2019-04-08 DIAGNOSIS — G47.00 INSOMNIA, UNSPECIFIED TYPE: ICD-10-CM

## 2019-04-08 LAB
25(OH)D3+25(OH)D2 SERPL-MCNC: 33 NG/ML (ref 30–96)
ALBUMIN SERPL BCP-MCNC: 3.6 G/DL (ref 3.5–5.2)
ALP SERPL-CCNC: 62 U/L (ref 55–135)
ALT SERPL W/O P-5'-P-CCNC: 12 U/L (ref 10–44)
ANION GAP SERPL CALC-SCNC: 10 MMOL/L (ref 8–16)
AST SERPL-CCNC: 17 U/L (ref 10–40)
BASOPHILS # BLD AUTO: 0.14 K/UL (ref 0–0.2)
BASOPHILS NFR BLD: 1.6 % (ref 0–1.9)
BILIRUB SERPL-MCNC: 0.4 MG/DL (ref 0.1–1)
BUN SERPL-MCNC: 28 MG/DL (ref 8–23)
CALCIUM SERPL-MCNC: 9.7 MG/DL (ref 8.7–10.5)
CHLORIDE SERPL-SCNC: 103 MMOL/L (ref 95–110)
CO2 SERPL-SCNC: 28 MMOL/L (ref 23–29)
CREAT SERPL-MCNC: 1.6 MG/DL (ref 0.5–1.4)
DIFFERENTIAL METHOD: ABNORMAL
EOSINOPHIL # BLD AUTO: 0.5 K/UL (ref 0–0.5)
EOSINOPHIL NFR BLD: 5.3 % (ref 0–8)
ERYTHROCYTE [DISTWIDTH] IN BLOOD BY AUTOMATED COUNT: 12.5 % (ref 11.5–14.5)
EST. GFR  (AFRICAN AMERICAN): 43.5 ML/MIN/1.73 M^2
EST. GFR  (NON AFRICAN AMERICAN): 37.6 ML/MIN/1.73 M^2
GLUCOSE SERPL-MCNC: 103 MG/DL (ref 70–110)
HCT VFR BLD AUTO: 47.5 % (ref 40–54)
HGB BLD-MCNC: 15 G/DL (ref 14–18)
IMM GRANULOCYTES # BLD AUTO: 0.02 K/UL (ref 0–0.04)
IMM GRANULOCYTES NFR BLD AUTO: 0.2 % (ref 0–0.5)
LYMPHOCYTES # BLD AUTO: 2.8 K/UL (ref 1–4.8)
LYMPHOCYTES NFR BLD: 30.9 % (ref 18–48)
MCH RBC QN AUTO: 29.9 PG (ref 27–31)
MCHC RBC AUTO-ENTMCNC: 31.6 G/DL (ref 32–36)
MCV RBC AUTO: 95 FL (ref 82–98)
MONOCYTES # BLD AUTO: 1 K/UL (ref 0.3–1)
MONOCYTES NFR BLD: 11.6 % (ref 4–15)
NEUTROPHILS # BLD AUTO: 4.5 K/UL (ref 1.8–7.7)
NEUTROPHILS NFR BLD: 50.4 % (ref 38–73)
NRBC BLD-RTO: 0 /100 WBC
PLATELET # BLD AUTO: 268 K/UL (ref 150–350)
PMV BLD AUTO: 10.9 FL (ref 9.2–12.9)
POTASSIUM SERPL-SCNC: 4.7 MMOL/L (ref 3.5–5.1)
PROT SERPL-MCNC: 7.4 G/DL (ref 6–8.4)
RBC # BLD AUTO: 5.01 M/UL (ref 4.6–6.2)
SODIUM SERPL-SCNC: 141 MMOL/L (ref 136–145)
T4 FREE SERPL-MCNC: 1.01 NG/DL (ref 0.71–1.51)
TSH SERPL DL<=0.005 MIU/L-ACNC: 0.87 UIU/ML (ref 0.4–4)
WBC # BLD AUTO: 8.91 K/UL (ref 3.9–12.7)

## 2019-04-08 PROCEDURE — 80053 COMPREHEN METABOLIC PANEL: CPT | Mod: HCNC

## 2019-04-08 PROCEDURE — 1100F PR PT FALLS ASSESS DOC 2+ FALLS/FALL W/INJURY/YR: ICD-10-PCS | Mod: HCNC,CPTII,S$GLB, | Performed by: INTERNAL MEDICINE

## 2019-04-08 PROCEDURE — 99999 PR PBB SHADOW E&M-EST. PATIENT-LVL III: ICD-10-PCS | Mod: PBBFAC,HCNC,, | Performed by: INTERNAL MEDICINE

## 2019-04-08 PROCEDURE — 1100F PTFALLS ASSESS-DOCD GE2>/YR: CPT | Mod: HCNC,CPTII,S$GLB, | Performed by: INTERNAL MEDICINE

## 2019-04-08 PROCEDURE — 82306 VITAMIN D 25 HYDROXY: CPT | Mod: HCNC

## 2019-04-08 PROCEDURE — 36415 COLL VENOUS BLD VENIPUNCTURE: CPT | Mod: HCNC,PO

## 2019-04-08 PROCEDURE — 99999 PR PBB SHADOW E&M-EST. PATIENT-LVL III: CPT | Mod: PBBFAC,HCNC,, | Performed by: INTERNAL MEDICINE

## 2019-04-08 PROCEDURE — 99215 PR OFFICE/OUTPT VISIT, EST, LEVL V, 40-54 MIN: ICD-10-PCS | Mod: HCNC,S$GLB,, | Performed by: INTERNAL MEDICINE

## 2019-04-08 PROCEDURE — 99499 UNLISTED E&M SERVICE: CPT | Mod: S$GLB,,, | Performed by: INTERNAL MEDICINE

## 2019-04-08 PROCEDURE — 3288F PR FALLS RISK ASSESSMENT DOCUMENTED: ICD-10-PCS | Mod: HCNC,CPTII,S$GLB, | Performed by: INTERNAL MEDICINE

## 2019-04-08 PROCEDURE — 3288F FALL RISK ASSESSMENT DOCD: CPT | Mod: HCNC,CPTII,S$GLB, | Performed by: INTERNAL MEDICINE

## 2019-04-08 PROCEDURE — 99499 RISK ADDL DX/OHS AUDIT: ICD-10-PCS | Mod: S$GLB,,, | Performed by: INTERNAL MEDICINE

## 2019-04-08 PROCEDURE — 99215 OFFICE O/P EST HI 40 MIN: CPT | Mod: HCNC,S$GLB,, | Performed by: INTERNAL MEDICINE

## 2019-04-08 PROCEDURE — 84443 ASSAY THYROID STIM HORMONE: CPT | Mod: HCNC

## 2019-04-08 PROCEDURE — 84439 ASSAY OF FREE THYROXINE: CPT | Mod: HCNC

## 2019-04-08 PROCEDURE — 85025 COMPLETE CBC W/AUTO DIFF WBC: CPT | Mod: HCNC

## 2019-04-08 NOTE — PROGRESS NOTES
Chief complaint, HA, constipation and daytime fatigue        89-year-old white male  He is here with his wife.  Some of his complaints are recurrent.  He cannot sleep without the Seroquel at night.  He also appears to have suppressed morning hallucinations of seeing people in the room .  He does complain about being he is sedated in the morning.  His wife did reduce the Seroquel from 50 mg to 25.  Still remains somewhat sleepy in the morning.  It actually appears that he is more sleepy after he takes his morning meds and we have attempted to figure out which one of these medications at night. He does take an Effexor twice a day but I doubt that.  He takes gabapentin 3 times per day and we discussed and gave him written instructions to hold that a little bit later.  wife has taken his blood pressure when he feels bad and his blood pressures been okay sometimes actually high.  We've and discussed holding all of his morning medicationsfor now or 2 or 3 and assess if indeed this relates to his medications at all.  We discussed reducing the Seroquel 12.5 mg possibly at a separate time.  They did reduce the Seroquel at 12 point 5 at night is still having the daytime tiredness into the afternoon.  They take the Effexor twice a day but reassured it's probably not the Effexor.  When he does not take the Seroquel at night he can't sleep and so therefore is tired the next day.  Plan at last visit was to try him on Restoril.  He took it for only 2 times.  It did not get him to sleep so there after he slept all day the next day.      We again discussed that he awakes feeling fine so it is very doubtful that this is the nighttime Seroquel.  About an hour later when he takes his meds he gets the somnolence.  Again which we have discussed before we discussed holding the medications for an hour to and assess if indeed he feels fine and it is 1 of his morning meds and will have to go through the process of holding 1 medication at a  time.  I am pretty sure we may have done this already.  The wife remembers doing so but cannot clarify if indeed they figured out if there was any particular medicine so we will repeat the process.      Still complains about his chronic headaches.  His acoustic neuroma apparently has been treated.  He had a trigeminal neuralgia treatment and apparently did not work.  His headaches are really unchanged and he is more resolved to just live with it    In the interval, 1 night he could not fall asleep and at about 3:00 a.m. he did take Valium 5 mg.  He felt that this put him to sleep any awoke feeling better.  Previously they will use the Valium on occasion for anxiety.  We did discuss that perhaps he can try using the Valium in the evening in place of the Seroquel and again trying to find an option where upon he can find something that puts him to sleep and allows him to go a few days without taking the Seroquel to assess if indeed it is the Seroquel at makes him feel so bad the next day.  His wife manages medication and she is very reliable.  She has clear understanding that we can try 5 mg, 7.5 per even 10 mg at night as long as there is no perceived problems or side effects the next day.  Wife says he only tried the Valium 1 day.  He has a long history of not giving things much time.  We discussed that will likely just leave things as they are and he will continue to use the Seroquel.    She has found that the gabapentin does make him a little bit sleepy during the day and so we discussed that the gabapentin is not essential and that doses can be held to assess if indeed he can have less sedation but needs to watch for the return of any suppressed neurological symptoms.  We will need to balance the good in the bad.    Other interval issues is he is hearing some loud popping wearing his hearing aid in the left ear.  Apparently had some adjustments to the hearing aid.  His wife says that she can actually hear his neck  popping and she wonders if this correlates with his symptoms which is likely.  There were reassured.  She will need to explain him ongoing so that he can try to assess if indeed what he is hearing is from his neck.  One option is to see if indeed he still hears the same sensation without the hearing aid which would clearly support that it is coming from crepitus from his cervical spine.  No particular new neck pain.    They do have a form trying to get some assistance [facility].  I completed extensively with the wife assisting.  He really cannot prepare meals due to his lack of depth perception.  He is absolutely relegated to a walker and due to his Parkinson's, vertigo, acoustic neuroma, leg weakness and knee replacements and so forth he is absolutely unstable without use of a walker.  He does need some form of assistance 100% of the time but they at night and this will be a permanent issue.    Regarding constipation, recently had some painful constipation that was eventually treated with a enema.  He continues on the MiraLax twice a day and likely has some lactulose that he takes at night.  We discussed that with the use of the tramadol, gabapentin apparently they will start a new overactive bladder medicine, constipation may worsen and the likely need to increase the evening dose of lactulose.     . All these issues reviewed and patient counseled an evaluation and management we based upon time counselingTotal time over 45 minutes with over 50% counseling.  Wife does verbalize it is very therapeutic for him to come in and discuss all these issues even though many of them are not that much changed.    Labs were a year ago and vit D low many yrs ago    ROS:   CONST: weight stable.  No new myalgias arthralgias, no new neurological symptoms, no new psychiatric symptoms, no new hallucinations or any worsening    PAST SURGERIES:  Total knee replacements bilaterally.                                                                                                      PAST MEDICAL HISTORY:    Hypertension.   Hyperlipidemia.    Depression.           Generalized anxiety ([facility]).    Hypothyroidism.    Osteoarthritis.    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  Optic neuropathy, right eye.  Right-sided trigeminal neuralgia, seen by neurology, pain management and neurosurgery  Chronic constipation   Evaluation by [facility] for NPH, apparently no response to spinal tap   Chronic vertigo   Vitamin D deficiency   Parkinsonism by [facility] evaluation    Morning hallucinations, possibly related to Seroquel                                                                                                           SOCIAL HISTORY:  He is  and lives with his spouse.  They have adult   children in the area and grandchildren.  He has never been a cigarette       smoker.      Vitals as above,  Gen: no distress  Pleasant male, very hard of hearing, walks slowly and does so with a walker.        Labs reviewed      Ifeanyi was seen today for dementia.    Diagnoses and all orders for this visit:    Mood disorder, chronic and stable, we have been unable to adjust medications and he has chronically done fairly well on the Seroquel at night even though he continues have the same complaints of the same severity.  All her in agreement just to continue medications as they are.    Visual hallucinations    Insomnia, unspecified type, continue current medications    Essential hypertension, elevated a little bit recently but discussed with wife and she is in agreement that we will need to get more readings before we add more medications especially at his age  -     Comprehensive metabolic panel; Future  -     CBC auto differential; Future  -     TSH; Future  -     T4, free; Future  -     Vitamin D; Future    Chronic nonintractable headache, unspecified headache type    Trigeminal neuralgia    Hypothyroidism,  unspecified type, due for annual blood work  -     TSH; Future  -     T4, free; Future    Vitamin D deficiency disease, reassess  -     Vitamin D; Future    Chronic constipation, multifactorial but discussed the effects of the tramadol when it is increased, gabapentin and possibly the new bladder medication    Dementia with behavioral disturbance, unspecified dementia type, recent memory was a little bit worse but could have occurred in the setting of the constipation and/or recent UTI that was treated.  Wife will follow if indeed things he says during the day a return to baseline.  Hearing makes it difficult as well.

## 2019-04-12 RX ORDER — ALFUZOSIN HYDROCHLORIDE 10 MG/1
TABLET, EXTENDED RELEASE ORAL
Qty: 60 TABLET | Refills: 1 | Status: SHIPPED | OUTPATIENT
Start: 2019-04-12 | End: 2019-04-17 | Stop reason: SDUPTHER

## 2019-04-16 ENCOUNTER — TELEPHONE (OUTPATIENT)
Dept: FAMILY MEDICINE | Facility: CLINIC | Age: 84
End: 2019-04-16

## 2019-04-17 RX ORDER — ALFUZOSIN HYDROCHLORIDE 10 MG/1
10 TABLET, EXTENDED RELEASE ORAL DAILY
Qty: 60 TABLET | Refills: 1 | Status: SHIPPED | OUTPATIENT
Start: 2019-04-17 | End: 2019-09-18

## 2019-04-17 NOTE — TELEPHONE ENCOUNTER
----- Message from Angeles Barahona sent at 4/17/2019 11:26 AM CDT -----  Contact: spouse  Type: RX Refill Request    Who Called: spouse    Refill or New Rx: New    RX Name and Strength: alfuzosin (UROXATRAL) 10 mg Tb24    Would the patient rather a call back or a response via [facility]?  Call back     Best Call Back Number: [phone]    Additional Information: Spouse states pharmacy called her today and stated they need an okay to fill alfuzosin (UROXATRAL) 10 mg Tb24--  Fax # is   [phone].  She would also like to verify if this is the alternate mediatation that Dr. Motley would like pt to take.

## 2019-04-18 NOTE — TELEPHONE ENCOUNTER
----- Message from Belen Talamantes sent at 4/16/2019 10:47 AM CDT -----  Contact: Self  Type:  Test Results    Who Called: Ifeanyi Rutherford    Name of Test (Lab/Mammo/Etc): Lab    Date of Test: 04/08/2019    Ordering Provider: Jason Levy    [facility]      Would the patient rather a call back or a response via [facility]? Call Back    Best Call Back Number: [phone]    Additional Information:  Patient called to discuss recent lab results.  
Called patient his wife was out. Will call back later.  
Called patient spoke to wife . She verbally understood all the test results.  
Please call the wife, patient cannot hear    Labs look good although the kidney test was a little dried up indicating he probably needs to drink more water.    Might be good to repeat the kidney test in 3-4 weeks after trying to increase some fluids.    All else looks excellent and normal.  Electrolytes, liver, thyroid all normal and even the vitamin-D level is now normal    The blood count is also all normal  
independent

## 2019-04-23 ENCOUNTER — OFFICE VISIT (OUTPATIENT)
Dept: OPTOMETRY | Facility: CLINIC | Age: 84
End: 2019-04-23
Payer: MEDICARE

## 2019-04-23 DIAGNOSIS — D31.31 CHOROIDAL NEVUS OF RIGHT EYE: ICD-10-CM

## 2019-04-23 DIAGNOSIS — Z96.1 PSEUDOPHAKIA: ICD-10-CM

## 2019-04-23 DIAGNOSIS — H52.7 REFRACTIVE ERROR: ICD-10-CM

## 2019-04-23 DIAGNOSIS — H47.013 OPTIC NEUROPATHY, ISCHEMIC, BILATERAL: Primary | ICD-10-CM

## 2019-04-23 PROCEDURE — 92014 COMPRE OPH EXAM EST PT 1/>: CPT | Mod: HCNC,S$GLB,, | Performed by: OPTOMETRIST

## 2019-04-23 PROCEDURE — 92014 PR EYE EXAM, EST PATIENT,COMPREHESV: ICD-10-PCS | Mod: HCNC,S$GLB,, | Performed by: OPTOMETRIST

## 2019-04-23 PROCEDURE — 99999 PR PBB SHADOW E&M-EST. PATIENT-LVL II: CPT | Mod: PBBFAC,HCNC,, | Performed by: OPTOMETRIST

## 2019-04-23 PROCEDURE — 99999 PR PBB SHADOW E&M-EST. PATIENT-LVL II: ICD-10-PCS | Mod: PBBFAC,HCNC,, | Performed by: OPTOMETRIST

## 2019-04-23 NOTE — PROGRESS NOTES
Subjective:       Patient ID: Ifeanyi Rutherford is a 89 y.o. male      Chief Complaint   Patient presents with    Concerns About Ocular Health     History of Present Illness  Dls: 6/14/18 Dr. Salazar    88 y/o male presents today for ocular health check.  Pt c/o blurry vision os at distance and near.   Pt wears bifocal glasses.     + tearing  No itching  No burning  No pain  +  Ha's for yrs   No floaters  No flashes    Eye meds  otc gtts ou prn           Assessment/Plan:     1. Optic neuropathy, ischemic, bilateral  OD > OS. Stable.    2. Choroidal nevus of right eye  Stable.    3. Pseudophakia - Both Eyes  Clear. Centered.    4. Refractive error  Continue with current specs. [facility] stable.     Recommend pt f/u with PCP for increased headaches  Follow up in about 1 year (around 4/23/2020).

## 2019-04-25 ENCOUNTER — TELEPHONE (OUTPATIENT)
Dept: UROLOGY | Facility: CLINIC | Age: 84
End: 2019-04-25

## 2019-04-25 DIAGNOSIS — R39.15 URINARY URGENCY: Primary | ICD-10-CM

## 2019-04-25 NOTE — TELEPHONE ENCOUNTER
Spoke to wife Krystal who states patient was taking the medicine mentioned for about a week now and it has affected his dementia tremendously.. She is asking if you can suggest another medication.. Because she would like for him to stop that one.. B.N.

## 2019-04-25 NOTE — TELEPHONE ENCOUNTER
----- Message from Yolanda Ambrosio sent at 4/25/2019 11:05 AM CDT -----  Contact: pt's wife markel [phone]  Type: Patient Call Back    Who called:wife    What is the request in detail:pt is having side effects from alfuzosin (UROXATRAL) 10 mg Tb24. Dementia has increased with taking this medication. Call wife    Can the clinic reply by [facility]?    Would the patient rather a call back or a response via [facility]? Call back    Best call back number:pt's wife markel [phone]    Additional Information:

## 2019-04-29 ENCOUNTER — TELEPHONE (OUTPATIENT)
Dept: UROLOGY | Facility: CLINIC | Age: 84
End: 2019-04-29

## 2019-04-29 DIAGNOSIS — R39.15 URINARY URGENCY: ICD-10-CM

## 2019-04-29 NOTE — TELEPHONE ENCOUNTER
----- Message from Vivian Rodriguez sent at 4/29/2019 10:16 AM CDT -----  Contact: Krystal- Wife   Type: RX Refill Request    Who Called:  Wife     Refill or New Rx: Refill     RX Name and Strength: mirabegron (MYRBETRIQ) 50 mg Tb24    Preferred Pharmacy with phone number: [facility] [place] [address]  [phone] (Phone)  [phone] (Fax)    Local or Mail Order: Local     Ordering Provider: Dr. Motley     Would the patient rather a call back or a response via [facility]? Call     Best Call Back Number:  [phone]    Additional Information: she also states [facility] price is the same as [employer]

## 2019-04-29 NOTE — TELEPHONE ENCOUNTER
Pt's wife called back an would like mirabegron resent to pharmacy as she states they will get it filled an see how he does on the medication. She states will work on the cost with family. [facility]

## 2019-04-29 NOTE — TELEPHONE ENCOUNTER
Spoke to pt's wife she states she is going to call [place] to see if mirabegron is cheaper with the mail order pharmacy. She states doesn't want any other medication that will worsen dementia. She will call us back with pharmacy info-keiko

## 2019-04-29 NOTE — TELEPHONE ENCOUNTER
Pt's wife states rod is to expensive an is asking if something can be call in cheaper. Please advise-keiko

## 2019-04-29 NOTE — TELEPHONE ENCOUNTER
Cheaper medications are in the same class as Detrol.  These all carry the risk of worsening dementia.  He had issues with Detrol.      Does she want to try another one of these medications?  If not, then there are no other oral alternatives.

## 2019-05-07 ENCOUNTER — OFFICE VISIT (OUTPATIENT)
Dept: FAMILY MEDICINE | Facility: CLINIC | Age: 84
End: 2019-05-07
Payer: MEDICARE

## 2019-05-07 VITALS
HEART RATE: 72 BPM | HEIGHT: 74 IN | OXYGEN SATURATION: 95 % | TEMPERATURE: 98 F | DIASTOLIC BLOOD PRESSURE: 70 MMHG | SYSTOLIC BLOOD PRESSURE: 124 MMHG | BODY MASS INDEX: 26 KG/M2 | WEIGHT: 202.63 LBS

## 2019-05-07 DIAGNOSIS — G50.0 TRIGEMINAL NEURALGIA OF RIGHT SIDE OF FACE: ICD-10-CM

## 2019-05-07 PROCEDURE — 99214 OFFICE O/P EST MOD 30 MIN: CPT | Mod: HCNC,S$GLB,, | Performed by: PHYSICIAN ASSISTANT

## 2019-05-07 PROCEDURE — 99999 PR PBB SHADOW E&M-EST. PATIENT-LVL III: ICD-10-PCS | Mod: PBBFAC,HCNC,, | Performed by: PHYSICIAN ASSISTANT

## 2019-05-07 PROCEDURE — 99999 PR PBB SHADOW E&M-EST. PATIENT-LVL III: CPT | Mod: PBBFAC,HCNC,, | Performed by: PHYSICIAN ASSISTANT

## 2019-05-07 PROCEDURE — 99214 PR OFFICE/OUTPT VISIT, EST, LEVL IV, 30-39 MIN: ICD-10-PCS | Mod: HCNC,S$GLB,, | Performed by: PHYSICIAN ASSISTANT

## 2019-05-07 PROCEDURE — 1101F PT FALLS ASSESS-DOCD LE1/YR: CPT | Mod: HCNC,CPTII,S$GLB, | Performed by: PHYSICIAN ASSISTANT

## 2019-05-07 PROCEDURE — 1101F PR PT FALLS ASSESS DOC 0-1 FALLS W/OUT INJ PAST YR: ICD-10-PCS | Mod: HCNC,CPTII,S$GLB, | Performed by: PHYSICIAN ASSISTANT

## 2019-05-07 RX ORDER — GABAPENTIN 300 MG/1
CAPSULE ORAL
Qty: 540 CAPSULE | Refills: 3 | Status: SHIPPED | OUTPATIENT
Start: 2019-05-07 | End: 2020-08-13 | Stop reason: SDUPTHER

## 2019-05-07 NOTE — ASSESSMENT & PLAN NOTE
Will slowly increase gabapentin from 900 mg to 1800 mg daily for better control of pain  Take 4 tablets for two days, then increase to 5 tablets for another two days, then to 6 tablets daily (2 tablets by mouth three times a day)  Counseled regarding risks, benefits, and limitations of medications  Continue to monitor and follow up as needed

## 2019-05-07 NOTE — PROGRESS NOTES
Patient Name: Ifeanyi Rutherford    : 1929  MRN: [id number]    Subjective:  Ifeanyi is a 89 y.o. male who presents today for:    Chief Complaint   Patient presents with    Jaw Pain       HPI  Patient has multiple medical diagnoses as listed below in the history. Patient is accompanied by his wife who has provided most of the history. The patient has a history of dementia. He complains of right sided jaw pain that has been a chronic issue. He has a history of trigeminal neuralgia pain s/p percutaneous glycerol trigeminal ganglionotomy. He has been followed by neurology and told there is not more surgically that can be done. Pain has to be controlled with medication. He reports the procedure to have relieved his pain for a while. He reports an increase of pain over the last few months. He is taking 900 mg of gabapentin daily and 150 mg of tramadol daily. He has a Rx for Norco which his wife reports giving him 1/2 a tablet PRN when the pain is really bad. He describes the pain as sharp, shooting and intermittent. His wife reports him having severe flare ups of pain every other day or so. She reports the pain to bring him to tears. She occasionally gives him a valium to help with his anxiety that is exacerbated by the pain and vice versa. She expresses concern over all the medications and being fearful of given him too much. He denies difficulty with chewing or swallowing. He denies tenderness to touch or loss of sensation.     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    Spinal stenosis     Trigeminal neuralgia pain 1/20/2015    Trouble in sleeping     Vertigo 9/11/2012    Vestibular schwannoma 6/4/2015       Past Surgical History  Past Surgical History:   Procedure Laterality Date    bilateral total knee arthroplasties      CATARACT EXTRACTION W/  INTRAOCULAR LENS IMPLANT Bilateral     CHOLECYSTECTOMY      EYE SURGERY      GALLBLADDER SURGERY      PERCUTANEOUS INJECTION-TRIGEMINAL NERVE Right 2/27/2018    Performed by Mahendra Rodriguez [facility] at [facility] OR [address] [place] [address]    THYROIDECTOMY      TONSILLECTOMY         Family History  Family History   Problem Relation Age of Onset    Heart disease Mother     No Known Problems Father     No Known Problems Sister     No Known Problems Brother     No Known Problems Daughter     No Known Problems Son     No Known Problems Sister     No Known Problems Brother     No Known Problems Brother     No Known Problems Brother     No Known Problems Maternal Aunt     No Known Problems Maternal Uncle     No Known Problems Paternal Aunt     No Known Problems Paternal Uncle     No Known Problems Maternal Grandmother     No Known Problems Maternal Grandfather     No Known Problems Paternal Grandmother     No Known Problems Paternal Grandfather     Suicide Neg Hx     Schizophrenia Neg Hx     Amblyopia Neg Hx     Blindness Neg Hx     Cataracts Neg Hx     Glaucoma Neg Hx     Macular degeneration Neg Hx     Retinal detachment Neg Hx     Strabismus Neg Hx     Cancer Neg Hx     Diabetes Neg Hx     Hypertension Neg Hx     Stroke Neg Hx     Thyroid disease Neg Hx        Social History  Social History     Socioeconomic History    Marital status:      Spouse name: Not on file    Number of children: Not on file    Years of education: Not on file    Highest education level: Not on file   Occupational History    Not on file   Social Needs    Financial resource strain: Not on file    Food insecurity:     Worry:  Not on file     Inability: Not on file    Transportation needs:     Medical: Not on file     Non-medical: Not on file   Tobacco Use    Smoking status: Former Smoker    Smokeless tobacco: Never Used   Substance and Sexual Activity    Alcohol use: No     Alcohol/week: 0.0 oz    Drug use: No    Sexual activity: Yes     Partners: Female   Lifestyle    Physical activity:     Days per week: Not on file     Minutes per session: Not on file    Stress: Not on file   Relationships    Social connections:     Talks on phone: Not on file     Gets together: Not on file     Attends Hindu service: Not on file     Active member of club or organization: Not on file     Attends meetings of clubs or organizations: Not on file     Relationship status: Not on file   Other Topics Concern    Not on file   Social History Narrative    Not on file       Current Medications  Current Outpatient Medications on File Prior to Visit   Medication Sig Dispense Refill    acetaminophen (TYLENOL) 325 MG tablet Take 2 tablets (650 mg total) by mouth every 6 (six) hours as needed for Pain.  0    diazePAM (VALIUM) 5 MG tablet Half to whole pill every 8 hours as needed for anxiety 90 tablet 3    hydrocodone-acetaminophen 5-325mg (NORCO) 5-325 mg per tablet Take 1 tablet by mouth every 6 (six) hours as needed for Pain. 30 tablet 0    levothyroxine (SYNTHROID) 75 MCG tablet TAKE 1 TABLET BEFORE BREAKFAST 90 tablet 0    lidocaine HCl 2% (LIDOCAINE VISCOUS) 2 % Soln by Mucous Membrane route every 6 (six) hours. 100 mL 3    lisinopril (PRINIVIL,ZESTRIL) 40 MG tablet TAKE 1 TABLET EVERY DAY 90 tablet 1    mirabegron (MYRBETRIQ) 50 mg Tb24 Take 1 tablet (50 mg total) by mouth once daily. 30 tablet 11    polyethylene glycol (GLYCOLAX) 17 gram/dose powder MIX 1 CAPFUL (17GM) IN LIQUID AND DRINK ONE TIME DAILY 1530 g 12    pravastatin (PRAVACHOL) 40 MG tablet TAKE 1 TABLET EVERY DAY 90 tablet 0    QUEtiapine (SEROQUEL) 50 MG tablet TAKE 1  "TABLET EVERY EVENING 90 tablet 0    traMADol (ULTRAM) 50 mg tablet TAKE ONE TABLET BY MOUTH THREE TIMES DAILY 90 tablet 5    triamcinolone acetonide 0.1% (KENALOG) 0.1 % cream Apply to the affected area twice a day 30 g 2    venlafaxine (EFFEXOR-XR) 75 MG 24 hr capsule TAKE 2 CAPSULES EVERY  capsule 3    [DISCONTINUED] gabapentin (NEURONTIN) 300 MG capsule Take 1 capsule (300 mg total) by mouth 3 (three) times daily. 270 capsule 3    alfuzosin (UROXATRAL) 10 mg Tb24 Take 1 tablet (10 mg total) by mouth once daily. 60 tablet 1    tolterodine (DETROL LA) 4 MG 24 hr capsule Take 1 capsule (4 mg total) by mouth once daily. 90 capsule 1     No current facility-administered medications on file prior to visit.        Allergies   Review of patient's allergies indicates:   Allergen Reactions    Carbamazepine Swelling    Trazodone Anxiety    Demerol [meperidine] Hallucinations    Morphine Nausea Only         ROS  Review of Systems   Constitutional: Negative for chills, fatigue and fever.   Eyes: Negative for pain and visual disturbance.   Respiratory: Negative for cough and shortness of breath.    Cardiovascular: Negative for chest pain and palpitations.   Musculoskeletal: Negative for arthralgias and myalgias.        Facial pain   Skin: Negative for rash.   Neurological: Negative for facial asymmetry, light-headedness, numbness and headaches.   Hematological: Negative for adenopathy.         Objective:    /70   Pulse 72   Temp 98.2 °F (36.8 °C)   Ht 6' 2" (1.88 m)   Wt 91.9 kg (202 lb 9.6 oz)   SpO2 95%   BMI 26.01 kg/m²     Physical Exam   Constitutional: Vital signs are normal.   HENT:   Head: Normocephalic.   No facial tenderness, sensation intact   Eyes: Pupils are equal, round, and reactive to light. EOM are normal.   Cardiovascular: Normal rate, regular rhythm, S1 normal, S2 normal and intact distal pulses.   Pulses:       Dorsalis pedis pulses are 2+ on the right side, and 2+ on the left " side.   Pulmonary/Chest: Effort normal.   Lymphadenopathy:     He has no cervical adenopathy.        Right: No supraclavicular adenopathy present.        Left: No supraclavicular adenopathy present.   Neurological: He is alert.   Skin: Skin is warm, dry and intact. No rash noted.   Psychiatric: He has a normal mood and affect.       Assessment/Plan:  Ifeanyi Rutherford is a 89 y.o. male who presents today for :    Ifeanyi was seen today for jaw pain.    Diagnoses and all orders for this visit:    Trigeminal neuralgia of right side of face  -     gabapentin (NEURONTIN) 300 MG capsule; Take 2 capsules (600 mg) by mouth three times daily.    As below      Problem list issues addressed during this visit    Trigeminal neuralgia of right side of face  Will slowly increase gabapentin from 900 mg to 1800 mg daily for better control of pain  Take 4 tablets for two days, then increase to 5 tablets for another two days, then to 6 tablets daily   Counseled regarding risks, benefits, and limitations of medications  Continue to monitor and follow up as needed  Discussed possible follow up with neurology if needed on future  Patient gave verbal understanding and agreement of plan        Health maintenance reviewed and disussed, deferred at this time, aware of need for shingles vaccine, will follow up      I spent >50% of this 25 minute encounter counseling the patient on diagnoses, risk factors, and treatment.         Encouraged to call/return to clinic if symptoms persist or worsen    Maria Del Rosario Cagle PA-C  [facility]/ Internal Med/ Peds

## 2019-06-07 DIAGNOSIS — E78.5 HYPERLIPIDEMIA, UNSPECIFIED HYPERLIPIDEMIA TYPE: ICD-10-CM

## 2019-06-07 RX ORDER — QUETIAPINE FUMARATE 50 MG/1
50 TABLET, FILM COATED ORAL NIGHTLY
Qty: 90 TABLET | Refills: 0 | Status: SHIPPED | OUTPATIENT
Start: 2019-06-07 | End: 2020-05-19

## 2019-06-07 RX ORDER — LEVOTHYROXINE SODIUM 75 UG/1
TABLET ORAL
Qty: 90 TABLET | Refills: 0 | Status: SHIPPED | OUTPATIENT
Start: 2019-06-07 | End: 2019-09-25 | Stop reason: SDUPTHER

## 2019-06-07 RX ORDER — PRAVASTATIN SODIUM 40 MG/1
40 TABLET ORAL DAILY
Qty: 90 TABLET | Refills: 0 | Status: SHIPPED | OUTPATIENT
Start: 2019-06-07 | End: 2019-09-25 | Stop reason: SDUPTHER

## 2019-06-07 NOTE — TELEPHONE ENCOUNTER
----- Message from Vivian Rodriguez sent at 6/7/2019 12:48 PM CDT -----  Contact: Self   Type: RX Refill Request    Who Called:  Jeremias _ [facility]     Refill or New Rx: refill     RX Name and Strength: pravastatin (PRAVACHOL) 40 MG tablet  QUEtiapine (SEROQUEL) 50 MG tablet  levothyroxine (SYNTHROID) 75 MCG tablet      Preferred Pharmacy with phone number: [facility] - [address] [phone] (Phone)  [phone] (Fax)    Local or Mail Order: local     Ordering Provider: Dr. Levy    Would the patient rather a call back or a response via [facility]?  Call

## 2019-06-17 ENCOUNTER — TELEPHONE (OUTPATIENT)
Dept: NEUROLOGY | Facility: CLINIC | Age: 84
End: 2019-06-17

## 2019-06-17 ENCOUNTER — LAB VISIT (OUTPATIENT)
Dept: LAB | Facility: HOSPITAL | Age: 84
End: 2019-06-17
Attending: INTERNAL MEDICINE
Payer: MEDICARE

## 2019-06-17 ENCOUNTER — OFFICE VISIT (OUTPATIENT)
Dept: FAMILY MEDICINE | Facility: CLINIC | Age: 84
End: 2019-06-17
Payer: MEDICARE

## 2019-06-17 VITALS
TEMPERATURE: 98 F | OXYGEN SATURATION: 98 % | HEART RATE: 64 BPM | BODY MASS INDEX: 26.4 KG/M2 | SYSTOLIC BLOOD PRESSURE: 120 MMHG | HEIGHT: 74 IN | DIASTOLIC BLOOD PRESSURE: 60 MMHG | WEIGHT: 205.69 LBS

## 2019-06-17 DIAGNOSIS — R39.15 URINARY URGENCY: ICD-10-CM

## 2019-06-17 DIAGNOSIS — R51.9 CHRONIC NONINTRACTABLE HEADACHE, UNSPECIFIED HEADACHE TYPE: ICD-10-CM

## 2019-06-17 DIAGNOSIS — K59.09 CHRONIC CONSTIPATION: ICD-10-CM

## 2019-06-17 DIAGNOSIS — F03.91 DEMENTIA WITH BEHAVIORAL DISTURBANCE, UNSPECIFIED DEMENTIA TYPE: ICD-10-CM

## 2019-06-17 DIAGNOSIS — G50.0 TRIGEMINAL NEURALGIA OF RIGHT SIDE OF FACE: ICD-10-CM

## 2019-06-17 DIAGNOSIS — G20.C PARKINSONISM, UNSPECIFIED PARKINSONISM TYPE: ICD-10-CM

## 2019-06-17 DIAGNOSIS — R44.1 VISUAL HALLUCINATIONS: ICD-10-CM

## 2019-06-17 DIAGNOSIS — N17.9 ACUTE RENAL FAILURE, UNSPECIFIED ACUTE RENAL FAILURE TYPE: ICD-10-CM

## 2019-06-17 DIAGNOSIS — F39 MOOD DISORDER: Primary | ICD-10-CM

## 2019-06-17 DIAGNOSIS — G47.00 INSOMNIA, UNSPECIFIED TYPE: ICD-10-CM

## 2019-06-17 DIAGNOSIS — I10 ESSENTIAL HYPERTENSION: ICD-10-CM

## 2019-06-17 DIAGNOSIS — E55.9 VITAMIN D DEFICIENCY DISEASE: ICD-10-CM

## 2019-06-17 DIAGNOSIS — G89.29 CHRONIC NONINTRACTABLE HEADACHE, UNSPECIFIED HEADACHE TYPE: ICD-10-CM

## 2019-06-17 DIAGNOSIS — E03.9 HYPOTHYROIDISM, UNSPECIFIED TYPE: ICD-10-CM

## 2019-06-17 LAB
ANION GAP SERPL CALC-SCNC: 12 MMOL/L (ref 8–16)
BUN SERPL-MCNC: 25 MG/DL (ref 8–23)
CALCIUM SERPL-MCNC: 9 MG/DL (ref 8.7–10.5)
CHLORIDE SERPL-SCNC: 105 MMOL/L (ref 95–110)
CO2 SERPL-SCNC: 26 MMOL/L (ref 23–29)
CREAT SERPL-MCNC: 1.4 MG/DL (ref 0.5–1.4)
EST. GFR  (AFRICAN AMERICAN): 51.1 ML/MIN/1.73 M^2
EST. GFR  (NON AFRICAN AMERICAN): 44.2 ML/MIN/1.73 M^2
GLUCOSE SERPL-MCNC: 105 MG/DL (ref 70–110)
POTASSIUM SERPL-SCNC: 4.2 MMOL/L (ref 3.5–5.1)
SODIUM SERPL-SCNC: 143 MMOL/L (ref 136–145)

## 2019-06-17 PROCEDURE — 99499 RISK ADDL DX/OHS AUDIT: ICD-10-PCS | Mod: S$GLB,,, | Performed by: INTERNAL MEDICINE

## 2019-06-17 PROCEDURE — 99215 PR OFFICE/OUTPT VISIT, EST, LEVL V, 40-54 MIN: ICD-10-PCS | Mod: HCNC,S$GLB,, | Performed by: INTERNAL MEDICINE

## 2019-06-17 PROCEDURE — 36415 COLL VENOUS BLD VENIPUNCTURE: CPT | Mod: HCNC,PO

## 2019-06-17 PROCEDURE — 99999 PR PBB SHADOW E&M-EST. PATIENT-LVL IV: CPT | Mod: PBBFAC,HCNC,, | Performed by: INTERNAL MEDICINE

## 2019-06-17 PROCEDURE — 1101F PR PT FALLS ASSESS DOC 0-1 FALLS W/OUT INJ PAST YR: ICD-10-PCS | Mod: HCNC,CPTII,S$GLB, | Performed by: INTERNAL MEDICINE

## 2019-06-17 PROCEDURE — 80048 BASIC METABOLIC PNL TOTAL CA: CPT | Mod: HCNC

## 2019-06-17 PROCEDURE — 99999 PR PBB SHADOW E&M-EST. PATIENT-LVL IV: ICD-10-PCS | Mod: PBBFAC,HCNC,, | Performed by: INTERNAL MEDICINE

## 2019-06-17 PROCEDURE — 99215 OFFICE O/P EST HI 40 MIN: CPT | Mod: HCNC,S$GLB,, | Performed by: INTERNAL MEDICINE

## 2019-06-17 PROCEDURE — 1101F PT FALLS ASSESS-DOCD LE1/YR: CPT | Mod: HCNC,CPTII,S$GLB, | Performed by: INTERNAL MEDICINE

## 2019-06-17 PROCEDURE — 99499 UNLISTED E&M SERVICE: CPT | Mod: S$GLB,,, | Performed by: INTERNAL MEDICINE

## 2019-06-17 RX ORDER — LACTULOSE 10 G/15ML
SOLUTION ORAL
COMMUNITY
Start: 2019-05-09 | End: 2019-09-18

## 2019-06-17 NOTE — TELEPHONE ENCOUNTER
Pt/wife recently saw PCP and it was recommended to follow up with Dr. Erickson. (LAst seen 2015). Appt accepted by Mrs. Rutherford for 8/1/19 @ 2:40pm. Appt letter placed in the mail.

## 2019-06-17 NOTE — TELEPHONE ENCOUNTER
----- Message from Ja Harden sent at 6/17/2019  2:21 PM CDT -----  Needs Advice    Reason for call: Mrs. Rutherford is calling to schedule an appt w/ the doctor for dementia, pt last seen 04/29/2015        Communication Preference: Mrs. Rutherford (wife) @ [phone]    Additional Information:

## 2019-06-17 NOTE — PROGRESS NOTES
Chief complaint, HA, constipation and daytime fatigue        89-year-old white male  He is here with his wife.  Some of his complaints are recurrent.  Most recently had an increase in the right-sided trigeminal neuralgia.  He was on 300 mg gabapentin t.i.d. and it was increased to 600 t.i.d..  He was also very close to his older brother that was 92 years old who  a week ago.  Wife says he is definitely been more confused.  He awoke from a nap not oriented.  He is usually very calm but she gave him the Valium pill to help calm down a throughout across the room.  His memory over the last month has also been getting worse.  He is forgetting her made name, where they live and so forth.  The last saw Neurology about 4 years ago.  At that time he had just had worsening trigeminal neuralgia and actually had to be admitted for extreme agitation.  At that point they had entertained Lewy body dementia but discussed that over the past couple of years since that time he has really had not much progression and really less signs of parkinsonism and so I would doubt that the Lewy body diagnosis would be pertinent at this time.  They would like to get back and see Dr. Erickson in Neurology.  I think the recent changes may be explainable but will be good to have a Neurology input.    We did discuss between the wife and myself that he is on the Seroquel 25 at night and she would like to go up to 50.  We also discussed reducing the gabapentin from 600 t.i.d. back down to 300 since that may well have had some cognitive affects lately as well.  We also discussed the definite affects of stress on worsening memory, dementia so forth.  He is on Effexor 150.  If depression other emotional symptoms increased we might need to increase the Effexor to 225 mg.    Wife has noted occasional tick her jumping of the arm.  We did discuss that could be a movement disorder side effect related to the Seroquel but the patient himself is not complained  about her noticed is she has not pointed out to him and I think that is a good idea so as to not give him another thing to worry about since he is chronically anxious.    Continues to have overflow incontinence.  He really has incontinence all the time but is over flowing his diapers at night.  We discussed reducing fluids as he does like to drink a lot of water.  They probably need to try and stop significant water intake after 5:00 p.m. or even earlier.  She is already tried increasing the size of his diapers and so forth.  He has no other new UTI symptoms and really difficult to assess urine.  It has been checked by Urology in the past without signs of UTI.  His current change in mental status could be considered as a side effect of some metabolic or infectious problem but does not appear to be acute and probably has other explanations but it was all considered.  We may need to try check urine in the future.     . All these issues reviewed and patient counseled an evaluation and management we based upon time counselingTotal time over 45 minutes with over 50% counseling.  Wife does verbalize it is very therapeutic for him to come in and discuss all these issues even though many of them are not that much changed.    Labs were 4/19 and vit D low many yrs ago but now ok. Creatinine was up to 1.6 so will repeat    ROS:   CONST: weight stable.  No new myalgias arthralgias, no new neurological symptoms, no new psychiatric symptoms, no new hallucinations or any worsening    PAST SURGERIES:  Total knee replacements bilaterally.                                                                                                     PAST MEDICAL HISTORY:    Hypertension.   Hyperlipidemia.    Depression.           Generalized anxiety ([facility]).    Hypothyroidism.    Osteoarthritis.    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  Optic neuropathy, right eye.  Right-sided trigeminal neuralgia, seen by neurology,  pain management and neurosurgery  Chronic constipation   Evaluation by [place] [facility] for NPH, apparently no response to spinal tap   Chronic vertigo   Vitamin D deficiency   Parkinsonism by [facility] evaluation    Morning hallucinations, possibly related to Seroquel                                                                                                           SOCIAL HISTORY:  He is  and lives with his spouse.  They have adult   children in the area and grandchildren.  He has never been a cigarette       smoker.      Vitals as above,  Gen: no distress  Pleasant male, very hard of hearing, walks slowly and does so with a walker.  To he is well dressed.  His affect and communication appears to be at baseline compared to prior, exam otherwise deferred        Labs reviewed          Ifeanyi was seen today for dementia.    Diagnoses and all orders for this visit:    Mood disorder, multifactorial, definitely an underlying degree of stress reaction, stress-induced worsening dementia, affects possibly of gabapentin, time related worsening of underlying dementia and so forth.  I agree with their desire to get back and see Neurology.  Medication adjustments as above    Visual hallucinations, no subjective complaint or worsening of the visual hallucinations which are suppressed by the nighttime Seroquel which we will increase to 50 for other related issues if needed    Insomnia, unspecified type    Essential hypertension, chronic and stable    Vitamin D deficiency disease, reviewed    Acute renal failure, unspecified acute renal failure type, reassess after increasing fluid which may have caused some of his incontinence issues at night  -     Basic metabolic panel; Future    Trigeminal neuralgia of right side of face, he did respond to the increase gabapentin will need to follow for worsening pain with the reduction of the gabapentin.  Discussed with the wife that we could keep the p.m. dose of  the gabapentin higher if necessary  -     Ambulatory referral to Neurology    Chronic nonintractable headache, unspecified headache type    Hypothyroidism, unspecified type, labs reviewed    Chronic constipation, chronic and stable at this time    Dementia with behavioral disturbance, unspecified dementia type  -     Ambulatory referral to Neurology    Parkinsonism, unspecified Parkinsonism type, no worsening symptoms of parkinsonism over the years despite lack of treatment so doubt there is true underlying Parkinson's  -     Ambulatory referral to Neurology    Urinary urgency, multifactorial, baseline incontinence, try reduction of fluids so as to less than the nighttime awakenings of having to change

## 2019-06-18 ENCOUNTER — TELEPHONE (OUTPATIENT)
Dept: FAMILY MEDICINE | Facility: CLINIC | Age: 84
End: 2019-06-18

## 2019-06-19 NOTE — TELEPHONE ENCOUNTER
----- Message from Vivian Rodriguez sent at 6/19/2019  8:38 AM CDT -----  Contact: Krystal- Wife   Type: Patient Call Back    Who called: Wife - Krystal     What is the request in detail: patient's wife called to check the status of patient's kidney results     Can the clinic reply by [facility]? No     Would the patient rather a call back or a response via [facility]? Call     Best call back number:[phone]

## 2019-06-19 NOTE — TELEPHONE ENCOUNTER
Please call wife, patient with dementia    They had a repeat kidney test    Dr. ROYAL says the repeat kidney test is back to normal

## 2019-06-23 ENCOUNTER — HOSPITAL ENCOUNTER (EMERGENCY)
Facility: HOSPITAL | Age: 84
Discharge: HOME | End: 2019-06-24
Attending: EMERGENCY MEDICINE
Payer: MEDICARE

## 2019-06-23 DIAGNOSIS — R35.0 URINARY FREQUENCY: ICD-10-CM

## 2019-06-23 DIAGNOSIS — R14.0 ABDOMINAL BLOATING: ICD-10-CM

## 2019-06-23 DIAGNOSIS — I10 HYPERTENSION, UNSPECIFIED TYPE: ICD-10-CM

## 2019-06-23 DIAGNOSIS — K59.00 CONSTIPATION, UNSPECIFIED CONSTIPATION TYPE: Primary | ICD-10-CM

## 2019-06-23 PROCEDURE — 99284 EMERGENCY DEPT VISIT MOD MDM: CPT | Mod: HCNC,,, | Performed by: PHYSICIAN ASSISTANT

## 2019-06-23 PROCEDURE — 99284 PR EMERGENCY DEPT VISIT,LEVEL IV: ICD-10-PCS | Mod: HCNC,,, | Performed by: PHYSICIAN ASSISTANT

## 2019-06-23 PROCEDURE — 81001 URINALYSIS AUTO W/SCOPE: CPT | Mod: HCNC

## 2019-06-23 PROCEDURE — 25000003 PHARM REV CODE 250: Mod: HCNC | Performed by: PHYSICIAN ASSISTANT

## 2019-06-23 PROCEDURE — 99283 EMERGENCY DEPT VISIT LOW MDM: CPT | Mod: HCNC

## 2019-06-23 RX ORDER — SYRING-NEEDL,DISP,INSUL,0.3 ML 29 G X1/2"
296 SYRINGE, EMPTY DISPOSABLE MISCELLANEOUS
Status: COMPLETED | OUTPATIENT
Start: 2019-06-23 | End: 2019-06-23

## 2019-06-23 RX ORDER — PSEUDOEPHEDRINE/ACETAMINOPHEN 30MG-500MG
100 TABLET ORAL
Status: COMPLETED | OUTPATIENT
Start: 2019-06-23 | End: 2019-06-23

## 2019-06-23 RX ADMIN — MAGNESIUM CITRATE 296 ML: 1.75 LIQUID ORAL at 10:06

## 2019-06-23 RX ADMIN — Medication 100 ML: at 10:06

## 2019-06-23 RX ADMIN — SODIUM CHLORIDE 500 ML: 0.9 INJECTION, SOLUTION INTRAVENOUS at 10:06

## 2019-06-24 VITALS
BODY MASS INDEX: 26.31 KG/M2 | SYSTOLIC BLOOD PRESSURE: 175 MMHG | DIASTOLIC BLOOD PRESSURE: 81 MMHG | HEART RATE: 71 BPM | TEMPERATURE: 98 F | HEIGHT: 74 IN | OXYGEN SATURATION: 94 % | WEIGHT: 205 LBS | RESPIRATION RATE: 23 BRPM

## 2019-06-24 LAB
BILIRUB UR QL STRIP: NEGATIVE
CLARITY UR REFRACT.AUTO: CLEAR
COLOR UR AUTO: ABNORMAL
GLUCOSE UR QL STRIP: NEGATIVE
HGB UR QL STRIP: ABNORMAL
KETONES UR QL STRIP: NEGATIVE
LEUKOCYTE ESTERASE UR QL STRIP: NEGATIVE
MICROSCOPIC COMMENT: NORMAL
NITRITE UR QL STRIP: NEGATIVE
PH UR STRIP: 8 [PH] (ref 5–8)
PROT UR QL STRIP: NEGATIVE
RBC #/AREA URNS AUTO: 2 /HPF (ref 0–4)
SP GR UR STRIP: 1.01 (ref 1–1.03)
URN SPEC COLLECT METH UR: ABNORMAL
WBC #/AREA URNS AUTO: 0 /HPF (ref 0–5)

## 2019-06-24 NOTE — DISCHARGE INSTRUCTIONS
Your abdominal pain was relieved with having a bowel movement in the ER today.  Your urine did not show any evidence of an infection.  You should take MiraLax each day to prevent constipation and Colace if you do not have a bowel movement within 1-2 days.  Please follow up with your primary care physician within a week to discuss your ER visit today.  He should also follow up with your neurologist as scheduled to be evaluated for worsening dementia.  Please return to the ER if you develop any worsening symptoms or any other concerning symptoms.

## 2019-06-24 NOTE — ED PROVIDER NOTES
Encounter Date: 6/23/2019       History     Chief Complaint   Patient presents with    Fecal Impaction     Per pt wife pt has c/o abd pain and has been constipated x4-5 days, no relief w/ mutiple laxatives and high fiber diet. Per wife pt BP also elevated at 220/97 at home. Pt denies emesis, CP     89-year-old male with past medical history of dementia, acoustic neuroma, hypertension, hyperlipidemia, hypothyroidism, and trigeminal neuralgia who presents to the ED with complaint of constipation.  Per patient, he has not a bowel movement in the past 4 days despite taking MiraLax BID and eating a high fiber diet.  However, patient's wife states that he passed a very small quantity of brown stool this morning.  He also notes that he has increased flatulence over the past few days.  He has associated mild diffuse abdominal pressure.  Per wife, his dementia few weeks.  For this, he was evaluated by his PCP and referred to Neurology.  He has a scheduled appointment with Dr. Erickson on August 1st.  He notes having urinary frequency and history of UTI denies fever, chills, weakness, numbness, chest pain, shortness of breath, n/v, blood in stool, melena, dysuria, or any other medical complaints.    The history is provided by the patient.     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    THYROIDECTOMY      TONSILLECTOMY       Family History   Problem Relation Age of Onset    Heart disease Mother     No Known Problems Father     No Known Problems Sister     No Known Problems Brother     No Known Problems Daughter     No Known Problems Son     No Known Problems Sister     No Known Problems Brother     No Known Problems Brother     No Known Problems Brother     No Known Problems Maternal Aunt     No Known Problems Maternal Uncle     No Known Problems Paternal Aunt     No Known Problems Paternal Uncle     No Known Problems Maternal Grandmother     No Known Problems Maternal Grandfather     No Known Problems Paternal Grandmother     No Known Problems Paternal Grandfather     Suicide Neg Hx     Schizophrenia Neg Hx     Amblyopia Neg Hx     Blindness Neg Hx     Cataracts Neg Hx     Glaucoma Neg Hx     Macular degeneration Neg Hx     Retinal detachment Neg Hx     Strabismus Neg Hx     Cancer Neg Hx     Diabetes Neg Hx     Hypertension Neg Hx     Stroke Neg Hx     Thyroid disease Neg Hx      Social History     Tobacco Use    Smoking status: Former Smoker    Smokeless tobacco: Never Used   Substance Use Topics    Alcohol use: No     Alcohol/week: 0.0 oz    Drug use: No     Review of Systems   Constitutional: Negative for chills, fatigue and fever.   HENT: Negative for congestion.    Eyes: Negative for visual disturbance.   Respiratory: Negative for shortness of breath.    Cardiovascular: Negative for chest pain.    Gastrointestinal: Positive for abdominal pain and constipation. Negative for blood in stool, diarrhea, nausea and vomiting.   Genitourinary: Positive for frequency. Negative for dysuria and hematuria.   Musculoskeletal: Negative for back pain and neck pain.   Skin: Negative for rash.   Neurological: Negative for dizziness, weakness, light-headedness, numbness and headaches.   Hematological: Does not bruise/bleed easily.   Psychiatric/Behavioral: Positive for confusion (chronic).       Physical Exam     Initial Vitals [06/23/19 2059]   BP Pulse Resp Temp SpO2   (!) 188/82 69 17 97.5 °F (36.4 °C) 97 %      MAP       --         Physical Exam    Nursing note and vitals reviewed.  Constitutional: He appears well-developed and well-nourished.   HENT:   Head: Normocephalic and atraumatic.   Eyes: Conjunctivae and EOM are normal.   Neck: Normal range of motion. Neck supple.   Cardiovascular: Normal rate, regular rhythm and normal heart sounds.   Pulmonary/Chest: Breath sounds normal. He has no wheezes. He has no rhonchi. He has no rales.   Abdominal: Soft. There is no tenderness. There is no rebound and no guarding.   No CVA tenderness.   Genitourinary: : Acceptable.  Genitourinary Comments: Large amount of brown soft stool in rectal vault, Hemoccult negative.    Musculoskeletal: Normal range of motion. He exhibits no edema or tenderness.   No midline C, T, or L spinal tenderness to palpation.    Neurological: He is alert and oriented to person, place, and time. He has normal strength.   Skin: Skin is warm. Capillary refill takes less than 2 seconds.   Psychiatric: He has a normal mood and affect.         ED Course   Procedures  Labs Reviewed   URINALYSIS, REFLEX TO URINE CULTURE - Abnormal; Notable for the following components:       Result Value    Occult Blood UA 1+ (*)     All other components within normal limits    Narrative:     Preferred Collection Type->Urine, Clean Catch   URINALYSIS MICROSCOPIC     Narrative:     Preferred Collection Type->Urine, Clean Catch          Imaging Results    None          Medical Decision Making:   Initial Assessment:   89-year-old male with past medical history of dementia, acoustic neuroma, hypertension, hyperlipidemia, hypothyroidism, and trigeminal neuralgia who presents to the ED with complaint of constipation x 4 days.  He has tried MiraLax, but is been without relief.  He has associated flatulence and abdominal pressure.  Patient's wife notes worsening of chronic dementia over the past few weeks.  Patient is hypertensive with BP of 188/82. RRR.  Lungs CTA bilaterally. Abdomen soft and nontender. No CVA tenderness. Neurovascularly intact.  Differential Diagnosis:   DDx includes but is not limited to constipation, fecal impaction, chronic dementia, UTI, dehydration.  Clinical Tests:   Lab Tests: Ordered and Reviewed  ED Management:  Pt with large soft stool in rectal vault. Will give pt brown bomb for relief. Will also check UA as pt reports history of UTIs.     Patient with relief of abdominal pressure with passing large bowel movement. UA with negative nitrites, negative leukocytes, and 1+ occult blood.     Reviewed results with patient and pt's wife. Pt's BP improved from 175/81. He is stable for discharge with instructions to follow up with his PCP within a week to discuss further management of his BP at home. Also recommended pt take MiraLax daily and if no bowel movement within 1-2 days he should take Colace.  Patient should keep appointment with Dr. Wellington on 08/01/2019. Pt given strict ER return precautions. All questions answered. Pt and pt's wife expressed understanding and are agreeable with plan.     I have discussed the treatment and management of this patient with my supervising physician, and we agree on the plan of care.      Nancy Grijalva PA-C                Attending Attestation:             Attending ED Notes:   This patient was evaluated independently by the  midlevel provider. I did not perform a face-to-face evaluation of this patient. I did, however, discuss the patient's presentation, exam, workup, and management with the midlevel provider and agree with the care rendered. Furthermore, I agree with the history, examination, assessment, and plan as documented.  Pineda Gold III, M.D. - Attending             Clinical Impression:       ICD-10-CM ICD-9-CM   1. Constipation, unspecified constipation type K59.00 564.00   2. Abdominal bloating R14.0 787.3   3. Urinary frequency R35.0 788.41   4. Hypertension, unspecified type I10 401.9         Disposition:   Disposition: Discharged  Condition: Stable                        Nancy Grijalva PA-C  06/24/19 0695       Pineda Gold III, MD  06/26/19 1232

## 2019-06-24 NOTE — ED NOTES
Pt assisted off the bedside commode; pt had a large bowel movement and states a relieve of pressure in the abdomen.

## 2019-06-24 NOTE — ED NOTES
Patient identifiers verified and correct for Ifeanyi Rutherford.    LOC: The patient is awake, alert and aware of environment with an appropriate affect, the patient is oriented x 3 and speaking appropriately.    APPEARANCE: Patient resting comfortably and in no acute distress, patient is clean and well groomed, patient's clothing is properly fastened.    SKIN: The skin is warm and dry, color consistent with ethnicity, patient has normal skin turgor and moist mucus membranes, skin intact, no breakdown or bruising noted.    MUSCULOSKELETAL: Patient moving all extremities spontaneously, BLE edema (trace edema)    RESPIRATORY: Airway is open and patent, respirations are spontaneous, patient has a normal effort and rate, no accessory muscle use noted, bilateral breath sounds clear     CARDIAC: Patient has a normal rate and regular rhythm, BLE edema  capillary refill < 3 seconds.    ABDOMEN: Round, tender to palpation, distention noted, hypoactive bowel sounds present in all four quadrants.    NEUROLOGIC: PERRLA, 3 mm bilaterally, eyes open spontaneously, behavior appropriate to situation, follows commands, facial expression symmetrical, bilateral hand grasp equal and even, purposeful motor response noted, normal sensation in all extremities when touched with a finger.

## 2019-06-25 ENCOUNTER — TELEPHONE (OUTPATIENT)
Dept: FAMILY MEDICINE | Facility: CLINIC | Age: 84
End: 2019-06-25

## 2019-07-22 ENCOUNTER — OFFICE VISIT (OUTPATIENT)
Dept: FAMILY MEDICINE | Facility: CLINIC | Age: 84
End: 2019-07-22
Payer: MEDICARE

## 2019-07-22 ENCOUNTER — HOSPITAL ENCOUNTER (OUTPATIENT)
Dept: RADIOLOGY | Facility: HOSPITAL | Age: 84
Discharge: HOME | End: 2019-07-22
Attending: PHYSICIAN ASSISTANT
Payer: MEDICARE

## 2019-07-22 VITALS
DIASTOLIC BLOOD PRESSURE: 66 MMHG | BODY MASS INDEX: 26.31 KG/M2 | WEIGHT: 205 LBS | TEMPERATURE: 98 F | SYSTOLIC BLOOD PRESSURE: 134 MMHG | OXYGEN SATURATION: 96 % | HEIGHT: 74 IN | HEART RATE: 71 BPM

## 2019-07-22 DIAGNOSIS — M25.511 ACUTE PAIN OF RIGHT SHOULDER: ICD-10-CM
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
## 2019-07-22 DIAGNOSIS — M25.511 ACUTE PAIN OF RIGHT SHOULDER: Primary | ICD-10-CM

## 2019-07-22 PROCEDURE — 99214 OFFICE O/P EST MOD 30 MIN: CPT | Mod: HCNC,S$GLB,, | Performed by: PHYSICIAN ASSISTANT

## 2019-07-22 PROCEDURE — 73030 X-RAY EXAM OF SHOULDER: CPT | Mod: 26,HCNC,RT, | Performed by: RADIOLOGY

## 2019-07-22 PROCEDURE — 73030 XR SHOULDER COMPLETE 2 OR MORE VIEWS RIGHT: ICD-10-PCS | Mod: 26,HCNC,RT, | Performed by: RADIOLOGY

## 2019-07-22 PROCEDURE — 1101F PR PT FALLS ASSESS DOC 0-1 FALLS W/OUT INJ PAST YR: ICD-10-PCS | Mod: HCNC,CPTII,S$GLB, | Performed by: PHYSICIAN ASSISTANT

## 2019-07-22 PROCEDURE — 99999 PR PBB SHADOW E&M-EST. PATIENT-LVL IV: CPT | Mod: PBBFAC,HCNC,, | Performed by: PHYSICIAN ASSISTANT

## 2019-07-22 PROCEDURE — 99214 PR OFFICE/OUTPT VISIT, EST, LEVL IV, 30-39 MIN: ICD-10-PCS | Mod: HCNC,S$GLB,, | Performed by: PHYSICIAN ASSISTANT

## 2019-07-22 PROCEDURE — 99999 PR PBB SHADOW E&M-EST. PATIENT-LVL IV: ICD-10-PCS | Mod: PBBFAC,HCNC,, | Performed by: PHYSICIAN ASSISTANT

## 2019-07-22 PROCEDURE — 73030 X-RAY EXAM OF SHOULDER: CPT | Mod: TC,HCNC,FY,PO,RT

## 2019-07-22 PROCEDURE — 1101F PT FALLS ASSESS-DOCD LE1/YR: CPT | Mod: HCNC,CPTII,S$GLB, | Performed by: PHYSICIAN ASSISTANT

## 2019-07-22 NOTE — PROGRESS NOTES
Patient Name: Ifeanyi Rutherford    : 1929  MRN: [id number]    Subjective:  Ifeanyi is a 89 y.o. male who presents today for:    Chief Complaint   Patient presents with    Fall     Patient fell last Thursday        HPI  Patient has multiple medical diagnoses as listed below in the history. He complains of right shoulder pain for 4 days. He reports falling from a standing position at his home. He fell into the large standing dresser hitting his right shoulder. He denies LOC or head trauma. His pain began the next day. The pain is worse with certain movements. The pain has improved since onset. He has been using tylenol and applying cold packs. Pain today 4/10. He denies radiation, numbness tingling or weakness.     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    Chronic headache 2014    Dementia     worked up for NPH at  and had taps with no improvement    Depression     Hyperlipidemia     Hypertension     Hypothyroidism     Insufficiency of tear film of both eyes 2016    Mild major depression 2012    Spinal stenosis     Trigeminal neuralgia pain 2015    Trouble in sleeping     Vertigo 2012    Vestibular schwannoma 2015       Past Surgical History  Past Surgical History:   Procedure Laterality Date    bilateral total knee arthroplasties      CATARACT EXTRACTION W/  INTRAOCULAR LENS IMPLANT Bilateral     CHOLECYSTECTOMY      EYE SURGERY      GALLBLADDER SURGERY      PERCUTANEOUS INJECTION-TRIGEMINAL NERVE Right 2018    Performed by [facility]    THYROIDECTOMY      TONSILLECTOMY         Family History  Family History   Problem Relation Age of Onset    Heart disease Mother     No Known Problems Father     No Known Problems Sister     No  Known Problems Brother     No Known Problems Daughter     No Known Problems Son     No Known Problems Sister     No Known Problems Brother     No Known Problems Brother     No Known Problems Brother     No Known Problems Maternal Aunt     No Known Problems Maternal Uncle     No Known Problems Paternal Aunt     No Known Problems Paternal Uncle     No Known Problems Maternal Grandmother     No Known Problems Maternal Grandfather     No Known Problems Paternal Grandmother     No Known Problems Paternal Grandfather     Suicide Neg Hx     Schizophrenia Neg Hx     Amblyopia Neg Hx     Blindness Neg Hx     Cataracts Neg Hx     Glaucoma Neg Hx     Macular degeneration Neg Hx     Retinal detachment Neg Hx     Strabismus Neg Hx     Cancer Neg Hx     Diabetes Neg Hx     Hypertension Neg Hx     Stroke Neg Hx     Thyroid disease Neg Hx        Social History  Social History     Socioeconomic History    Marital status:      Spouse name: Not on file    Number of children: Not on file    Years of education: Not on file    Highest education level: Not on file   Occupational History    Not on file   Social Needs    Financial resource strain: Not on file    Food insecurity:     Worry: Not on file     Inability: Not on file    Transportation needs:     Medical: Not on file     Non-medical: Not on file   Tobacco Use    Smoking status: Former Smoker    Smokeless tobacco: Never Used   Substance and Sexual Activity    Alcohol use: No     Alcohol/week: 0.0 oz    Drug use: No    Sexual activity: Yes     Partners: Female   Lifestyle    Physical activity:     Days per week: Not on file     Minutes per session: Not on file    Stress: Not on file   Relationships    Social connections:     Talks on phone: Not on file     Gets together: Not on file     Attends L[employer] day service: Not on file     Active member of club or organization: Not on file     Attends meetings of clubs or organizations: Not on  file     Relationship status: Not on file   Other Topics Concern    Not on file   Social History Narrative    Not on file       Current Medications  Current Outpatient Medications on File Prior to Visit   Medication Sig Dispense Refill    acetaminophen (TYLENOL) 325 MG tablet Take 2 tablets (650 mg total) by mouth every 6 (six) hours as needed for Pain.  0    alfuzosin (UROXATRAL) 10 mg Tb24 Take 1 tablet (10 mg total) by mouth once daily. 60 tablet 1    CONSTULOSE 10 gram/15 mL solution       diazePAM (VALIUM) 5 MG tablet Half to whole pill every 8 hours as needed for anxiety 90 tablet 3    gabapentin (NEURONTIN) 300 MG capsule Take 2 capsules (600 mg) by mouth three times daily. 540 capsule 3    hydrocodone-acetaminophen 5-325mg (NORCO) 5-325 mg per tablet Take 1 tablet by mouth every 6 (six) hours as needed for Pain. 30 tablet 0    levothyroxine (SYNTHROID) 75 MCG tablet TAKE 1 TABLET BEFORE BREAKFAST 90 tablet 0    lidocaine HCl 2% (LIDOCAINE VISCOUS) 2 % Soln by Mucous Membrane route every 6 (six) hours. 100 mL 3    lisinopril (PRINIVIL,ZESTRIL) 40 MG tablet TAKE 1 TABLET EVERY DAY 90 tablet 1    mirabegron (MYRBETRIQ) 50 mg Tb24 Take 1 tablet (50 mg total) by mouth once daily. 30 tablet 11    polyethylene glycol (GLYCOLAX) 17 gram/dose powder MIX 1 CAPFUL (17GM) IN LIQUID AND DRINK ONE TIME DAILY 1530 g 12    pravastatin (PRAVACHOL) 40 MG tablet Take 1 tablet (40 mg total) by mouth once daily. 90 tablet 0    QUEtiapine (SEROQUEL) 50 MG tablet Take 1 tablet (50 mg total) by mouth every evening. 90 tablet 0    tolterodine (DETROL LA) 4 MG 24 hr capsule Take 1 capsule (4 mg total) by mouth once daily. 90 capsule 1    traMADol (ULTRAM) 50 mg tablet TAKE ONE TABLET BY MOUTH THREE TIMES DAILY 90 tablet 5    triamcinolone acetonide 0.1% (KENALOG) 0.1 % cream Apply to the affected area twice a day 30 g 2    venlafaxine (EFFEXOR-XR) 75 MG 24 hr capsule TAKE 2 CAPSULES EVERY  capsule 3     No  "current facility-administered medications on file prior to visit.        Allergies   Review of patient's allergies indicates:   Allergen Reactions    Carbamazepine Swelling    Trazodone Anxiety    Demerol [meperidine] Hallucinations    Morphine Nausea Only         ROS  Review of Systems   Constitutional: Negative for fever.   Eyes: Negative for visual disturbance.   Respiratory: Negative for shortness of breath.    Cardiovascular: Negative for chest pain.   Musculoskeletal: Positive for arthralgias. Negative for myalgias, neck pain and neck stiffness.   Skin: Negative for rash and wound.   Neurological: Negative for syncope, weakness, light-headedness, numbness and headaches.   Hematological: Negative for adenopathy.   Psychiatric/Behavioral: Negative for sleep disturbance. The patient is not nervous/anxious.          Objective:    /66 (BP Location: Left arm, Patient Position: Sitting, BP Method: Medium (Manual))   Pulse 71   Temp 97.8 °F (36.6 °C) (Oral)   Ht 6' 2" (1.88 m)   Wt 93 kg (205 lb) Comment: patient gave weight  SpO2 96%   BMI 26.32 kg/m²     Physical Exam   Constitutional: Vital signs are normal.   HENT:   Head: Normocephalic.   Eyes: Pupils are equal, round, and reactive to light. EOM are normal.   Cardiovascular: Normal rate, regular rhythm, S1 normal and S2 normal.   Pulmonary/Chest: Effort normal and breath sounds normal. He has no wheezes.   Musculoskeletal:        Right shoulder: He exhibits tenderness. He exhibits normal range of motion, no bony tenderness, no swelling, no effusion and no deformity.        Left shoulder: Normal.        Right elbow: Normal.       Left elbow: Normal.   Lymphadenopathy:     He has no cervical adenopathy.        Right: No supraclavicular adenopathy present.        Left: No supraclavicular adenopathy present.   Neurological: He is alert.   Skin: Skin is warm, dry and intact. No rash noted.   Psychiatric: He has a normal mood and affect. Judgment " normal.       Assessment/Plan:  Ifeanyi Rutherford is a 89 y.o. male who presents today for :    Ifeanyi was seen today for fall.    Diagnoses and all orders for this visit:    Acute pain of right shoulder  -     X-ray Shoulder 2 or More Views Right; Future    Fall, initial encounter, No LOC or head trauma, fall from standing position in home  -     X-ray Shoulder 2 or More Views Right; Future      Will rule out traumatic changes on xray, patient had good range of motion on exam without palpable deformities   Continue with cold packs as needed and Tylenol for pain PRN  Discussed various stretches and exercises   Counseled patient on the clinical course of condition including symptomatology, treatment and prevention  Counseled regarding risks, benefits, and limitations of medications  Advised patient to seek urgent/emergent care if symptoms intensify  Patient gave verbal understanding and agreement of plan      Health maintenance reviewed and discussed, deferred at this time due to acute illness      I spent >50% of this 25 minute encounter counseling the patient on diagnoses, risk factors, and treatments.         Encouraged to call/return to clinic if symptoms persist or worsen    Maria Del Rosario Cagle PA-C  [facility]/ Internal Med

## 2019-07-23 ENCOUNTER — TELEPHONE (OUTPATIENT)
Dept: FAMILY MEDICINE | Facility: CLINIC | Age: 84
End: 2019-07-23

## 2019-07-23 NOTE — TELEPHONE ENCOUNTER
----- Message from Maria Del Rosario Cagle PA-C sent at 7/23/2019  7:36 AM CDT -----  Shoulder x-ray is normal. No acute fractures or dislocations.

## 2019-08-01 ENCOUNTER — TELEPHONE (OUTPATIENT)
Dept: NEUROLOGY | Facility: CLINIC | Age: 84
End: 2019-08-01

## 2019-08-01 ENCOUNTER — OFFICE VISIT (OUTPATIENT)
Dept: NEUROLOGY | Facility: CLINIC | Age: 84
End: 2019-08-01
Payer: MEDICARE

## 2019-08-01 VITALS
BODY MASS INDEX: 26.32 KG/M2 | HEIGHT: 74 IN | HEART RATE: 63 BPM | DIASTOLIC BLOOD PRESSURE: 85 MMHG | SYSTOLIC BLOOD PRESSURE: 188 MMHG

## 2019-08-01 DIAGNOSIS — G50.0 TRIGEMINAL NEURALGIA OF RIGHT SIDE OF FACE: ICD-10-CM

## 2019-08-01 DIAGNOSIS — F03.90 DEMENTIA WITHOUT BEHAVIORAL DISTURBANCE, UNSPECIFIED DEMENTIA TYPE: ICD-10-CM

## 2019-08-01 DIAGNOSIS — G20.C PARKINSONISM, UNSPECIFIED PARKINSONISM TYPE: Primary | ICD-10-CM

## 2019-08-01 PROBLEM — D33.3 ACOUSTIC NEUROMA: Status: RESOLVED | Noted: 2018-02-27 | Resolved: 2019-08-01

## 2019-08-01 PROCEDURE — 99999 PR PBB SHADOW E&M-EST. PATIENT-LVL III: ICD-10-PCS | Mod: PBBFAC,HCNC,, | Performed by: PSYCHIATRY & NEUROLOGY

## 2019-08-01 PROCEDURE — 99999 PR PBB SHADOW E&M-EST. PATIENT-LVL III: CPT | Mod: PBBFAC,HCNC,, | Performed by: PSYCHIATRY & NEUROLOGY

## 2019-08-01 PROCEDURE — 1101F PR PT FALLS ASSESS DOC 0-1 FALLS W/OUT INJ PAST YR: ICD-10-PCS | Mod: HCNC,CPTII,S$GLB, | Performed by: PSYCHIATRY & NEUROLOGY

## 2019-08-01 PROCEDURE — 99214 OFFICE O/P EST MOD 30 MIN: CPT | Mod: HCNC,S$GLB,, | Performed by: PSYCHIATRY & NEUROLOGY

## 2019-08-01 PROCEDURE — 1101F PT FALLS ASSESS-DOCD LE1/YR: CPT | Mod: HCNC,CPTII,S$GLB, | Performed by: PSYCHIATRY & NEUROLOGY

## 2019-08-01 PROCEDURE — 99214 PR OFFICE/OUTPT VISIT, EST, LEVL IV, 30-39 MIN: ICD-10-PCS | Mod: HCNC,S$GLB,, | Performed by: PSYCHIATRY & NEUROLOGY

## 2019-08-01 RX ORDER — DONEPEZIL HYDROCHLORIDE 5 MG/1
5 TABLET, FILM COATED ORAL NIGHTLY
Qty: 30 TABLET | Refills: 11 | Status: SHIPPED | OUTPATIENT
Start: 2019-08-01 | End: 2020-05-18

## 2019-08-01 NOTE — TELEPHONE ENCOUNTER
Marilee Erickson MD; [facility] Staff             ZEV BHATIA  1929  MRN#[id number]      Thank you for the referral for HH service.    However, we are sadly unable to accept due to: unable to Staff patient needs at this time.     If you need anything further, please contact our office.     Sincerely,     [facility]    Intake Dept

## 2019-08-01 NOTE — PROGRESS NOTES
I. Reason for Consult:  dementia      Interval history:  89 y.o. M seen for DLB.  I have not seen him in 4 years.  Accompanied by wife who has asked for f/u due to memory.    Since December, he has had sundowning.  In last couple months, he has been more confused (for place, wife, even children).  He gets lost in house at night when getting up to use bathroom.   Gets a glazed look in eyes.    Sleeps a lot during day.  Morning headaches and irritability he thinks is due to seroquel, but unable to sleep without it.  Shuffling feet, having more difficulty getting around.  Fell a week ago.  No injury.    +incontinence.  Occasional high blood pressure.    Hearing is poor and lost vision in right eye.    Gangliotomy helped with the TN for about 3-4 months, then wore off a bit.  Wife says under control now with gabapentin.      4/9/2015:  Depressed, has intermittent right TN in face (severe) and has blurred vision (cannot read paper).    Gets the TN pain every day, intermittently.  Unable to take pain medications as these are what caused such severe delirium in past.      From my note 12/2014:  Back at home doing everything for himself.  Has vivid, but not active dreaming.  Uses walker, no falls.        History of present illness (from my consult 9/2/14):   Patient is a 85 y.o. male with PMHx of HTN, Depression, Anxiety, acoustic neuroma s/p cyberknife (2012), NPH followed by neurosurgery at [facility], who presented to the ED for altered mental status with associated visual hallucinations, agitation, limb rigidity and sleep disturbance. Patient has been having hallucinations with increased frequency over the past six months. Agitation is a new symptom. Patient was recently prescribed quetiapine (seroquel) by his psychiatrist for sleep disturbance. Per wife, patient was refusing his seroquel two days prior to admission. One day prior to this admission at [facility] he was seen at the [facility] ED for AMS. Patient received a CT  "Head, two LPs as well as blood work and, per family, was given a diagnosis of dementia and given olanzepine (zyprexa) and discharged. At home he became acutely combative, was altered again, and had one episode of emesis. Wife did not call EMS because she did not want to go back to [facility]. She brought patient in by personal transport to [facility] ED. This was difficult as patient was combative and very rigid per wife.   Patient has not slept for the past two nights per wife. His AMS and agitation have increased despite quetiapine, olanzepine, and haldol. He remains intermittently disoriented and easily agitated. He is oriented to year, president, and recognizes his wife and daughter this morning.     II.  Review of systems:  As in HPI; balance 3 systems reviewed and are negative.     III.  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    Spinal stenosis     Trigeminal neuralgia pain 1/20/2015    Trouble in sleeping     Vertigo 9/11/2012    Vestibular schwannoma 6/4/2015     Famhx: nothing pertinant  Sochx:  , retired    Current neuro/psych medications:   Melatonin qhs  seroquel 25mg qhs (for sleep)  Gabapentin 900mg tid  effexor xr 75mg  Lisinopril 20 (prn high blood pressure)  Levothyroxine  Tylenol  docusate      IV. Physical Exam    Vitals:    08/01/19 1409   BP: (!) 188/85   Pulse: 63   Height: 6' 2" (1.88 m)     General appearance: Well nourished, well developed, no acute distress.         Cardiovascular:  pedal pulses 2, no edema or cyanosis, heart regular rate " and rhythym, no carotid bruits.         -------------------------------------------------------------  Facial Expression: surprised expression      Affect: full       Orientation to time & place:  Oriented to place, person, but not clearly to situation or date      Attention & concentration:  short     Memory:  Poor recent, but also VERY hard to test due to hearing loss  Language: Spontaneous, fluent; able to repeat and name objects        Fund of knowledge:  Aware of some current events        Speech:  normal (not dysarthric)  -------------------------------------------------------  Cranial nerves: able to count fingers left eye; pupils equal round and reactive, extraocular movements intact,       facial sensation intact, face symmetrical, severe hearing loss, palate raises midline, shoulder shrug strength normal, tongue protrudes midline.        -------------------------------------------------------  Musculoskeletal  Muscle tone: all 4 extremities normal        Muscle Bulk: all 4 extremities normal        Muscle strength:  5/5 in all 4 extremities        No pronator drift  Sensation: Intact to light touch in all extremities        Deep tendon Reflexes:1 bilateral biceps, triceps, patella and ankles        --------------------------------------------------------------  Cerebellar and Coordination  Gait:  With walker, duck-like, low step height, short stride       Finger-nose: no dysmetria       Rapid Alternating Movements (pronation/supination):  R normal; L normal, but marked apraxia in feet  --------------------------------------------------------------  MOVEMENT DISORDERS FOCUSED EXAM  Abnormality of movement (bradykinesia, hyperkinesia) present? No    Tremor present?   No   Postural stability:  Not tested      V.  Laboratory/ Radiological Data: no new pertinent    From my note 9/3/14  8/30/2014 CT Head WO  Prominence of the ventricular system, increased in size compared to 2007. This may in part relate to  compensatory enlargement from cerebral volume loss, but NPH is a consideration in the appropriate clinical setting.              VI. Medical Decision Making       Problem List Items Addressed This Visit        1 - High    Dementia    Current Assessment & Plan     Based on his progression over past 6 years, I have higher suspicion of alzheimer's type than DLB; however, there is little difference in management.   -> aricept         Relevant Medications    donepezil (ARICEPT) 5 MG tablet    Other Relevant Orders    Ambulatory Referral to Home Health       2     Parkinsonism - Primary    Overview     Lower-body parkinsonism.  nph w/u negative in 2014.   -> home health PT/OT         Relevant Orders    Ambulatory Referral to Home Health       3     Trigeminal neuralgia of right side of face    Overview     02/27/18 right percutaneous glycerol trigeminal ganglionotomy             Follow up in about 4 months (around 12/1/2019) for dementia.

## 2019-08-01 NOTE — LETTER
August 1, 2019      Jason Levy MD  [address]  [place] [address]           [facility]  [address]  [place] [address]  Phone: [phone]  Fax: [phone]          Patient: Ifeanyi Rutherford   MR Number: [id number]   YOB: 1929   Date of Visit: 8/1/2019       Dear Dr. Jason Levy:    Thank you for referring Ifeanyi Rutherford to me for evaluation. Attached you will find relevant portions of my assessment and plan of care.    If you have questions, please do not hesitate to call me. I look forward to following Ifeanyi Rutherford along with you.    Sincerely,    Deya Erickson MD    Enclosure  CC:  No Recipients    If you would like to receive this communication electronically, please contact [email or web address] or [phone] to request more information on [facility] access.    For providers and/or their staff who would like to refer a patient to [facility], please contact us through our one-stop-shop provider referral line, [facility], at [phone].    If you feel you have received this communication in error or would no longer like to receive these types of communications, please e-mail [email or web address]

## 2019-08-01 NOTE — ASSESSMENT & PLAN NOTE
Based on his progression over past 6 years, I have higher suspicion of alzheimer's type than DLB; however, there is little difference in management.   -> aricept

## 2019-08-02 ENCOUNTER — TELEPHONE (OUTPATIENT)
Dept: NEUROLOGY | Facility: CLINIC | Age: 84
End: 2019-08-02

## 2019-08-02 DIAGNOSIS — F03.90 DEMENTIA WITHOUT BEHAVIORAL DISTURBANCE, UNSPECIFIED DEMENTIA TYPE: ICD-10-CM

## 2019-08-02 DIAGNOSIS — G20.C PARKINSONISM, UNSPECIFIED PARKINSONISM TYPE: Primary | ICD-10-CM

## 2019-08-02 NOTE — TELEPHONE ENCOUNTER
Spoke to patient's wife and advised HH referral will be forwarded to the Medical Team.  Faxed order, note and insurance info to the Medical Team.

## 2019-08-06 ENCOUNTER — TELEPHONE (OUTPATIENT)
Dept: FAMILY MEDICINE | Facility: CLINIC | Age: 84
End: 2019-08-06

## 2019-08-06 PROCEDURE — G0180 MD CERTIFICATION HHA PATIENT: HCPCS | Mod: ,,, | Performed by: PSYCHIATRY & NEUROLOGY

## 2019-08-06 PROCEDURE — G0180 PR HOME HEALTH MD CERTIFICATION: ICD-10-PCS | Mod: ,,, | Performed by: PSYCHIATRY & NEUROLOGY

## 2019-08-06 NOTE — TELEPHONE ENCOUNTER
Wife wanted to be sure that home health is agreeable with pcp ordered by Dr Erickson. Please advise

## 2019-08-06 NOTE — TELEPHONE ENCOUNTER
----- Message from Woo Xavier sent at 8/6/2019 10:52 AM CDT -----  Contact: Krystal (spouse)  Name of Who is Calling: Krystal (spouse)      What is the request in detail: Would like to speak with staff in regards to approval of home health therapy prescribed by Dr. Alonzo. Please advise      Can the clinic reply by [facility]: no      [facility] [phone]

## 2019-08-13 ENCOUNTER — TELEPHONE (OUTPATIENT)
Dept: NEUROLOGY | Facility: CLINIC | Age: 84
End: 2019-08-13

## 2019-08-13 DIAGNOSIS — F03.90 DEMENTIA WITHOUT BEHAVIORAL DISTURBANCE, UNSPECIFIED DEMENTIA TYPE: ICD-10-CM

## 2019-08-13 DIAGNOSIS — G20.C PARKINSONISM, UNSPECIFIED PARKINSONISM TYPE: Primary | ICD-10-CM

## 2019-08-13 NOTE — TELEPHONE ENCOUNTER
----- Message from Emily Dickinson sent at 8/13/2019  8:46 AM CDT -----  Contact: Rosalinda    The Medical Team    [phone] or fax [phone]  Pts insurance was hacked and he has new insurance info.  Rosalinda says Var[facility] told her they would have to discharge him and then re eval him once the new insurance is update.  She is calling to request a new order.

## 2019-08-13 NOTE — TELEPHONE ENCOUNTER
----- Message from Keegan Pathak sent at 8/13/2019  9:07 AM CDT -----  Contact: Db ( spouse ) @ [phone] or [phone]   Caller requesting a return call to discuss reordering therapy from the Medical Team  ( due to a scam ) pls call [phone] to get the fax #

## 2019-08-14 ENCOUNTER — TELEPHONE (OUTPATIENT)
Dept: FAMILY MEDICINE | Facility: CLINIC | Age: 84
End: 2019-08-14

## 2019-08-14 NOTE — TELEPHONE ENCOUNTER
Called patients wife and gave her all the information. SHe verbally understood. Will hold Aricept and let us know how he is feeling.

## 2019-08-14 NOTE — TELEPHONE ENCOUNTER
Spoke to patient wife Mrs. Rice. She stated since he was put on donepezil 5mg he has been more severe face pain. He had a hard time getting up this morning.  He was sitting at the table with his head down in pain. Ate breakfast and went back to bed. He takes gabapentin 300mg 3 times per day and tramadol 50mg 3 time per day but it does not seem to be helping. Please advise

## 2019-08-14 NOTE — TELEPHONE ENCOUNTER
----- Message from Yolanda Ambrosio sent at 8/14/2019  9:39 AM CDT -----  Contact: pt's wife ravin [phone]  Type: Patient Call Back    Who called:pt's wife ravin [phone]    What is the request in detail:pt is experiencing severe pain with tumor. Wants something for pain. Call wife    Can the clinic reply by [facility]?    Would the patient rather a call back or a response via [facility]? Call back    Best call back number:pt's wife ravin [phone]    Additional Information:

## 2019-08-14 NOTE — TELEPHONE ENCOUNTER
Reassure that Dr. ROYAL is not sure how the Aricept would worsen the facial pain and that the facial pain from the trigeminal neuralgia can sometimes worsen on his phone for no particular reason    Possibly encouraged him to go back to bed, possibly take a little bit more gabapentin to suppress the nerve.  If taking 300 mg 3 times per day try 600 or 900 mg at a time.    Okay to hold the Aricept for a couple of days and assess, possibly restart in the future

## 2019-08-15 ENCOUNTER — TELEPHONE (OUTPATIENT)
Dept: HOME HEALTH SERVICES | Facility: HOSPITAL | Age: 84
End: 2019-08-15

## 2019-08-20 ENCOUNTER — TELEPHONE (OUTPATIENT)
Dept: NEUROLOGY | Facility: CLINIC | Age: 84
End: 2019-08-20

## 2019-08-20 DIAGNOSIS — F03.90 DEMENTIA WITHOUT BEHAVIORAL DISTURBANCE, UNSPECIFIED DEMENTIA TYPE: ICD-10-CM

## 2019-08-20 DIAGNOSIS — G20.C PARKINSONISM, UNSPECIFIED PARKINSONISM TYPE: Primary | ICD-10-CM

## 2019-08-20 NOTE — TELEPHONE ENCOUNTER
Called patient to verify whether he needs new orders for Home health Spoke with patient. Requests cb after his wife returns from dr henry.     Placed call to [facility] intake dept to verify that they can take patient. CELESTINE Rodriguez .

## 2019-08-22 PROCEDURE — G0180 PR HOME HEALTH MD CERTIFICATION: ICD-10-PCS | Mod: ,,, | Performed by: PSYCHIATRY & NEUROLOGY

## 2019-08-22 PROCEDURE — G0180 MD CERTIFICATION HHA PATIENT: HCPCS | Mod: ,,, | Performed by: PSYCHIATRY & NEUROLOGY

## 2019-09-13 ENCOUNTER — TELEPHONE (OUTPATIENT)
Dept: FAMILY MEDICINE | Facility: CLINIC | Age: 84
End: 2019-09-13

## 2019-09-13 ENCOUNTER — HOSPITAL ENCOUNTER (OUTPATIENT)
Facility: HOSPITAL | Age: 84
Discharge: HOME | End: 2019-09-14
Attending: EMERGENCY MEDICINE | Admitting: INTERNAL MEDICINE
Payer: MEDICARE

## 2019-09-13 DIAGNOSIS — R79.89 ELEVATED TROPONIN: Primary | ICD-10-CM

## 2019-09-13 DIAGNOSIS — R11.15 INTRACTABLE CYCLICAL VOMITING WITH NAUSEA: ICD-10-CM

## 2019-09-13 DIAGNOSIS — R11.10 EMESIS: ICD-10-CM

## 2019-09-13 DIAGNOSIS — R11.2 INTRACTABLE VOMITING WITH NAUSEA, UNSPECIFIED VOMITING TYPE: ICD-10-CM

## 2019-09-13 DIAGNOSIS — G20.A1 PARKINSON'S DISEASE: Chronic | ICD-10-CM

## 2019-09-13 DIAGNOSIS — I10 ESSENTIAL HYPERTENSION: Chronic | ICD-10-CM

## 2019-09-13 PROBLEM — E78.5 HYPERLIPIDEMIA: Chronic | Status: ACTIVE | Noted: 2019-09-13

## 2019-09-13 PROBLEM — N18.30 CKD (CHRONIC KIDNEY DISEASE) STAGE 3, GFR 30-59 ML/MIN: Chronic | Status: ACTIVE | Noted: 2019-09-13

## 2019-09-13 LAB
ALBUMIN SERPL BCP-MCNC: 3.5 G/DL (ref 3.5–5.2)
ALP SERPL-CCNC: 58 U/L (ref 55–135)
ALT SERPL W/O P-5'-P-CCNC: 35 U/L (ref 10–44)
ANION GAP SERPL CALC-SCNC: 11 MMOL/L (ref 8–16)
AST SERPL-CCNC: 44 U/L (ref 10–40)
BACTERIA #/AREA URNS HPF: ABNORMAL /HPF
BASOPHILS # BLD AUTO: 0.04 K/UL (ref 0–0.2)
BASOPHILS NFR BLD: 0.5 % (ref 0–1.9)
BILIRUB SERPL-MCNC: 0.5 MG/DL (ref 0.1–1)
BILIRUB UR QL STRIP: NEGATIVE
BNP SERPL-MCNC: 548 PG/ML (ref 0–99)
BUN SERPL-MCNC: 23 MG/DL (ref 8–23)
CALCIUM SERPL-MCNC: 8.8 MG/DL (ref 8.7–10.5)
CHLORIDE SERPL-SCNC: 105 MMOL/L (ref 95–110)
CLARITY UR: CLEAR
CO2 SERPL-SCNC: 27 MMOL/L (ref 23–29)
COLOR UR: YELLOW
CREAT SERPL-MCNC: 1.5 MG/DL (ref 0.5–1.4)
DIFFERENTIAL METHOD: ABNORMAL
EOSINOPHIL # BLD AUTO: 0 K/UL (ref 0–0.5)
EOSINOPHIL NFR BLD: 0.2 % (ref 0–8)
ERYTHROCYTE [DISTWIDTH] IN BLOOD BY AUTOMATED COUNT: 12.6 % (ref 11.5–14.5)
EST. GFR  (AFRICAN AMERICAN): 47 ML/MIN/1.73 M^2
EST. GFR  (NON AFRICAN AMERICAN): 40 ML/MIN/1.73 M^2
GLUCOSE SERPL-MCNC: 153 MG/DL (ref 70–110)
GLUCOSE UR QL STRIP: ABNORMAL
HCT VFR BLD AUTO: 43.8 % (ref 40–54)
HGB BLD-MCNC: 13.9 G/DL (ref 14–18)
HGB UR QL STRIP: ABNORMAL
HYALINE CASTS #/AREA URNS LPF: 0 /LPF
KETONES UR QL STRIP: ABNORMAL
LEUKOCYTE ESTERASE UR QL STRIP: NEGATIVE
LIPASE SERPL-CCNC: 14 U/L (ref 4–60)
LYMPHOCYTES # BLD AUTO: 1.1 K/UL (ref 1–4.8)
LYMPHOCYTES NFR BLD: 12.9 % (ref 18–48)
MCH RBC QN AUTO: 30.3 PG (ref 27–31)
MCHC RBC AUTO-ENTMCNC: 31.7 G/DL (ref 32–36)
MCV RBC AUTO: 96 FL (ref 82–98)
MICROSCOPIC COMMENT: ABNORMAL
MONOCYTES # BLD AUTO: 0.6 K/UL (ref 0.3–1)
MONOCYTES NFR BLD: 7 % (ref 4–15)
NEUTROPHILS # BLD AUTO: 6.6 K/UL (ref 1.8–7.7)
NEUTROPHILS NFR BLD: 79.9 % (ref 38–73)
NITRITE UR QL STRIP: NEGATIVE
PH UR STRIP: 6 [PH] (ref 5–8)
PLATELET # BLD AUTO: 246 K/UL (ref 150–350)
PMV BLD AUTO: 10.7 FL (ref 9.2–12.9)
POTASSIUM SERPL-SCNC: 4.6 MMOL/L (ref 3.5–5.1)
PROT SERPL-MCNC: 7.5 G/DL (ref 6–8.4)
PROT UR QL STRIP: ABNORMAL
RBC # BLD AUTO: 4.58 M/UL (ref 4.6–6.2)
RBC #/AREA URNS HPF: 5 /HPF (ref 0–4)
SODIUM SERPL-SCNC: 143 MMOL/L (ref 136–145)
SP GR UR STRIP: 1.02 (ref 1–1.03)
SQUAMOUS #/AREA URNS HPF: 2 /HPF
TROPONIN I SERPL DL<=0.01 NG/ML-MCNC: 0.04 NG/ML (ref 0–0.03)
URN SPEC COLLECT METH UR: ABNORMAL
UROBILINOGEN UR STRIP-ACNC: NEGATIVE EU/DL
WBC # BLD AUTO: 8.34 K/UL (ref 3.9–12.7)
WBC #/AREA URNS HPF: 2 /HPF (ref 0–5)
YEAST URNS QL MICRO: ABNORMAL

## 2019-09-13 PROCEDURE — S0028 INJECTION, FAMOTIDINE, 20 MG: HCPCS | Mod: HCNC | Performed by: EMERGENCY MEDICINE

## 2019-09-13 PROCEDURE — 25000003 PHARM REV CODE 250: Mod: HCNC | Performed by: EMERGENCY MEDICINE

## 2019-09-13 PROCEDURE — 80053 COMPREHEN METABOLIC PANEL: CPT | Mod: HCNC

## 2019-09-13 PROCEDURE — 84484 ASSAY OF TROPONIN QUANT: CPT | Mod: HCNC

## 2019-09-13 PROCEDURE — 83690 ASSAY OF LIPASE: CPT | Mod: HCNC

## 2019-09-13 PROCEDURE — G0378 HOSPITAL OBSERVATION PER HR: HCPCS | Mod: HCNC

## 2019-09-13 PROCEDURE — 85025 COMPLETE CBC W/AUTO DIFF WBC: CPT | Mod: HCNC

## 2019-09-13 PROCEDURE — 96375 TX/PRO/DX INJ NEW DRUG ADDON: CPT | Mod: HCNC

## 2019-09-13 PROCEDURE — 93010 ELECTROCARDIOGRAM REPORT: CPT | Mod: HCNC,,, | Performed by: INTERNAL MEDICINE

## 2019-09-13 PROCEDURE — 93005 ELECTROCARDIOGRAM TRACING: CPT | Mod: HCNC

## 2019-09-13 PROCEDURE — 96372 THER/PROPH/DIAG INJ SC/IM: CPT | Mod: 59

## 2019-09-13 PROCEDURE — 93010 EKG 12-LEAD: ICD-10-PCS | Mod: HCNC,76,, | Performed by: INTERNAL MEDICINE

## 2019-09-13 PROCEDURE — 81000 URINALYSIS NONAUTO W/SCOPE: CPT | Mod: HCNC

## 2019-09-13 PROCEDURE — 96376 TX/PRO/DX INJ SAME DRUG ADON: CPT | Mod: HCNC

## 2019-09-13 PROCEDURE — 96374 THER/PROPH/DIAG INJ IV PUSH: CPT | Mod: HCNC

## 2019-09-13 PROCEDURE — 99285 EMERGENCY DEPT VISIT HI MDM: CPT | Mod: 25,HCNC

## 2019-09-13 PROCEDURE — 63600175 PHARM REV CODE 636 W HCPCS: Mod: HCNC | Performed by: EMERGENCY MEDICINE

## 2019-09-13 PROCEDURE — 93010 ELECTROCARDIOGRAM REPORT: CPT | Mod: HCNC,76,, | Performed by: INTERNAL MEDICINE

## 2019-09-13 PROCEDURE — 83880 ASSAY OF NATRIURETIC PEPTIDE: CPT | Mod: HCNC

## 2019-09-13 RX ORDER — FAMOTIDINE 20 MG/1
20 TABLET, FILM COATED ORAL DAILY
Status: CANCELLED | OUTPATIENT
Start: 2019-09-14

## 2019-09-13 RX ORDER — TRAMADOL HYDROCHLORIDE 50 MG/1
50 TABLET ORAL
Status: ACTIVE | OUTPATIENT
Start: 2019-09-13 | End: 2019-09-14

## 2019-09-13 RX ORDER — ONDANSETRON 4 MG/1
TABLET, FILM COATED ORAL
Qty: 45 TABLET | Refills: 12 | Status: SHIPPED | OUTPATIENT
Start: 2019-09-13 | End: 2019-09-18

## 2019-09-13 RX ORDER — OXYBUTYNIN CHLORIDE 5 MG/1
10 TABLET, EXTENDED RELEASE ORAL DAILY
Status: DISCONTINUED | OUTPATIENT
Start: 2019-09-14 | End: 2019-09-14 | Stop reason: HOSPADM

## 2019-09-13 RX ORDER — ACETAMINOPHEN 500 MG
500 TABLET ORAL EVERY 6 HOURS PRN
Status: DISCONTINUED | OUTPATIENT
Start: 2019-09-14 | End: 2019-09-14 | Stop reason: HOSPADM

## 2019-09-13 RX ORDER — ONDANSETRON 2 MG/ML
8 INJECTION INTRAMUSCULAR; INTRAVENOUS EVERY 8 HOURS PRN
Status: DISCONTINUED | OUTPATIENT
Start: 2019-09-14 | End: 2019-09-14 | Stop reason: HOSPADM

## 2019-09-13 RX ORDER — LISINOPRIL 20 MG/1
40 TABLET ORAL
Status: COMPLETED | OUTPATIENT
Start: 2019-09-13 | End: 2019-09-13

## 2019-09-13 RX ORDER — TRAMADOL HYDROCHLORIDE 50 MG/1
50 TABLET ORAL 3 TIMES DAILY PRN
Status: DISCONTINUED | OUTPATIENT
Start: 2019-09-13 | End: 2019-09-14 | Stop reason: HOSPADM

## 2019-09-13 RX ORDER — CLONIDINE HYDROCHLORIDE 0.1 MG/1
0.1 TABLET ORAL 3 TIMES DAILY PRN
Status: DISCONTINUED | OUTPATIENT
Start: 2019-09-14 | End: 2019-09-14 | Stop reason: HOSPADM

## 2019-09-13 RX ORDER — GABAPENTIN 300 MG/1
300 CAPSULE ORAL
Status: COMPLETED | OUTPATIENT
Start: 2019-09-13 | End: 2019-09-13

## 2019-09-13 RX ORDER — RAMELTEON 8 MG/1
8 TABLET ORAL NIGHTLY PRN
Status: DISCONTINUED | OUTPATIENT
Start: 2019-09-13 | End: 2019-09-14 | Stop reason: HOSPADM

## 2019-09-13 RX ORDER — AMOXICILLIN 250 MG
1 CAPSULE ORAL 2 TIMES DAILY
Status: CANCELLED | OUTPATIENT
Start: 2019-09-13

## 2019-09-13 RX ORDER — ENOXAPARIN SODIUM 100 MG/ML
40 INJECTION SUBCUTANEOUS EVERY 24 HOURS
Status: DISCONTINUED | OUTPATIENT
Start: 2019-09-13 | End: 2019-09-14 | Stop reason: HOSPADM

## 2019-09-13 RX ORDER — SODIUM CHLORIDE 0.9 % (FLUSH) 0.9 %
10 SYRINGE (ML) INJECTION
Status: CANCELLED | OUTPATIENT
Start: 2019-09-13

## 2019-09-13 RX ORDER — ACETAMINOPHEN 325 MG/1
650 TABLET ORAL EVERY 8 HOURS PRN
Status: CANCELLED | OUTPATIENT
Start: 2019-09-13

## 2019-09-13 RX ORDER — ONDANSETRON 2 MG/ML
4 INJECTION INTRAMUSCULAR; INTRAVENOUS
Status: COMPLETED | OUTPATIENT
Start: 2019-09-13 | End: 2019-09-13

## 2019-09-13 RX ORDER — QUETIAPINE FUMARATE 25 MG/1
50 TABLET, FILM COATED ORAL NIGHTLY
Status: DISCONTINUED | OUTPATIENT
Start: 2019-09-13 | End: 2019-09-14 | Stop reason: HOSPADM

## 2019-09-13 RX ORDER — PRAVASTATIN SODIUM 40 MG/1
40 TABLET ORAL DAILY
Status: DISCONTINUED | OUTPATIENT
Start: 2019-09-14 | End: 2019-09-14 | Stop reason: HOSPADM

## 2019-09-13 RX ORDER — FAMOTIDINE 10 MG/ML
20 INJECTION INTRAVENOUS
Status: COMPLETED | OUTPATIENT
Start: 2019-09-13 | End: 2019-09-13

## 2019-09-13 RX ORDER — VENLAFAXINE HYDROCHLORIDE 75 MG/1
150 CAPSULE, EXTENDED RELEASE ORAL DAILY
Status: DISCONTINUED | OUTPATIENT
Start: 2019-09-14 | End: 2019-09-14 | Stop reason: HOSPADM

## 2019-09-13 RX ORDER — LISINOPRIL 20 MG/1
40 TABLET ORAL DAILY
Status: DISCONTINUED | OUTPATIENT
Start: 2019-09-14 | End: 2019-09-14 | Stop reason: HOSPADM

## 2019-09-13 RX ORDER — ALFUZOSIN HYDROCHLORIDE 10 MG/1
10 TABLET, EXTENDED RELEASE ORAL DAILY
Status: DISCONTINUED | OUTPATIENT
Start: 2019-09-14 | End: 2019-09-14 | Stop reason: HOSPADM

## 2019-09-13 RX ORDER — GABAPENTIN 300 MG/1
900 CAPSULE ORAL 3 TIMES DAILY
Status: DISCONTINUED | OUTPATIENT
Start: 2019-09-14 | End: 2019-09-14 | Stop reason: HOSPADM

## 2019-09-13 RX ORDER — SODIUM CHLORIDE 9 MG/ML
INJECTION, SOLUTION INTRAVENOUS CONTINUOUS
Status: DISCONTINUED | OUTPATIENT
Start: 2019-09-14 | End: 2019-09-14

## 2019-09-13 RX ORDER — QUETIAPINE FUMARATE 25 MG/1
50 TABLET, FILM COATED ORAL
Status: COMPLETED | OUTPATIENT
Start: 2019-09-13 | End: 2019-09-13

## 2019-09-13 RX ORDER — DONEPEZIL HYDROCHLORIDE 5 MG/1
5 TABLET, FILM COATED ORAL NIGHTLY
Status: DISCONTINUED | OUTPATIENT
Start: 2019-09-13 | End: 2019-09-14 | Stop reason: HOSPADM

## 2019-09-13 RX ORDER — LEVOTHYROXINE SODIUM 75 UG/1
75 TABLET ORAL
Status: DISCONTINUED | OUTPATIENT
Start: 2019-09-14 | End: 2019-09-14 | Stop reason: HOSPADM

## 2019-09-13 RX ORDER — AMOXICILLIN 250 MG
1 CAPSULE ORAL 2 TIMES DAILY PRN
Status: DISCONTINUED | OUTPATIENT
Start: 2019-09-14 | End: 2019-09-14 | Stop reason: HOSPADM

## 2019-09-13 RX ORDER — POLYETHYLENE GLYCOL 3350 17 G/17G
17 POWDER, FOR SOLUTION ORAL DAILY
Status: CANCELLED | OUTPATIENT
Start: 2019-09-14

## 2019-09-13 RX ADMIN — LISINOPRIL 40 MG: 20 TABLET ORAL at 09:09

## 2019-09-13 RX ADMIN — FAMOTIDINE 20 MG: 10 INJECTION INTRAVENOUS at 05:09

## 2019-09-13 RX ADMIN — ONDANSETRON HYDROCHLORIDE 4 MG: 2 SOLUTION INTRAMUSCULAR; INTRAVENOUS at 07:09

## 2019-09-13 RX ADMIN — DONEPEZIL HYDROCHLORIDE 5 MG: 5 TABLET, FILM COATED ORAL at 11:09

## 2019-09-13 RX ADMIN — ENOXAPARIN SODIUM 40 MG: 100 INJECTION SUBCUTANEOUS at 11:09

## 2019-09-13 RX ADMIN — GABAPENTIN 300 MG: 300 CAPSULE ORAL at 07:09

## 2019-09-13 RX ADMIN — QUETIAPINE FUMARATE 50 MG: 25 TABLET ORAL at 10:09

## 2019-09-13 RX ADMIN — ONDANSETRON HYDROCHLORIDE 4 MG: 2 SOLUTION INTRAMUSCULAR; INTRAVENOUS at 05:09

## 2019-09-13 NOTE — ED TRIAGE NOTES
"Patient presents to ED via EMS from home.  Patient with history of dementia and states he just keeps "throwing up"  "

## 2019-09-13 NOTE — TELEPHONE ENCOUNTER
----- Message from Griselda Damon sent at 9/13/2019  7:58 AM CDT -----  Contact: Self   Type: Patient Call Back    What is the request in detail: Pt calling to speak to a nurse regarding vomiting and feeling nauseous. Pt would like something called into pharmacy.     Can the clinic reply by [facility]? No    Would the patient rather a call back or a response via [facility]? Call back     Best call back number: [phone]      [address] [address]  Phone: [phone] Fax: [phone]

## 2019-09-13 NOTE — TELEPHONE ENCOUNTER
Reassure wife we will send in some Zofran to the pharmacy to suppress nausea but if indeed the vomiting continues he might need to be brought to the emergency room by ambulance if difficult for her to get him in the car and if he is weak.  Hopefully was just a 1 night thing.  Try to get him hydrated with some ginger ale, 7 up, Gatorade or what ever he thinks he can handle    Medications sent

## 2019-09-13 NOTE — TELEPHONE ENCOUNTER
Since nausea can be a symptom of a possible underlying serious medical problem, please call back and get more information from the wife as to how severe this is, is this a new problem, have they simply used some medication in the past to help with the nausea and assess if there any other associated new GI symptoms.      If patient is very ill today he may need to go to the urgent care were be seen in any available appointment, see Maria Del Rosario etc    If they simply need a medication that is help with nausea in the past then that should be noted, possibly a refill done and so forth.    Let me know ASAP

## 2019-09-13 NOTE — TELEPHONE ENCOUNTER
Had a heavy meal last night. Started vomiting last night and this morning. Vomited about an hour ago no food coming up anymore just green. Patient is weal. Wife stated she could not bring him in he is hard for her to help him. Vomited a lot last night. Please advise. [place]

## 2019-09-13 NOTE — ED PROVIDER NOTES
Encounter Date: 9/13/2019    SCRIBE #1 NOTE: I, Roberto Mejia, am scribing for, and in the presence of,  Mary Jasso MD. I have scribed the following portions of the note - Other sections scribed: HPI, ROS, PE.       History     Chief Complaint   Patient presents with    Vomiting     with nause onset 9/12/19.  Family report vomiting since last night.  EMS reports no nause, only dry heaving and c/o nausea.       CC: vomiting    HPI:  History limited secondary to condition of the patient. This is a 89 y.o. male with history of dementia who presents to the Emergency Department with a cc emesis beginning last night.  Pt reports associated right upper dental pain. Pt denies fever, CP, abdominal pain, SOB, diarrhea, dysuria, nausea, or sore throat. Further history limited due to patient gagging and retching in the emergency department.    Collateral history obtained from patient's wife arrived in the emergency department later reports that patient was in his usual state of health until after a large dinner last night that the entire family ate.  No other family members are having similar symptoms however patient developed severe and continuous nausea and vomiting that lasted throughout the entire night and stop this morning.  Patient's wife contacted his primary physician who called in a prescription for Zofran and counseled her on supportive care including oral hydration.  Wife administered Zofran but reports that patient continue to have nausea vomiting and gagging, so she brought him to the emergency department.  Wife reports that patient has a history of trigeminal neuralgia for which she frequently complains of right upper dental pain. She reports that this is closely managed by his outpatient doctors.        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    Chronic headache 9/12/2014    Dementia     worked up for NPH at  and had taps with no improvement    Depression     Hyperlipidemia     Hypertension     Hypothyroidism     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    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    Spinal stenosis     Trigeminal neuralgia pain 1/20/2015    Trouble in sleeping     Vertigo 9/11/2012    Vestibular schwannoma 6/4/2015     Past Surgical History:   Procedure Laterality Date    bilateral total knee arthroplasties      CATARACT EXTRACTION W/  INTRAOCULAR LENS IMPLANT Bilateral     CHOLECYSTECTOMY      EYE SURGERY      GALLBLADDER SURGERY      PERCUTANEOUS INJECTION-TRIGEMINAL NERVE Right 2/27/2018    Performed by Mahendra Rodriguez MD at [facility] OR [address] [place] [address]    THYROIDECTOMY      TONSILLECTOMY       Family History   Problem Relation Age of Onset    Heart disease Mother     No Known Problems Father     No Known Problems Sister     No Known Problems Brother     No Known Problems Daughter     No Known Problems Son     No Known Problems Sister     No Known Problems Brother     No Known Problems Brother     No Known Problems Brother     No Known Problems Maternal Aunt     No Known Problems Maternal Uncle     No Known Problems Paternal Aunt     No Known Problems Paternal Uncle     No Known Problems Maternal Grandmother     No Known Problems Maternal Grandfather     No Known Problems Paternal Grandmother     No Known Problems Paternal Grandfather     Suicide Neg Hx     Schizophrenia Neg Hx     Amblyopia Neg Hx     Blindness Neg Hx     Cataracts Neg Hx     Glaucoma Neg Hx     Macular degeneration Neg Hx     Retinal detachment Neg Hx     Strabismus Neg Hx     Cancer Neg Hx     Diabetes Neg Hx     Hypertension  Neg Hx     Stroke Neg Hx     Thyroid disease Neg Hx      Social History     Tobacco Use    Smoking status: Former Smoker    Smokeless tobacco: Never Used   Substance Use Topics    Alcohol use: No     Alcohol/week: 0.0 oz    Drug use: No     Review of Systems   Constitutional: Negative for fever.   HENT: Positive for dental problem. Negative for sore throat.    Respiratory: Negative for shortness of breath.    Cardiovascular: Negative for chest pain.   Gastrointestinal: Positive for vomiting. Negative for abdominal pain, diarrhea and nausea.   Genitourinary: Negative for dysuria.   Musculoskeletal: Negative for back pain.   Skin: Negative for rash.   Neurological: Negative for weakness.   Hematological: Does not bruise/bleed easily.       Physical Exam     Initial Vitals [09/13/19 1618]   BP Pulse Resp Temp SpO2   (!) 182/82 65 16 97.1 °F (36.2 °C) (!) 94 %      MAP       --         Physical Exam    Nursing note and vitals reviewed.  Constitutional: He is not diaphoretic. No distress.   HENT:   Head: Normocephalic and atraumatic.   Mouth/Throat: Oropharynx is clear and moist.   No focal oral tenderness, no gum tenderness, Edentulous   Eyes: Conjunctivae and EOM are normal. Pupils are equal, round, and reactive to light. No scleral icterus.   Neck: Normal range of motion. Neck supple. No JVD present.   Cardiovascular: Normal rate, regular rhythm and intact distal pulses.   Pulmonary/Chest: Breath sounds normal. No stridor. No respiratory distress.   Abdominal: Soft. Bowel sounds are normal. He exhibits no distension. There is no tenderness.   Musculoskeletal: Normal range of motion. He exhibits no edema or tenderness.   Neurological: He is alert.   Disoriented to time, oriented to place and person   Skin: Skin is warm and dry. No rash noted.   Psychiatric: He has a normal mood and affect.         ED Course   Procedures  Labs Reviewed   CBC W/ AUTO DIFFERENTIAL - Abnormal; Notable for the following components:        Result Value    RBC 4.58 (*)     Hemoglobin 13.9 (*)     Mean Corpuscular Hemoglobin Conc 31.7 (*)     Gran% 79.9 (*)     Lymph% 12.9 (*)     All other components within normal limits   COMPREHENSIVE METABOLIC PANEL - Abnormal; Notable for the following components:    Glucose 153 (*)     Creatinine 1.5 (*)     AST 44 (*)     eGFR if  47 (*)     eGFR if non  40 (*)     All other components within normal limits   TROPONIN I - Abnormal; Notable for the following components:    Troponin I 0.037 (*)     All other components within normal limits   B-TYPE NATRIURETIC PEPTIDE - Abnormal; Notable for the following components:     (*)     All other components within normal limits   URINALYSIS, REFLEX TO URINE CULTURE - Abnormal; Notable for the following components:    Protein, UA 2+ (*)     Glucose, UA 1+ (*)     Ketones, UA Trace (*)     Occult Blood UA 1+ (*)     All other components within normal limits    Narrative:     Preferred Collection Type->Urine, Clean Catch   URINALYSIS MICROSCOPIC - Abnormal; Notable for the following components:    RBC, UA 5 (*)     Yeast, UA Few (*)     All other components within normal limits    Narrative:     Preferred Collection Type->Urine, Clean Catch   LIPASE     EKG Readings: (Independently Interpreted)   Initial Reading: No STEMI. Rhythm: Normal Sinus Rhythm. Heart Rate: 65. Conduction: RBBB. ST Segments: Non-Specific ST Segment Depression. Axis: Normal.       Imaging Results          X-Ray Chest 1 View (Final result)  Result time 09/13/19 20:33:15    Final result by Smith Salmeron MD (09/13/19 20:33:15)                 Impression:      Subsegmental atelectasis in the lung bases.  Otherwise, no acute process.      Electronically signed by: Smith Salmeron MD  Date:    09/13/2019  Time:    20:33             Narrative:    EXAMINATION:  XR CHEST 1 VIEW    CLINICAL HISTORY:  Vomiting, unspecified    TECHNIQUE:  Single frontal view of the chest was  performed.    COMPARISON:  02/20/2018.    FINDINGS:  Monitoring EKG leads are present.  The trachea is unremarkable.  There are calcifications of the aortic knob.  The cardiac silhouette is upper limits of normal.  There are calcified perihilar lymph nodes.  The hemidiaphragms are unremarkable.  There is no evidence of free air beneath the hemidiaphragms.  There are no pleural effusions.  There is no evidence of a pneumothorax.  There is no evidence of pneumomediastinum.  No airspace opacity is present.  The areas of subsegmental atelectasis in the lung bases.  The osseous structures are unremarkable.                               CT Abdomen Pelvis  Without Contrast (Final result)  Result time 09/13/19 20:04:10    Final result by Smith Salmeron MD (09/13/19 20:04:10)                 Impression:      No CT findings of bowel obstruction.    Moderate colonic stool burden.  The findings may be seen with constipation in the appropriate clinical setting.    Enlargement of the prostate gland with wall thickening of the urinary bladder.  The findings most likely represent chronic bladder outlet obstruction.    Advanced degenerative changes in the lumbar spine with scattered the disc lesions.  MRI of the lumbar spine may be attempted for follow-up.    Nonobstructing left-sided nephrolithiasis.      Electronically signed by: Smith Salmeron MD  Date:    09/13/2019  Time:    20:04             Narrative:    EXAMINATION:  CT ABDOMEN PELVIS WITHOUT CONTRAST    CLINICAL HISTORY:  Bowel obstruction, low-grade;intractable nausea, vomiting, r/o bowel obstruction;    TECHNIQUE:  Axial CT scan of the abdomen and pelvis was obtained from the level of the hemidiaphragms to the pubic symphysis without intravenous contrast. Coronal and sagittal reformats were obtained.  There are some motion limitations to the exam.    COMPARISON:  CT abdomen pelvis dated 01/28/2017.    FINDINGS:  There are no pleural effusions.  There is no evidence of a  pneumothorax.  There are dependent changes in the lung bases.  No discrete pulmonary nodule is identified.    The heart is unremarkable.  There is minimal focal ectasia involving the infrarenal abdominal aorta.  There are calcifications involving the abdominal aorta.  There is no evidence of lymphadenopathy in the abdomen or pelvis.    The esophagus, stomach, and duodenum are within normal limits.  The small bowel loops are unremarkable.  The appendix is not definitely identified.  There are no secondary findings of acute appendicitis.  There is colonic diverticula without evidence of acute diverticulitis.    The liver is unremarkable.  The gallbladder is not visualized and may be collapsed or surgically absent.  The biliary tree is within normal limits.  The spleen is unremarkable.  The pancreas is within normal limits.    The adrenal glands are unremarkable.  There is a 1.7 cm simple cyst in the lower pole of the right kidney.  There is a 4.5 cm simple cyst upper pole the left kidney.  There is a 5 mm nonobstructing stone in the lower pole of the left kidney.  The ureters are unremarkable.  There is wall thickening of the urinary bladder.  The prostate gland is enlarged.    There is no evidence of free fluid in the abdomen or pelvis.  There is no evidence of free air.  There is no evidence of pneumatosis.  No portal venous air is identified.    The psoas margins are unremarkable.  The abdominal wall is within normal limits.  There are significant degenerative changes in the lumbar spine.  There are scattered lytic lesions in the upper lumbar vertebral bodies.  No compression fracture is identified.                              X-Rays:   Independently Interpreted Readings:   Chest X-Ray: No infiltrates.     Medical Decision Making:   History:   Old Medical Records: I decided to obtain old medical records.  Old Records Summarized: other records.       <> Summary of Records: Recent communication with primary  physician regarding current symptoms.  Primary physician called in an order for Zofran for patient.  Differential Diagnosis:   GERD, gastritis, gastroenteritis, pancreatitis, hepatobiliary disease, occult infection, bowel obstruction, ACS  Independently Interpreted Test(s):   I have ordered and independently interpreted X-rays - see prior notes.  I have ordered and independently interpreted EKG Reading(s) - see prior notes  Clinical Tests:   Lab Tests: Ordered and Reviewed  Radiological Study: Ordered and Reviewed  Medical Tests: Ordered and Reviewed  ED Management:  Patient is afebrile but actively gagging and retching at time history and physical.  EKG does not suggest STEMI.  Vital signs are within acceptable limits. Patient given antiemetic with initial resolution of nausea and vomiting.  Labs without leukocytosis.  Patient has renal insufficiency but appears comparable to prior.  Troponin is elevated at 0.037.  BNP is mildly elevated however patient is not hypoxic, tachypneic or in respiratory distress. Chest x-ray without evidence of pneumonia.  Patient sent for CT scan of the abdomen pelvis which does not demonstrate acute surgical pathology.  There is moderate colonic stool burden as well as nonobstructing left-sided renal stones.  Patient has had further episodes of emesis since.  He is to be placed in observation for intractable nausea and vomiting as well as elevated troponin.  Urinalysis is pending to rule out UTI requiring antibiotics.    Patient care transferred to Dr. Grimes who will follow up urinalysis results and place patient in observation.  This chart was completed using dictation software, as a result there may be some transcription errors.          Scribe attestation: I, Dr. Mary Jasso, personally performed the services described in this documentation. All medical record entries made by the scribe were at my direction and in my presence.  I have reviewed the chart and agree that the  record reflects my personal performance and is accurate and complete.    PHYSICIAN PROGRESS NOTE    Patient received as signout from Dr. Jasso. His note is above, and the below is from me, Dr. Grimes.    89 yo male presented via EMS with nausea/vomiting since last PM. Patient ate a large dinner last night with family; no other family members are ill. Daughter reports patient chronically has constipation but has not had vomiting like this in the recent past. No relief with zofran at home.    Ddx includes dehydration, FADI, UTI, obstruction, other.    PMH: Parkinsonism, dementia, trigeminal neuralgia, vestibular schwannoma of R ear s/p gamma knife x 2, atherosclerosis of aorta, HTN, DLD, hypothyroidism, spinal stenosis, arthritis, choroidal nevus of R eye, cataract, headache, trouble sleeping, vertigo  Psych: depression  PSH: bilateral total knee, bilateral cataracts with IOL, cholecystectomy, trigeminal nerve injection R, thyroidectomy, tonsillectomy    On exam, this is a confused elderly male, at baseline mentation per family. He is nontoxic-appearing.    Vitals with HTN, otherwise reassuring.    EKG 17:12: NSR, HR 65. First degree AV block. RBBB. No STEMI.   EKG 20:14: NSR, HR 78. RBBB. No STEMI.   C/w 2/20/18.    Labs: BUN 23 / creatinine 1.5, c/w prior. , mildly elevated. Troponin 0.037, mildly elevated, higher than priors, but no recent values. UA without infection.     CXR NAD.    CT abdom/pelvis without acute pathology to explain vomiting.    Vomiting improved s/p zofran x 2, pepcid x 1. Patient vomited tramadol for trigeminal neuralgia pain. He did tolerate PO gabapentin. He also tolerated PO lisinopril for HTN.    At present, BP elevated. I will tx as needed to keep SBP < 200. I have also ordered seroquel 50mg as patient is on this QHS for sundowning.    I discussed patient for OBS for intractable vomiting and elevated troponin with nocturnist Dr. Romero. I have written courtesy orders.     Luzmaria NELSON  Cornelio                      Clinical Impression:       ICD-10-CM ICD-9-CM   1. Elevated troponin R74.8 790.6   2. Emesis R11.10 787.03   3. Intractable vomiting with nausea, unspecified vomiting type R11.2 536.2         Disposition:   Disposition: Placed in Observation  Condition: Alexandra Grimes MD  09/13/19 4721

## 2019-09-14 VITALS
RESPIRATION RATE: 16 BRPM | DIASTOLIC BLOOD PRESSURE: 67 MMHG | TEMPERATURE: 98 F | OXYGEN SATURATION: 95 % | SYSTOLIC BLOOD PRESSURE: 149 MMHG | BODY MASS INDEX: 25.47 KG/M2 | HEART RATE: 64 BPM | WEIGHT: 198.44 LBS | HEIGHT: 74 IN

## 2019-09-14 LAB
ALBUMIN SERPL BCP-MCNC: 3.3 G/DL (ref 3.5–5.2)
ALP SERPL-CCNC: 57 U/L (ref 55–135)
ALT SERPL W/O P-5'-P-CCNC: 27 U/L (ref 10–44)
ANION GAP SERPL CALC-SCNC: 6 MMOL/L (ref 8–16)
ANION GAP SERPL CALC-SCNC: 8 MMOL/L (ref 8–16)
AORTIC ROOT ANNULUS: 3.17 CM
AORTIC VALVE CUSP SEPERATION: 1.64 CM
ASCENDING AORTA: 2.51 CM
AST SERPL-CCNC: 29 U/L (ref 10–40)
AV PEAK GRADIENT: 4 MMHG
AV VELOCITY RATIO: 0.83
BASOPHILS # BLD AUTO: 0.03 K/UL (ref 0–0.2)
BASOPHILS NFR BLD: 0.3 % (ref 0–1.9)
BILIRUB SERPL-MCNC: 0.4 MG/DL (ref 0.1–1)
BSA FOR ECHO PROCEDURE: 2.2 M2
BUN SERPL-MCNC: 24 MG/DL (ref 8–23)
BUN SERPL-MCNC: 25 MG/DL (ref 8–23)
CALCIUM SERPL-MCNC: 8.4 MG/DL (ref 8.7–10.5)
CALCIUM SERPL-MCNC: 8.5 MG/DL (ref 8.7–10.5)
CHLORIDE SERPL-SCNC: 106 MMOL/L (ref 95–110)
CHLORIDE SERPL-SCNC: 106 MMOL/L (ref 95–110)
CHOLEST SERPL-MCNC: 227 MG/DL (ref 120–199)
CHOLEST/HDLC SERPL: 5.5 {RATIO} (ref 2–5)
CO2 SERPL-SCNC: 28 MMOL/L (ref 23–29)
CO2 SERPL-SCNC: 32 MMOL/L (ref 23–29)
CREAT SERPL-MCNC: 1.6 MG/DL (ref 0.5–1.4)
CREAT SERPL-MCNC: 1.6 MG/DL (ref 0.5–1.4)
CV ECHO LV RWT: 0.44 CM
DIFFERENTIAL METHOD: ABNORMAL
DOP CALC AO PEAK VEL: 1.02 M/S
DOP CALC LVOT AREA: 2.9 CM2
DOP CALC LVOT DIAMETER: 1.92 CM
DOP CALC LVOT PEAK VEL: 0.85 M/S
DOP CALC LVOT STROKE VOLUME: 57.24 CM3
DOP CALCLVOT PEAK VEL VTI: 19.78 CM
E WAVE DECELERATION TIME: 232.29 MSEC
E/A RATIO: 0.9
E/E' RATIO: 15.69 M/S
ECHO LV POSTERIOR WALL: 1.09 CM (ref 0.6–1.1)
EOSINOPHIL # BLD AUTO: 0.1 K/UL (ref 0–0.5)
EOSINOPHIL NFR BLD: 0.7 % (ref 0–8)
ERYTHROCYTE [DISTWIDTH] IN BLOOD BY AUTOMATED COUNT: 12.7 % (ref 11.5–14.5)
EST. GFR  (AFRICAN AMERICAN): 43 ML/MIN/1.73 M^2
EST. GFR  (AFRICAN AMERICAN): 43 ML/MIN/1.73 M^2
EST. GFR  (NON AFRICAN AMERICAN): 37 ML/MIN/1.73 M^2
EST. GFR  (NON AFRICAN AMERICAN): 37 ML/MIN/1.73 M^2
FRACTIONAL SHORTENING: 30 % (ref 28–44)
GLUCOSE SERPL-MCNC: 101 MG/DL (ref 70–110)
GLUCOSE SERPL-MCNC: 110 MG/DL (ref 70–110)
HCT VFR BLD AUTO: 45.3 % (ref 40–54)
HDLC SERPL-MCNC: 41 MG/DL (ref 40–75)
HDLC SERPL: 18.1 % (ref 20–50)
HGB BLD-MCNC: 14.1 G/DL (ref 14–18)
INTERVENTRICULAR SEPTUM: 1.04 CM (ref 0.6–1.1)
IVRT: 0.06 MSEC
LA MAJOR: 4.11 CM
LA MINOR: 5 CM
LA WIDTH: 4.09 CM
LDLC SERPL CALC-MCNC: 161.6 MG/DL (ref 63–159)
LEFT ATRIUM SIZE: 3.23 CM
LEFT ATRIUM VOLUME INDEX: 23.4 ML/M2
LEFT ATRIUM VOLUME: 50.66 CM3
LEFT INTERNAL DIMENSION IN SYSTOLE: 3.45 CM (ref 2.1–4)
LEFT VENTRICLE DIASTOLIC VOLUME INDEX: 52.9 ML/M2
LEFT VENTRICLE DIASTOLIC VOLUME: 114.56 ML
LEFT VENTRICLE MASS INDEX: 89 G/M2
LEFT VENTRICLE SYSTOLIC VOLUME INDEX: 22.6 ML/M2
LEFT VENTRICLE SYSTOLIC VOLUME: 48.97 ML
LEFT VENTRICULAR INTERNAL DIMENSION IN DIASTOLE: 4.93 CM (ref 3.5–6)
LEFT VENTRICULAR MASS: 193.68 G
LV LATERAL E/E' RATIO: 14.57 M/S
LV SEPTAL E/E' RATIO: 17 M/S
LYMPHOCYTES # BLD AUTO: 2.7 K/UL (ref 1–4.8)
LYMPHOCYTES NFR BLD: 30.3 % (ref 18–48)
MAGNESIUM SERPL-MCNC: 2.3 MG/DL (ref 1.6–2.6)
MCH RBC QN AUTO: 30.5 PG (ref 27–31)
MCHC RBC AUTO-ENTMCNC: 31.1 G/DL (ref 32–36)
MCV RBC AUTO: 98 FL (ref 82–98)
MONOCYTES # BLD AUTO: 1 K/UL (ref 0.3–1)
MONOCYTES NFR BLD: 10.8 % (ref 4–15)
MV PEAK A VEL: 1.13 M/S
MV PEAK E VEL: 1.02 M/S
NEUTROPHILS # BLD AUTO: 5.2 K/UL (ref 1.8–7.7)
NEUTROPHILS NFR BLD: 58 % (ref 38–73)
NONHDLC SERPL-MCNC: 186 MG/DL
PHOSPHATE SERPL-MCNC: 3.9 MG/DL (ref 2.7–4.5)
PISA TR MAX VEL: 2.55 M/S
PLATELET # BLD AUTO: 226 K/UL (ref 150–350)
PMV BLD AUTO: 10 FL (ref 9.2–12.9)
POTASSIUM SERPL-SCNC: 4 MMOL/L (ref 3.5–5.1)
POTASSIUM SERPL-SCNC: 4.2 MMOL/L (ref 3.5–5.1)
PROT SERPL-MCNC: 6.5 G/DL (ref 6–8.4)
PULM VEIN S/D RATIO: 0.63
PV PEAK D VEL: 0.67 M/S
PV PEAK S VEL: 0.42 M/S
PV PEAK VELOCITY: 0.84 CM/S
RA MAJOR: 4.57 CM
RA PRESSURE: 8 MMHG
RA WIDTH: 3.62 CM
RBC # BLD AUTO: 4.62 M/UL (ref 4.6–6.2)
RIGHT VENTRICULAR END-DIASTOLIC DIMENSION: 3.47 CM
SINUS: 3.7 CM
SODIUM SERPL-SCNC: 142 MMOL/L (ref 136–145)
SODIUM SERPL-SCNC: 144 MMOL/L (ref 136–145)
STJ: 2.57 CM
TDI LATERAL: 0.07 M/S
TDI SEPTAL: 0.06 M/S
TDI: 0.07 M/S
TR MAX PG: 26 MMHG
TRICUSPID ANNULAR PLANE SYSTOLIC EXCURSION: 2.13 CM
TRIGL SERPL-MCNC: 122 MG/DL (ref 30–150)
TROPONIN I SERPL DL<=0.01 NG/ML-MCNC: 0.05 NG/ML (ref 0–0.03)
TROPONIN I SERPL DL<=0.01 NG/ML-MCNC: 0.06 NG/ML (ref 0–0.03)
TSH SERPL DL<=0.005 MIU/L-ACNC: 1.17 UIU/ML (ref 0.4–4)
TV REST PULMONARY ARTERY PRESSURE: 34 MMHG
WBC # BLD AUTO: 9.05 K/UL (ref 3.9–12.7)

## 2019-09-14 PROCEDURE — 99220 PR INITIAL OBSERVATION CARE,LEVL III: ICD-10-PCS | Mod: HCNC,,, | Performed by: INTERNAL MEDICINE

## 2019-09-14 PROCEDURE — G0378 HOSPITAL OBSERVATION PER HR: HCPCS | Mod: HCNC

## 2019-09-14 PROCEDURE — 36415 COLL VENOUS BLD VENIPUNCTURE: CPT | Mod: HCNC

## 2019-09-14 PROCEDURE — 96361 HYDRATE IV INFUSION ADD-ON: CPT

## 2019-09-14 PROCEDURE — 25000003 PHARM REV CODE 250: Mod: HCNC | Performed by: NURSE PRACTITIONER

## 2019-09-14 PROCEDURE — 99220 PR INITIAL OBSERVATION CARE,LEVL III: CPT | Mod: HCNC,,, | Performed by: INTERNAL MEDICINE

## 2019-09-14 PROCEDURE — 25000003 PHARM REV CODE 250: Mod: HCNC | Performed by: INTERNAL MEDICINE

## 2019-09-14 PROCEDURE — 25000003 PHARM REV CODE 250: Mod: HCNC | Performed by: EMERGENCY MEDICINE

## 2019-09-14 PROCEDURE — 84100 ASSAY OF PHOSPHORUS: CPT | Mod: HCNC

## 2019-09-14 PROCEDURE — 80048 BASIC METABOLIC PNL TOTAL CA: CPT | Mod: HCNC

## 2019-09-14 PROCEDURE — 84484 ASSAY OF TROPONIN QUANT: CPT | Mod: HCNC

## 2019-09-14 PROCEDURE — 85025 COMPLETE CBC W/AUTO DIFF WBC: CPT | Mod: HCNC

## 2019-09-14 PROCEDURE — 63600175 PHARM REV CODE 636 W HCPCS: Mod: HCNC | Performed by: NURSE PRACTITIONER

## 2019-09-14 PROCEDURE — 83735 ASSAY OF MAGNESIUM: CPT | Mod: HCNC

## 2019-09-14 PROCEDURE — 80053 COMPREHEN METABOLIC PANEL: CPT | Mod: HCNC

## 2019-09-14 PROCEDURE — 84484 ASSAY OF TROPONIN QUANT: CPT | Mod: 91,HCNC

## 2019-09-14 PROCEDURE — 84443 ASSAY THYROID STIM HORMONE: CPT | Mod: HCNC

## 2019-09-14 PROCEDURE — 80061 LIPID PANEL: CPT | Mod: HCNC

## 2019-09-14 PROCEDURE — 63600175 PHARM REV CODE 636 W HCPCS: Mod: HCNC | Performed by: INTERNAL MEDICINE

## 2019-09-14 PROCEDURE — 94761 N-INVAS EAR/PLS OXIMETRY MLT: CPT | Mod: HCNC

## 2019-09-14 PROCEDURE — 96360 HYDRATION IV INFUSION INIT: CPT | Mod: 59

## 2019-09-14 RX ORDER — HYDRALAZINE HYDROCHLORIDE 20 MG/ML
10 INJECTION INTRAMUSCULAR; INTRAVENOUS EVERY 8 HOURS PRN
Status: DISCONTINUED | OUTPATIENT
Start: 2019-09-14 | End: 2019-09-14 | Stop reason: HOSPADM

## 2019-09-14 RX ORDER — AMLODIPINE BESYLATE 5 MG/1
10 TABLET ORAL DAILY
Status: DISCONTINUED | OUTPATIENT
Start: 2019-09-14 | End: 2019-09-14 | Stop reason: HOSPADM

## 2019-09-14 RX ADMIN — SODIUM CHLORIDE 250 ML: 0.9 INJECTION, SOLUTION INTRAVENOUS at 08:09

## 2019-09-14 RX ADMIN — GABAPENTIN 900 MG: 300 CAPSULE ORAL at 08:09

## 2019-09-14 RX ADMIN — LISINOPRIL 40 MG: 20 TABLET ORAL at 08:09

## 2019-09-14 RX ADMIN — OXYBUTYNIN CHLORIDE 10 MG: 5 TABLET, EXTENDED RELEASE ORAL at 08:09

## 2019-09-14 RX ADMIN — VENLAFAXINE HYDROCHLORIDE 150 MG: 75 CAPSULE, EXTENDED RELEASE ORAL at 08:09

## 2019-09-14 RX ADMIN — SODIUM CHLORIDE: 0.9 INJECTION, SOLUTION INTRAVENOUS at 12:09

## 2019-09-14 RX ADMIN — GABAPENTIN 900 MG: 300 CAPSULE ORAL at 02:09

## 2019-09-14 RX ADMIN — SODIUM CHLORIDE: 0.9 INJECTION, SOLUTION INTRAVENOUS at 10:09

## 2019-09-14 RX ADMIN — LEVOTHYROXINE SODIUM 75 MCG: 75 TABLET ORAL at 05:09

## 2019-09-14 RX ADMIN — AMLODIPINE BESYLATE 10 MG: 5 TABLET ORAL at 08:09

## 2019-09-14 RX ADMIN — TRAMADOL HYDROCHLORIDE 50 MG: 50 TABLET, FILM COATED ORAL at 07:09

## 2019-09-14 RX ADMIN — CLONIDINE HYDROCHLORIDE 0.1 MG: 0.1 TABLET ORAL at 12:09

## 2019-09-14 RX ADMIN — PRAVASTATIN SODIUM 40 MG: 40 TABLET ORAL at 08:09

## 2019-09-14 NOTE — HOSPITAL COURSE
Placed in observation for evaluation after presentng for NV that has since resolved. The patient was found to have incidental elevated troponin level. Suspected elevated troponin related to his FADI. His creatine is about 1.6 which is near baseline. He was admonished IV fluids overnight. Although patient never complained of chest pain, cardiology consulted 2/2 elevated troponin 1 level with risk factors. 2D echo repeated by cardiology showing preserved LVEF 60% without mention of DD. Vitals stable, symptoms of NV resolved - discharge to home in stable condition - FU in clinic with PCP and cardiology.

## 2019-09-14 NOTE — CONSULTS
[facility]  Cardiology  Consult Note    Patient Name: Ifaenyi Rutherford  MRN: [id number]  Admission Date: 9/13/2019  Hospital Length of Stay: 0 days  Code Status: Full Code   Attending Provider: Isabel Mclean MD   Consulting Provider: Giovanny Duong MD  Primary Care Physician: Jason Levy MD  Principal Problem:Elevated troponin    Patient information was obtained from patient, spouse/SO and ER records.     Inpatient consult to Cardiology  Consult performed by: Giovanny Duong MD  Consult ordered by: PRASHANT Broderick  Reason for consult: elev trop        Subjective:     Chief Complaint:  N/V    HPI:   89 y.o. male with essential hypertension, hyperlipidemia (.0 Dec 2015), CKD stage 3, hypothyroidism (TSH 0.869 Apr 2019), dementia, Parkinson disease, and trigeminal neuralgia secondary to compression of acoustic neuroma who presents to [facility] ED with complaints of nausea and vomiting since last night.  His daughter-in-law reports that he had been vomiting all throughout the night after a family dinner last night and denies any one else is similarly ill.  He is typically functional and is able to feed and dress himself.  He has not been complain of any chest pains and does not have any cardiac history.  He was complaining of some abdominal pain but is unable to further characterize it.  Further history is otherwise limited at this time.    Cardiology has been consulted for elevation of his troponin.  This is a flat pattern in the setting of mild renal insufficiency.  The patient denies any angina or dyspnea.  He presented with recurrent severe vomiting.  He otherwise had no palpitations, lightheadedness, dizziness, or syncope.  There has been no PND, orthopnea, or lower extremity edema.  He denies melena, hematuria, or claudicant symptoms.  He does have a history of bilateral knee replacements which causes him some arthritic type symptoms.  He denies any cardiac history.   His wife is at the bedside and supplements the with history.    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    Spinal stenosis     Trigeminal neuralgia pain 1/20/2015    Trouble in sleeping     Vertigo 9/11/2012    Vestibular schwannoma 6/4/2015       Past Surgical History:   Procedure Laterality Date    bilateral total knee arthroplasties      CATARACT EXTRACTION W/  INTRAOCULAR LENS IMPLANT Bilateral     CHOLECYSTECTOMY      EYE SURGERY      GALLBLADDER SURGERY      PERCUTANEOUS INJECTION-TRIGEMINAL NERVE Right 2/27/2018    Performed by [facility]hendra Rodriguez MD at [facility] OR [address] [place] [address]    THYROIDECTOMY      TONSILLECTOMY         Review of patient's allergies indicates:   Allergen Reactions    Carbamazepine Swelling    Trazodone Anxiety    Demerol [meperidine] Hallucinations    Morphine Nausea Only       No current facility-administered medications on file prior to encounter.      Current Outpatient Medications on File Prior to Encounter   Medication Sig    acetaminophen (TYLENOL) 325 MG tablet Take 2 tablets (650 mg total) by mouth every 6 (six) hours as needed for Pain.    CONSTULOSE 10 gram/15 mL solution     diazePAM (VALIUM) 5 MG tablet Half to whole pill every 8 hours as needed for anxiety    donepezil (ARICEPT) 5 MG tablet Take 1 tablet (5 mg total) by mouth every evening.    gabapentin (NEURONTIN) 300 MG capsule Take 2 capsules (600 mg) by mouth three times daily. (Patient taking differently: Take 900 mg by mouth 3 (three) times  daily. Take 2 capsules (600 mg) by mouth three times daily.)    levothyroxine (SYNTHROID) 75 MCG tablet TAKE 1 TABLET BEFORE BREAKFAST    lidocaine HCl 2% (LIDOCAINE VISCOUS) 2 % Soln by Mucous Membrane route every 6 (six) hours.    polyethylene glycol (GLYCOLAX) 17 gram/dose powder MIX 1 CAPFUL (17GM) IN LIQUID AND DRINK ONE TIME DAILY    pravastatin (PRAVACHOL) 40 MG tablet Take 1 tablet (40 mg total) by mouth once daily.    QUEtiapine (SEROQUEL) 50 MG tablet Take 1 tablet (50 mg total) by mouth every evening. (Patient taking differently: Take 25 mg by mouth every evening. )    traMADol (ULTRAM) 50 mg tablet TAKE ONE TABLET BY MOUTH THREE TIMES DAILY    triamcinolone acetonide 0.1% (KENALOG) 0.1 % cream Apply to the affected area twice a day    venlafaxine (EFFEXOR-XR) 75 MG 24 hr capsule TAKE 2 CAPSULES EVERY DAY    alfuzosin (UROXATRAL) 10 mg Tb24 Take 1 tablet (10 mg total) by mouth once daily.    hydrocodone-acetaminophen 5-325mg (NORCO) 5-325 mg per tablet Take 1 tablet by mouth every 6 (six) hours as needed for Pain.    lisinopril (PRINIVIL,ZESTRIL) 40 MG tablet TAKE 1 TABLET EVERY DAY    mirabegron (MYRBETRIQ) 50 mg Tb24 Take 1 tablet (50 mg total) by mouth once daily.    ondansetron (ZOFRAN) 4 MG tablet 1-2 every 8 hr as needed for nausea    tolterodine (DETROL LA) 4 MG 24 hr capsule Take 1 capsule (4 mg total) by mouth once daily.     Family History     Problem Relation (Age of Onset)    Heart disease Mother    No Known Problems Father, Sister, Brother, Daughter, Son, Sister, Brother, Brother, Brother, Maternal Aunt, Maternal Uncle, Paternal Aunt, Paternal Uncle, Maternal Grandmother, Maternal Grandfather, Paternal Grandmother, Paternal Grandfather        Tobacco Use    Smoking status: Former Smoker    Smokeless tobacco: Never Used   Substance and Sexual Activity    Alcohol use: No     Alcohol/week: 0.0 oz    Drug use: No    Sexual activity: Yes     Partners: Female     Review of  Systems   Constitution: Negative for chills, diaphoresis, fever and malaise/fatigue.   HENT: Negative for nosebleeds.    Eyes: Negative for blurred vision and double vision.   Cardiovascular: Negative for chest pain, claudication, cyanosis, dyspnea on exertion, leg swelling, orthopnea, palpitations, paroxysmal nocturnal dyspnea and syncope.   Respiratory: Negative for cough, shortness of breath and wheezing.    Skin: Negative for dry skin and poor wound healing.   Musculoskeletal: Negative for back pain, joint swelling and myalgias.   Gastrointestinal: Negative for abdominal pain, nausea and vomiting.   Genitourinary: Negative for hematuria.   Neurological: Negative for dizziness, headaches, numbness, seizures and weakness.   Psychiatric/Behavioral: Negative for altered mental status and depression.     Objective:     Vital Signs (Most Recent):  Temp: 98.5 °F (36.9 °C) (09/14/19 0732)  Pulse: (!) 59 (09/14/19 1010)  Resp: 17 (09/14/19 0732)  BP: (!) 159/71 (09/14/19 1010)  SpO2: (!) 94 % (09/14/19 0732) Vital Signs (24h Range):  Temp:  [97.1 °F (36.2 °C)-98.5 °F (36.9 °C)] 98.5 °F (36.9 °C)  Pulse:  [59-72] 59  Resp:  [10-20] 17  SpO2:  [89 %-98 %] 94 %  BP: (127-226)/(71-97) 159/71     Weight: 90 kg (198 lb 6.6 oz)  Body mass index is 25.47 kg/m².    SpO2: (!) 94 %  O2 Device (Oxygen Therapy): room air      Intake/Output Summary (Last 24 hours) at 9/14/2019 1051  Last data filed at 9/14/2019 1000  Gross per 24 hour   Intake 380 ml   Output 200 ml   Net 180 ml       Lines/Drains/Airways     Peripheral Intravenous Line                 Peripheral IV - Single Lumen 09/13/19 1900 20 G Right Wrist less than 1 day                Physical Exam   Constitutional: He is oriented to person, place, and time. He appears well-developed and well-nourished.   HENT:   Head: Normocephalic and atraumatic.   Eyes: Pupils are equal, round, and reactive to light. Conjunctivae and EOM are normal. No scleral icterus.   Neck: Normal range  of motion. Neck supple. No JVD present. Carotid bruit is not present. No tracheal deviation present. No thyromegaly present.   Cardiovascular: Regular rhythm, S1 normal, S2 normal and intact distal pulses. Bradycardia present. Exam reveals distant heart sounds. Exam reveals no gallop and no friction rub.   No murmur heard.  Pulmonary/Chest: Effort normal and breath sounds normal. No respiratory distress. He has no wheezes. He has no rales. He exhibits no tenderness.   Abdominal: Soft. He exhibits no distension.   Musculoskeletal: Normal range of motion. He exhibits no edema.   Neurological: He is alert and oriented to person, place, and time. He has normal strength. A cranial nerve deficit (presbycusis) is present.   Skin: Skin is warm and dry. No rash noted.   Psychiatric: He has a normal mood and affect. His behavior is normal.       Current Medications:   alfuzosin  10 mg Oral Daily    amLODIPine  10 mg Oral Daily    donepezil  5 mg Oral QHS    enoxaparin  40 mg Subcutaneous Daily    gabapentin  900 mg Oral TID    levothyroxine  75 mcg Oral Before breakfast    lisinopril  40 mg Oral Daily    oxybutynin  10 mg Oral Daily    pravastatin  40 mg Oral Daily    QUEtiapine  50 mg Oral QHS    venlafaxine  150 mg Oral Daily      sodium chloride 0.9% 100 mL/hr at 09/14/19 1010     acetaminophen, cloNIDine, hydrALAZINE, ondansetron, promethazine (PHENERGAN) IVPB, ramelteon, senna-docusate 8.6-50 mg, traMADol    Laboratory (all labs reviewed):  CBC:  Recent Labs   Lab 10/09/17  1020 02/21/18  0955 04/08/19  1355 09/13/19  1706 09/14/19  0533   WBC 6.52 6.68 8.91 8.34 9.05   Hemoglobin 14.2 14.4 15.0 13.9 L 14.1   Hematocrit 44.0 45.0 47.5 43.8 45.3   Platelets 215 248 268 246 226       CHEMISTRIES:  Recent Labs   Lab 02/21/18  0955 04/08/19  1355 06/17/19  1158 09/13/19  1706 09/14/19  0533   Glucose 93 103 105 153 H 101   Sodium 142 141 143 143 142   Potassium 4.3 4.7 4.2 4.6 4.0   BUN, Bld 19 28 H 25 H 23 24 H    Creatinine 1.0 1.6 H 1.4 1.5 H 1.6 H   eGFR if  >60.0 43.5 A 51.1 A 47 A 43 A   eGFR if non  >60.0 37.6 A 44.2 A 40 A 37 A   Calcium 8.9 9.7 9.0 8.8 8.5 L   Magnesium  --   --   --   --  2.3       CARDIAC BIOMARKERS:  Recent Labs   Lab 09/13/19  1706 09/14/19  0014 09/14/19  0533   Troponin I 0.037 H 0.052 H 0.062 H       COAGS:  Recent Labs   Lab 02/21/18  0955   INR 1.0       LIPIDS/LFTS:  Recent Labs   Lab 01/10/17  1338 02/21/18  0955 04/08/19  1355 09/13/19  1706 09/14/19  0533   AST 16 18 17 44 H 29   ALT 12 12 12 35 27       BNP:  Recent Labs   Lab 01/10/17  1338 09/13/19  1706   BNP 77 548 H       TSH:  Recent Labs   Lab 01/10/17  1338 04/08/19  1355   TSH 1.894 0.869       Free T4:  Recent Labs   Lab 01/10/17  1338 04/08/19  1355   Free T4 1.03 1.01       Diagnostic Results:  ECG (personally reviewed and interpreted tracing(s)):  9/13/19 1712 SR 65, 1st deg AVB, RBBB, NSSTTW changes    Chest X-Ray (personally reviewed and interpreted image(s)): 9/13/19 NAD    Echo: ordered      Assessment and Plan:     * Elevated troponin  Flat pattern in setting of CKD and severe vomiting.  No anginal sxs or new EKG changes, doubt ACS.  Cont med rx for CAD RF.  Check echo.  Check TSH (bradycardia)  Check lipids  Assuming no significant findings, OK for discharge and follow up with me as outpatient for stress testing.      Intractable nausea and vomiting  Per IM, resolved    CKD stage 3  Stable, creat 1.6    Essential hypertension  Cont med rx    Hyperlipidemia  Cont statin  Check lipids        VTE Risk Mitigation (From admission, onward)        Ordered     IP VTE HIGH RISK PATIENT  Once      09/13/19 2328     enoxaparin injection 40 mg  Daily      09/13/19 5495          Thank you for your consult. I will follow-up with patient. Please contact us if you have any additional questions.    Giovanny Duong MD  Cardiology   [facility]

## 2019-09-14 NOTE — ASSESSMENT & PLAN NOTE
Flat pattern in setting of CKD and severe vomiting.  No anginal sxs or new EKG changes, doubt ACS.  Cont med rx for CAD RF.  Check echo.  Check TSH (bradycardia)  Check lipids  Assuming no significant findings, OK for discharge and follow up with me as outpatient for stress testing.

## 2019-09-14 NOTE — PLAN OF CARE
Patient will return home with wife and [place]      09/14/19 1613   Final Note   Assessment Type Final Discharge Note   Anticipated Discharge Disposition Home-Health   Hospital Follow Up  Appt(s) scheduled? Yes   Discharge plans and expectations educations in teach back method with documentation complete? Yes   Right Care Referral Info   Post Acute Recommendation No Care

## 2019-09-14 NOTE — SUBJECTIVE & OBJECTIVE
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    Spinal stenosis     Trigeminal neuralgia pain 1/20/2015    Trouble in sleeping     Vertigo 9/11/2012    Vestibular schwannoma 6/4/2015       Past Surgical History:   Procedure Laterality Date    bilateral total knee arthroplasties      CATARACT EXTRACTION W/  INTRAOCULAR LENS IMPLANT Bilateral     CHOLECYSTECTOMY      EYE SURGERY      GALLBLADDER SURGERY      PERCUTANEOUS INJECTION-TRIGEMINAL NERVE Right 2/27/2018    Performed by [facility] at [facility] OR [address] [place] [address]    THYROIDECTOMY      TONSILLECTOMY         Review of patient's allergies indicates:   Allergen Reactions    Carbamazepine Swelling    Trazodone Anxiety    Demerol [meperidine] Hallucinations    Morphine Nausea Only       No current facility-administered medications on file prior to encounter.      Current Outpatient Medications on File Prior to Encounter   Medication Sig    acetaminophen (TYLENOL) 325 MG tablet Take 2 tablets (650 mg total) by mouth every 6 (six) hours as needed for Pain.    CONSTULOSE 10 gram/15 mL solution     diazePAM (VALIUM) 5 MG tablet Half to whole pill every 8 hours as needed for anxiety    donepezil (ARICEPT) 5 MG tablet Take 1 tablet (5 mg total) by mouth every evening.    gabapentin (NEURONTIN) 300 MG capsule Take 2 capsules (600 mg) by mouth three times daily. (Patient taking differently: Take 900 mg by mouth 3 (three) times daily. Take 2 capsules (600 mg) by mouth three times daily.)     levothyroxine (SYNTHROID) 75 MCG tablet TAKE 1 TABLET BEFORE BREAKFAST    lidocaine HCl 2% (LIDOCAINE VISCOUS) 2 % Soln by Mucous Membrane route every 6 (six) hours.    polyethylene glycol (GLYCOLAX) 17 gram/dose powder MIX 1 CAPFUL (17GM) IN LIQUID AND DRINK ONE TIME DAILY    pravastatin (PRAVACHOL) 40 MG tablet Take 1 tablet (40 mg total) by mouth once daily.    QUEtiapine (SEROQUEL) 50 MG tablet Take 1 tablet (50 mg total) by mouth every evening. (Patient taking differently: Take 25 mg by mouth every evening. )    traMADol (ULTRAM) 50 mg tablet TAKE ONE TABLET BY MOUTH THREE TIMES DAILY    triamcinolone acetonide 0.1% (KENALOG) 0.1 % cream Apply to the affected area twice a day    venlafaxine (EFFEXOR-XR) 75 MG 24 hr capsule TAKE 2 CAPSULES EVERY DAY    alfuzosin (UROXATRAL) 10 mg Tb24 Take 1 tablet (10 mg total) by mouth once daily.    hydrocodone-acetaminophen 5-325mg (NORCO) 5-325 mg per tablet Take 1 tablet by mouth every 6 (six) hours as needed for Pain.    lisinopril (PRINIVIL,ZESTRIL) 40 MG tablet TAKE 1 TABLET EVERY DAY    mirabegron (MYRBETRIQ) 50 mg Tb24 Take 1 tablet (50 mg total) by mouth once daily.    ondansetron (ZOFRAN) 4 MG tablet 1-2 every 8 hr as needed for nausea    tolterodine (DETROL LA) 4 MG 24 hr capsule Take 1 capsule (4 mg total) by mouth once daily.     Family History     Problem Relation (Age of Onset)    Heart disease Mother    No Known Problems Father, Sister, Brother, Daughter, Son, Sister, Brother, Brother, Brother, Maternal Aunt, Maternal Uncle, Paternal Aunt, Paternal Uncle, Maternal Grandmother, Maternal Grandfather, Paternal Grandmother, Paternal Grandfather        Tobacco Use    Smoking status: Former Smoker    Smokeless tobacco: Never Used   Substance and Sexual Activity    Alcohol use: No     Alcohol/week: 0.0 oz    Drug use: No    Sexual activity: Yes     Partners: Female     Review of Systems   Unable to perform ROS: Dementia     Objective:     Vital  Signs (Most Recent):  Temp: 98.2 °F (36.8 °C) (09/13/19 2342)  Pulse: 71 (09/13/19 2342)  Resp: 18 (09/13/19 2342)  BP: (!) 171/74 (09/13/19 2342)  SpO2: 95 % (09/13/19 2342) Vital Signs (24h Range):  Temp:  [97.1 °F (36.2 °C)-98.3 °F (36.8 °C)] 98.2 °F (36.8 °C)  Pulse:  [63-72] 71  Resp:  [10-20] 18  SpO2:  [89 %-98 %] 95 %  BP: (158-226)/(72-97) 171/74     Weight: 90 kg (198 lb 6.6 oz)  Body mass index is 25.47 kg/m².    Physical Exam   Constitutional: He appears well-developed and well-nourished. No distress.   HENT:   Head: Normocephalic and atraumatic.   Right Ear: External ear normal.   Left Ear: External ear normal.   Nose: Nose normal.   Eyes: Right eye exhibits no discharge. Left eye exhibits no discharge.   Neck: Normal range of motion.   Cardiovascular: Normal rate, regular rhythm, normal heart sounds and intact distal pulses. Exam reveals no gallop and no friction rub.   No murmur heard.  Pulmonary/Chest: Effort normal and breath sounds normal. No stridor. No respiratory distress. He has no wheezes. He has no rales. He exhibits no tenderness.   Abdominal: Soft. Bowel sounds are normal. He exhibits no distension. There is no tenderness. There is no rebound and no guarding.   Musculoskeletal: Normal range of motion. He exhibits no edema.   Neurological:   Awake, alert, pleasant, does not follow commands, is not oriented   Skin: Skin is warm and dry. He is not diaphoretic. No erythema.   Nursing note and vitals reviewed.          Significant Labs: All pertinent labs within the past 24 hours have been reviewed.    Significant Imaging: I have reviewed and interpreted all pertinent imaging results/findings within the past 24 hours.

## 2019-09-14 NOTE — SUBJECTIVE & OBJECTIVE
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    Spinal stenosis     Trigeminal neuralgia pain 1/20/2015    Trouble in sleeping     Vertigo 9/11/2012    Vestibular schwannoma 6/4/2015       Past Surgical History:   Procedure Laterality Date    bilateral total knee arthroplasties      CATARACT EXTRACTION W/  INTRAOCULAR LENS IMPLANT Bilateral     CHOLECYSTECTOMY      EYE SURGERY      GALLBLADDER SURGERY      PERCUTANEOUS INJECTION-TRIGEMINAL NERVE Right 2/27/2018    Performed by Mahendra Rodriguez [facility] at [facility] OR [address] [place] [address]    THYROIDECTOMY      TONSILLECTOMY         Review of patient's allergies indicates:   Allergen Reactions    Carbamazepine Swelling    Trazodone Anxiety    Demerol [meperidine] Hallucinations    Morphine Nausea Only       No current facility-administered medications on file prior to encounter.      Current Outpatient Medications on File Prior to Encounter   Medication Sig    acetaminophen (TYLENOL) 325 MG tablet Take 2 tablets (650 mg total) by mouth every 6 (six) hours as needed for Pain.    CONSTULOSE 10 gram/15 mL solution     diazePAM (VALIUM) 5 MG tablet Half to whole pill every 8 hours as needed for anxiety    donepezil (ARICEPT) 5 MG tablet Take 1 tablet (5 mg total) by mouth every evening.    gabapentin (NEURONTIN) 300 MG capsule Take 2 capsules (600 mg) by mouth three times daily. (Patient taking differently: Take 900 mg by mouth 3 (three) times daily. Take 2 capsules (600 mg) by mouth three times daily.)     levothyroxine (SYNTHROID) 75 MCG tablet TAKE 1 TABLET BEFORE BREAKFAST    lidocaine HCl 2% (LIDOCAINE VISCOUS) 2 % Soln by Mucous Membrane route every 6 (six) hours.    polyethylene glycol (GLYCOLAX) 17 gram/dose powder MIX 1 CAPFUL (17GM) IN LIQUID AND DRINK ONE TIME DAILY    pravastatin (PRAVACHOL) 40 MG tablet Take 1 tablet (40 mg total) by mouth once daily.    QUEtiapine (SEROQUEL) 50 MG tablet Take 1 tablet (50 mg total) by mouth every evening. (Patient taking differently: Take 25 mg by mouth every evening. )    traMADol (ULTRAM) 50 mg tablet TAKE ONE TABLET BY MOUTH THREE TIMES DAILY    triamcinolone acetonide 0.1% (KENALOG) 0.1 % cream Apply to the affected area twice a day    venlafaxine (EFFEXOR-XR) 75 MG 24 hr capsule TAKE 2 CAPSULES EVERY DAY    alfuzosin (UROXATRAL) 10 mg Tb24 Take 1 tablet (10 mg total) by mouth once daily.    hydrocodone-acetaminophen 5-325mg (NORCO) 5-325 mg per tablet Take 1 tablet by mouth every 6 (six) hours as needed for Pain.    lisinopril (PRINIVIL,ZESTRIL) 40 MG tablet TAKE 1 TABLET EVERY DAY    mirabegron (MYRBETRIQ) 50 mg Tb24 Take 1 tablet (50 mg total) by mouth once daily.    ondansetron (ZOFRAN) 4 MG tablet 1-2 every 8 hr as needed for nausea    tolterodine (DETROL LA) 4 MG 24 hr capsule Take 1 capsule (4 mg total) by mouth once daily.     Family History     Problem Relation (Age of Onset)    Heart disease Mother    No Known Problems Father, Sister, Brother, Daughter, Son, Sister, Brother, Brother, Brother, Maternal Aunt, Maternal Uncle, Paternal Aunt, Paternal Uncle, Maternal Grandmother, Maternal Grandfather, Paternal Grandmother, Paternal Grandfather        Tobacco Use    Smoking status: Former Smoker    Smokeless tobacco: Never Used   Substance and Sexual Activity    Alcohol use: No     Alcohol/week: 0.0 oz    Drug use: No    Sexual activity: Yes     Partners: Female     Review of Systems   Constitution: Negative for chills, diaphoresis, fever and  malaise/fatigue.   HENT: Negative for nosebleeds.    Eyes: Negative for blurred vision and double vision.   Cardiovascular: Negative for chest pain, claudication, cyanosis, dyspnea on exertion, leg swelling, orthopnea, palpitations, paroxysmal nocturnal dyspnea and syncope.   Respiratory: Negative for cough, shortness of breath and wheezing.    Skin: Negative for dry skin and poor wound healing.   Musculoskeletal: Negative for back pain, joint swelling and myalgias.   Gastrointestinal: Negative for abdominal pain, nausea and vomiting.   Genitourinary: Negative for hematuria.   Neurological: Negative for dizziness, headaches, numbness, seizures and weakness.   Psychiatric/Behavioral: Negative for altered mental status and depression.     Objective:     Vital Signs (Most Recent):  Temp: 98.5 °F (36.9 °C) (09/14/19 0732)  Pulse: (!) 59 (09/14/19 1010)  Resp: 17 (09/14/19 0732)  BP: (!) 159/71 (09/14/19 1010)  SpO2: (!) 94 % (09/14/19 0732) Vital Signs (24h Range):  Temp:  [97.1 °F (36.2 °C)-98.5 °F (36.9 °C)] 98.5 °F (36.9 °C)  Pulse:  [59-72] 59  Resp:  [10-20] 17  SpO2:  [89 %-98 %] 94 %  BP: (127-226)/(71-97) 159/71     Weight: 90 kg (198 lb 6.6 oz)  Body mass index is 25.47 kg/m².    SpO2: (!) 94 %  O2 Device (Oxygen Therapy): room air      Intake/Output Summary (Last 24 hours) at 9/14/2019 1051  Last data filed at 9/14/2019 1000  Gross per 24 hour   Intake 380 ml   Output 200 ml   Net 180 ml       Lines/Drains/Airways     Peripheral Intravenous Line                 Peripheral IV - Single Lumen 09/13/19 1900 20 G Right Wrist less than 1 day                Physical Exam   Constitutional: He is oriented to person, place, and time. He appears well-developed and well-nourished.   HENT:   Head: Normocephalic and atraumatic.   Eyes: Pupils are equal, round, and reactive to light. Conjunctivae and EOM are normal. No scleral icterus.   Neck: Normal range of motion. Neck supple. No JVD present. Carotid bruit is not present.  No tracheal deviation present. No thyromegaly present.   Cardiovascular: Regular rhythm, S1 normal, S2 normal and intact distal pulses. Bradycardia present. Exam reveals distant heart sounds. Exam reveals no gallop and no friction rub.   No murmur heard.  Pulmonary/Chest: Effort normal and breath sounds normal. No respiratory distress. He has no wheezes. He has no rales. He exhibits no tenderness.   Abdominal: Soft. He exhibits no distension.   Musculoskeletal: Normal range of motion. He exhibits no edema.   Neurological: He is alert and oriented to person, place, and time. He has normal strength. A cranial nerve deficit (presbycusis) is present.   Skin: Skin is warm and dry. No rash noted.   Psychiatric: He has a normal mood and affect. His behavior is normal.       Current Medications:   alfuzosin  10 mg Oral Daily    amLODIPine  10 mg Oral Daily    donepezil  5 mg Oral QHS    enoxaparin  40 mg Subcutaneous Daily    gabapentin  900 mg Oral TID    levothyroxine  75 mcg Oral Before breakfast    lisinopril  40 mg Oral Daily    oxybutynin  10 mg Oral Daily    pravastatin  40 mg Oral Daily    QUEtiapine  50 mg Oral QHS    venlafaxine  150 mg Oral Daily      sodium chloride 0.9% 100 mL/hr at 09/14/19 1010     acetaminophen, cloNIDine, hydrALAZINE, ondansetron, promethazine (PHENERGAN) IVPB, ramelteon, senna-docusate 8.6-50 mg, traMADol    Laboratory (all labs reviewed):  CBC:  Recent Labs   Lab 10/09/17  1020 02/21/18  0955 04/08/19  1355 09/13/19  1706 09/14/19  0533   WBC 6.52 6.68 8.91 8.34 9.05   Hemoglobin 14.2 14.4 15.0 13.9 L 14.1   Hematocrit 44.0 45.0 47.5 43.8 45.3   Platelets 215 248 268 246 226       CHEMISTRIES:  Recent Labs   Lab 02/21/18  0955 04/08/19  1355 06/17/19  1158 09/13/19  1706 09/14/19  0533   Glucose 93 103 105 153 H 101   Sodium 142 141 143 143 142   Potassium 4.3 4.7 4.2 4.6 4.0   BUN, Bld 19 28 H 25 H 23 24 H   Creatinine 1.0 1.6 H 1.4 1.5 H 1.6 H   eGFR if   >60.0 43.5 A 51.1 A 47 A 43 A   eGFR if non  >60.0 37.6 A 44.2 A 40 A 37 A   Calcium 8.9 9.7 9.0 8.8 8.5 L   Magnesium  --   --   --   --  2.3       CARDIAC BIOMARKERS:  Recent Labs   Lab 09/13/19  1706 09/14/19  0014 09/14/19  0533   Troponin I 0.037 H 0.052 H 0.062 H       COAGS:  Recent Labs   Lab 02/21/18  0955   INR 1.0       LIPIDS/LFTS:  Recent Labs   Lab 01/10/17  1338 02/21/18  0955 04/08/19  1355 09/13/19  1706 09/14/19  0533   AST 16 18 17 44 H 29   ALT 12 12 12 35 27       BNP:  Recent Labs   Lab 01/10/17  1338 09/13/19  1706   BNP 77 548 H       TSH:  Recent Labs   Lab 01/10/17  1338 04/08/19  1355   TSH 1.894 0.869       Free T4:  Recent Labs   Lab 01/10/17  1338 04/08/19  1355   Free T4 1.03 1.01       Diagnostic Results:  ECG (personally reviewed and interpreted tracing(s)):  9/13/19 1712 SR 65, 1st deg AVB, RBBB, NSSTTW changes    Chest X-Ray (personally reviewed and interpreted image(s)): 9/13/19 NAD    Echo: ordered

## 2019-09-14 NOTE — HPI
Mr. Ifeanyi Rutherford is a 89 y.o. male with essential hypertension, hyperlipidemia (.0 Dec 2015), CKD stage 3, hypothyroidism (TSH 0.869 Apr 2019), dementia, Parkinson disease, and trigeminal neuralgia secondary to compression of acoustic neuroma who presents to [facility] ED with complaints of nausea and vomiting since last night.  His daughter-in-law reports that he had been vomiting all throughout the night after a family dinner last night and denies any one else is similarly ill.  He is typically functional and is able to feed and dress himself.  He has not been complain of any chest pains and does not have any cardiac history.  He was complaining of some abdominal pain but is unable to further characterize it.  Further history is otherwise limited at this time.

## 2019-09-14 NOTE — PLAN OF CARE
09/14/19 1611   Post-Acute Status   Post-Acute Authorization Other   Other Status No Post-Acute Service Needs

## 2019-09-14 NOTE — NURSING
Received patient from ER to room via stretcher. Patient accompanied by transport and family. Transferred patient to bed. Evaluated general patient appearance and condition. Admit assessment initiated. Tele monitoring initiated. Saline lock at right forearm and is Intact. No apparent distress noted at this time.

## 2019-09-14 NOTE — ASSESSMENT & PLAN NOTE
Patient only has a very minimally elevated troponin level 0.037 in the setting of no chest pain. I have reviewed his EKG personally and it was significant for normal sinus rhythm with inferolateral ST segment depressions which appear to be chronic.  Will continue to obtain serial troponins and monitor on cardiac telemetry.

## 2019-09-14 NOTE — H&P
[facility] Hospital Medicine  History & Physical    Patient Name: Ifeanyi Rutherford  MRN: [id number]  Admission Date: 9/13/2019  Attending Physician: Isabel Mclean MD   Primary Care Provider: Jason Levy MD         Patient information was obtained from ER records.     Subjective:     Principal Problem:Elevated troponin    Chief Complaint:  Nausea and vomiting since last night.    HPI: Mr. Ifeanyi Rutherford is a 89 y.o. male with essential hypertension, hyperlipidemia (.0 Dec 2015), CKD stage 3, hypothyroidism (TSH 0.869 Apr 2019), dementia, Parkinson disease, and trigeminal neuralgia secondary to compression of acoustic neuroma who presents to [facility] ED with complaints of nausea and vomiting since last night.  His daughter-in-law reports that he had been vomiting all throughout the night after a family dinner last night and denies any one else is similarly ill.  He is typically functional and is able to feed and dress himself.  He has not been complain of any chest pains and does not have any cardiac history.  He was complaining of some abdominal pain but is unable to further characterize it.  Further history is otherwise limited at this time.    Chart Review:  Previous Hospitalizations  Date Hospital Diagnosis   Feb 2018 [facility] Trigeminal neuralgia s/p percutaneous injection of glycerol   Oct 2014 [facility] Behavioral irritability secondary to medications     Outpatient Follow-Up  Date of Visit Physician Service   Aug 2019 Deya Erickson MD Neurology   Jul 2019 Maria Del Rosario [facility]   Apr 2019 Abner Motley MD Urology   Jul 2018 Keiko Galan DPM Podiatry   May 2018 Mahendra Rodriguez MD Neurosurgery   Nov 2015 Nolan Hector MD Pain Medicine   Oct 2013 John Parsons MD Psychiatry     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    Arthritis     Atherosclerosis of aorta 5/2/2017    CT 2017    Cataract     Choroidal nevus  of right eye 3/14/2014    Chronic headache 9/12/2014    CKD stage 3 9/13/2019    Dementia     worked up for NPH at  and had taps with no improvement    Depression     Hyperlipidemia     Hypertension     Hypothyroidism     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    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    Spinal stenosis     Trigeminal neuralgia pain 1/20/2015    Trouble in sleeping     Vertigo 9/11/2012    Vestibular schwannoma 6/4/2015       Past Surgical History:   Procedure Laterality Date    bilateral total knee arthroplasties      CATARACT EXTRACTION W/  INTRAOCULAR LENS IMPLANT Bilateral     CHOLECYSTECTOMY      EYE SURGERY      GALLBLADDER SURGERY      PERCUTANEOUS INJECTION-TRIGEMINAL NERVE Right 2/27/2018    Performed by [facility] at [facility]    THYROIDECTOMY      TONSILLECTOMY         Review of patient's allergies indicates:   Allergen Reactions    Carbamazepine Swelling    Trazodone Anxiety    Demerol [meperidine] Hallucinations    Morphine Nausea Only       No current facility-administered medications on file prior to encounter.      Current Outpatient Medications on File Prior to Encounter   Medication Sig    acetaminophen (TYLENOL) 325 MG tablet Take 2 tablets (650 mg total) by mouth every 6 (six) hours as needed for Pain.    CONSTULOSE 10 gram/15 mL solution     diazePAM (VALIUM) 5 MG tablet Half to whole pill every 8 hours as needed for anxiety    donepezil (ARICEPT) 5 MG tablet Take 1 tablet (5 mg total) by mouth every evening.    gabapentin (NEURONTIN) 300 MG capsule Take 2 capsules (600 mg) by mouth three times daily. (Patient taking differently: Take 900 mg by mouth 3 (three) times daily. Take 2 capsules (600 mg) by mouth three times daily.)    levothyroxine (SYNTHROID) 75 MCG tablet TAKE 1 TABLET BEFORE BREAKFAST    lidocaine HCl 2% (LIDOCAINE VISCOUS) 2 % Soln by Mucous Membrane route every 6 (six) hours.    polyethylene glycol (GLYCOLAX)  17 gram/dose powder MIX 1 CAPFUL (17GM) IN LIQUID AND DRINK ONE TIME DAILY    pravastatin (PRAVACHOL) 40 MG tablet Take 1 tablet (40 mg total) by mouth once daily.    QUEtiapine (SEROQUEL) 50 MG tablet Take 1 tablet (50 mg total) by mouth every evening. (Patient taking differently: Take 25 mg by mouth every evening. )    traMADol (ULTRAM) 50 mg tablet TAKE ONE TABLET BY MOUTH THREE TIMES DAILY    triamcinolone acetonide 0.1% (KENALOG) 0.1 % cream Apply to the affected area twice a day    venlafaxine (EFFEXOR-XR) 75 MG 24 hr capsule TAKE 2 CAPSULES EVERY DAY    alfuzosin (UROXATRAL) 10 mg Tb24 Take 1 tablet (10 mg total) by mouth once daily.    hydrocodone-acetaminophen 5-325mg (NORCO) 5-325 mg per tablet Take 1 tablet by mouth every 6 (six) hours as needed for Pain.    lisinopril (PRINIVIL,ZESTRIL) 40 MG tablet TAKE 1 TABLET EVERY DAY    mirabegron (MYRBETRIQ) 50 mg Tb24 Take 1 tablet (50 mg total) by mouth once daily.    ondansetron (ZOFRAN) 4 MG tablet 1-2 every 8 hr as needed for nausea    tolterodine (DETROL LA) 4 MG 24 hr capsule Take 1 capsule (4 mg total) by mouth once daily.     Family History     Problem Relation (Age of Onset)    Heart disease Mother    No Known Problems Father, Sister, Brother, Daughter, Son, Sister, Brother, Brother, Brother, Maternal Aunt, Maternal Uncle, Paternal Aunt, Paternal Uncle, Maternal Grandmother, Maternal Grandfather, Paternal Grandmother, Paternal Grandfather        Tobacco Use    Smoking status: Former Smoker    Smokeless tobacco: Never Used   Substance and Sexual Activity    Alcohol use: No     Alcohol/week: 0.0 oz    Drug use: No    Sexual activity: Yes     Partners: Female     Review of Systems   Unable to perform ROS: Dementia     Objective:     Vital Signs (Most Recent):  Temp: 98.2 °F (36.8 °C) (09/13/19 2342)  Pulse: 71 (09/13/19 2342)  Resp: 18 (09/13/19 2342)  BP: (!) 171/74 (09/13/19 2342)  SpO2: 95 % (09/13/19 2342) Vital Signs (24h Range):  Temp:   [97.1 °F (36.2 °C)-98.3 °F (36.8 °C)] 98.2 °F (36.8 °C)  Pulse:  [63-72] 71  Resp:  [10-20] 18  SpO2:  [89 %-98 %] 95 %  BP: (158-226)/(72-97) 171/74     Weight: 90 kg (198 lb 6.6 oz)  Body mass index is 25.47 kg/m².    Physical Exam   Constitutional: He appears well-developed and well-nourished. No distress.   HENT:   Head: Normocephalic and atraumatic.   Right Ear: External ear normal.   Left Ear: External ear normal.   Nose: Nose normal.   Eyes: Right eye exhibits no discharge. Left eye exhibits no discharge.   Neck: Normal range of motion.   Cardiovascular: Normal rate, regular rhythm, normal heart sounds and intact distal pulses. Exam reveals no gallop and no friction rub.   No murmur heard.  Pulmonary/Chest: Effort normal and breath sounds normal. No stridor. No respiratory distress. He has no wheezes. He has no rales. He exhibits no tenderness.   Abdominal: Soft. Bowel sounds are normal. He exhibits no distension. There is no tenderness. There is no rebound and no guarding.   Musculoskeletal: Normal range of motion. He exhibits no edema.   Neurological:   Awake, alert, pleasant, does not follow commands, is not oriented   Skin: Skin is warm and dry. He is not diaphoretic. No erythema.   Nursing note and vitals reviewed.          Significant Labs: All pertinent labs within the past 24 hours have been reviewed.    Significant Imaging: I have reviewed and interpreted all pertinent imaging results/findings within the past 24 hours.    Assessment/Plan:     * Elevated troponin  Patient only has a very minimally elevated troponin level 0.037 in the setting of no chest pain. I have reviewed his EKG personally and it was significant for normal sinus rhythm with inferolateral ST segment depressions which appear to be chronic.  Will continue to obtain serial troponins and monitor on cardiac telemetry.    Intractable nausea and vomiting  The etiology of his intractable nausea and vomiting appears to be viral  gastroenteritis but is improved at this time.  Provide supportive care with IV fluids and antiemetic support.    Essential hypertension  His blood pressure has been very poorly controlled since presentation but appears to be more stable now.  Will continue his home regimen of lisinopril and provide as needed clonidine.    Hyperlipidemia  Poorly controlled; will continue his home regimen of pravastatin.    CKD stage 3  His renal function appears to be at his baseline; will continue to monitor his urine output.    Hypothyroidism  Well controlled; will continue his home regimen of levothyroxine.    Dementia  Stable; will continue his home regimen of donepezil.    Parkinsonism  Stable; there are no acute issues.    Trigeminal neuralgia of right side of face  Stable; will continue his home regimen of gabapentin.    VTE Risk Mitigation (From admission, onward)        Ordered     IP VTE HIGH RISK PATIENT  Once      09/13/19 2322     enoxaparin injection 40 mg  Daily      09/13/19 2310             Dannielle Romero M.D.  Staff Nocturnist  Department of Hospital Medicine  [facility]  Pager: [phone]          N.B.: Portions of this note was dictated using M*Modal Fluency Direct--there may be voice recognition errors occasionally missed on review.

## 2019-09-14 NOTE — HPI
89 y.o. male with essential hypertension, hyperlipidemia (.0 Dec 2015), CKD stage 3, hypothyroidism (TSH 0.869 Apr 2019), dementia, Parkinson disease, and trigeminal neuralgia secondary to compression of acoustic neuroma who presents to [facility] ED with complaints of nausea and vomiting since last night.  His daughter-in-law reports that he had been vomiting all throughout the night after a family dinner last night and denies any one else is similarly ill.  He is typically functional and is able to feed and dress himself.  He has not been complain of any chest pains and does not have any cardiac history.  He was complaining of some abdominal pain but is unable to further characterize it.  Further history is otherwise limited at this time.    Cardiology has been consulted for elevation of his troponin.  This is a flat pattern in the setting of mild renal insufficiency.  The patient denies any angina or dyspnea.  He presented with recurrent severe vomiting.  He otherwise had no palpitations, lightheadedness, dizziness, or syncope.  There has been no PND, orthopnea, or lower extremity edema.  He denies melena, hematuria, or claudicant symptoms.  He does have a history of bilateral knee replacements which causes him some arthritic type symptoms.  He denies any cardiac history.  His wife is at the bedside and supplements the with history.

## 2019-09-14 NOTE — ASSESSMENT & PLAN NOTE
His blood pressure has been very poorly controlled since presentation but appears to be more stable now.  Will continue his home regimen of lisinopril and provide as needed clonidine.

## 2019-09-14 NOTE — PLAN OF CARE
09/14/19 1551   Discharge Assessment   Assessment Type Discharge Planning Assessment   Confirmed/corrected address and phone number on facesheet? Yes   Assessment information obtained from? Patient;Caregiver   Expected Length of Stay (days)   (discharged)   Communicated expected length of stay with patient/caregiver yes   Prior to hospitilization cognitive status: Alert/Oriented   Prior to hospitalization functional status: Assistive Equipment   Current cognitive status: Alert/Oriented   Current Functional Status: Assistive Equipment;Needs Assistance   Lives With spouse   Able to Return to Prior Arrangements yes   Is patient able to care for self after discharge? No   Who are your caregiver(s) and their phone number(s)? Wife and daughter, Db   Patient's perception of discharge disposition home or selfcare   Readmission Within the Last 30 Days no previous admission in last 30 days   Patient currently being followed by outpatient case management? No   Patient currently receives any other outside agency services? Yes   Name and contact number of agency or person providing outside services [facility]    Is it the patient/care giver preference to resume care with the current outside agency? Yes   Equipment Currently Used at Home rollator;walker, standard;wheelchair   Do you have any problems affording any of your prescribed medications? No   Is the patient taking medications as prescribed? yes   Does the patient have transportation home? Yes   Transportation Anticipated family or friend will provide   Does the patient receive services at the [facility]? No   Discharge Plan A Home with family   Patient/Family in Agreement with Plan yes

## 2019-09-14 NOTE — DISCHARGE SUMMARY
[facility] Hospital Medicine  Discharge Summary      Patient Name: Ifeanyi Rutherford  MRN: [id number]  Admission Date: 9/13/2019  Hospital Length of Stay: 0 days  Discharge Date and Time:  09/14/2019 4:37 PM  Attending Physician: No att. providers found   Discharging Provider: PRASHANT Ch  Primary Care Provider: Jason Levy MD      HPI:   Mr. Ifeanyi Rutherford is a 89 y.o. 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  His daughter-in-law reports that he had been vomiting all throughout the night after a family dinner last night and denies any one else is similarly ill.  He is typically functional and is able to feed and dress himself.  He has not been complain of any chest pains and does not have any cardiac history.  He was complaining of some abdominal pain but is unable to further characterize it.  Further history is otherwise limited at this time.    * No surgery found *      Hospital Course:   Placed in observation for evaluation after presentng for NV that has since resolved. The patient was found to have incidental elevated troponin level. Suspected elevated troponin related to his FADI. His creatine is about 1.6 which is near baseline. He was admonished IV fluids overnight. Although patient never complained of chest pain, cardiology consulted 2/2 elevated troponin 1 level with risk factors. 2D echo repeated by cardiology showing preserved LVEF 60% without mention of DD. Vitals stable, symptoms of NV resolved - discharge to home in stable condition - FU in clinic with PCP and cardiology.     Consults:   Consults (From admission, onward)        Status Ordering Provider     Inpatient consult to Cardiology  Once     Provider:   Marjorie Duong MD    Completed SARITHA SERRA     Inpatient consult to Social Work  Once     Provider:  (Not yet assigned)    Acknowledged MARJORIE DUONG          No new Assessment & Plan notes have been filed under this hospital service since the last note was generated.  Service: Hospital Medicine    Final Active Diagnoses:    Diagnosis Date Noted POA    PRINCIPAL PROBLEM:  Elevated troponin [R74.8] 09/13/2019 Yes    Intractable nausea and vomiting [R11.2] 09/13/2019 Yes    Hyperlipidemia [E78.5] 09/13/2019 Yes     Chronic    CKD stage 3 [N18.3] 09/13/2019 Yes     Chronic    Trigeminal neuralgia of right side of face [G50.0] 01/20/2015 Yes     Chronic    Parkinsonism [G20] 10/22/2014 Yes     Chronic    Hypothyroidism [E03.9] 09/10/2014 Yes     Chronic    Dementia [F03.90] 09/10/2014 Yes     Chronic    Essential hypertension [I10] 07/23/2012 Yes     Chronic      Problems Resolved During this Admission:       Discharged Condition: stable    Disposition: Home or Self Care    Follow Up:  Follow-up Information     Jason Levy MD On 10/2/2019.    Specialty:  Internal Medicine  Why:  @1:20pm, For outpatient follow-up/post hospitalizaton  Contact information:  [address] [facility] [id number] [phone]             Marjorie Duong MD On 10/18/2019.    Specialties:  Cardiology, INTERVENTIONAL CARDIOLOGY  Why:  @1:20pm For outpatient follow-up/post hospitalizaton  Contact information:  [address] [facility]  [address]  [facility] [id number] [phone]                 Patient Instructions:      Diet Cardiac     Activity as tolerated       Significant Diagnostic Studies: Labs:   CMP   Recent Labs   Lab 09/13/19  1706 09/14/19  0533 09/14/19  1024    142 144   K 4.6 4.0 4.2    106 106   CO2 27 28 32*   * 101 110   BUN 23 24* 25*   CREATININE 1.5* 1.6* 1.6*   CALCIUM 8.8 8.5* 8.4*   PROT 7.5 6.5  --    ALBUMIN 3.5 3.3*  --    BILITOT 0.5 0.4  --    ALKPHOS 58 57  --    AST 44* 29   --    ALT 35 27  --    ANIONGAP 11 8 6*   ESTGFRAFRICA 47* 43* 43*   EGFRNONAA 40* 37* 37*   , CBC   Recent Labs   Lab 09/13/19  1706 09/14/19  0533   WBC 8.34 9.05   HGB 13.9* 14.1   HCT 43.8 45.3    226   , INR   Lab Results   Component Value Date    INR 1.0 02/21/2018    INR 1.0 02/11/2012   , Lipid Panel   Lab Results   Component Value Date    CHOL 227 (H) 09/14/2019    HDL 41 09/14/2019    LDLCALC 161.6 (H) 09/14/2019    TRIG 122 09/14/2019    CHOLHDL 18.1 (L) 09/14/2019   , Troponin   Recent Labs   Lab 09/14/19  0533   TROPONINI 0.062*    and A1C: No results for input(s): HGBA1C in the last 4320 hours.    Pending Diagnostic Studies:     Procedure Component Value Units Date/Time    EKG 12-lead [[id number]] Collected:  09/13/19 1712    Order Status:  Sent Lab Status:  In process Updated:  09/14/19 1100    Narrative:       Test Reason : R11.10,    Vent. Rate : 065 BPM     Atrial Rate : 065 BPM     P-R Int : 372 ms          QRS Dur : 136 ms      QT Int : 500 ms       P-R-T Axes : 030 075 049 degrees     QTc Int : 520 ms    Sinus rhythm with 1st degree A-V block  Right bundle branch block  Abnormal ECG  When compared with ECG of 20-FEB-2018 15:00,  Significant changes have occurred    Referred By: [email or web address]   SELF           Confirmed By:          Medications:  Reconciled Home Medications:      Medication List      CHANGE how you take these medications    gabapentin 300 MG capsule  Commonly known as:  NEURONTIN  Take 2 capsules (600 mg) by mouth three times daily.  What changed:    · how much to take  · how to take this  · when to take this  · additional instructions     QUEtiapine 50 MG tablet  Commonly known as:  SEROQUEL  Take 1 tablet (50 mg total) by mouth every evening.  What changed:  how much to take        CONTINUE taking these medications    acetaminophen 325 MG tablet  Commonly known as:  TYLENOL  Take 2 tablets (650 mg total) by mouth every 6 (six) hours as needed for Pain.     alfuzosin 10 mg  Tb24  Commonly known as:  UROXATRAL  Take 1 tablet (10 mg total) by mouth once daily.     CONSTULOSE 10 gram/15 mL solution  Generic drug:  lactulose     diazePAM 5 MG tablet  Commonly known as:  VALIUM  Half to whole pill every 8 hours as needed for anxiety     donepezil 5 MG tablet  Commonly known as:  ARICEPT  Take 1 tablet (5 mg total) by mouth every evening.     HYDROcodone-acetaminophen 5-325 mg per tablet  Commonly known as:  NORCO  Take 1 tablet by mouth every 6 (six) hours as needed for Pain.     levothyroxine 75 MCG tablet  Commonly known as:  SYNTHROID  TAKE 1 TABLET BEFORE BREAKFAST     lidocaine HCl 2% 2 % Soln  Commonly known as:  LIDOCAINE VISCOUS  by Mucous Membrane route every 6 (six) hours.     lisinopril 40 MG tablet  Commonly known as:  PRINIVIL,ZESTRIL  TAKE 1 TABLET EVERY DAY     mirabegron 50 mg Tb24  Commonly known as:  MYRBETRIQ  Take 1 tablet (50 mg total) by mouth once daily.     ondansetron 4 MG tablet  Commonly known as:  ZOFRAN  1-2 every 8 hr as needed for nausea     polyethylene glycol 17 gram/dose powder  Commonly known as:  GLYCOLAX  MIX 1 CAPFUL (17GM) IN LIQUID AND DRINK ONE TIME DAILY     pravastatin 40 MG tablet  Commonly known as:  PRAVACHOL  Take 1 tablet (40 mg total) by mouth once daily.     tolterodine 4 MG 24 hr capsule  Commonly known as:  DETROL LA  Take 1 capsule (4 mg total) by mouth once daily.     traMADol 50 mg tablet  Commonly known as:  ULTRAM  TAKE ONE TABLET BY MOUTH THREE TIMES DAILY     triamcinolone acetonide 0.1% 0.1 % cream  Commonly known as:  KENALOG  Apply to the affected area twice a day     venlafaxine 75 MG 24 hr capsule  Commonly known as:  EFFEXOR-XR  TAKE 2 CAPSULES EVERY DAY            Indwelling Lines/Drains at time of discharge:   Lines/Drains/Airways          None          Time spent on the discharge of patient: 35 minutes  Patient was seen and examined on the date of discharge and determined to be suitable for discharge.         Irasema SOL  SERA Foster, FNP-C  Hospitalist - Department of Hospital Medicine  [address] [address]  Office [phone]; Pager [phone]

## 2019-09-14 NOTE — ASSESSMENT & PLAN NOTE
The etiology of his intractable nausea and vomiting appears to be viral gastroenteritis but is improved at this time.  Provide supportive care with IV fluids and antiemetic support.

## 2019-09-14 NOTE — NURSING
In preparation for discharge, d/c'ed patient's saline lock, applied pressure to site, secured site with gauze and tape, no redness or swelling noted. D/C'ed patient's tele, SR prior to removal. Patient is sitting in room waiting for discharge instructions. NAD noted.

## 2019-09-17 ENCOUNTER — OFFICE VISIT (OUTPATIENT)
Dept: FAMILY MEDICINE | Facility: CLINIC | Age: 84
End: 2019-09-17
Payer: MEDICARE

## 2019-09-17 VITALS
HEART RATE: 58 BPM | BODY MASS INDEX: 25.47 KG/M2 | SYSTOLIC BLOOD PRESSURE: 136 MMHG | DIASTOLIC BLOOD PRESSURE: 78 MMHG | OXYGEN SATURATION: 95 % | HEIGHT: 74 IN | TEMPERATURE: 98 F

## 2019-09-17 DIAGNOSIS — G89.29 CHRONIC INTRACTABLE HEADACHE, UNSPECIFIED HEADACHE TYPE: ICD-10-CM

## 2019-09-17 DIAGNOSIS — F03.90 DEMENTIA WITHOUT BEHAVIORAL DISTURBANCE, UNSPECIFIED DEMENTIA TYPE: Chronic | ICD-10-CM

## 2019-09-17 DIAGNOSIS — G50.0 TRIGEMINAL NEURALGIA OF RIGHT SIDE OF FACE: Chronic | ICD-10-CM

## 2019-09-17 DIAGNOSIS — N18.30 CKD (CHRONIC KIDNEY DISEASE) STAGE 3, GFR 30-59 ML/MIN: Chronic | ICD-10-CM

## 2019-09-17 DIAGNOSIS — R11.2 INTRACTABLE VOMITING WITH NAUSEA, UNSPECIFIED VOMITING TYPE: ICD-10-CM

## 2019-09-17 DIAGNOSIS — R51.9 CHRONIC INTRACTABLE HEADACHE, UNSPECIFIED HEADACHE TYPE: ICD-10-CM

## 2019-09-17 DIAGNOSIS — Z09 HOSPITAL DISCHARGE FOLLOW-UP: Primary | ICD-10-CM

## 2019-09-17 PROCEDURE — 1101F PT FALLS ASSESS-DOCD LE1/YR: CPT | Mod: HCNC,CPTII,S$GLB, | Performed by: PHYSICIAN ASSISTANT

## 2019-09-17 PROCEDURE — 99499 RISK ADDL DX/OHS AUDIT: ICD-10-PCS | Mod: HCNC,S$GLB,, | Performed by: PHYSICIAN ASSISTANT

## 2019-09-17 PROCEDURE — 99499 UNLISTED E&M SERVICE: CPT | Mod: HCNC,S$GLB,, | Performed by: PHYSICIAN ASSISTANT

## 2019-09-17 PROCEDURE — 99999 PR PBB SHADOW E&M-EST. PATIENT-LVL III: ICD-10-PCS | Mod: PBBFAC,HCNC,, | Performed by: PHYSICIAN ASSISTANT

## 2019-09-17 PROCEDURE — 99999 PR PBB SHADOW E&M-EST. PATIENT-LVL III: CPT | Mod: PBBFAC,HCNC,, | Performed by: PHYSICIAN ASSISTANT

## 2019-09-17 PROCEDURE — 99214 PR OFFICE/OUTPT VISIT, EST, LEVL IV, 30-39 MIN: ICD-10-PCS | Mod: HCNC,S$GLB,, | Performed by: PHYSICIAN ASSISTANT

## 2019-09-17 PROCEDURE — 99214 OFFICE O/P EST MOD 30 MIN: CPT | Mod: HCNC,S$GLB,, | Performed by: PHYSICIAN ASSISTANT

## 2019-09-17 PROCEDURE — 1101F PR PT FALLS ASSESS DOC 0-1 FALLS W/OUT INJ PAST YR: ICD-10-PCS | Mod: HCNC,CPTII,S$GLB, | Performed by: PHYSICIAN ASSISTANT

## 2019-09-17 NOTE — ASSESSMENT & PLAN NOTE
Secondary to trigeminal nerve pain  Stable, chronic as reviewed in record, controlled with medication   followed by PCP  Continue current treatment plan

## 2019-09-17 NOTE — PROGRESS NOTES
Patient Name: Ifeanyi Rutherford    : 1929  MRN: [id number]    Subjective:  Ifeanyi is a 89 y.o. male who presents today for:    Chief Complaint   Patient presents with    Hospital Follow Up       HPI  Patient has multiple medical diagnoses as listed below in the history. He presents for hospital follow up after admission to observation for nausea and vomiting. The patient was treated with IV fluids and monitored over night. His vomiting resolved and he has been tolerating PO fluids well. He denies nausea or vomiting since discharge. He has been slowly incorporating soft, bland foods back into his diet. During discharged he was found to have a slightly elevated troponin and FADI. The FADI was corrected prior to discharge and likely due to dehydration after emesis. Cardiology consult/work up was unremarkable. Patient does have follow up with cardiology in few weeks for stress test. He denies SOB, chest pain, or palpitations. He does complain of increased trigeminal neuralgia pain since discharge. This is a chronic issue for which he has been seen by neurology. His pain is controlled with Tramadol and gabapentin. We discussed that likely his pain has increased because he was not keeping down the pain medication during the vomiting and it is out of his system. He takes 50mg of tramadol 3x daily and there is room to increase to 100 mg every 4-6 hours as needed. He does have a follow up appointment with neurology. He denies fever, chills, abdominal pain, n/v/d, or chest pain.       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    Spinal stenosis     Trigeminal neuralgia pain 1/20/2015    Trouble in sleeping     Vertigo 9/11/2012    Vestibular schwannoma 6/4/2015       Past Surgical History  Past Surgical History:   Procedure Laterality Date    bilateral total knee arthroplasties      CATARACT EXTRACTION W/  INTRAOCULAR LENS IMPLANT Bilateral     CHOLECYSTECTOMY      EYE SURGERY      GALLBLADDER SURGERY      PERCUTANEOUS INJECTION-TRIGEMINAL NERVE Right 2/27/2018    Performed by Mahendra Rodriguez MD at [facility] OR [address] [place] [address]    THYROIDECTOMY      TONSILLECTOMY         Family History  Family History   Problem Relation Age of Onset    Heart disease Mother     No Known Problems Father     No Known Problems Sister     No Known Problems Brother     No Known Problems Daughter     No Known Problems Son     No Known Problems Sister     No Known Problems Brother     No Known Problems Brother     No Known Problems Brother     No Known Problems Maternal Aunt     No Known Problems Maternal Uncle     No Known Problems Paternal Aunt     No Known Problems Paternal Uncle     No Known Problems Maternal Grandmother     No Known Problems Maternal Grandfather     No Known Problems Paternal Grandmother     No Known Problems Paternal Grandfather     Suicide Neg Hx     Schizophrenia Neg Hx     Amblyopia Neg Hx     Blindness Neg Hx     Cataracts Neg Hx     Glaucoma Neg Hx     Macular degeneration Neg Hx     Retinal detachment Neg Hx     Strabismus Neg Hx     Cancer Neg Hx     Diabetes Neg Hx     Hypertension Neg Hx     Stroke Neg Hx     Thyroid disease Neg Hx        Social History  Social History     Socioeconomic History    Marital status:      Spouse name: Not on file    Number of children: Not on file    Years of education: Not on file    Highest education level: Not on file   Occupational History    Not on file   Social  Needs    Financial resource strain: Not on file    Food insecurity:     Worry: Not on file     Inability: Not on file    Transportation needs:     Medical: Not on file     Non-medical: Not on file   Tobacco Use    Smoking status: Former Smoker    Smokeless tobacco: Never Used   Substance and Sexual Activity    Alcohol use: No     Alcohol/week: 0.0 oz    Drug use: No    Sexual activity: Yes     Partners: Female   Lifestyle    Physical activity:     Days per week: Not on file     Minutes per session: Not on file    Stress: Not on file   Relationships    Social connections:     Talks on phone: Not on file     Gets together: Not on file     Attends Sabianism service: Not on file     Active member of club or organization: Not on file     Attends meetings of clubs or organizations: Not on file     Relationship status: Not on file   Other Topics Concern    Not on file   Social History Narrative    Not on file       Current Medications  Current Outpatient Medications on File Prior to Visit   Medication Sig Dispense Refill    acetaminophen (TYLENOL) 325 MG tablet Take 2 tablets (650 mg total) by mouth every 6 (six) hours as needed for Pain.  0    alfuzosin (UROXATRAL) 10 mg Tb24 Take 1 tablet (10 mg total) by mouth once daily. 60 tablet 1    CONSTULOSE 10 gram/15 mL solution       diazePAM (VALIUM) 5 MG tablet Half to whole pill every 8 hours as needed for anxiety 90 tablet 3    donepezil (ARICEPT) 5 MG tablet Take 1 tablet (5 mg total) by mouth every evening. 30 tablet 11    gabapentin (NEURONTIN) 300 MG capsule Take 2 capsules (600 mg) by mouth three times daily. (Patient taking differently: Take 900 mg by mouth 3 (three) times daily. Take 2 capsules (600 mg) by mouth three times daily.) 540 capsule 3    hydrocodone-acetaminophen 5-325mg (NORCO) 5-325 mg per tablet Take 1 tablet by mouth every 6 (six) hours as needed for Pain. 30 tablet 0    levothyroxine (SYNTHROID) 75 MCG tablet TAKE 1 TABLET BEFORE  BREAKFAST 90 tablet 0    lidocaine HCl 2% (LIDOCAINE VISCOUS) 2 % Soln by Mucous Membrane route every 6 (six) hours. 100 mL 3    lisinopril (PRINIVIL,ZESTRIL) 40 MG tablet TAKE 1 TABLET EVERY DAY 90 tablet 1    mirabegron (MYRBETRIQ) 50 mg Tb24 Take 1 tablet (50 mg total) by mouth once daily. 30 tablet 11    ondansetron (ZOFRAN) 4 MG tablet 1-2 every 8 hr as needed for nausea 45 tablet 12    polyethylene glycol (GLYCOLAX) 17 gram/dose powder MIX 1 CAPFUL (17GM) IN LIQUID AND DRINK ONE TIME DAILY 1530 g 12    pravastatin (PRAVACHOL) 40 MG tablet Take 1 tablet (40 mg total) by mouth once daily. 90 tablet 0    QUEtiapine (SEROQUEL) 50 MG tablet Take 1 tablet (50 mg total) by mouth every evening. (Patient taking differently: Take 25 mg by mouth every evening. ) 90 tablet 0    tolterodine (DETROL LA) 4 MG 24 hr capsule Take 1 capsule (4 mg total) by mouth once daily. 90 capsule 1    traMADol (ULTRAM) 50 mg tablet TAKE ONE TABLET BY MOUTH THREE TIMES DAILY 90 tablet 5    triamcinolone acetonide 0.1% (KENALOG) 0.1 % cream Apply to the affected area twice a day 30 g 2    venlafaxine (EFFEXOR-XR) 75 MG 24 hr capsule TAKE 2 CAPSULES EVERY  capsule 3     No current facility-administered medications on file prior to visit.        Allergies   Review of patient's allergies indicates:   Allergen Reactions    Carbamazepine Swelling    Trazodone Anxiety    Demerol [meperidine] Hallucinations    Morphine Nausea Only         ROS  Review of Systems   Constitutional: Negative for chills, fatigue and fever.   HENT: Negative for facial swelling.         Positive right facial pain   Eyes: Negative for visual disturbance.   Respiratory: Negative for shortness of breath.    Cardiovascular: Negative for chest pain, palpitations and leg swelling.   Gastrointestinal: Negative for abdominal pain, nausea and vomiting.   Musculoskeletal: Negative for myalgias.   Skin: Negative for rash.   Neurological: Negative for weakness,  "light-headedness and headaches.   Hematological: Negative for adenopathy.         Objective:    /78 (BP Location: Left arm, Patient Position: Sitting, BP Method: Large (Manual))   Pulse (!) 58   Temp 97.7 °F (36.5 °C) (Oral)   Ht 6' 2" (1.88 m)   SpO2 95%   BMI 25.47 kg/m²     Physical Exam   Constitutional: Vital signs are normal.   HENT:   Head: Normocephalic.   Eyes: Pupils are equal, round, and reactive to light. EOM are normal.   Cardiovascular: Normal rate, regular rhythm, S1 normal and S2 normal.   Pulmonary/Chest: Effort normal and breath sounds normal. He has no wheezes.   Abdominal: Soft. Normal appearance. There is no tenderness.   Neurological: He is alert.   Skin: Skin is warm, dry and intact. No rash noted.   Psychiatric: He has a normal mood and affect. Judgment normal.       Assessment/Plan:  Ifeanyi Rutherford is a 89 y.o. male who presents today for :    Ifeanyi was seen today for hospital follow up.    Diagnoses and all orders for this visit:    Hospital discharge follow-up  Patient has improved since discharge and feeling well  No nausea or vomiting, hemodynamically stable  Keep follow up with cardiology  No pending tests or studies at this time       Intractable nausea and vomiting  Resolved  Continue to slowly incorporate solid food diet      Problem list issues addressed during this visit    Dementia  Followed by neurology   Continue current treatment plan    Trigeminal neuralgia of right side of face  May increase Tramadol to 100 mg every 4-6 hours as needed for pain       Chronic headache  Likely secondary to trigeminal nerve pain  Stable, chronic as reviewed in record, controlled with medication   followed by PCP  Continue current treatment plan      CKD stage 3  Stable, monitor          Health maintenance reviewed, deferred at this time     I spent >50% of this 40 minute encounter counseling the patient on diagnoses, risk factors, and treatment.         Encouraged to call/return to " clinic if symptoms persist or worsen    Maria Del Rosario Cagle PA-C  [facility] Family Med/ Internal Med

## 2019-09-18 ENCOUNTER — HOSPITAL ENCOUNTER (EMERGENCY)
Facility: HOSPITAL | Age: 84
Discharge: HOME | End: 2019-09-18
Attending: EMERGENCY MEDICINE
Payer: MEDICARE

## 2019-09-18 VITALS
HEIGHT: 74 IN | WEIGHT: 205 LBS | BODY MASS INDEX: 26.31 KG/M2 | TEMPERATURE: 98 F | HEART RATE: 80 BPM | DIASTOLIC BLOOD PRESSURE: 71 MMHG | SYSTOLIC BLOOD PRESSURE: 156 MMHG | OXYGEN SATURATION: 98 % | RESPIRATION RATE: 20 BRPM

## 2019-09-18 DIAGNOSIS — I10 HYPERTENSION, UNSPECIFIED TYPE: ICD-10-CM

## 2019-09-18 DIAGNOSIS — G50.0 TRIGEMINAL NEURALGIA: ICD-10-CM

## 2019-09-18 DIAGNOSIS — I10 HTN (HYPERTENSION): ICD-10-CM

## 2019-09-18 DIAGNOSIS — K59.00 CONSTIPATION, UNSPECIFIED CONSTIPATION TYPE: Primary | ICD-10-CM

## 2019-09-18 LAB
ALBUMIN SERPL BCP-MCNC: 3.4 G/DL (ref 3.5–5.2)
ALP SERPL-CCNC: 55 U/L (ref 55–135)
ALT SERPL W/O P-5'-P-CCNC: 15 U/L (ref 10–44)
ANION GAP SERPL CALC-SCNC: 5 MMOL/L (ref 8–16)
AST SERPL-CCNC: 17 U/L (ref 10–40)
BASOPHILS # BLD AUTO: 0.06 K/UL (ref 0–0.2)
BASOPHILS NFR BLD: 0.8 % (ref 0–1.9)
BILIRUB SERPL-MCNC: 0.4 MG/DL (ref 0.1–1)
BILIRUB UR QL STRIP: NEGATIVE
BUN SERPL-MCNC: 25 MG/DL (ref 8–23)
CALCIUM SERPL-MCNC: 8.9 MG/DL (ref 8.7–10.5)
CHLORIDE SERPL-SCNC: 104 MMOL/L (ref 95–110)
CLARITY UR: CLEAR
CO2 SERPL-SCNC: 32 MMOL/L (ref 23–29)
COLOR UR: ABNORMAL
CREAT SERPL-MCNC: 1.6 MG/DL (ref 0.5–1.4)
DIFFERENTIAL METHOD: ABNORMAL
EOSINOPHIL # BLD AUTO: 0.5 K/UL (ref 0–0.5)
EOSINOPHIL NFR BLD: 6 % (ref 0–8)
ERYTHROCYTE [DISTWIDTH] IN BLOOD BY AUTOMATED COUNT: 12.7 % (ref 11.5–14.5)
EST. GFR  (AFRICAN AMERICAN): 43 ML/MIN/1.73 M^2
EST. GFR  (NON AFRICAN AMERICAN): 37 ML/MIN/1.73 M^2
GLUCOSE SERPL-MCNC: 99 MG/DL (ref 70–110)
GLUCOSE UR QL STRIP: NEGATIVE
HCT VFR BLD AUTO: 44 % (ref 40–54)
HGB BLD-MCNC: 14 G/DL (ref 14–18)
HGB UR QL STRIP: ABNORMAL
KETONES UR QL STRIP: NEGATIVE
LEUKOCYTE ESTERASE UR QL STRIP: NEGATIVE
LIPASE SERPL-CCNC: 28 U/L (ref 4–60)
LYMPHOCYTES # BLD AUTO: 2 K/UL (ref 1–4.8)
LYMPHOCYTES NFR BLD: 27.2 % (ref 18–48)
MCH RBC QN AUTO: 30.7 PG (ref 27–31)
MCHC RBC AUTO-ENTMCNC: 31.8 G/DL (ref 32–36)
MCV RBC AUTO: 97 FL (ref 82–98)
MICROSCOPIC COMMENT: ABNORMAL
MONOCYTES # BLD AUTO: 0.8 K/UL (ref 0.3–1)
MONOCYTES NFR BLD: 10.1 % (ref 4–15)
NEUTROPHILS # BLD AUTO: 4.2 K/UL (ref 1.8–7.7)
NEUTROPHILS NFR BLD: 56.2 % (ref 38–73)
NITRITE UR QL STRIP: NEGATIVE
PH UR STRIP: 7 [PH] (ref 5–8)
PLATELET # BLD AUTO: 217 K/UL (ref 150–350)
PMV BLD AUTO: 10 FL (ref 9.2–12.9)
POTASSIUM SERPL-SCNC: 3.9 MMOL/L (ref 3.5–5.1)
PROT SERPL-MCNC: 6.5 G/DL (ref 6–8.4)
PROT UR QL STRIP: NEGATIVE
RBC # BLD AUTO: 4.56 M/UL (ref 4.6–6.2)
RBC #/AREA URNS HPF: 10 /HPF (ref 0–4)
SODIUM SERPL-SCNC: 141 MMOL/L (ref 136–145)
SP GR UR STRIP: 1.01 (ref 1–1.03)
TROPONIN I SERPL DL<=0.01 NG/ML-MCNC: 0.04 NG/ML (ref 0–0.03)
URN SPEC COLLECT METH UR: ABNORMAL
UROBILINOGEN UR STRIP-ACNC: NEGATIVE EU/DL
WBC # BLD AUTO: 7.49 K/UL (ref 3.9–12.7)

## 2019-09-18 PROCEDURE — 84484 ASSAY OF TROPONIN QUANT: CPT | Mod: HCNC

## 2019-09-18 PROCEDURE — 80053 COMPREHEN METABOLIC PANEL: CPT | Mod: HCNC

## 2019-09-18 PROCEDURE — 96361 HYDRATE IV INFUSION ADD-ON: CPT | Mod: HCNC

## 2019-09-18 PROCEDURE — 93010 EKG 12-LEAD: ICD-10-PCS | Mod: HCNC,,, | Performed by: INTERNAL MEDICINE

## 2019-09-18 PROCEDURE — 93010 ELECTROCARDIOGRAM REPORT: CPT | Mod: HCNC,,, | Performed by: INTERNAL MEDICINE

## 2019-09-18 PROCEDURE — 99285 EMERGENCY DEPT VISIT HI MDM: CPT | Mod: 25,HCNC

## 2019-09-18 PROCEDURE — 85025 COMPLETE CBC W/AUTO DIFF WBC: CPT | Mod: HCNC

## 2019-09-18 PROCEDURE — 63600175 PHARM REV CODE 636 W HCPCS: Mod: HCNC | Performed by: EMERGENCY MEDICINE

## 2019-09-18 PROCEDURE — 25500020 PHARM REV CODE 255: Mod: HCNC | Performed by: EMERGENCY MEDICINE

## 2019-09-18 PROCEDURE — 93005 ELECTROCARDIOGRAM TRACING: CPT | Mod: HCNC

## 2019-09-18 PROCEDURE — 81000 URINALYSIS NONAUTO W/SCOPE: CPT | Mod: HCNC

## 2019-09-18 PROCEDURE — 96376 TX/PRO/DX INJ SAME DRUG ADON: CPT | Mod: HCNC

## 2019-09-18 PROCEDURE — 96374 THER/PROPH/DIAG INJ IV PUSH: CPT | Mod: HCNC

## 2019-09-18 PROCEDURE — 96375 TX/PRO/DX INJ NEW DRUG ADDON: CPT | Mod: HCNC

## 2019-09-18 PROCEDURE — 83690 ASSAY OF LIPASE: CPT | Mod: HCNC

## 2019-09-18 PROCEDURE — 25000003 PHARM REV CODE 250: Mod: HCNC | Performed by: EMERGENCY MEDICINE

## 2019-09-18 RX ORDER — LISINOPRIL 20 MG/1
40 TABLET ORAL
Status: COMPLETED | OUTPATIENT
Start: 2019-09-18 | End: 2019-09-18

## 2019-09-18 RX ORDER — HYDRALAZINE HYDROCHLORIDE 20 MG/ML
10 INJECTION INTRAMUSCULAR; INTRAVENOUS
Status: COMPLETED | OUTPATIENT
Start: 2019-09-18 | End: 2019-09-18

## 2019-09-18 RX ORDER — ONDANSETRON 2 MG/ML
4 INJECTION INTRAMUSCULAR; INTRAVENOUS
Status: COMPLETED | OUTPATIENT
Start: 2019-09-18 | End: 2019-09-18

## 2019-09-18 RX ORDER — SENNOSIDES 8.6 MG/1
1 TABLET ORAL DAILY
Qty: 30 TABLET | Refills: 0 | Status: SHIPPED | OUTPATIENT
Start: 2019-09-18 | End: 2019-10-18

## 2019-09-18 RX ORDER — PSEUDOEPHEDRINE/ACETAMINOPHEN 30MG-500MG
100 TABLET ORAL
Status: COMPLETED | OUTPATIENT
Start: 2019-09-18 | End: 2019-09-18

## 2019-09-18 RX ORDER — SYRING-NEEDL,DISP,INSUL,0.3 ML 29 G X1/2"
296 SYRINGE, EMPTY DISPOSABLE MISCELLANEOUS
Status: COMPLETED | OUTPATIENT
Start: 2019-09-18 | End: 2019-09-18

## 2019-09-18 RX ORDER — HYDROMORPHONE HYDROCHLORIDE 2 MG/ML
0.5 INJECTION, SOLUTION INTRAMUSCULAR; INTRAVENOUS; SUBCUTANEOUS
Status: COMPLETED | OUTPATIENT
Start: 2019-09-18 | End: 2019-09-18

## 2019-09-18 RX ADMIN — SODIUM CHLORIDE, SODIUM LACTATE, POTASSIUM CHLORIDE, AND CALCIUM CHLORIDE 500 ML: .6; .31; .03; .02 INJECTION, SOLUTION INTRAVENOUS at 12:09

## 2019-09-18 RX ADMIN — MAGNESIUM CITRATE 296 ML: 1.75 LIQUID ORAL at 12:09

## 2019-09-18 RX ADMIN — ONDANSETRON HYDROCHLORIDE 4 MG: 2 SOLUTION INTRAMUSCULAR; INTRAVENOUS at 03:09

## 2019-09-18 RX ADMIN — Medication 100 ML: at 12:09

## 2019-09-18 RX ADMIN — SODIUM CHLORIDE 500 ML: 0.9 INJECTION, SOLUTION INTRAVENOUS at 12:09

## 2019-09-18 RX ADMIN — HYDRALAZINE HYDROCHLORIDE 10 MG: 20 INJECTION INTRAMUSCULAR; INTRAVENOUS at 01:09

## 2019-09-18 RX ADMIN — IOHEXOL 100 ML: 350 INJECTION, SOLUTION INTRAVENOUS at 10:09

## 2019-09-18 RX ADMIN — HYDRALAZINE HYDROCHLORIDE 10 MG: 20 INJECTION INTRAMUSCULAR; INTRAVENOUS at 11:09

## 2019-09-18 RX ADMIN — HYDROMORPHONE HYDROCHLORIDE 0.5 MG: 2 INJECTION, SOLUTION INTRAMUSCULAR; INTRAVENOUS; SUBCUTANEOUS at 01:09

## 2019-09-18 RX ADMIN — SODIUM CHLORIDE, SODIUM LACTATE, POTASSIUM CHLORIDE, AND CALCIUM CHLORIDE 500 ML: .6; .31; .03; .02 INJECTION, SOLUTION INTRAVENOUS at 09:09

## 2019-09-18 RX ADMIN — LISINOPRIL 40 MG: 20 TABLET ORAL at 09:09

## 2019-09-18 NOTE — ED NOTES
Rectal enema administered without complication. Large amount of soft stool passed. Pt tolerated well.

## 2019-09-18 NOTE — DISCHARGE INSTRUCTIONS
Please meet with her primary care doctor to recheck blood pressure in 2-3 days.  Take blood pressures at home during this time and bring the log to her primary care doctor.  Return to the ED immediately for any numbness, weakness, chest pain, shortness of breath, difficulty speaking, changes in vision, facial droop.  Please also discussed with the ER doctor referral to either ENT or Neurosurgery for re-evaluation of trigeminal neuralgia pain. I have written you a stool softener to help with constipation that may be secondary to his tramadol.  Please take this and continue to drink a lot of water as this can help with constipation.    Thank you for coming to our Emergency Department today. It is important to remember that some problems are difficult to diagnose and may not be found during your first visit. Be sure to follow up with your primary care doctor and review any labs/imaging that was performed with them. If you do not have a primary care doctor, you may contact the one listed on your discharge paperwork or you may also call the [facility] Appointment Desk at [phone] to schedule an appointment with one.     All medications may potentially have side effects and it is impossible to predict which medications may give you side effects. If you feel that you are having a negative effect of any medication you should immediately stop taking them and seek medical attention.    Return to the ER with any questions/concerns, new/concerning symptoms, worsening or failure to improve. Do not drive or make any important decisions for 24 hours if you have received any pain medications, sedatives or mood altering drugs during your ER visit.

## 2019-09-18 NOTE — ED PROVIDER NOTES
Encounter Date: 9/18/2019    SCRIBE #1 NOTE: I, Kimi Xavier, am scribing for, and in the presence of,  Lisa Cardenas MD. I have scribed the following portions of the note - Other sections scribed: HPI, ROS, PE.       History     Chief Complaint   Patient presents with    Constipation     pt wife reports pt has no had a BM in 6 days and was c/o pain in the stomach earlier. pt has not had any of his morning medications. hx of dementia      CC: Constipation    HPI: This 89 y.o male, accompanied by his wife, presents to the emergency department for an evaluation for constipation for the past 6 days.  Patient also reports of associated intermittent lower abdominal pain and a chronic headache (from trigeminal neuralgia).  Patient's wife states he was recently evaluated in the hospital for nausea and emesis, but reports this has since resolved.  She reports due to the patient's last bowel movement being 6 days ago, she has been giving the patient lighter meals.  She reports since onset of his symptoms, he has not had any change in appetite. She states he currently takes Miralax twice per day.  She states she has attempted treatment with Dulcolax for the past 2 days and states last night, she attempted a Glycerin suppository and an enema, but reports he has only passed a small amount of yellow, liquid stool.  His wife notes the patient's Tramadol was recently increased from 3x per day to 5x per day for his Trigeminal neuralgia.  Wife notes the patient has been having strong smelling urine.  Patient currently denies chest pain, shortness of breath, or any other associated symptoms. Noted to be HTN in triage, has not taken medications yet today. Denies numbness, weakness. Patient's wife denies alcohol consumption, smoking cigarettes, or illicit drug use.    PMHx: Acoustic neuroma, HTN, HLD, Trigeminal neuralgia, Vestibular schwannoma, Chronic headache, CKD stage 3  Prior abdominal surgeries include: Cholecystectomy          The history is provided by the patient and the spouse. No  was used.     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    Spinal stenosis     Trigeminal neuralgia pain 1/20/2015    Trouble in sleeping     Vertigo 9/11/2012    Vestibular schwannoma 6/4/2015     Past Surgical History:   Procedure Laterality Date    bilateral total knee arthroplasties      CATARACT EXTRACTION W/  INTRAOCULAR LENS IMPLANT Bilateral     CHOLECYSTECTOMY      EYE SURGERY      GALLBLADDER SURGERY      PERCUTANEOUS INJECTION-TRIGEMINAL NERVE Right 2/27/2018    [facility]    THYROIDECTOMY      TONSILLECTOMY       Family History   Problem Relation Age of Onset    Heart disease Mother     No Known Problems Father     No Known Problems Sister     No Known Problems Brother     No Known Problems Daughter     No Known Problems Son     No Known Problems Sister     No Known Problems Brother     No Known Problems Brother     No Known Problems Brother     No Known Problems Maternal Aunt     No Known Problems Maternal Uncle     No Known Problems Paternal Aunt     No Known Problems Paternal Uncle     No Known Problems Maternal Grandmother     No Known Problems Maternal Grandfather     No Known Problems Paternal  Grandmother     No Known Problems Paternal Grandfather     Suicide Neg Hx     Schizophrenia Neg Hx     Amblyopia Neg Hx     Blindness Neg Hx     Cataracts Neg Hx     Glaucoma Neg Hx     Macular degeneration Neg Hx     Retinal detachment Neg Hx     Strabismus Neg Hx     Cancer Neg Hx     Diabetes Neg Hx     Hypertension Neg Hx     Stroke Neg Hx     Thyroid disease Neg Hx      Social History     Tobacco Use    Smoking status: Former Smoker    Smokeless tobacco: Never Used   Substance Use Topics    Alcohol use: No     Alcohol/week: 0.0 oz    Drug use: No     Review of Systems   Constitutional: Negative for appetite change, chills, diaphoresis and fever.   HENT: Negative for sore throat.    Eyes: Negative for photophobia and visual disturbance.   Respiratory: Negative for cough and shortness of breath.    Cardiovascular: Negative for chest pain and leg swelling.   Gastrointestinal: Positive for abdominal pain and constipation. Negative for blood in stool, diarrhea, nausea and vomiting.   Genitourinary: Negative for dysuria, frequency, hematuria and urgency.        +strong smelling urine    Musculoskeletal: Negative for neck pain and neck stiffness.   Skin: Negative for rash and wound.   Neurological: Positive for headaches (chronic). Negative for weakness, light-headedness and numbness.   Hematological: Does not bruise/bleed easily.   Psychiatric/Behavioral: Positive for confusion (chronic, dementia ). Negative for suicidal ideas.   All other systems reviewed and are negative.      Physical Exam     Initial Vitals [09/18/19 0800]   BP Pulse Resp Temp SpO2   (!) 203/91 60 18 97.6 °F (36.4 °C) 97 %      MAP       --         Physical Exam    Nursing note and vitals reviewed.  Constitutional: He appears well-developed and well-nourished. He is not diaphoretic. No distress.   HENT:   Head: Normocephalic and atraumatic.   Right Ear: External ear normal.   Left Ear: External ear normal.   Mouth/Throat:  Oropharynx is clear and moist. No oropharyngeal exudate.   Hearing aid to left ear, hearing loss (chronic to right and left ear)    Eyes: Conjunctivae and EOM are normal. Pupils are equal, round, and reactive to light. Right eye exhibits no discharge. Left eye exhibits no discharge.   Neck: Normal range of motion. Neck supple. No JVD present.   Cardiovascular: Normal rate, regular rhythm, normal heart sounds and intact distal pulses. Exam reveals no gallop and no friction rub.    No murmur heard.  Pulses:       Radial pulses are 2+ on the right side, and 2+ on the left side.        Dorsalis pedis pulses are 2+ on the right side, and 2+ on the left side.        Posterior tibial pulses are 2+ on the right side, and 2+ on the left side.   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 No respiratory distress. He has no wheezes. He has no rhonchi. He has no rales.   Abdominal: Soft. Bowel sounds are normal. He exhibits no distension. There is tenderness in the right lower quadrant and suprapubic area. There is no rebound, no guarding, no CVA tenderness and negative Win's sign.   Mild tenderness to the right lower quadrant, suprapubic and right upper quadrant.  Negative Win's and no CVA tenderness.   Genitourinary:   Genitourinary Comments: Patient has brown stool in the rectal vault.  There is good rectal tone. No hematochezia. No melena. Hemoccult negative.     Penile and groin exam with no tenderness, swelling, skin changes noted.    Musculoskeletal: Normal range of motion. He exhibits no edema or tenderness.   Lymphadenopathy:     He has no cervical adenopathy.   Neurological: He is alert. He has normal strength. No sensory deficit.   Strength 5/5 bilateral upper and lower extremities, able to lift himself and move around in bed without any difficulty.  Sensation intact to light touch.  Apart from chronic blindness and chronic hearing loss patient's cranial nerves 2-12 are intact.    Skin: Skin is warm and dry. Capillary refill takes less than 2 seconds. No rash noted. No erythema.   Psychiatric: He has a normal mood and affect. Thought content normal.         ED Course   Procedures  Labs Reviewed   CBC W/ AUTO DIFFERENTIAL - Abnormal; Notable for the following components:       Result Value    RBC 4.56 (*)     Mean Corpuscular Hemoglobin Conc 31.8 (*)     All other components within normal limits   COMPREHENSIVE METABOLIC PANEL - Abnormal; Notable for the following components:    CO2 32 (*)     BUN, Bld 25 (*)     Creatinine 1.6 (*)     Albumin 3.4 (*)     Anion Gap 5 (*)     eGFR if  43 (*)     eGFR if non  37 (*)     All other components within normal limits   URINALYSIS - Abnormal; Notable for the following components:    Occult Blood UA 1+ (*)     All other components within normal limits   URINALYSIS MICROSCOPIC - Abnormal; Notable for the following components:    RBC, UA 10 (*)     All other components within normal limits   TROPONIN I - Abnormal; Notable for the following components:    Troponin I 0.037 (*)     All other components within normal limits   LIPASE     EKG Readings: (Independently Interpreted)   First degree AV block, sinus rhythm at 61.  Right bundle branch block with strange morphology however unchanged from September 13, 2009 13 EKG.  Improvement of inferior lateral ST depression now not present.  No other acute changes.     ECG Results          EKG 12-lead (In process)  Result time 09/18/19 10:36:29    In process by Interface, Lab In [facility] (09/18/19 10:36:29)                 Narrative:    Test Reason : I10,    Vent. Rate : 061 BPM     Atrial Rate : 061 BPM     P-R Int : 342 ms          QRS Dur : 140 ms      QT Int : 484 ms       P-R-T Axes : 030 068 002 degrees     QTc Int : 487 ms    Sinus rhythm with 1st degree A-V block  Right bundle branch block  Abnormal ECG  When compared with ECG of 13-SEP-2019 20:14,  Significant changes have  occurred    Referred By: [email or web address]   SELF           Confirmed By:                   In process by Interface, Lab In [facility] (09/18/19 10:34:22)                 Narrative:    Test Reason : I10,    Vent. Rate : 061 BPM     Atrial Rate : 061 BPM     P-R Int : 342 ms          QRS Dur : 140 ms      QT Int : 484 ms       P-R-T Axes : 030 068 002 degrees     QTc Int : 487 ms    Sinus rhythm with 1st degree A-V block  Right bundle branch block  Abnormal ECG  When compared with ECG of 13-SEP-2019 20:14,  Significant changes have occurred    Referred By: [email or web address]   SELF           Confirmed By:                             Imaging Results          CT Abdomen Pelvis With Contrast (Final result)  Result time 09/18/19 11:24:48    Final result by Mitch Jones MD (09/18/19 11:24:48)                 Impression:      1. No definite signs for inflammatory process in the right lower quadrant.  2. Diverticulosis of the sigmoid colon without definite signs for acute diverticulitis.  3. Bilateral renal cysts.  4. No definite signs for obstructive uropathy.  5. Enlarged prostate as above.  6. Rest of the findings as above.      Electronically signed by: Mitch Jones MD  Date:    09/18/2019  Time:    11:24             Narrative:    EXAMINATION:  CT ABDOMEN PELVIS WITH CONTRAST    CLINICAL HISTORY:  RLQ pain, appendicitis suspected;    TECHNIQUE:  Low dose axial images, sagittal and coronal reformations were obtained from the lung bases to the pubic symphysis following the IV administration of 100 mL of Omnipaque 350 and no oral contrast was given.    COMPARISON:  CT scan of the abdomen and pelvis 09/13/2019    FINDINGS:  There is minimal subpleural dependent atelectatic changes in the lung bases bilaterally.  No pleural effusion.  There is no significant pulmonary consolidations.  Heart size is within normal limits.    Diffuse low attenuation of the liver, likely related to the arterial phase of the study.  No gross hepatic masses or  intrahepatic biliary ductal dilatation.  There are no filling defects in the portal vein or the splenic vein.  There is flow artifacts in the superior mesenteric vein.    The spleen and the pancreas and the adrenal glands are unremarkable.  Gallbladder not visualized on this study, likely from prior cholecystectomy.  Clinical correlation suggested.    Bilateral renal cysts remain without significant change with the largest cyst in the left kidney measuring approximately 4.9 cm.  There is prominence of the extrarenal pelvis bilaterally.  No hydroureter is.  Along the expected course of the ureters there are no abnormal calcifications to suggest ureteral calculi.    Urinary bladder is distended.  There are focal small bladder diverticula or irregularity along the wall of the urinary bladder that could be representative of bladder outlet obstruction.  The prostate is enlarged and measures approximately 4.1 x 5.2 x 4.9 cm.    There is moderate amount of retained stool in the rectum.  Redundancy of the sigmoid colon there are numerous diverticula in the distal descending colon and the proximal sigmoid colon without definite signs for acute diverticulitis.  In the right lower quadrant no definite signs for appendicitis or diverticulitis involving the cecum.  No inflammatory processes of the small bowel in the region of the terminal ileum.    There is no small bowel obstruction or perforations or ileus.  Within the mesentery there are no soft tissue masses or adenopathy.    Diffuse atherosclerotic changes of the abdominal aorta without aneurysmal dilatation.  Also atherosclerotic changes of the iliac arteries.  There is no intra-abdominal lymphadenopathy.    Spondylotic changes are seen throughout the lumbar spine.  The ischiorectal fossa are symmetric bilaterally and within normal limits.    Significant atrophic changes of the right anterior abdominal wall.    Focal small ossific density in the right acetabular region,  likely a bone island.  There is also DJD of the left hip joint.  Mild DJD of the right hip joint.                              MDM  89 year old patient presenting 2/2 abdominal pain of nonemergent etiology. Patient is non toxic in appearance with vitals signs significant for HTN (did not take medications this morning). Pain is able to be controlled with PO medication and abdominal exam with mild tenderness to the right sided abdomen, including RLQ. Negative Win's. Rectal exam with no distal impaction noted, FOBT negative. Most likely abdominal pain secondary to constipation however it is in the right lower quadrant has no history of appendectomy in the past.    Also considered but less likely:     AAA: location inconsistent, no bruits  Cholecystitis: location inconsistent, no relation with meals, negative wins, history of cholecystectomy  SBO: normal flatus. No vomiting  Appendicitis: no fever, no rebound/guarding, will obtain CT abdomen to further evaluate  Mesenteric ischemia:  Considered however HPI inconsistent, does not coincide with meals, other dx more likely  Kidney stone: no radiation to back or cva tenderness, no dysuria, no hematuria  Pyelonephritis: no cva tenderness, no dysuria, no fever, strong smelling urine will send urinalysis  Pancreatitis: no history of alcohol abuse, unlikely gallstone obstructing, location inconsistent  Diverticulitis: age and location not most common, no history of diverticulitis, no fever, no wbc  Epididymitis: no testicular swelling or redness  UTI: UA negative, no dysuria or increased frequency of urination  Testicular torsion: no testicular pain/swelling    Patient's blood pressure remains elevated although similar between right and left arms with equal pulses. Patient denies any complaints including chest pain, shortness of breath, lightheadedness, numbness, weakness. His neuro exam is unchanged and nonfocal except for hearing loss which is chronic.  I have given  him his home dose of lisinopril however he has not had a significant change in his blood pressure.  Will give 10 mg of hydralazine IV.  As this is asymptomatic hypertension with no signs of hypertensive emergency and creatinine at his baseline I do not feel the need to aggressively bring his blood pressure down to normal at this could have negative affects.    Patient's blood pressure remained elevated however he then started complaining of trigeminal neuralgia pain. I treated him with pain medicine and his blood pressure improved to 156/71.  He had a large bowel movement after the brown bomb with no abdominal pain. Given his consistently elevated blood pressure and difficulty with history given dementia I decide to get a troponin on him to evaluate for end-organ damage.  His troponin was improved from his most recent and continues to downtrend from his recent NSTEMI.  He has outpatient follow-up already scheduled with  for is stress echo.  I discussed with wife very strict return precautions for any numbness, weakness, chest pain, shortness of breath, changes in vision, facial drooping, difficulty speaking or any other concerns as this could be a sign of hypertensive emergency.  I encouraged her to take his blood pressure for the next 2 days and follow up with primary care doctor for remains elevated as he may need an increase in his medication although I suspect that his elevated blood pressure might have been secondary to pain. Patient was discharged in stable condition and I have given him senna as an outpatient as his primary care doctor recently increased his tramadol dosage any is suffering from constipation.  Discussed with wife his diagnosis of diverticulosis as well as the rest of the findings on his CT abdomen.    No medical condition requiring immediate intervention beyond what was provided in the ED, and I believe patient is safe for discharge. Regardless, an unremarkable evaluation in the ED  does not preclude the development or presence of a serious of life threatening condition. As such, patient was instructed to return immediately for any worsening or change in current symptoms    Laboratory results and radiology imaging results reviewed.  Based on the patient's history, physical, and workup here in the emergency department I believe the patient is safe for discharge with a diagnosis of Constipation, HTN.  I have discussed the specifics of the workup with the patient and the patient has verbalized understanding of the details of the workup, the diagnosis, the treatment plan, and the need for outpatient follow-up.  In addition, I have advised the patient that they can return to the ED and/or activate EMS at any time with worsening of their symptoms, change of their symptoms, or with any other medical complaint.  The patient remained comfortable and stable during their visit in the ED.  The patient will be given prescriptions for: senna  The patient has been directed to follow up with: PCP, cardiology     Future Appointments   Date Time Provider Department Center   10/18/2019  1:20 PM Giovanny Duong MD [facility] CARDIO [facility]   12/4/2019  2:20 PM Deya Erickson MD [facility] NEURO [facility]                             Scribe Attestation:   Scribe #1: I performed the above scribed service and the documentation accurately describes the services I performed. I attest to the accuracy of the note.    I, Lisa Cardenas, personally performed the services described in this documentation. All medical record entries made by the scribe were at my direction and in my presence.  I have reviewed the chart and agree that the record reflects my personal performance and is accurate and complete.        ED Course as of Sep 18 1556   Wed Sep 18, 2019   1316 Patient had a large BM after brown bomb enema. Improvement of symptoms. No abdominal tenderness on multiple repeat exams. Continues to have elevated BP without chest  pain, SOB, changes in vision from baseline, numbness, weakness. Likely essential hypertension, patient has had BP to 180/90s at outpatient appt, however wife states never into the 200s, recent appt with 130s/70s BP. Will give Hydralazine 10 mg IV and reassess.     [JT]   1405 Patient BP remains elevated after hydralazine x 2, possibly secondary to pain? Patient denies pain, but then refers to face frequently. History limited by dementia. Will treat with pain medication and given recent NSTEMI in the setting of poor historian will obtain repeat trop for end-organ damage.     [JT]      ED Course User Index  [JT] Lisa Cardenas MD     Clinical Impression:       ICD-10-CM ICD-9-CM   1. Constipation, unspecified constipation type K59.00 564.00   2. HTN (hypertension) I10 401.9   3. Hypertension, unspecified type I10 401.9   4. Trigeminal neuralgia G50.0 350.1                                Lisa Cardenas MD  09/18/19 1337

## 2019-09-23 ENCOUNTER — TELEPHONE (OUTPATIENT)
Dept: FAMILY MEDICINE | Facility: CLINIC | Age: 84
End: 2019-09-23

## 2019-09-23 DIAGNOSIS — D33.3 UNILATERAL VESTIBULAR SCHWANNOMA: ICD-10-CM

## 2019-09-23 NOTE — TELEPHONE ENCOUNTER
Spoke with wife she said that he seen Maria Del Rosario Akbar MP on 9/17/19 and she increased his Tramadol from 3 pills a day to 4 to 5 a day. She said that his Rx will be running out if he needs to increase his medication so he would like a new Rx sent to [facility] if she needs to stay this dose. Wife said that he is ok right now but will need a new Rx soon. Last refill was on 3/27/19 # 90.Please advise

## 2019-09-23 NOTE — TELEPHONE ENCOUNTER
----- Message from Yolanda Ambrosio sent at 9/23/2019 11:00 AM CDT -----  Contact: pt's wife ravin [phone]  Type: Patient Call Back    Who called:pt's wife ravin [phone]    What is the request in detail:asking for tramadol. Wife states the dosage was increased by the doctor and now he has run out of it. Call ravin    Can the clinic reply by [facility]?    Would the patient rather a call back or a response via [facility]?     Best call back number:pt's wife ravin [phone]    Additional Information:

## 2019-09-25 ENCOUNTER — TELEPHONE (OUTPATIENT)
Dept: NEUROLOGY | Facility: CLINIC | Age: 84
End: 2019-09-25

## 2019-09-25 DIAGNOSIS — R53.81 DEBILITY: ICD-10-CM

## 2019-09-25 DIAGNOSIS — I10 ESSENTIAL HYPERTENSION: ICD-10-CM

## 2019-09-25 DIAGNOSIS — G20.C PARKINSONISM, UNSPECIFIED PARKINSONISM TYPE: Primary | ICD-10-CM

## 2019-09-25 DIAGNOSIS — E78.5 HYPERLIPIDEMIA, UNSPECIFIED HYPERLIPIDEMIA TYPE: ICD-10-CM

## 2019-09-25 RX ORDER — LEVOTHYROXINE SODIUM 75 UG/1
TABLET ORAL
Qty: 90 TABLET | Refills: 12 | Status: SHIPPED | OUTPATIENT
Start: 2019-09-25 | End: 2020-11-06 | Stop reason: SDUPTHER

## 2019-09-25 RX ORDER — PRAVASTATIN SODIUM 40 MG/1
TABLET ORAL
Qty: 90 TABLET | Refills: 0 | Status: SHIPPED | OUTPATIENT
Start: 2019-09-25 | End: 2019-11-20

## 2019-09-25 RX ORDER — LISINOPRIL 40 MG/1
TABLET ORAL
Qty: 90 TABLET | Refills: 1 | Status: SHIPPED | OUTPATIENT
Start: 2019-09-25 | End: 2019-10-29

## 2019-09-25 RX ORDER — TRAMADOL HYDROCHLORIDE 50 MG/1
TABLET ORAL
Qty: 180 TABLET | Refills: 5 | Status: SHIPPED | OUTPATIENT
Start: 2019-09-25 | End: 2020-06-15 | Stop reason: SDUPTHER

## 2019-09-25 NOTE — TELEPHONE ENCOUNTER
----- Message from Shruthi Gonzalez MA sent at 9/23/2019  3:28 PM CDT -----  Contact: Mr.Shaun Millan [facility] [phone]      ----- Message -----  From: Rosie Gan  Sent: 9/23/2019   3:17 PM CDT  To: Georgina SOTELO Staff    Needs Advice     Reason for call:    states he would like to speak with nurse in reference to extending physical therapy for 2 weeks     Communication Preference:   Mr.Shaun Millan [facility] [phone]    Additional Information:     Thank You

## 2019-09-25 NOTE — TELEPHONE ENCOUNTER
Please let his wife know that Dr. larios has sent a tramadol prescriptions with different directions and different amount

## 2019-09-26 DIAGNOSIS — D33.3 UNILATERAL VESTIBULAR SCHWANNOMA: ICD-10-CM

## 2019-09-30 ENCOUNTER — EXTERNAL HOME HEALTH (OUTPATIENT)
Dept: HOME HEALTH SERVICES | Facility: HOSPITAL | Age: 84
End: 2019-09-30
Payer: MEDICARE

## 2019-09-30 ENCOUNTER — TELEPHONE (OUTPATIENT)
Dept: HOME HEALTH SERVICES | Facility: HOSPITAL | Age: 84
End: 2019-09-30

## 2019-10-02 RX ORDER — TRAMADOL HYDROCHLORIDE 50 MG/1
TABLET ORAL
Qty: 90 TABLET | OUTPATIENT
Start: 2019-10-02

## 2019-10-03 ENCOUNTER — OFFICE VISIT (OUTPATIENT)
Dept: FAMILY MEDICINE | Facility: CLINIC | Age: 84
End: 2019-10-03
Payer: MEDICARE

## 2019-10-03 ENCOUNTER — LAB VISIT (OUTPATIENT)
Dept: LAB | Facility: HOSPITAL | Age: 84
End: 2019-10-03
Attending: INTERNAL MEDICINE
Payer: MEDICARE

## 2019-10-03 VITALS
WEIGHT: 200.63 LBS | HEART RATE: 60 BPM | SYSTOLIC BLOOD PRESSURE: 160 MMHG | DIASTOLIC BLOOD PRESSURE: 66 MMHG | TEMPERATURE: 98 F | HEIGHT: 74 IN | OXYGEN SATURATION: 98 % | BODY MASS INDEX: 25.75 KG/M2

## 2019-10-03 DIAGNOSIS — N18.30 CKD (CHRONIC KIDNEY DISEASE) STAGE 3, GFR 30-59 ML/MIN: ICD-10-CM

## 2019-10-03 DIAGNOSIS — N17.9 ACUTE RENAL FAILURE, UNSPECIFIED ACUTE RENAL FAILURE TYPE: ICD-10-CM

## 2019-10-03 DIAGNOSIS — G50.0 TRIGEMINAL NEURALGIA OF RIGHT SIDE OF FACE: Primary | ICD-10-CM

## 2019-10-03 DIAGNOSIS — F03.90 DEMENTIA WITHOUT BEHAVIORAL DISTURBANCE, UNSPECIFIED DEMENTIA TYPE: ICD-10-CM

## 2019-10-03 LAB
ANION GAP SERPL CALC-SCNC: 10 MMOL/L (ref 8–16)
BUN SERPL-MCNC: 23 MG/DL (ref 8–23)
CALCIUM SERPL-MCNC: 9.5 MG/DL (ref 8.7–10.5)
CHLORIDE SERPL-SCNC: 104 MMOL/L (ref 95–110)
CO2 SERPL-SCNC: 27 MMOL/L (ref 23–29)
CREAT SERPL-MCNC: 1.7 MG/DL (ref 0.5–1.4)
EST. GFR  (AFRICAN AMERICAN): 40.2 ML/MIN/1.73 M^2
EST. GFR  (NON AFRICAN AMERICAN): 34.7 ML/MIN/1.73 M^2
GLUCOSE SERPL-MCNC: 98 MG/DL (ref 70–110)
POTASSIUM SERPL-SCNC: 4.1 MMOL/L (ref 3.5–5.1)
SODIUM SERPL-SCNC: 141 MMOL/L (ref 136–145)

## 2019-10-03 PROCEDURE — 99999 PR PBB SHADOW E&M-EST. PATIENT-LVL IV: CPT | Mod: PBBFAC,HCNC,, | Performed by: INTERNAL MEDICINE

## 2019-10-03 PROCEDURE — 80048 BASIC METABOLIC PNL TOTAL CA: CPT | Mod: HCNC

## 2019-10-03 PROCEDURE — 1101F PR PT FALLS ASSESS DOC 0-1 FALLS W/OUT INJ PAST YR: ICD-10-PCS | Mod: HCNC,CPTII,S$GLB, | Performed by: INTERNAL MEDICINE

## 2019-10-03 PROCEDURE — 99499 UNLISTED E&M SERVICE: CPT | Mod: HCNC,S$GLB,, | Performed by: INTERNAL MEDICINE

## 2019-10-03 PROCEDURE — 99215 PR OFFICE/OUTPT VISIT, EST, LEVL V, 40-54 MIN: ICD-10-PCS | Mod: HCNC,S$GLB,, | Performed by: INTERNAL MEDICINE

## 2019-10-03 PROCEDURE — 99215 OFFICE O/P EST HI 40 MIN: CPT | Mod: HCNC,S$GLB,, | Performed by: INTERNAL MEDICINE

## 2019-10-03 PROCEDURE — 99999 PR PBB SHADOW E&M-EST. PATIENT-LVL IV: ICD-10-PCS | Mod: PBBFAC,HCNC,, | Performed by: INTERNAL MEDICINE

## 2019-10-03 PROCEDURE — 1101F PT FALLS ASSESS-DOCD LE1/YR: CPT | Mod: HCNC,CPTII,S$GLB, | Performed by: INTERNAL MEDICINE

## 2019-10-03 PROCEDURE — 36415 COLL VENOUS BLD VENIPUNCTURE: CPT | Mod: HCNC,PO

## 2019-10-03 PROCEDURE — 99499 RISK ADDL DX/OHS AUDIT: ICD-10-PCS | Mod: HCNC,S$GLB,, | Performed by: INTERNAL MEDICINE

## 2019-10-03 RX ORDER — HYDROCODONE BITARTRATE AND ACETAMINOPHEN 5; 325 MG/1; MG/1
TABLET ORAL
Qty: 120 TABLET | Refills: 0 | Status: SHIPPED | OUTPATIENT
Start: 2019-10-03 | End: 2020-01-21 | Stop reason: SDUPTHER

## 2019-10-03 NOTE — PROGRESS NOTES
Chief complaint, facial pain        N[age]-year-old white male  He is here with his wife.  Some of his complaints are recurrent.  Most recently had an increase in the right-sided trigeminal neuralgia.  We did increase his tramadol but not effectively controlling his pain. He takes a gabapentin 300 about 4 times per day without any increase.  He does fall asleep easily during the day which might be slightly worse with the increasing medications as would be expected.  He has had issues with constipation and we reviewed his visits to the emergency room.  They have increased medications for constipation without any worsening.  On September 18 he also developed some acute emesis which resolved and may well have been some GI virus.  He did go to the emergency room for that and had some acute renal failure superimposed on upon his chronic.  His last creatinine was 1.6 which is near baseline but we will reassess.  We reviewed and discuss the dose limitations with the gabapentin.  I did is GFR of 30-60 his maximum is 1400 mg per day.  We discussed that we could increase 1 of his 300 mg doses to 600 mg and assess response possibly targeting the evening dose if indeed that is when he is in pain. Wife does suggest possibly using a stronger pain medication for the times when the pain is more severe.  He has tolerated hydrocodone in the past.  We reviewed his last Neurosurgery note and the last procedure.  It has been over a year and half since he was seen with a recommendation for a six-month follow-up.  I presume it was expected that the ganglionic me would not be a permanent fix but it areas documentation that he improved a few weeks after the procedure.  It may be time to repeat that procedure if indeed his trigeminal neuralgia pain is persistent and significantly decreasing the quality of his life and if we cannot get some control with gabapentin and hydrocodone.  We discussed using tramadol at times with the pain may not be  as severe and we discussed adjusting the dose of the hydrocodone to affect.  We will repeat his renal function to get a good baseline degree but I think we are limited on the gabapentin long-term other way.      All these issues reviewed and patient counseled an evaluation and management we based upon time counselingTotal time over 45 minutes with over 50% counseling.  Wife does verbalize it is very therapeutic for him to come in and discuss all these issues even though many of them are not that much changed.    Labs were 4/19 and vit D low many yrs ago but now ok. Creatinine was up to 1.6 so will repeat    ROS:   CONST: weight stable.  No new myalgias arthralgias, no new neurological symptoms, no new psychiatric symptoms, no new hallucinations or any worsening    PAST SURGERIES:  Total knee replacements bilaterally.                                                                                                     PAST MEDICAL HISTORY:    Hypertension.   Hyperlipidemia.    Depression.           Generalized anxiety ([facility]).    Hypothyroidism.    Osteoarthritis.    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  Optic neuropathy, right eye.  Right-sided trigeminal neuralgia, seen by neurology, pain management and neurosurgery  Chronic constipation   Evaluation by [facility] for NPH, apparently no response to spinal tap   Chronic vertigo   Vitamin D deficiency   Parkinsonism by [facility] evaluation    Morning hallucinations, possibly related to Seroquel                                                                                                           SOCIAL HISTORY:  He is  and lives with his spouse.  They have adult   children in the area and grandchildren.  He has never been a cigarette       smoker.      Vitals as above,  Gen: no distress  Pleasant male, very hard of hearing, walks slowly and does so with a walker.  To he is well dressed.  His affect and  communication appears to be at baseline compared to prior, exam otherwise deferred        Labs reviewed        Ifeanyi was seen today for facial pain.    Diagnoses and all orders for this visit:    Trigeminal neuralgia of right side of face, worsening neuropathic symptoms, try to suppress with gabapentin but we are limited in the dose, continue tramadol for mild pain but will add hydrocodone for more severe pain watching for numerous side effects which we discussed at length.  -     Ambulatory referral to Neurosurgery    CKD stage 3  -     Basic metabolic panel; Future    Acute renal failure, unspecified acute renal failure type, secondary to vomiting which is resolved, reassess baseline    Dementia without behavioral disturbance, unspecified dementia type, does not appear to be any worse lately.    Other orders  -     HYDROcodone-acetaminophen (NORCO) 5-325 mg per tablet; 1-2 pills every 4-6  hrs prn pain

## 2019-10-09 ENCOUNTER — TELEPHONE (OUTPATIENT)
Dept: FAMILY MEDICINE | Facility: CLINIC | Age: 84
End: 2019-10-09

## 2019-10-09 ENCOUNTER — TELEPHONE (OUTPATIENT)
Dept: NEUROSURGERY | Facility: CLINIC | Age: 84
End: 2019-10-09

## 2019-10-09 NOTE — TELEPHONE ENCOUNTER
----- Message from Bela Boles MA sent at 10/9/2019  2:43 PM CDT -----  Good evening, Dr. Levy put a referral in for Mr. Ifeanyi Rutherford MRN# [id number] to see Dr. Mccray. Please contact the patient for appointment.    Dx: Trigeminal neuralgia of right side of face    Thanks  Bela   Ext [phone]

## 2019-10-09 NOTE — TELEPHONE ENCOUNTER
A message has been sent to Dr. Mahendra Rodriguez staff to contact the patient for a scheduled appointment..

## 2019-10-10 ENCOUNTER — CLINICAL SUPPORT (OUTPATIENT)
Dept: FAMILY MEDICINE | Facility: CLINIC | Age: 84
End: 2019-10-10
Payer: MEDICARE

## 2019-10-10 DIAGNOSIS — Z23 NEED FOR VACCINATION: Primary | ICD-10-CM

## 2019-10-10 PROCEDURE — 99499 UNLISTED E&M SERVICE: CPT | Mod: HCNC,S$GLB,, | Performed by: INTERNAL MEDICINE

## 2019-10-10 PROCEDURE — 99499 NO LOS: ICD-10-PCS | Mod: HCNC,S$GLB,, | Performed by: INTERNAL MEDICINE

## 2019-10-10 PROCEDURE — G0008 FLU VACCINE - HIGH DOSE (65+) PRESERVATIVE FREE IM: ICD-10-PCS | Mod: HCNC,S$GLB,, | Performed by: INTERNAL MEDICINE

## 2019-10-10 PROCEDURE — G0008 ADMIN INFLUENZA VIRUS VAC: HCPCS | Mod: HCNC,S$GLB,, | Performed by: INTERNAL MEDICINE

## 2019-10-10 PROCEDURE — 90662 IIV NO PRSV INCREASED AG IM: CPT | Mod: HCNC,S$GLB,, | Performed by: INTERNAL MEDICINE

## 2019-10-10 PROCEDURE — 90662 FLU VACCINE - HIGH DOSE (65+) PRESERVATIVE FREE IM: ICD-10-PCS | Mod: HCNC,S$GLB,, | Performed by: INTERNAL MEDICINE

## 2019-10-14 ENCOUNTER — TELEPHONE (OUTPATIENT)
Dept: FAMILY MEDICINE | Facility: CLINIC | Age: 84
End: 2019-10-14

## 2019-10-14 DIAGNOSIS — N18.30 CKD (CHRONIC KIDNEY DISEASE) STAGE 3, GFR 30-59 ML/MIN: Primary | ICD-10-CM

## 2019-10-14 NOTE — TELEPHONE ENCOUNTER
Call wife - Dr ROYAL says the lab from 11 days ago showed the kidneys about the same as usual bit we kati need to recheck one more time to assess if trending up or down. Lab any day Later this week ok

## 2019-10-18 ENCOUNTER — LAB VISIT (OUTPATIENT)
Dept: LAB | Facility: HOSPITAL | Age: 84
End: 2019-10-18
Attending: INTERNAL MEDICINE
Payer: MEDICARE

## 2019-10-18 DIAGNOSIS — N18.30 CKD (CHRONIC KIDNEY DISEASE) STAGE 3, GFR 30-59 ML/MIN: ICD-10-CM

## 2019-10-18 LAB
ANION GAP SERPL CALC-SCNC: 9 MMOL/L (ref 8–16)
BUN SERPL-MCNC: 25 MG/DL (ref 8–23)
CALCIUM SERPL-MCNC: 9.4 MG/DL (ref 8.7–10.5)
CHLORIDE SERPL-SCNC: 104 MMOL/L (ref 95–110)
CO2 SERPL-SCNC: 31 MMOL/L (ref 23–29)
CREAT SERPL-MCNC: 1.9 MG/DL (ref 0.5–1.4)
EST. GFR  (AFRICAN AMERICAN): 35.1 ML/MIN/1.73 M^2
EST. GFR  (NON AFRICAN AMERICAN): 30.4 ML/MIN/1.73 M^2
GLUCOSE SERPL-MCNC: 102 MG/DL (ref 70–110)
POTASSIUM SERPL-SCNC: 4.8 MMOL/L (ref 3.5–5.1)
SODIUM SERPL-SCNC: 144 MMOL/L (ref 136–145)

## 2019-10-18 PROCEDURE — 80048 BASIC METABOLIC PNL TOTAL CA: CPT | Mod: HCNC

## 2019-10-18 PROCEDURE — 36415 COLL VENOUS BLD VENIPUNCTURE: CPT | Mod: HCNC,PO

## 2019-10-24 ENCOUNTER — TELEPHONE (OUTPATIENT)
Dept: FAMILY MEDICINE | Facility: CLINIC | Age: 84
End: 2019-10-24

## 2019-10-24 NOTE — TELEPHONE ENCOUNTER
----- Message from Edita Horn sent at 10/24/2019 12:37 PM CDT -----  Contact: Self   Type: Patient Call Back    Who called: Self     What is the request in detail:  Patient is asking to speak with the office   Can the clinic reply by [facility]?  Call   Would the patient rather a call back or a response via [facility]?  Call   Best call back number: [phone]  Additional Information:

## 2019-10-28 ENCOUNTER — LAB VISIT (OUTPATIENT)
Dept: LAB | Facility: HOSPITAL | Age: 84
End: 2019-10-28
Attending: INTERNAL MEDICINE
Payer: MEDICARE

## 2019-10-28 DIAGNOSIS — N18.30 CKD (CHRONIC KIDNEY DISEASE) STAGE 3, GFR 30-59 ML/MIN: ICD-10-CM

## 2019-10-28 LAB
ANION GAP SERPL CALC-SCNC: 8 MMOL/L (ref 8–16)
BUN SERPL-MCNC: 27 MG/DL (ref 8–23)
CALCIUM SERPL-MCNC: 8.8 MG/DL (ref 8.7–10.5)
CHLORIDE SERPL-SCNC: 104 MMOL/L (ref 95–110)
CO2 SERPL-SCNC: 28 MMOL/L (ref 23–29)
CREAT SERPL-MCNC: 2 MG/DL (ref 0.5–1.4)
EST. GFR  (AFRICAN AMERICAN): 33 ML/MIN/1.73 M^2
EST. GFR  (NON AFRICAN AMERICAN): 28.5 ML/MIN/1.73 M^2
GLUCOSE SERPL-MCNC: 85 MG/DL (ref 70–110)
POTASSIUM SERPL-SCNC: 4.3 MMOL/L (ref 3.5–5.1)
SODIUM SERPL-SCNC: 140 MMOL/L (ref 136–145)

## 2019-10-28 PROCEDURE — 80048 BASIC METABOLIC PNL TOTAL CA: CPT | Mod: HCNC

## 2019-10-28 PROCEDURE — 36415 COLL VENOUS BLD VENIPUNCTURE: CPT | Mod: HCNC,PO

## 2019-10-29 ENCOUNTER — TELEPHONE (OUTPATIENT)
Dept: FAMILY MEDICINE | Facility: CLINIC | Age: 84
End: 2019-10-29

## 2019-10-29 DIAGNOSIS — I10 ESSENTIAL HYPERTENSION: Primary | ICD-10-CM

## 2019-10-29 RX ORDER — AMLODIPINE BESYLATE 5 MG/1
5 TABLET ORAL DAILY
Qty: 30 TABLET | Refills: 11 | Status: SHIPPED | OUTPATIENT
Start: 2019-10-29 | End: 2019-11-20 | Stop reason: SDUPTHER

## 2019-10-29 NOTE — TELEPHONE ENCOUNTER
Patient with dementia and hearing problem so please call wife    Dr. ROYAL says the repeat kidney test is still about the same, still elevated at 2.0.    It might be one of the blood pressure medications keeping the kidney test up, the lisinopril so we need to hold that blood pressure medication and obviously will need to make sure blood pressure does not rise     Also, since the blood pressure has been somewhat elevated at the recent appointments, Dr. ROYAL thinks it will just go up off the lisinopril so he is calling in a new blood pressure medication that will not affect the kidneys, amlodipine 5 mg.    Sent to the local pharmacy since we need to make the switch.    Probably good to come by to repeat labs in about 10 days or so and also can set up a nurse appointment to check blood pressure if desired      Medicine called in, lab order in

## 2019-11-11 ENCOUNTER — LAB VISIT (OUTPATIENT)
Dept: LAB | Facility: HOSPITAL | Age: 84
End: 2019-11-11
Attending: INTERNAL MEDICINE
Payer: MEDICARE

## 2019-11-11 DIAGNOSIS — I10 ESSENTIAL HYPERTENSION: ICD-10-CM

## 2019-11-11 LAB
ANION GAP SERPL CALC-SCNC: 10 MMOL/L (ref 8–16)
BUN SERPL-MCNC: 22 MG/DL (ref 8–23)
CALCIUM SERPL-MCNC: 8.9 MG/DL (ref 8.7–10.5)
CHLORIDE SERPL-SCNC: 105 MMOL/L (ref 95–110)
CO2 SERPL-SCNC: 28 MMOL/L (ref 23–29)
CREAT SERPL-MCNC: 1.7 MG/DL (ref 0.5–1.4)
EST. GFR  (AFRICAN AMERICAN): 40.2 ML/MIN/1.73 M^2
EST. GFR  (NON AFRICAN AMERICAN): 34.7 ML/MIN/1.73 M^2
GLUCOSE SERPL-MCNC: 110 MG/DL (ref 70–110)
POTASSIUM SERPL-SCNC: 4 MMOL/L (ref 3.5–5.1)
SODIUM SERPL-SCNC: 143 MMOL/L (ref 136–145)

## 2019-11-11 PROCEDURE — 36415 COLL VENOUS BLD VENIPUNCTURE: CPT | Mod: HCNC,PO

## 2019-11-11 PROCEDURE — 80048 BASIC METABOLIC PNL TOTAL CA: CPT | Mod: HCNC

## 2019-11-14 ENCOUNTER — OFFICE VISIT (OUTPATIENT)
Dept: FAMILY MEDICINE | Facility: CLINIC | Age: 84
End: 2019-11-14
Payer: MEDICARE

## 2019-11-14 VITALS
HEART RATE: 75 BPM | SYSTOLIC BLOOD PRESSURE: 134 MMHG | BODY MASS INDEX: 25.76 KG/M2 | OXYGEN SATURATION: 96 % | HEIGHT: 74 IN | TEMPERATURE: 98 F | DIASTOLIC BLOOD PRESSURE: 60 MMHG

## 2019-11-14 DIAGNOSIS — J01.90 ACUTE SINUSITIS WITH SYMPTOMS GREATER THAN 10 DAYS: Primary | ICD-10-CM

## 2019-11-14 DIAGNOSIS — G89.29 CHRONIC INTRACTABLE HEADACHE, UNSPECIFIED HEADACHE TYPE: ICD-10-CM

## 2019-11-14 DIAGNOSIS — I10 ESSENTIAL HYPERTENSION: Chronic | ICD-10-CM

## 2019-11-14 DIAGNOSIS — R51.9 CHRONIC INTRACTABLE HEADACHE, UNSPECIFIED HEADACHE TYPE: ICD-10-CM

## 2019-11-14 PROCEDURE — 99999 PR PBB SHADOW E&M-EST. PATIENT-LVL IV: CPT | Mod: PBBFAC,HCNC,, | Performed by: PHYSICIAN ASSISTANT

## 2019-11-14 PROCEDURE — 1101F PR PT FALLS ASSESS DOC 0-1 FALLS W/OUT INJ PAST YR: ICD-10-PCS | Mod: HCNC,CPTII,S$GLB, | Performed by: PHYSICIAN ASSISTANT

## 2019-11-14 PROCEDURE — 99214 OFFICE O/P EST MOD 30 MIN: CPT | Mod: HCNC,S$GLB,, | Performed by: PHYSICIAN ASSISTANT

## 2019-11-14 PROCEDURE — 99214 PR OFFICE/OUTPT VISIT, EST, LEVL IV, 30-39 MIN: ICD-10-PCS | Mod: HCNC,S$GLB,, | Performed by: PHYSICIAN ASSISTANT

## 2019-11-14 PROCEDURE — 1101F PT FALLS ASSESS-DOCD LE1/YR: CPT | Mod: HCNC,CPTII,S$GLB, | Performed by: PHYSICIAN ASSISTANT

## 2019-11-14 PROCEDURE — 99999 PR PBB SHADOW E&M-EST. PATIENT-LVL IV: ICD-10-PCS | Mod: PBBFAC,HCNC,, | Performed by: PHYSICIAN ASSISTANT

## 2019-11-14 RX ORDER — AZITHROMYCIN 250 MG/1
TABLET, FILM COATED ORAL
Qty: 6 TABLET | Refills: 0 | Status: SHIPPED | OUTPATIENT
Start: 2019-11-14 | End: 2020-01-21

## 2019-11-14 RX ORDER — FLUTICASONE PROPIONATE 50 MCG
2 SPRAY, SUSPENSION (ML) NASAL DAILY
Qty: 1 BOTTLE | Refills: 12 | Status: SHIPPED | OUTPATIENT
Start: 2019-11-14 | End: 2019-12-14

## 2019-11-14 NOTE — PATIENT INSTRUCTIONS
Sinusitis (Antibiotic Treatment)    The sinuses are air-filled spaces within the bones of the face. They connect to the inside of the nose. Sinusitis is an inflammation of the tissue lining the sinus cavity. Sinus inflammation can occur during a cold. It can also be due to allergies to pollens and other particles in the air. Sinusitis can cause symptoms of sinus congestion and fullness. A sinus infection causes fever, headache and facial pain. There is often green or yellow drainage from the nose or into the back of the throat (post-nasal drip). You have been given antibiotics to treat this condition.  Home care:  · Take the full course of antibiotics as instructed. Do not stop taking them, even if you feel better.  · Drink plenty of water, hot tea, and other liquids. This may help thin mucus. It also may promote sinus drainage.  · Heat may help soothe painful areas of the face. Use a towel soaked in hot water. Or,  the shower and direct the hot spray onto your face. Using a vaporizer along with a menthol rub at night may also help.   · An expectorant containing guaifenesin may help thin the mucus and promote drainage from the sinuses.  · Over-the-counter decongestants may be used unless a similar medicine was prescribed. Nasal sprays work the fastest. Use one that contains phenylephrine or oxymetazoline. First blow the nose gently. Then use the spray. Do not use these medicines more often than directed on the label or symptoms may get worse. You may also use tablets containing pseudoephedrine. Avoid products that combine ingredients, because side effects may be increased. Read labels. You can also ask the pharmacist for help. (NOTE: Persons with high blood pressure should not use decongestants. They can raise blood pressure.)  · Over-the-counter antihistamines may help if allergies contributed to your sinusitis.    · Do not use nasal rinses or irrigation during an acute sinus infection, unless told to by  your health care provider. Rinsing may spread the infection to other sinuses.  · Use acetaminophen or ibuprofen to control pain, unless another pain medicine was prescribed. (If you have chronic liver or kidney disease or ever had a stomach ulcer, talk with your doctor before using these medicines. Aspirin should never be used in anyone under 18 years of age who is ill with a fever. It may cause severe liver damage.)  · Don't smoke. This can worsen symptoms.  Follow-up care  Follow up with your healthcare provider or our staff if you are not improving within the next week.  When to seek medical advice  Call your healthcare provider if any of these occur:  · Facial pain or headache becoming more severe  · Stiff neck  · Unusual drowsiness or confusion  · Swelling of the forehead or eyelids  · Vision problems, including blurred or double vision  · Fever of 100.4ºF (38ºC) or higher, or as directed by your healthcare provider  · Seizure  · Breathing problems  · Symptoms not resolving within 10 days  Date Last Reviewed: 4/13/2015  © 3713-5117 [employer]. [address] [place] [address], [place] [address]. All rights reserved. This information is not intended as a substitute for professional medical care. Always follow your healthcare professional's instructions.

## 2019-11-14 NOTE — ASSESSMENT & PLAN NOTE
Will treat current sinus infection which I believe is contributing to acute headaches  Patient does suffer from chronic Has likely secondary to trigeminal neuralgia   Continue current treatment plan

## 2019-11-14 NOTE — PROGRESS NOTES
Patient Name: Ifeanyi Rutherford    : 1929  MRN: [id number]    Subjective:  Ifeanyi is a 89 y.o. male who presents today for:    Chief Complaint   Patient presents with    Sinusitis       HPI  Patient has multiple medical diagnoses as listed below in the history. He complains of nasal congestion and sinus pressure for 2 weeks. He has intermittent tension type headaches that are relieved with current home pain medications and tylenol. He has associated maxillary facial pain. His wife reports him having symptoms similar years ago which happened to be sinus infection. He was treated with Zpak with good effect last time. She alsoreports Augmentin and doxycyline to have given him some GI upset and loose bowels in the past. The symptoms are worse in the evening and early morning. He reports having a few sleepless and restless nights due to nasal congestion and sinus pressure. He denies fever, chills, body aches, cough, wheezing, dyspnea, or chest pain.     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    Chronic headache 2014    CKD stage 3 2019    Dementia     worked up for NPH at  and had taps with no improvement    Depression     Hyperlipidemia     Hypertension     Hypothyroidism     Insufficiency of tear film of both eyes 2016    Mild major depression 2012    Spinal stenosis     Trigeminal neuralgia pain 2015    Trouble in sleeping     Vertigo 2012    Vestibular schwannoma 2015       Past Surgical History  Past Surgical History:   Procedure Laterality Date    bilateral total knee arthroplasties      CATARACT EXTRACTION W/  INTRAOCULAR LENS IMPLANT Bilateral     CHOLECYSTECTOMY      EYE SURGERY      GALLBLADDER SURGERY      THYROIDECTOMY      TONSILLECTOMY          Family History  Family History   Problem Relation Age of Onset    Heart disease Mother     No Known Problems Father     No Known Problems Sister     No Known Problems Brother     No Known Problems Daughter     No Known Problems Son     No Known Problems Sister     No Known Problems Brother     No Known Problems Brother     No Known Problems Brother     No Known Problems Maternal Aunt     No Known Problems Maternal Uncle     No Known Problems Paternal Aunt     No Known Problems Paternal Uncle     No Known Problems Maternal Grandmother     No Known Problems Maternal Grandfather     No Known Problems Paternal Grandmother     No Known Problems Paternal Grandfather     Suicide Neg Hx     Schizophrenia Neg Hx     Amblyopia Neg Hx     Blindness Neg Hx     Cataracts Neg Hx     Glaucoma Neg Hx     Macular degeneration Neg Hx     Retinal detachment Neg Hx     Strabismus Neg Hx     Cancer Neg Hx     Diabetes Neg Hx     Hypertension Neg Hx     Stroke Neg Hx     Thyroid disease Neg Hx        Social History  Social History     Socioeconomic History    Marital status:      Spouse name: Not on file    Number of children: Not on file    Years of education: Not on file    Highest education level: Not on file   Occupational History    Not on file   Social Needs    Financial resource strain: Not on file    Food insecurity:     Worry: Not on file     Inability: Not on file    Transportation needs:     Medical: Not on file     Non-medical: Not on file   Tobacco Use    Smoking status: Former Smoker    Smokeless tobacco: Never Used   Substance and Sexual Activity    Alcohol use: No     Alcohol/week: 0.0 standard drinks    Drug use: No    Sexual activity: Yes     Partners: Female   Lifestyle    Physical activity:     Days per week: Not on file     Minutes per session: Not on file    Stress: Not on file   Relationships    Social connections:     Talks on phone: Not on file     Gets  together: Not on file     Attends [employer] service: Not on file     Active member of club or organization: Not on file     Attends meetings of clubs or organizations: Not on file     Relationship status: Not on file   Other Topics Concern    Not on file   Social History Narrative    Not on file       Current Medications  Current Outpatient Medications on File Prior to Visit   Medication Sig Dispense Refill    acetaminophen (TYLENOL) 325 MG tablet Take 2 tablets (650 mg total) by mouth every 6 (six) hours as needed for Pain.  0    amLODIPine (NORVASC) 5 MG tablet Take 1 tablet (5 mg total) by mouth once daily. 30 tablet 11    diazePAM (VALIUM) 5 MG tablet Half to whole pill every 8 hours as needed for anxiety 90 tablet 3    donepezil (ARICEPT) 5 MG tablet Take 1 tablet (5 mg total) by mouth every evening. 30 tablet 11    gabapentin (NEURONTIN) 300 MG capsule Take 2 capsules (600 mg) by mouth three times daily. (Patient taking differently: Take 900 mg by mouth 3 (three) times daily. Take 2 capsules (600 mg) by mouth three times daily.) 540 capsule 3    HYDROcodone-acetaminophen (NORCO) 5-325 mg per tablet 1-2 pills every 4-6  hrs prn pain 120 tablet 0    levothyroxine (SYNTHROID) 75 MCG tablet TAKE 1 TABLET BEFORE BREAKFAST 90 tablet 12    lidocaine HCl 2% (LIDOCAINE VISCOUS) 2 % Soln by Mucous Membrane route every 6 (six) hours. 100 mL 3    polyethylene glycol (GLYCOLAX) 17 gram/dose powder MIX 1 CAPFUL (17GM) IN LIQUID AND DRINK ONE TIME DAILY 1530 g 12    pravastatin (PRAVACHOL) 40 MG tablet TAKE 1 TABLET EVERY DAY 90 tablet 0    QUEtiapine (SEROQUEL) 50 MG tablet Take 1 tablet (50 mg total) by mouth every evening. (Patient taking differently: Take 25 mg by mouth every evening. ) 90 tablet 0    traMADol (ULTRAM) 50 mg tablet 1-2 pills every 6 hr as needed for pain 180 tablet 5    venlafaxine (EFFEXOR-XR) 75 MG 24 hr capsule TAKE 2 CAPSULES EVERY  capsule 3     No current facility-administered  "medications on file prior to visit.        Allergies   Review of patient's allergies indicates:   Allergen Reactions    Carbamazepine Swelling    Trazodone Anxiety    Demerol [meperidine] Hallucinations    Morphine Nausea Only         ROS  Review of Systems   Constitutional: Negative for chills, fatigue and fever.   HENT: Positive for congestion, postnasal drip and sinus pain. Negative for ear pain, rhinorrhea, sinus pressure, sneezing and sore throat.    Respiratory: Negative for cough, shortness of breath and wheezing.    Cardiovascular: Negative for chest pain and palpitations.   Gastrointestinal: Negative for abdominal pain and nausea.   Musculoskeletal: Negative for myalgias.   Skin: Negative for rash.   Neurological: Positive for headaches. Negative for light-headedness.   Hematological: Negative for adenopathy.   Psychiatric/Behavioral: Positive for sleep disturbance. The patient is nervous/anxious.          Objective:    /60 (BP Location: Right arm, Patient Position: Sitting, BP Method: Large (Manual))   Pulse 75   Temp 98.2 °F (36.8 °C) (Oral)   Ht 6' 2" (1.88 m)   SpO2 96%   BMI 25.76 kg/m²     Physical Exam   Constitutional: Vital signs are normal.   HENT:   Head: Normocephalic.   Right Ear: Hearing, tympanic membrane, external ear and ear canal normal.   Left Ear: Hearing, external ear and ear canal normal.   Nose: Mucosal edema present. Right sinus exhibits maxillary sinus tenderness. Left sinus exhibits maxillary sinus tenderness.   Mouth/Throat: Uvula is midline and oropharynx is clear and moist.   Eyes: Pupils are equal, round, and reactive to light. EOM are normal.   Neck: Carotid bruit is not present.   Cardiovascular: Normal rate, regular rhythm, S1 normal and S2 normal.   Pulmonary/Chest: Effort normal and breath sounds normal. He has no wheezes.   Lymphadenopathy:     He has no cervical adenopathy.        Right: No supraclavicular adenopathy present.        Left: No " supraclavicular adenopathy present.   Neurological: He is alert.   Skin: Skin is warm, dry and intact. No rash noted.   Psychiatric: He has a normal mood and affect. Judgment normal.       Assessment/Plan:  Ifeanyi Rutherford is a 89 y.o. male who presents today for :    Ifeanyi was seen today for sinusitis.    Diagnoses and all orders for this visit:    Acute sinusitis with symptoms greater than 10 days  -     azithromycin (ZITHROMAX Z-CHRISTY) 250 MG tablet; 2 pills on day 1, then 1 pill a day  -     fluticasone propionate (FLONASE) 50 mcg/actuation nasal spray; 2 sprays (100 mcg total) by Each Nostril route once daily.    Some intolerance to Doxy and Augmentin in the past, GI upset  Will treat with Zpak and have patient follow up in 2-3 days if no improvement of symptoms  Discussed symptomatology of acute bacterial rhinosinusitis, including risk factors and proposed benefit, side effects/risks of antibiotic therapy   Advised to treat symptoms with tylenol/NSAID, nasal irrigation, fluticasone and antihistamine as needed.  Counseled patient on the clinical course of condition including symptomatology, treatment and prevention.  Counseled regarding risks, benefits, and limitations of medications.  Advised patient to seek urgent/emergent care if symptoms intensify.  Sent patient with informational material about diagnosis.  Patient gave verbal understanding and agreement of plan.        Problem list issues addressed during this visit    Essential hypertension  BP controlled presently - reviewed anti-hypertensive regimen - continue current therapy        Chronic headache  Will treat current sinus infection which I believe is contributing to acute headaches  Patient does suffer from chronic HAs likely secondary to trigeminal neuralgia   Continue current treatment plan             Health maintenance reviewed and discussed, deferred at this time due to acute illness      I spent >50% of this 25 minute encounter counseling the  patient on diagnoses, risk factors, and treatments.         Encouraged to call/return to clinic if symptoms persist or worsen    Maria Del Rosario Cagle PA-C  [facility]/ [facility]

## 2019-11-20 ENCOUNTER — OFFICE VISIT (OUTPATIENT)
Dept: CARDIOLOGY | Facility: CLINIC | Age: 84
End: 2019-11-20
Payer: MEDICARE

## 2019-11-20 ENCOUNTER — TELEPHONE (OUTPATIENT)
Dept: FAMILY MEDICINE | Facility: CLINIC | Age: 84
End: 2019-11-20

## 2019-11-20 VITALS
HEIGHT: 74 IN | RESPIRATION RATE: 15 BRPM | OXYGEN SATURATION: 95 % | BODY MASS INDEX: 25.93 KG/M2 | SYSTOLIC BLOOD PRESSURE: 146 MMHG | HEART RATE: 75 BPM | WEIGHT: 202 LBS | DIASTOLIC BLOOD PRESSURE: 82 MMHG

## 2019-11-20 DIAGNOSIS — I77.810 AORTIC ROOT DILATATION: ICD-10-CM

## 2019-11-20 DIAGNOSIS — N18.30 CKD (CHRONIC KIDNEY DISEASE) STAGE 3, GFR 30-59 ML/MIN: Chronic | ICD-10-CM

## 2019-11-20 DIAGNOSIS — I10 ESSENTIAL HYPERTENSION: Chronic | ICD-10-CM

## 2019-11-20 DIAGNOSIS — F03.90 DEMENTIA WITHOUT BEHAVIORAL DISTURBANCE, UNSPECIFIED DEMENTIA TYPE: Chronic | ICD-10-CM

## 2019-11-20 DIAGNOSIS — E78.2 MIXED HYPERLIPIDEMIA: ICD-10-CM

## 2019-11-20 DIAGNOSIS — R79.89 ELEVATED TROPONIN: Primary | ICD-10-CM

## 2019-11-20 PROCEDURE — 99499 UNLISTED E&M SERVICE: CPT | Mod: HCNC,S$GLB,, | Performed by: INTERNAL MEDICINE

## 2019-11-20 PROCEDURE — 1101F PR PT FALLS ASSESS DOC 0-1 FALLS W/OUT INJ PAST YR: ICD-10-PCS | Mod: HCNC,CPTII,S$GLB, | Performed by: INTERNAL MEDICINE

## 2019-11-20 PROCEDURE — 1126F PR PAIN SEVERITY QUANTIFIED, NO PAIN PRESENT: ICD-10-PCS | Mod: HCNC,S$GLB,, | Performed by: INTERNAL MEDICINE

## 2019-11-20 PROCEDURE — 99499 RISK ADDL DX/OHS AUDIT: ICD-10-PCS | Mod: HCNC,S$GLB,, | Performed by: INTERNAL MEDICINE

## 2019-11-20 PROCEDURE — 99999 PR PBB SHADOW E&M-EST. PATIENT-LVL III: ICD-10-PCS | Mod: PBBFAC,HCNC,, | Performed by: INTERNAL MEDICINE

## 2019-11-20 PROCEDURE — 1101F PT FALLS ASSESS-DOCD LE1/YR: CPT | Mod: HCNC,CPTII,S$GLB, | Performed by: INTERNAL MEDICINE

## 2019-11-20 PROCEDURE — 99999 PR PBB SHADOW E&M-EST. PATIENT-LVL III: CPT | Mod: PBBFAC,HCNC,, | Performed by: INTERNAL MEDICINE

## 2019-11-20 PROCEDURE — 99214 OFFICE O/P EST MOD 30 MIN: CPT | Mod: HCNC,S$GLB,, | Performed by: INTERNAL MEDICINE

## 2019-11-20 PROCEDURE — 1159F PR MEDICATION LIST DOCUMENTED IN MEDICAL RECORD: ICD-10-PCS | Mod: HCNC,S$GLB,, | Performed by: INTERNAL MEDICINE

## 2019-11-20 PROCEDURE — 1126F AMNT PAIN NOTED NONE PRSNT: CPT | Mod: HCNC,S$GLB,, | Performed by: INTERNAL MEDICINE

## 2019-11-20 PROCEDURE — 1159F MED LIST DOCD IN RCRD: CPT | Mod: HCNC,S$GLB,, | Performed by: INTERNAL MEDICINE

## 2019-11-20 PROCEDURE — 99214 PR OFFICE/OUTPT VISIT, EST, LEVL IV, 30-39 MIN: ICD-10-PCS | Mod: HCNC,S$GLB,, | Performed by: INTERNAL MEDICINE

## 2019-11-20 RX ORDER — ASPIRIN 81 MG/1
81 TABLET ORAL DAILY
Qty: 90 TABLET | Refills: 3 | COMMUNITY
Start: 2019-11-20

## 2019-11-20 RX ORDER — AMLODIPINE BESYLATE 10 MG/1
10 TABLET ORAL DAILY
Qty: 90 TABLET | Refills: 3 | Status: SHIPPED | OUTPATIENT
Start: 2019-11-20 | End: 2020-10-07

## 2019-11-20 RX ORDER — AMLODIPINE BESYLATE 5 MG/1
5 TABLET ORAL 2 TIMES DAILY
Qty: 60 TABLET | Refills: 11 | Status: SHIPPED | OUTPATIENT
Start: 2019-11-20 | End: 2019-11-20 | Stop reason: SDUPTHER

## 2019-11-20 RX ORDER — ATORVASTATIN CALCIUM 40 MG/1
40 TABLET, FILM COATED ORAL NIGHTLY
Qty: 90 TABLET | Refills: 3 | Status: SHIPPED | OUTPATIENT
Start: 2019-11-20 | End: 2020-06-15 | Stop reason: SDUPTHER

## 2019-11-20 NOTE — TELEPHONE ENCOUNTER
----- Message from Gigi Mata sent at 11/20/2019  2:24 PM CST -----  Contact: [phone]  Type: Patient Call Back    Who called:Krystal    What is the request in detail: Krystal,wife of the patient is requesting a call back from the staff.    Can the clinic reply by [facility]?no    Would the patient rather a call back or a response via [facility]? Call back    Best call back number:[phone]

## 2019-11-20 NOTE — TELEPHONE ENCOUNTER
Patient's wifesaid that his B/P is running 198/80 - 194/80 but only in th AM.  She went on to say that she missed our call earlier because she brought Patient to see his Cardiologist who addressed the elevated B/P by increasing his amlodipine to 10 mg once daily so he won't need the Rx. from Dr. Levy.

## 2019-11-20 NOTE — TELEPHONE ENCOUNTER
Spoke to patient wife. B/P running high. New medication is Amlodipine. What does she need to do. He has been on the medication for 2 weeks

## 2019-11-20 NOTE — TELEPHONE ENCOUNTER
----- Message from Vivian Rodriguez sent at 11/20/2019  7:57 AM CST -----  Contact: Wife- Krystal   Type: Patient Call Back    Who called: Wife- Krystal     What is the request in detail:patient's BP has been elevated and she thinks it because of his medication. Please call  ( also I transferred patient to on call because BP was elavated at time of call  194/83 )    Can the clinic reply by [facility]? No     Would the patient rather a call back or a response via [facility]?  Call     Best call back number:[phone]

## 2019-11-20 NOTE — TELEPHONE ENCOUNTER
Please ask wife some of the current blood pressure readings and also ask if indeed he has developed any ankle swelling which might occur with the medication    The plan at present will be to increase the amlodipine to 5 mg twice a day and watch the blood pressure.    If indeed he develops some fluid retention he might get a little bit more swelling around the ankles but it is nothing harmful.    A prescription for a twice daily supply was sent to the local pharmacy

## 2019-11-20 NOTE — PROGRESS NOTES
CARDIOVASCULAR PROGRESS NOTE    REASON FOR CONSULT:   Ifeanyi Rutherford is a 89 y.o. male who presents for follow up of recent hosp, elev trop, ?CAD.    PCP: Ehrensing  HISTORY OF PRESENT ILLNESS:   The patient returns for follow-up of his recent hospitalization, accompanied by his wife.  His wife presents most of the history as the patient has presbycusis and dementia.  He denies intercurrent angina or dyspnea.  There has been no palpitations, lightheadedness, or syncope.  He denies PND, orthopnea, melena, hematuria, or claudicant symptoms.  His hospitalization was notable for presentation with nausea and vomiting and associated dehydration and acute on chronic renal insufficiency.  A minimal flat elevation of troponin was noted.  Echocardiography was normal an outpatient stress testing was suggested.  On further discussion with the patient and his wife, they decline ischemia evaluation at this time given the absence of any symptoms.  I feel that this is not inappropriate.    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    Spinal stenosis     Trigeminal neuralgia pain 1/20/2015    Trouble in sleeping     Vertigo 9/11/2012    Vestibular schwannoma 6/4/2015       PAST SURGICAL HISTORY:     Past Surgical History:   Procedure Laterality Date    bilateral total knee arthroplasties      CATARACT EXTRACTION W/  INTRAOCULAR LENS IMPLANT Bilateral     CHOLECYSTECTOMY      EYE SURGERY       GALLBLADDER SURGERY      THYROIDECTOMY      TONSILLECTOMY         ALLERGIES AND MEDICATION:     Review of patient's allergies indicates:   Allergen Reactions    Carbamazepine Swelling    Trazodone Anxiety    Demerol [meperidine] Hallucinations    Morphine Nausea Only        Medication List           Accurate as of November 20, 2019  1:35 PM. If you have any questions, ask your nurse or doctor.               CHANGE how you take these medications    amLODIPine 5 MG tablet  Commonly known as:  NORVASC  Take 1 tablet (5 mg total) by mouth 2 (two) times daily.  What changed:  when to take this  Changed by:  Jason Levy MD     gabapentin 300 MG capsule  Commonly known as:  NEURONTIN  Take 2 capsules (600 mg) by mouth three times daily.  What changed:    · how much to take  · how to take this  · when to take this     QUEtiapine 50 MG tablet  Commonly known as:  SEROQUEL  Take 1 tablet (50 mg total) by mouth every evening.  What changed:  how much to take        CONTINUE taking these medications    acetaminophen 325 MG tablet  Commonly known as:  TYLENOL  Take 2 tablets (650 mg total) by mouth every 6 (six) hours as needed for Pain.     azithromycin 250 MG tablet  Commonly known as:  Zithromax Z-Steve  2 pills on day 1, then 1 pill a day     diazePAM 5 MG tablet  Commonly known as:  VALIUM  Half to whole pill every 8 hours as needed for anxiety     donepezil 5 MG tablet  Commonly known as:  ARICEPT  Take 1 tablet (5 mg total) by mouth every evening.     fluticasone propionate 50 mcg/actuation nasal spray  Commonly known as:  FLONASE  2 sprays (100 mcg total) by Each Nostril route once daily.     HYDROcodone-acetaminophen 5-325 mg per tablet  Commonly known as:  NORCO  1-2 pills every 4-6  hrs prn pain     levothyroxine 75 MCG tablet  Commonly known as:  SYNTHROID  TAKE 1 TABLET BEFORE BREAKFAST     lidocaine HCl 2% 2 % Soln  Commonly known as:  Lidocaine Viscous  by Mucous Membrane route every 6 (six) hours.      polyethylene glycol 17 gram/dose powder  Commonly known as:  GLYCOLAX  MIX 1 CAPFUL (17GM) IN LIQUID AND DRINK ONE TIME DAILY     pravastatin 40 MG tablet  Commonly known as:  PRAVACHOL  TAKE 1 TABLET EVERY DAY     traMADol 50 mg tablet  Commonly known as:  ULTRAM  1-2 pills every 6 hr as needed for pain     venlafaxine 75 MG 24 hr capsule  Commonly known as:  EFFEXOR-XR  TAKE 2 CAPSULES EVERY DAY           Where to Get Your Medications      These medications were sent to [address] [address]    Phone:  [phone]   · amLODIPine 5 MG tablet         SOCIAL HISTORY:     Social History     Socioeconomic History    Marital status:      Spouse name: Not on file    Number of children: Not on file    Years of education: Not on file    Highest education level: Not on file   Occupational History    Not on file   Social Needs    Financial resource strain: Not on file    Food insecurity:     Worry: Not on file     Inability: Not on file    Transportation needs:     Medical: Not on file     Non-medical: Not on file   Tobacco Use    Smoking status: Former Smoker    Smokeless tobacco: Never Used   Substance and Sexual Activity    Alcohol use: No     Alcohol/week: 0.0 standard drinks    Drug use: No    Sexual activity: Yes     Partners: Female   Lifestyle    Physical activity:     Days per week: Not on file     Minutes per session: Not on file    Stress: Not on file   Relationships    Social connections:     Talks on phone: Not on file     Gets together: Not on file     Attends [employer] service: Not on file     Active member of club or organization: Not on file     Attends meetings of clubs or organizations: Not on file     Relationship status: Not on file   Other Topics Concern    Not on file   Social History Narrative    Not on file       FAMILY HISTORY:     Family History   Problem Relation Age of Onset    Heart  disease Mother     No Known Problems Father     No Known Problems Sister     No Known Problems Brother     No Known Problems Daughter     No Known Problems Son     No Known Problems Sister     No Known Problems Brother     No Known Problems Brother     No Known Problems Brother     No Known Problems Maternal Aunt     No Known Problems Maternal Uncle     No Known Problems Paternal Aunt     No Known Problems Paternal Uncle     No Known Problems Maternal Grandmother     No Known Problems Maternal Grandfather     No Known Problems Paternal Grandmother     No Known Problems Paternal Grandfather     Suicide Neg Hx     Schizophrenia Neg Hx     Amblyopia Neg Hx     Blindness Neg Hx     Cataracts Neg Hx     Glaucoma Neg Hx     Macular degeneration Neg Hx     Retinal detachment Neg Hx     Strabismus Neg Hx     Cancer Neg Hx     Diabetes Neg Hx     Hypertension Neg Hx     Stroke Neg Hx     Thyroid disease Neg Hx        REVIEW OF SYSTEMS:   Review of Systems   Constitutional: Negative for chills, diaphoresis and fever.   HENT: Negative for nosebleeds.    Eyes: Negative for blurred vision, double vision and photophobia.   Respiratory: Negative for hemoptysis, shortness of breath and wheezing.    Cardiovascular: Negative for chest pain, palpitations, orthopnea, claudication, leg swelling and PND.   Gastrointestinal: Negative for abdominal pain, blood in stool, heartburn, melena, nausea and vomiting.   Genitourinary: Negative for flank pain and hematuria.   Musculoskeletal: Negative for falls, myalgias and neck pain.   Skin: Negative for rash.   Neurological: Negative for dizziness, seizures, loss of consciousness, weakness and headaches.        Presbycisus   Endo/Heme/Allergies: Negative for polydipsia. Does not bruise/bleed easily.   Psychiatric/Behavioral: Negative for depression and memory loss. The patient is not nervous/anxious.        PHYSICAL EXAM:     Vitals:    11/20/19 1256   BP: (!)  "146/82   Pulse: 75   Resp: 15    Body mass index is 25.94 kg/m².  Weight: 91.6 kg (202 lb)   Height: 6' 2" (188 cm)     Physical Exam   Constitutional: He is oriented to person, place, and time. He appears well-developed and well-nourished. He is cooperative.  Non-toxic appearance. No distress.   HENT:   Head: Normocephalic and atraumatic.   Eyes: Pupils are equal, round, and reactive to light. Conjunctivae and EOM are normal. No scleral icterus.   Neck: Trachea normal and normal range of motion. Neck supple. Normal carotid pulses and no JVD present. No neck rigidity. No tracheal deviation and no edema present. No thyromegaly present.   Cardiovascular: Normal rate, regular rhythm, S1 normal and S2 normal. PMI is not displaced. Exam reveals distant heart sounds. Exam reveals no gallop and no friction rub.   No murmur heard.  Pulses:       Carotid pulses are 2+ on the right side, and 2+ on the left side.  Pulmonary/Chest: Effort normal and breath sounds normal. No stridor. No respiratory distress. He has no wheezes. He has no rales. He exhibits no tenderness.   Abdominal: Soft. He exhibits no distension. There is no hepatosplenomegaly.   Musculoskeletal: Normal range of motion. He exhibits no edema or tenderness.   Feet:   Right Foot:   Skin Integrity: Negative for ulcer.   Left Foot:   Skin Integrity: Negative for ulcer.   Neurological: He is alert and oriented to person, place, and time. A cranial nerve deficit (presbycusis) is present.   Skin: Skin is warm and dry. No rash noted. No erythema.   Psychiatric: He has a normal mood and affect. His speech is normal and behavior is normal.   Vitals reviewed.      DATA:   EKG: (personally reviewed tracing)  9/13/19 SR 65, 1st deg AVB, RBBB, NSSTTW changes    Laboratory:  CBC:  Recent Labs   Lab 09/13/19  1706 09/14/19  0533 09/18/19  0907   WBC 8.34 9.05 7.49   Hemoglobin 13.9 L 14.1 14.0   Hematocrit 43.8 45.3 44.0   Platelets 246 226 217       CHEMISTRIES:  Recent Labs "   Lab 09/14/19  0533  10/18/19  1353 10/28/19  1230 11/11/19  1300   Glucose 101   < > 102 85 110   Sodium 142   < > 144 140 143   Potassium 4.0   < > 4.8 4.3 4.0   BUN, Bld 24 H   < > 25 H 27 H 22   Creatinine 1.6 H   < > 1.9 H 2.0 H 1.7 H   eGFR if  43 A   < > 35.1 A 33.0 A 40.2 A   eGFR if non  37 A   < > 30.4 A 28.5 A 34.7 A   Calcium 8.5 L   < > 9.4 8.8 8.9   Magnesium 2.3  --   --   --   --     < > = values in this interval not displayed.       CARDIAC BIOMARKERS:  Recent Labs   Lab 09/14/19  0014 09/14/19  0533 09/18/19  1342   Troponin I 0.052 H 0.062 H 0.037 H       COAGS:  Recent Labs   Lab 02/21/18  0955   INR 1.0       LIPIDS/LFTS:  Recent Labs   Lab 09/13/19  1706 09/14/19  0533 09/14/19  1024 09/18/19  0907   Cholesterol  --   --  227 H  --    Triglycerides  --   --  122  --    HDL  --   --  41  --    LDL Cholesterol  --   --  161.6 H  --    Non-HDL Cholesterol  --   --  186  --    AST 44 H 29  --  17   ALT 35 27  --  15     Lab Results   Component Value Date    TSH 1.174 09/14/2019         Cardiovascular Testing:  Echo 9/14/19  · Normal left ventricular systolic function. The estimated ejection fraction is 60%  · Concentric left ventricular remodeling.  · No wall motion abnormalities.  · The sinuses of Valsalva is mildly dilated. 3.7cm.  · Normal right ventricular systolic function.      ASSESSMENT:   # elev trop/?CAD, pt/wife decline MPI  # aortic root dil, 3.7cm (echo 9/2019)  # HTN, uncontrolled (but hasn't titrated amlod to 10mg qd yet)  # HLP, on prava 40mg  # CKD3  # dementia    PLAN:   Cont med rx  Add ASA 81mg qd  Inc amlod 10mg qd as instructed by Dr. Levy  Change prava to atorva 40mg qhs  RTC 3 months      Giovanny Duong MD, FACC

## 2019-12-04 ENCOUNTER — OFFICE VISIT (OUTPATIENT)
Dept: NEUROLOGY | Facility: CLINIC | Age: 84
End: 2019-12-04
Payer: MEDICARE

## 2019-12-04 VITALS
HEIGHT: 74 IN | HEART RATE: 68 BPM | BODY MASS INDEX: 25.94 KG/M2 | SYSTOLIC BLOOD PRESSURE: 168 MMHG | DIASTOLIC BLOOD PRESSURE: 74 MMHG

## 2019-12-04 DIAGNOSIS — N18.30 CKD (CHRONIC KIDNEY DISEASE) STAGE 3, GFR 30-59 ML/MIN: Chronic | ICD-10-CM

## 2019-12-04 DIAGNOSIS — G50.0 TRIGEMINAL NEURALGIA OF RIGHT SIDE OF FACE: Chronic | ICD-10-CM

## 2019-12-04 DIAGNOSIS — F03.90 DEMENTIA WITHOUT BEHAVIORAL DISTURBANCE, UNSPECIFIED DEMENTIA TYPE: Primary | Chronic | ICD-10-CM

## 2019-12-04 DIAGNOSIS — G20.A1 PARKINSON'S DISEASE: Chronic | ICD-10-CM

## 2019-12-04 PROCEDURE — 1126F PR PAIN SEVERITY QUANTIFIED, NO PAIN PRESENT: ICD-10-PCS | Mod: HCNC,S$GLB,, | Performed by: PSYCHIATRY & NEUROLOGY

## 2019-12-04 PROCEDURE — 99999 PR PBB SHADOW E&M-EST. PATIENT-LVL II: ICD-10-PCS | Mod: PBBFAC,HCNC,, | Performed by: PSYCHIATRY & NEUROLOGY

## 2019-12-04 PROCEDURE — 99214 PR OFFICE/OUTPT VISIT, EST, LEVL IV, 30-39 MIN: ICD-10-PCS | Mod: HCNC,S$GLB,, | Performed by: PSYCHIATRY & NEUROLOGY

## 2019-12-04 PROCEDURE — 99999 PR PBB SHADOW E&M-EST. PATIENT-LVL II: CPT | Mod: PBBFAC,HCNC,, | Performed by: PSYCHIATRY & NEUROLOGY

## 2019-12-04 PROCEDURE — 1159F MED LIST DOCD IN RCRD: CPT | Mod: HCNC,S$GLB,, | Performed by: PSYCHIATRY & NEUROLOGY

## 2019-12-04 PROCEDURE — 99499 UNLISTED E&M SERVICE: CPT | Mod: HCNC,S$GLB,, | Performed by: PSYCHIATRY & NEUROLOGY

## 2019-12-04 PROCEDURE — 1126F AMNT PAIN NOTED NONE PRSNT: CPT | Mod: HCNC,S$GLB,, | Performed by: PSYCHIATRY & NEUROLOGY

## 2019-12-04 PROCEDURE — 1159F PR MEDICATION LIST DOCUMENTED IN MEDICAL RECORD: ICD-10-PCS | Mod: HCNC,S$GLB,, | Performed by: PSYCHIATRY & NEUROLOGY

## 2019-12-04 PROCEDURE — 99214 OFFICE O/P EST MOD 30 MIN: CPT | Mod: HCNC,S$GLB,, | Performed by: PSYCHIATRY & NEUROLOGY

## 2019-12-04 PROCEDURE — 1101F PR PT FALLS ASSESS DOC 0-1 FALLS W/OUT INJ PAST YR: ICD-10-PCS | Mod: HCNC,CPTII,S$GLB, | Performed by: PSYCHIATRY & NEUROLOGY

## 2019-12-04 PROCEDURE — 99499 RISK ADDL DX/OHS AUDIT: ICD-10-PCS | Mod: HCNC,S$GLB,, | Performed by: PSYCHIATRY & NEUROLOGY

## 2019-12-04 PROCEDURE — 1101F PT FALLS ASSESS-DOCD LE1/YR: CPT | Mod: HCNC,CPTII,S$GLB, | Performed by: PSYCHIATRY & NEUROLOGY

## 2019-12-04 NOTE — PROGRESS NOTES
I. Reason for Consult:  dementia      Interval history:  89 y.o. M seen for dementia and lower-body parkinsonism.  Accompanied by wife.    With aricept, he has had less confusion.  Less disoriented.  Fragmented sleep (couple hours at a time).  Gabapentin is 4 pills of the 300mg/day NOT 900mg tid as in MAR.    Liquid lidocaine, swabbed in his mouth helps the TN, but rarely use as such short-lived.    8/1/19  I have not seen him in 4 years.  Accompanied by wife who has asked for f/u due to memory.  Since December, he has had sundowning.  In last couple months, he has been more confused (for place, wife, even children).  He gets lost in house at night when getting up to use bathroom.   Gets a glazed look in eyes.  Sleeps a lot during day.  Morning headaches and irritability he thinks is due to seroquel, but unable to sleep without it.  Shuffling feet, having more difficulty getting around.  Fell a week ago.  No injury.  +incontinence.  Occasional high blood pressure.  Hearing is poor and lost vision in right eye.  Gangliotomy helped with the TN for about 3-4 months, then wore off a bit.  Wife says under control now with gabapentin.      4/9/2015:  Depressed, has intermittent right TN in face (severe) and has blurred vision (cannot read paper).    Gets the TN pain every day, intermittently.  Unable to take pain medications as these are what caused such severe delirium in past.      From my note 12/2014:  Back at home doing everything for himself.  Has vivid, but not active dreaming.  Uses walker, no falls.        History of present illness (from my consult 9/2/14):   Patient is a 85 y.o. male with PMHx of HTN, Depression, Anxiety, acoustic neuroma s/p cyberknife (2012), NPH followed by neurosurgery at [facility], who presented to the ED for altered mental status with associated visual hallucinations, agitation, limb rigidity and sleep disturbance. Patient has been having hallucinations with increased frequency over the  past six months. Agitation is a new symptom. Patient was recently prescribed quetiapine (seroquel) by his psychiatrist for sleep disturbance. Per wife, patient was refusing his seroquel two days prior to admission. One day prior to this admission at [facility] he was seen at the [facility] ED for AMS. Patient received a CT Head, two LPs as well as blood work and, per family, was given a diagnosis of dementia and given olanzepine (zyprexa) and discharged. At home he became acutely combative, was altered again, and had one episode of emesis. Wife did not call EMS because she did not want to go back to [facility]. She brought patient in by personal transport to [facility] ED. This was difficult as patient was combative and very rigid per wife.   Patient has not slept for the past two nights per wife. His AMS and agitation have increased despite quetiapine, olanzepine, and haldol. He remains intermittently disoriented and easily agitated. He is oriented to year, president, and recognizes his wife and daughter this morning.     II.  Review of systems:  As in HPI; balance 3 systems reviewed and are negative.     III.  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 Physical Exam    Vitals:    12/04/19 1335   BP: (!) 168/74   Pulse: 68   Height: 6' 2" (1.88 m)     General appearance: Well nourished, well developed, no acute distress.         Cardiovascular:  pedal pulses 2, no edema or cyanosis, heart regular rate and rhythym, no carotid bruits.         -------------------------------------------------------------  Facial Expression: surprised expression      Affect: full       Orientation to time & place:  Oriented to place, person, but not clearly to situation or date      Attention & concentration:  short     Memory:  Poor recent, but also VERY hard to test due to hearing loss  Language: Spontaneous, fluent; able to repeat and name objects        Fund of knowledge:  Aware of some current events        Speech:  normal (not dysarthric)  -------------------------------------------------------  Cranial nerves: able to count fingers left eye; pupils equal round and reactive, extraocular movements intact,       facial sensation intact, face symmetrical, severe hearing loss, palate raises midline, shoulder shrug strength normal, tongue protrudes midline.        -------------------------------------------------------  Musculoskeletal  Muscle tone: all 4 extremities normal        Muscle Bulk: all 4 extremities normal        Muscle strength:  5/5 in all 4 extremities        No pronator drift  Sensation: Intact to light touch in all extremities        Deep tendon Reflexes:1 bilateral biceps, triceps, patella and ankles        --------------------------------------------------------------  Cerebellar and Coordination  Gait:  With walker, duck-like, low step height, short stride       Finger-nose: no dysmetria       Rapid Alternating Movements (pronation/supination):  R normal; L normal, but marked apraxia in " feet  --------------------------------------------------------------  MOVEMENT DISORDERS FOCUSED EXAM  Abnormality of movement (bradykinesia, hyperkinesia) present? No    Tremor present?   No   Postural stability:  Not tested      V.  Laboratory/ Radiological Data:     Lab Results   Component Value Date    EGFRNONAA 34.7 (A) 11/11/2019     From my note 9/3/14  8/30/2014 CT Head WO  Prominence of the ventricular system, increased in size compared to 2007. This may in part relate to compensatory enlargement from cerebral volume loss, but NPH is a consideration in the appropriate clinical setting.              VI. Medical Decision Making       Problem List Items Addressed This Visit        1 - High    Dementia - Primary (Chronic)    Current Assessment & Plan     Doing well, aricept has improved some symptoms.   -> no changes            2     Parkinsonism (Chronic)    Overview     Lower-body parkinsonism.  nph w/u negative in 2014.  2019- walker         Current Assessment & Plan     No falls.   -> no changes            3     Trigeminal neuralgia of right side of face (Chronic)    Overview     02/27/18 right percutaneous glycerol trigeminal ganglionotomy            4     CKD stage 3 (Chronic)    Current Assessment & Plan     Gabapentin is being renally-dosed per wife's administration, though not by prescription.             Follow up in about 4 months (around 4/4/2020) for dementia, PD.

## 2019-12-05 ENCOUNTER — TELEPHONE (OUTPATIENT)
Dept: FAMILY MEDICINE | Facility: CLINIC | Age: 84
End: 2019-12-05

## 2019-12-05 NOTE — TELEPHONE ENCOUNTER
Call and reassure wife that this is a common scenario.  As everyone gets older, there is an age-related decline in kidney function and is divided up in two stages, 1 2 3 and four and it simply means that his kidney function reflects stage III.  We have been watching his kidney function and it has been stable

## 2019-12-05 NOTE — TELEPHONE ENCOUNTER
----- Message from Vivian Rodriguez sent at 12/5/2019  7:39 AM CST -----  Contact: Krystal- Wife   Type: Patient Call Back    Who called: Self     What is the request in detail:patient's wife would like to speak with the nurse about patient who has stage 3 kidney failure and his feet are swollen. Please call     Can the clinic reply by [facility]? No     Would the patient rather a call back or a response via [facility]?  Call     Best call back number:[phone] or [phone]

## 2019-12-05 NOTE — TELEPHONE ENCOUNTER
Patient wife states she was took patient to neurologist and saw on paper that patient has stage 3 kidney disease asking for explanation of what this mean. Please call [phone]

## 2019-12-05 NOTE — TELEPHONE ENCOUNTER
----- Message from Mishel Bhatt sent at 12/5/2019  9:07 AM CST -----  Contact: pt  Type:  Patient Returning Call    Who Called: Krystal    Who Left Message for Patient: liang    Does the patient know what this is regarding?: Y     Best Call Back Number: [phone]    Additional Information:

## 2019-12-06 ENCOUNTER — TELEPHONE (OUTPATIENT)
Dept: FAMILY MEDICINE | Facility: CLINIC | Age: 84
End: 2019-12-06

## 2019-12-06 NOTE — TELEPHONE ENCOUNTER
----- Message from Kiera Gary sent at 12/6/2019  7:58 AM CST -----  Contact: Wife: [phone]  Type: Patient Call Back    Who called:Self    What is the request in detail:Wife stated she has a question regarding husbands kidneys    Can the clinic reply by [facility]? NO    Would the patient rather a call back or a response via [facility]?     Best call back number:[phone]

## 2019-12-06 NOTE — TELEPHONE ENCOUNTER
Patient's wife informed of message below.  She verbalized understanding , gratitude and intent to share message with Patient.

## 2019-12-06 NOTE — TELEPHONE ENCOUNTER
----- Message from Heydi Camargo sent at 12/6/2019  1:20 PM CST -----  Contact: ZEV BHATIA [[id number]]  Type:  Patient Returning Call     Who Called: Wife     Who Left Message for Patient: Bella     Does the patient know what this is regarding?:Yes     Would the patient rather a call back or a response via [facility]? Call     Best Call Back Number:[phone]     Additional Information: n/a

## 2019-12-27 ENCOUNTER — LAB VISIT (OUTPATIENT)
Dept: LAB | Facility: HOSPITAL | Age: 84
End: 2019-12-27
Attending: INTERNAL MEDICINE
Payer: MEDICARE

## 2019-12-27 ENCOUNTER — OFFICE VISIT (OUTPATIENT)
Dept: FAMILY MEDICINE | Facility: CLINIC | Age: 84
End: 2019-12-27
Payer: MEDICARE

## 2019-12-27 VITALS
WEIGHT: 202.19 LBS | BODY MASS INDEX: 25.95 KG/M2 | DIASTOLIC BLOOD PRESSURE: 60 MMHG | SYSTOLIC BLOOD PRESSURE: 122 MMHG | HEART RATE: 60 BPM | OXYGEN SATURATION: 95 % | TEMPERATURE: 98 F | HEIGHT: 74 IN

## 2019-12-27 DIAGNOSIS — R35.0 INCREASED FREQUENCY OF URINATION: ICD-10-CM

## 2019-12-27 DIAGNOSIS — R60.0 BILATERAL LEG EDEMA: ICD-10-CM

## 2019-12-27 DIAGNOSIS — E78.2 MIXED HYPERLIPIDEMIA: ICD-10-CM

## 2019-12-27 DIAGNOSIS — N18.30 CKD (CHRONIC KIDNEY DISEASE) STAGE 3, GFR 30-59 ML/MIN: Chronic | ICD-10-CM

## 2019-12-27 DIAGNOSIS — E03.9 HYPOTHYROIDISM, UNSPECIFIED TYPE: Chronic | ICD-10-CM

## 2019-12-27 DIAGNOSIS — I10 ESSENTIAL HYPERTENSION: Chronic | ICD-10-CM

## 2019-12-27 DIAGNOSIS — R73.9 HYPERGLYCEMIA: ICD-10-CM

## 2019-12-27 DIAGNOSIS — L03.119 CELLULITIS OF LOWER EXTREMITY, UNSPECIFIED LATERALITY: ICD-10-CM

## 2019-12-27 DIAGNOSIS — F03.90 DEMENTIA WITHOUT BEHAVIORAL DISTURBANCE, UNSPECIFIED DEMENTIA TYPE: Chronic | ICD-10-CM

## 2019-12-27 DIAGNOSIS — R60.0 BILATERAL LEG EDEMA: Primary | ICD-10-CM

## 2019-12-27 LAB
ALBUMIN SERPL BCP-MCNC: 3.5 G/DL (ref 3.5–5.2)
ALBUMIN SERPL BCP-MCNC: 3.5 G/DL (ref 3.5–5.2)
ALP SERPL-CCNC: 74 U/L (ref 55–135)
ALP SERPL-CCNC: 74 U/L (ref 55–135)
ALT SERPL W/O P-5'-P-CCNC: 16 U/L (ref 10–44)
ALT SERPL W/O P-5'-P-CCNC: 16 U/L (ref 10–44)
ANION GAP SERPL CALC-SCNC: 7 MMOL/L (ref 8–16)
AST SERPL-CCNC: 22 U/L (ref 10–40)
AST SERPL-CCNC: 22 U/L (ref 10–40)
BASOPHILS # BLD AUTO: 0.13 K/UL (ref 0–0.2)
BASOPHILS NFR BLD: 1.5 % (ref 0–1.9)
BILIRUB DIRECT SERPL-MCNC: 0.2 MG/DL (ref 0.1–0.3)
BILIRUB SERPL-MCNC: 0.5 MG/DL (ref 0.1–1)
BILIRUB SERPL-MCNC: 0.5 MG/DL (ref 0.1–1)
BNP SERPL-MCNC: 284 PG/ML (ref 0–99)
BUN SERPL-MCNC: 22 MG/DL (ref 8–23)
CALCIUM SERPL-MCNC: 9.2 MG/DL (ref 8.7–10.5)
CHLORIDE SERPL-SCNC: 104 MMOL/L (ref 95–110)
CHOLEST SERPL-MCNC: 184 MG/DL (ref 120–199)
CHOLEST/HDLC SERPL: 4.1 {RATIO} (ref 2–5)
CO2 SERPL-SCNC: 29 MMOL/L (ref 23–29)
CREAT SERPL-MCNC: 1.9 MG/DL (ref 0.5–1.4)
DIFFERENTIAL METHOD: ABNORMAL
EOSINOPHIL # BLD AUTO: 0.4 K/UL (ref 0–0.5)
EOSINOPHIL NFR BLD: 4.7 % (ref 0–8)
ERYTHROCYTE [DISTWIDTH] IN BLOOD BY AUTOMATED COUNT: 12.3 % (ref 11.5–14.5)
EST. GFR  (AFRICAN AMERICAN): 35.1 ML/MIN/1.73 M^2
EST. GFR  (NON AFRICAN AMERICAN): 30.4 ML/MIN/1.73 M^2
ESTIMATED AVG GLUCOSE: 123 MG/DL (ref 68–131)
GLUCOSE SERPL-MCNC: 103 MG/DL (ref 70–110)
HBA1C MFR BLD HPLC: 5.9 % (ref 4–5.6)
HCT VFR BLD AUTO: 42.6 % (ref 40–54)
HDLC SERPL-MCNC: 45 MG/DL (ref 40–75)
HDLC SERPL: 24.5 % (ref 20–50)
HGB BLD-MCNC: 13 G/DL (ref 14–18)
IMM GRANULOCYTES # BLD AUTO: 0.02 K/UL (ref 0–0.04)
IMM GRANULOCYTES NFR BLD AUTO: 0.2 % (ref 0–0.5)
LDLC SERPL CALC-MCNC: 105.6 MG/DL (ref 63–159)
LYMPHOCYTES # BLD AUTO: 2.2 K/UL (ref 1–4.8)
LYMPHOCYTES NFR BLD: 24.6 % (ref 18–48)
MCH RBC QN AUTO: 29.7 PG (ref 27–31)
MCHC RBC AUTO-ENTMCNC: 30.5 G/DL (ref 32–36)
MCV RBC AUTO: 98 FL (ref 82–98)
MONOCYTES # BLD AUTO: 1.1 K/UL (ref 0.3–1)
MONOCYTES NFR BLD: 12.1 % (ref 4–15)
NEUTROPHILS # BLD AUTO: 5 K/UL (ref 1.8–7.7)
NEUTROPHILS NFR BLD: 56.9 % (ref 38–73)
NONHDLC SERPL-MCNC: 139 MG/DL
NRBC BLD-RTO: 0 /100 WBC
PLATELET # BLD AUTO: 242 K/UL (ref 150–350)
PMV BLD AUTO: 10.4 FL (ref 9.2–12.9)
POTASSIUM SERPL-SCNC: 4 MMOL/L (ref 3.5–5.1)
PROT SERPL-MCNC: 7.1 G/DL (ref 6–8.4)
PROT SERPL-MCNC: 7.1 G/DL (ref 6–8.4)
RBC # BLD AUTO: 4.37 M/UL (ref 4.6–6.2)
SODIUM SERPL-SCNC: 140 MMOL/L (ref 136–145)
TRIGL SERPL-MCNC: 167 MG/DL (ref 30–150)
TSH SERPL DL<=0.005 MIU/L-ACNC: 2.58 UIU/ML (ref 0.4–4)
WBC # BLD AUTO: 8.78 K/UL (ref 3.9–12.7)

## 2019-12-27 PROCEDURE — 1159F MED LIST DOCD IN RCRD: CPT | Mod: HCNC,S$GLB,, | Performed by: PHYSICIAN ASSISTANT

## 2019-12-27 PROCEDURE — 80061 LIPID PANEL: CPT | Mod: HCNC

## 2019-12-27 PROCEDURE — 85025 COMPLETE CBC W/AUTO DIFF WBC: CPT | Mod: HCNC

## 2019-12-27 PROCEDURE — 1101F PR PT FALLS ASSESS DOC 0-1 FALLS W/OUT INJ PAST YR: ICD-10-PCS | Mod: HCNC,CPTII,S$GLB, | Performed by: PHYSICIAN ASSISTANT

## 2019-12-27 PROCEDURE — 1125F PR PAIN SEVERITY QUANTIFIED, PAIN PRESENT: ICD-10-PCS | Mod: HCNC,S$GLB,, | Performed by: PHYSICIAN ASSISTANT

## 2019-12-27 PROCEDURE — 1125F AMNT PAIN NOTED PAIN PRSNT: CPT | Mod: HCNC,S$GLB,, | Performed by: PHYSICIAN ASSISTANT

## 2019-12-27 PROCEDURE — 99999 PR PBB SHADOW E&M-EST. PATIENT-LVL III: ICD-10-PCS | Mod: PBBFAC,HCNC,, | Performed by: PHYSICIAN ASSISTANT

## 2019-12-27 PROCEDURE — 1159F PR MEDICATION LIST DOCUMENTED IN MEDICAL RECORD: ICD-10-PCS | Mod: HCNC,S$GLB,, | Performed by: PHYSICIAN ASSISTANT

## 2019-12-27 PROCEDURE — 36415 COLL VENOUS BLD VENIPUNCTURE: CPT | Mod: HCNC,PO

## 2019-12-27 PROCEDURE — 80076 HEPATIC FUNCTION PANEL: CPT | Mod: HCNC

## 2019-12-27 PROCEDURE — 99214 PR OFFICE/OUTPT VISIT, EST, LEVL IV, 30-39 MIN: ICD-10-PCS | Mod: HCNC,S$GLB,, | Performed by: PHYSICIAN ASSISTANT

## 2019-12-27 PROCEDURE — 84443 ASSAY THYROID STIM HORMONE: CPT | Mod: HCNC

## 2019-12-27 PROCEDURE — 83880 ASSAY OF NATRIURETIC PEPTIDE: CPT | Mod: HCNC

## 2019-12-27 PROCEDURE — 99999 PR PBB SHADOW E&M-EST. PATIENT-LVL III: CPT | Mod: PBBFAC,HCNC,, | Performed by: PHYSICIAN ASSISTANT

## 2019-12-27 PROCEDURE — 83036 HEMOGLOBIN GLYCOSYLATED A1C: CPT | Mod: HCNC

## 2019-12-27 PROCEDURE — 1101F PT FALLS ASSESS-DOCD LE1/YR: CPT | Mod: HCNC,CPTII,S$GLB, | Performed by: PHYSICIAN ASSISTANT

## 2019-12-27 PROCEDURE — 99499 UNLISTED E&M SERVICE: CPT | Mod: HCNC,S$GLB,, | Performed by: PHYSICIAN ASSISTANT

## 2019-12-27 PROCEDURE — 80053 COMPREHEN METABOLIC PANEL: CPT | Mod: HCNC

## 2019-12-27 PROCEDURE — 99499 RISK ADDL DX/OHS AUDIT: ICD-10-PCS | Mod: HCNC,S$GLB,, | Performed by: PHYSICIAN ASSISTANT

## 2019-12-27 PROCEDURE — 99214 OFFICE O/P EST MOD 30 MIN: CPT | Mod: HCNC,S$GLB,, | Performed by: PHYSICIAN ASSISTANT

## 2019-12-27 RX ORDER — SULFAMETHOXAZOLE AND TRIMETHOPRIM 800; 160 MG/1; MG/1
1 TABLET ORAL 2 TIMES DAILY
Qty: 14 TABLET | Refills: 0 | Status: SHIPPED | OUTPATIENT
Start: 2019-12-27 | End: 2020-01-03

## 2019-12-27 NOTE — PROGRESS NOTES
Patient Name: Ifeanyi Rutherford    : 1929  MRN: [id number]    Subjective:  Ifeanyi is a 89 y.o. male who presents today for:    Chief Complaint   Patient presents with    foot redness    Leg Swelling       HPI  Patient has multiple medical diagnoses as listed below in the history. He complains of increased LE edema and redness for one week. Symptoms worse. No improvement with elevation. He denies numbness/tingling. No pain. He reports redness that began at toes and has increased to mid foot. His wife and caretaker states that right ankle feels warm to the touch. He is rather sedentary due to other chronic conditions. No history of CHF or pulmonary disease. He is not on diuretic. He reports increase in thirst and water intake. He has increased frequency in urination, especially at night. No dyspnea, orthopnea, PND, or chest pain. No dysuria or retention. No calf pain. No fever or n/v/d. History of CKD 3 with recent elevations in Cr which is being monitored by PCP.     Wife also states he has had increased saturation of adult diapers at night. She is concerned for UTI. He does not complain of dysuria or hematuria. No flank or pelvic pain. No increased confusion. Patient does have dementia. She does report that he drinks water all day, even up to bedtime. He has complained of increased thirst with an increase of water consumption recently.     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    Chronic headache 2014    CKD stage 3 2019    Dementia     worked up for NPH at  and had taps with no improvement    Depression     Hyperlipidemia     Hypertension     Hypothyroidism     Insufficiency of tear film of both eyes 2016    Mild major depression 2012    Spinal stenosis     Trigeminal neuralgia  pain 1/20/2015    Trouble in sleeping     Vertigo 9/11/2012    Vestibular schwannoma 6/4/2015       Past Surgical History  Past Surgical History:   Procedure Laterality Date    bilateral total knee arthroplasties      CATARACT EXTRACTION W/  INTRAOCULAR LENS IMPLANT Bilateral     CHOLECYSTECTOMY      EYE SURGERY      GALLBLADDER SURGERY      THYROIDECTOMY      TONSILLECTOMY         Family History  Family History   Problem Relation Age of Onset    Heart disease Mother     No Known Problems Father     No Known Problems Sister     No Known Problems Brother     No Known Problems Daughter     No Known Problems Son     No Known Problems Sister     No Known Problems Brother     No Known Problems Brother     No Known Problems Brother     No Known Problems Maternal Aunt     No Known Problems Maternal Uncle     No Known Problems Paternal Aunt     No Known Problems Paternal Uncle     No Known Problems Maternal Grandmother     No Known Problems Maternal Grandfather     No Known Problems Paternal Grandmother     No Known Problems Paternal Grandfather     Suicide Neg Hx     Schizophrenia Neg Hx     Amblyopia Neg Hx     Blindness Neg Hx     Cataracts Neg Hx     Glaucoma Neg Hx     Macular degeneration Neg Hx     Retinal detachment Neg Hx     Strabismus Neg Hx     Cancer Neg Hx     Diabetes Neg Hx     Hypertension Neg Hx     Stroke Neg Hx     Thyroid disease Neg Hx        Social History  Social History     Socioeconomic History    Marital status:      Spouse name: Not on file    Number of children: Not on file    Years of education: Not on file    Highest education level: Not on file   Occupational History    Not on file   Social Needs    Financial resource strain: Not on file    Food insecurity:     Worry: Not on file     Inability: Not on file    Transportation needs:     Medical: Not on file     Non-medical: Not on file   Tobacco Use    Smoking status: Former Smoker     Smokeless tobacco: Never Used   Substance and Sexual Activity    Alcohol use: No     Alcohol/week: 0.0 standard drinks    Drug use: No    Sexual activity: Yes     Partners: Female   Lifestyle    Physical activity:     Days per week: Not on file     Minutes per session: Not on file    Stress: Not on file   Relationships    Social connections:     Talks on phone: Not on file     Gets together: Not on file     Attends [employer] service: Not on file     Active member of club or organization: Not on file     Attends meetings of clubs or organizations: Not on file     Relationship status: Not on file   Other Topics Concern    Not on file   Social History Narrative    Not on file       Current Medications  Current Outpatient Medications on File Prior to Visit   Medication Sig Dispense Refill    acetaminophen (TYLENOL) 325 MG tablet Take 2 tablets (650 mg total) by mouth every 6 (six) hours as needed for Pain.  0    amLODIPine (NORVASC) 10 MG tablet Take 1 tablet (10 mg total) by mouth once daily. 90 tablet 3    aspirin (ECOTRIN) 81 MG EC tablet Take 1 tablet (81 mg total) by mouth once daily. 90 tablet 3    atorvastatin (LIPITOR) 40 MG tablet Take 1 tablet (40 mg total) by mouth every evening. 90 tablet 3    azithromycin (ZITHROMAX Z-CHRISTY) 250 MG tablet 2 pills on day 1, then 1 pill a day 6 tablet 0    diazePAM (VALIUM) 5 MG tablet Half to whole pill every 8 hours as needed for anxiety 90 tablet 3    donepezil (ARICEPT) 5 MG tablet Take 1 tablet (5 mg total) by mouth every evening. 30 tablet 11    gabapentin (NEURONTIN) 300 MG capsule Take 2 capsules (600 mg) by mouth three times daily. (Patient taking differently: Take 300 mg by mouth 4 (four) times daily. Take 2 capsules (600 mg) by mouth three times daily.) 540 capsule 3    HYDROcodone-acetaminophen (NORCO) 5-325 mg per tablet 1-2 pills every 4-6  hrs prn pain 120 tablet 0    levothyroxine (SYNTHROID) 75 MCG tablet TAKE 1 TABLET BEFORE BREAKFAST 90  "tablet 12    lidocaine HCl 2% (LIDOCAINE VISCOUS) 2 % Soln by Mucous Membrane route every 6 (six) hours. 100 mL 3    polyethylene glycol (GLYCOLAX) 17 gram/dose powder MIX 1 CAPFUL (17GM) IN LIQUID AND DRINK ONE TIME DAILY 1530 g 12    QUEtiapine (SEROQUEL) 50 MG tablet Take 1 tablet (50 mg total) by mouth every evening. (Patient taking differently: Take 25 mg by mouth every evening. ) 90 tablet 0    traMADol (ULTRAM) 50 mg tablet 1-2 pills every 6 hr as needed for pain 180 tablet 5    venlafaxine (EFFEXOR-XR) 75 MG 24 hr capsule TAKE 2 CAPSULES EVERY  capsule 3     No current facility-administered medications on file prior to visit.        Allergies   Review of patient's allergies indicates:   Allergen Reactions    Carbamazepine Swelling    Trazodone Anxiety    Demerol [meperidine] Hallucinations    Morphine Nausea Only         ROS  Review of Systems   Constitutional: Negative for chills and fatigue.   HENT: Negative for congestion.    Respiratory: Negative for cough, shortness of breath and wheezing.    Cardiovascular: Positive for leg swelling (redness). Negative for chest pain and palpitations.   Gastrointestinal: Negative for abdominal pain, diarrhea, nausea and vomiting.   Endocrine: Positive for polydipsia.   Genitourinary: Positive for frequency. Negative for dysuria, flank pain, hematuria and urgency.   Skin: Negative for rash.   Neurological: Negative for dizziness, syncope, weakness and numbness.   Hematological: Negative for adenopathy.   Psychiatric/Behavioral: Negative for sleep disturbance. The patient is not nervous/anxious.          Objective:    /60 (BP Location: Left arm, Patient Position: Sitting, BP Method: Large (Automatic))   Pulse 60   Temp 97.9 °F (36.6 °C) (Oral)   Ht 6' 2" (1.88 m)   Wt 91.7 kg (202 lb 2.6 oz)   SpO2 95%   BMI 25.96 kg/m²     Physical Exam   Constitutional: Vital signs are normal.   HENT:   Head: Normocephalic.   Eyes: Pupils are equal, round, " and reactive to light. EOM are normal.   Neck: No JVD present. Carotid bruit is not present.   Cardiovascular: Normal rate, regular rhythm, S1 normal and S2 normal.   Pulmonary/Chest: Effort normal and breath sounds normal. He has no wheezes.   Abdominal: Soft. Normal appearance and bowel sounds are normal. There is no hepatosplenomegaly. There is no tenderness.   Musculoskeletal:        Right lower leg: He exhibits edema (pitting). He exhibits no tenderness.        Left lower leg: He exhibits edema (pitting edema). He exhibits no tenderness.   Well demarcated erythema and warmth of dorsal feet, bilaterally. No open wounds or drainage  Bilaterally pitting edema in feet and ankles   Lymphadenopathy:     He has no cervical adenopathy.        Right: No supraclavicular adenopathy present.        Left: No supraclavicular adenopathy present.   Neurological: He is alert.   Skin: Skin is warm, dry and intact. No rash noted.   Psychiatric: He has a normal mood and affect. Judgment normal.       Assessment/Plan:  Ifeanyi Rutherford is a 89 y.o. male who presents today for :    Ifeanyi was seen today for foot redness and leg swelling.    Diagnoses and all orders for this visit:    Bilateral leg edema  -     CBC auto differential; Future  -     TSH; Future  -     Brain natriuretic peptide; Future  -     Comprehensive metabolic panel; Future  -     Protein / creatinine ratio, urine; Future  I suspect dependent edema given pitting on exam and sedentary state. Will evaluate above labs to rule out more pathologic causes. Advised to elevate legs when sitting in chair at home. Wear compression stockings during the day. Avoid high sodium diet.     Cellulitis of lower extremity, unspecified laterality  -     sulfamethoxazole-trimethoprim 800-160mg (BACTRIM DS) 800-160 mg Tab; Take 1 tablet by mouth 2 (two) times daily. for 7 days  Well demarcated erythema with warmth R>L on exam. Will treat for bacterial soft tissue infection.   No signs of  systemic illness on exam    Increased frequency of urination; increased saturation of adult diapers at night  -     Urine culture; Future  -     Urinalysis; Future  Advised to monitor water intake in the evening    Hyperglycemia  -     Hemoglobin A1c; Future  Reassess A1c      Problem list issues addressed during this visit    Dementia  Stable, cared for by wife    Essential hypertension  BP controlled presently - reviewed anti-hypertensive regimen - continue current therapy      CKD stage 3  Reassess Cr    Hypothyroidism  Reassess TSH       I spent >50% of this 25 minute encounter counseling the patient on diagnoses, risk factors, and treatments.         Encouraged to call/return to clinic if symptoms persist or worsen    Maria Del Rosario Cagle PA-C  [facility] Family Med/ Internal Med

## 2019-12-28 ENCOUNTER — LAB VISIT (OUTPATIENT)
Dept: LAB | Facility: HOSPITAL | Age: 84
End: 2019-12-28
Attending: PHYSICIAN ASSISTANT
Payer: MEDICARE

## 2019-12-28 DIAGNOSIS — R60.0 BILATERAL LEG EDEMA: ICD-10-CM

## 2019-12-28 DIAGNOSIS — R35.0 INCREASED FREQUENCY OF URINATION: ICD-10-CM

## 2019-12-28 LAB
BACTERIA #/AREA URNS AUTO: ABNORMAL /HPF
BILIRUB UR QL STRIP: NEGATIVE
CLARITY UR REFRACT.AUTO: CLEAR
COLOR UR AUTO: YELLOW
CREAT UR-MCNC: 77 MG/DL (ref 23–375)
GLUCOSE UR QL STRIP: NEGATIVE
HGB UR QL STRIP: ABNORMAL
HYALINE CASTS UR QL AUTO: 3 /LPF
KETONES UR QL STRIP: NEGATIVE
LEUKOCYTE ESTERASE UR QL STRIP: NEGATIVE
MICROSCOPIC COMMENT: ABNORMAL
NITRITE UR QL STRIP: NEGATIVE
PH UR STRIP: 6 [PH] (ref 5–8)
PROT UR QL STRIP: ABNORMAL
PROT UR-MCNC: 50 MG/DL (ref 0–15)
PROT/CREAT UR: 0.65 MG/G{CREAT} (ref 0–0.2)
RBC #/AREA URNS AUTO: 2 /HPF (ref 0–4)
SP GR UR STRIP: 1.01 (ref 1–1.03)
URN SPEC COLLECT METH UR: ABNORMAL
WBC #/AREA URNS AUTO: 1 /HPF (ref 0–5)

## 2019-12-28 PROCEDURE — 87086 URINE CULTURE/COLONY COUNT: CPT | Mod: HCNC

## 2019-12-28 PROCEDURE — 84156 ASSAY OF PROTEIN URINE: CPT | Mod: HCNC

## 2019-12-28 PROCEDURE — 81001 URINALYSIS AUTO W/SCOPE: CPT | Mod: HCNC

## 2019-12-30 LAB
BACTERIA UR CULT: NORMAL
BACTERIA UR CULT: NORMAL

## 2019-12-31 ENCOUNTER — TELEPHONE (OUTPATIENT)
Dept: FAMILY MEDICINE | Facility: CLINIC | Age: 84
End: 2019-12-31

## 2019-12-31 DIAGNOSIS — N28.9 ABNORMAL KIDNEY FUNCTION: ICD-10-CM

## 2019-12-31 DIAGNOSIS — R60.0 BILATERAL LEG EDEMA: Primary | ICD-10-CM

## 2019-12-31 RX ORDER — FUROSEMIDE 20 MG/1
20 TABLET ORAL 2 TIMES DAILY
Qty: 30 TABLET | Refills: 0 | Status: SHIPPED | OUTPATIENT
Start: 2019-12-31 | End: 2020-03-05

## 2019-12-31 NOTE — TELEPHONE ENCOUNTER
----- Message from Janene Rodriguez sent at 12/31/2019  8:12 AM CST -----  Contact: Wife- Krystal  Type: Patient Call Back    Who called:Wife    What is the request in detail: She needs to discuss labs and swelling in feet and legs    Can the clinic reply by [facility]? No    Would the patient rather a call back or a response via [facility]? Call    Best call back number:[phone]

## 2019-12-31 NOTE — TELEPHONE ENCOUNTER
----- Message from Angeles Barahona sent at 12/31/2019  9:20 AM CST -----  Type:  Sooner Appointment Request    Patient is requesting a sooner appointment.  Patient declined first available appointment listed as well as another facility and provider .  Patient will not accept being placed on the waitlist and is requesting a message be sent to doctor.    Name of Caller: spouse/ Krystal     When is the first available appointment? 01/21    Symptoms: new med/ kidney check    Would the patient rather a call back or a response via [facility]? Call back     Best Call Back Number: [phone]    Additional Information: spouse requesting to be seen by Dr. GENNY colmenares

## 2019-12-31 NOTE — TELEPHONE ENCOUNTER
Spoke with patient's wife. She is patient's care taker. Discussed recent lab work. Patient's bilateral leg edema is mildly improved. Redness and warmth has resolved with antibiotic.     I have recommended the patient take 20 mg lasix for 3-4 days, continue with compression stockings and elevation. We will reassess his renal function in 1 week. I have advised to follow up with PCP 2-3 weeks with regard to kidney function and proteinuria. Patient's wife gave verbal understanding and agreement of plan.     Maria Del Rosario Cagle PA-C  [facility] Family Med/ Internal Med

## 2020-01-06 ENCOUNTER — TELEPHONE (OUTPATIENT)
Dept: FAMILY MEDICINE | Facility: CLINIC | Age: 85
End: 2020-01-06

## 2020-01-06 NOTE — TELEPHONE ENCOUNTER
Left message for the patient to return staff call.   incision opening; alittle puss; very sore; hurts on right side; really red

## 2020-01-06 NOTE — TELEPHONE ENCOUNTER
----- Message from Florencia Connelly sent at 1/6/2020  9:10 AM CST -----  Contact: Spouse/ Krystal/  [phone]  Type: Patient Call Back    Who called:  Krystal    What is the request in detail:  Spouse returned staffs call.  Thank you      Would the patient rather a call back or a response via [facility]?  Call back    Best call back number:  [phone]

## 2020-01-06 NOTE — TELEPHONE ENCOUNTER
----- Message from Celeste Amezcua sent at 1/6/2020  8:26 AM CST -----  Contact: Alicia Rice [phone]  Type:  Sooner Appointment Request    Patient is requesting a sooner appointment.  Patient is scheduled for first available appointment. Patient will accept being placed on the waitlist and is requesting a message be sent to doctor.    Name of Caller: Alicia Rice    When is the first available appointment? 01-21-20    Symptoms: Feet are very swollen. Nurse Gurjit put in on Lasix, but temporarily. Need his kidneys checked.    Would the patient rather a call back or a response via [facility]? Call back    Best Call Back Number: [phone]

## 2020-01-07 ENCOUNTER — OFFICE VISIT (OUTPATIENT)
Dept: FAMILY MEDICINE | Facility: CLINIC | Age: 85
End: 2020-01-07
Payer: MEDICARE

## 2020-01-07 VITALS
OXYGEN SATURATION: 97 % | HEIGHT: 74 IN | WEIGHT: 202 LBS | SYSTOLIC BLOOD PRESSURE: 134 MMHG | BODY MASS INDEX: 25.93 KG/M2 | TEMPERATURE: 99 F | HEART RATE: 60 BPM | DIASTOLIC BLOOD PRESSURE: 66 MMHG

## 2020-01-07 DIAGNOSIS — R60.9 DEPENDENT EDEMA: Primary | ICD-10-CM

## 2020-01-07 DIAGNOSIS — I70.0 ATHEROSCLEROSIS OF AORTA: ICD-10-CM

## 2020-01-07 DIAGNOSIS — I77.810 AORTIC ROOT DILATATION: ICD-10-CM

## 2020-01-07 DIAGNOSIS — G20.A1 PARKINSON'S DISEASE: ICD-10-CM

## 2020-01-07 DIAGNOSIS — F39 MOOD DISORDER: ICD-10-CM

## 2020-01-07 DIAGNOSIS — I10 ESSENTIAL HYPERTENSION: Chronic | ICD-10-CM

## 2020-01-07 DIAGNOSIS — N18.30 CKD (CHRONIC KIDNEY DISEASE) STAGE 3, GFR 30-59 ML/MIN: Chronic | ICD-10-CM

## 2020-01-07 PROCEDURE — 1159F PR MEDICATION LIST DOCUMENTED IN MEDICAL RECORD: ICD-10-PCS | Mod: HCNC,S$GLB,, | Performed by: PHYSICIAN ASSISTANT

## 2020-01-07 PROCEDURE — 99999 PR PBB SHADOW E&M-EST. PATIENT-LVL III: CPT | Mod: PBBFAC,HCNC,, | Performed by: PHYSICIAN ASSISTANT

## 2020-01-07 PROCEDURE — 99214 OFFICE O/P EST MOD 30 MIN: CPT | Mod: HCNC,S$GLB,, | Performed by: PHYSICIAN ASSISTANT

## 2020-01-07 PROCEDURE — 1125F PR PAIN SEVERITY QUANTIFIED, PAIN PRESENT: ICD-10-PCS | Mod: HCNC,S$GLB,, | Performed by: PHYSICIAN ASSISTANT

## 2020-01-07 PROCEDURE — 99999 PR PBB SHADOW E&M-EST. PATIENT-LVL III: ICD-10-PCS | Mod: PBBFAC,HCNC,, | Performed by: PHYSICIAN ASSISTANT

## 2020-01-07 PROCEDURE — 1159F MED LIST DOCD IN RCRD: CPT | Mod: HCNC,S$GLB,, | Performed by: PHYSICIAN ASSISTANT

## 2020-01-07 PROCEDURE — 1125F AMNT PAIN NOTED PAIN PRSNT: CPT | Mod: HCNC,S$GLB,, | Performed by: PHYSICIAN ASSISTANT

## 2020-01-07 PROCEDURE — 1101F PT FALLS ASSESS-DOCD LE1/YR: CPT | Mod: HCNC,CPTII,S$GLB, | Performed by: PHYSICIAN ASSISTANT

## 2020-01-07 PROCEDURE — 99214 PR OFFICE/OUTPT VISIT, EST, LEVL IV, 30-39 MIN: ICD-10-PCS | Mod: HCNC,S$GLB,, | Performed by: PHYSICIAN ASSISTANT

## 2020-01-07 PROCEDURE — 1101F PR PT FALLS ASSESS DOC 0-1 FALLS W/OUT INJ PAST YR: ICD-10-PCS | Mod: HCNC,CPTII,S$GLB, | Performed by: PHYSICIAN ASSISTANT

## 2020-01-07 NOTE — PROGRESS NOTES
Patient Name: Ifeanyi Rutherford    : 1929  MRN: [id number]    Subjective:  Ifeanyi is a 89 y.o. male who presents today for:    Chief Complaint   Patient presents with    Leg Swelling     both       HPI  Patient has multiple medical diagnoses as listed below in the history. He presents for follow up of bilateral lower extremity edema. LOV 19. He is here with his wife and care taker. Patient with history of dementia and his wife has provided the history. She reports improvement of leg edema with elevation, compression stockings, and 40 mg lasix daily. She reports the patient having an increase of water intake the last couple of weeks coupled with high sodium foods over the holiday. He is obviously not very active given other chronic issues. The patient has no associated symptoms. He denies dyspnea, PND, or orthopnea. Recent BNP was stable with mild elevation to be expected given his age. No chest pain. Kidney has been stable with his baseline Cr around 1.7. No leg pain. He was being treated for cellulitis of lower extremity with bactrim. He wife reports improvement of erythema. She states that the patient's dementia was worse on the bactrim. He was having increased hallucinations that improved once antibiotic was completed. She has been monitoring his salt intake when cooking at home. They do like to go out to eat once a week for shrimp platters. He has no other complaints today and is otherwise healthy.     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    Chronic headache 2014    CKD stage 3 2019    Dementia     worked up for NPH at  and had taps with no improvement    Depression     Hyperlipidemia     Hypertension     Hypothyroidism     Insufficiency of tear film of both eyes 2016    Mild  major depression 11/27/2012    Spinal stenosis     Trigeminal neuralgia pain 1/20/2015    Trouble in sleeping     Vertigo 9/11/2012    Vestibular schwannoma 6/4/2015       Past Surgical History  Past Surgical History:   Procedure Laterality Date    bilateral total knee arthroplasties      CATARACT EXTRACTION W/  INTRAOCULAR LENS IMPLANT Bilateral     CHOLECYSTECTOMY      EYE SURGERY      GALLBLADDER SURGERY      THYROIDECTOMY      TONSILLECTOMY         Family History  Family History   Problem Relation Age of Onset    Heart disease Mother     No Known Problems Father     No Known Problems Sister     No Known Problems Brother     No Known Problems Daughter     No Known Problems Son     No Known Problems Sister     No Known Problems Brother     No Known Problems Brother     No Known Problems Brother     No Known Problems Maternal Aunt     No Known Problems Maternal Uncle     No Known Problems Paternal Aunt     No Known Problems Paternal Uncle     No Known Problems Maternal Grandmother     No Known Problems Maternal Grandfather     No Known Problems Paternal Grandmother     No Known Problems Paternal Grandfather     Suicide Neg Hx     Schizophrenia Neg Hx     Amblyopia Neg Hx     Blindness Neg Hx     Cataracts Neg Hx     Glaucoma Neg Hx     Macular degeneration Neg Hx     Retinal detachment Neg Hx     Strabismus Neg Hx     Cancer Neg Hx     Diabetes Neg Hx     Hypertension Neg Hx     Stroke Neg Hx     Thyroid disease Neg Hx        Social History  Social History     Socioeconomic History    Marital status:      Spouse name: Not on file    Number of children: Not on file    Years of education: Not on file    Highest education level: Not on file   Occupational History    Not on file   Social Needs    Financial resource strain: Not on file    Food insecurity:     Worry: Not on file     Inability: Not on file    Transportation needs:     Medical: Not on file      Non-medical: Not on file   Tobacco Use    Smoking status: Former Smoker    Smokeless tobacco: Never Used   Substance and Sexual Activity    Alcohol use: No     Alcohol/week: 0.0 standard drinks    Drug use: No    Sexual activity: Yes     Partners: Female   Lifestyle    Physical activity:     Days per week: Not on file     Minutes per session: Not on file    Stress: Not on file   Relationships    Social connections:     Talks on phone: Not on file     Gets together: Not on file     Attends [employer] service: Not on file     Active member of club or organization: Not on file     Attends meetings of clubs or organizations: Not on file     Relationship status: Not on file   Other Topics Concern    Not on file   Social History Narrative    Not on file       Current Medications  Current Outpatient Medications on File Prior to Visit   Medication Sig Dispense Refill    acetaminophen (TYLENOL) 325 MG tablet Take 2 tablets (650 mg total) by mouth every 6 (six) hours as needed for Pain.  0    amLODIPine (NORVASC) 10 MG tablet Take 1 tablet (10 mg total) by mouth once daily. 90 tablet 3    aspirin (ECOTRIN) 81 MG EC tablet Take 1 tablet (81 mg total) by mouth once daily. 90 tablet 3    atorvastatin (LIPITOR) 40 MG tablet Take 1 tablet (40 mg total) by mouth every evening. 90 tablet 3    azithromycin (ZITHROMAX Z-CHRISTY) 250 MG tablet 2 pills on day 1, then 1 pill a day 6 tablet 0    diazePAM (VALIUM) 5 MG tablet Half to whole pill every 8 hours as needed for anxiety 90 tablet 3    donepezil (ARICEPT) 5 MG tablet Take 1 tablet (5 mg total) by mouth every evening. 30 tablet 11    furosemide (LASIX) 20 MG tablet Take 1 tablet (20 mg total) by mouth 2 (two) times daily. 30 tablet 0    gabapentin (NEURONTIN) 300 MG capsule Take 2 capsules (600 mg) by mouth three times daily. (Patient taking differently: Take 300 mg by mouth 4 (four) times daily. Take 2 capsules (600 mg) by mouth three times daily.) 540 capsule 3     "HYDROcodone-acetaminophen (NORCO) 5-325 mg per tablet 1-2 pills every 4-6  hrs prn pain 120 tablet 0    levothyroxine (SYNTHROID) 75 MCG tablet TAKE 1 TABLET BEFORE BREAKFAST 90 tablet 12    lidocaine HCl 2% (LIDOCAINE VISCOUS) 2 % Soln by Mucous Membrane route every 6 (six) hours. 100 mL 3    polyethylene glycol (GLYCOLAX) 17 gram/dose powder MIX 1 CAPFUL (17GM) IN LIQUID AND DRINK ONE TIME DAILY 1530 g 12    QUEtiapine (SEROQUEL) 50 MG tablet Take 1 tablet (50 mg total) by mouth every evening. (Patient taking differently: Take 25 mg by mouth every evening. ) 90 tablet 0    traMADol (ULTRAM) 50 mg tablet 1-2 pills every 6 hr as needed for pain 180 tablet 5    venlafaxine (EFFEXOR-XR) 75 MG 24 hr capsule TAKE 2 CAPSULES EVERY  capsule 3     No current facility-administered medications on file prior to visit.        Allergies   Review of patient's allergies indicates:   Allergen Reactions    Carbamazepine Swelling    Trazodone Anxiety    Bactrim [sulfamethoxazole-trimethoprim]      Hallucinations     Demerol [meperidine] Hallucinations    Morphine Nausea Only         ROS  Review of Systems   Constitutional: Negative for chills, fatigue and fever.   Respiratory: Negative for cough and shortness of breath.    Cardiovascular: Positive for leg swelling (pitting). Negative for chest pain and palpitations.   Gastrointestinal: Negative for abdominal distention and diarrhea.   Musculoskeletal: Negative for arthralgias and myalgias.   Skin: Negative for rash.   Neurological: Negative for light-headedness and headaches.   Hematological: Negative for adenopathy.         Objective:    /66 (BP Location: Left arm, Patient Position: Sitting, BP Method: Large (Automatic))   Pulse 60   Temp 98.6 °F (37 °C) (Oral)   Ht 6' 2" (1.88 m)   Wt 91.6 kg (202 lb)   SpO2 97%   BMI 25.94 kg/m²     Physical Exam   Constitutional: Vital signs are normal.   HENT:   Head: Normocephalic.   Eyes: Pupils are equal, round, " and reactive to light. EOM are normal.   Neck: No JVD present.   Cardiovascular: Normal rate, regular rhythm, S1 normal and S2 normal.   Pulmonary/Chest: Effort normal and breath sounds normal. He has no wheezes.   Abdominal: Soft. He exhibits no distension. There is no tenderness.   Musculoskeletal:        Right lower leg: He exhibits edema. He exhibits no tenderness.        Left lower leg: He exhibits edema. He exhibits no tenderness.   Mild bilateral pitting edema   Lymphadenopathy:     He has no cervical adenopathy.        Right: No supraclavicular adenopathy present.        Left: No supraclavicular adenopathy present.   Neurological: He is alert.   Skin: Skin is warm, dry and intact. Capillary refill takes less than 2 seconds. No rash noted.   Psychiatric: He has a normal mood and affect. Judgment normal.       Assessment/Plan:  Ifeanyi Rutherford is a 89 y.o. male who presents today for :    Ifeanyi was seen today for leg swelling.    Diagnoses and all orders for this visit:    Dependent edema  Some improvement with 40 mg lasix daily, okay to continue for few more days  Limited sodium intake and water intake to 2L daily  Continue with compression stockings and elevation  Patient scheduled to follow up with PCP in 2 weeks     Problem list issues addressed during this visit    CKD stage 3  Stable, monitor     Essential hypertension  BP controlled presently - reviewed anti-hypertensive regimen - continue current therapy      Parkinsonism  Chronic, followed by neuro Dr. Erickson    Atherosclerosis of aorta  Stable, monitor       Counseled patient on the clinical course of condition including symptomatology, treatment and prevention.  Counseled regarding risks, benefits, and limitations of medications.  Sent patient with informational material about diagnosis.  Patient gave verbal understanding and agreement of plan.      I spent >50% of this 25 minute encounter counseling the patient on diagnoses, risk factors, and  treatments.         Encouraged to call/return to clinic if symptoms persist or worsen    Maria Del Rosario Cagle PA-C  [facility] Family Med/ Internal Med

## 2020-01-13 ENCOUNTER — TELEPHONE (OUTPATIENT)
Dept: FAMILY MEDICINE | Facility: CLINIC | Age: 85
End: 2020-01-13

## 2020-01-13 NOTE — TELEPHONE ENCOUNTER
Okay for patient to stop the lasix with improvement of edema. I have seen the patient recently for dependent edema.     We also discussed sinus congestion which may be contributing to his headaches. Please advise wife to have patient use the flonase/nasal spray to see if this relieves headache. He should continue with allergy pill (claritin or zyrtec).     He is scheduled to follow up with Dr. ROYAL next week. If she feels he needs to be seen sooner they can see me.     Thank you!

## 2020-01-13 NOTE — TELEPHONE ENCOUNTER
Patient states he believes that since he has started the lasix he has had bad headaches. Legs and feet has decreased since starting the medication, please advise for further instruction.

## 2020-01-13 NOTE — TELEPHONE ENCOUNTER
----- Message from Kiera Gary sent at 1/13/2020 11:19 AM CST -----  Contact: Wife Krystal: [phone]  Type: Patient Call Back    Who called:Self    What is the request in detail:Called regarding Lasix causing him headaches    Can the clinic reply by [facility]? NO    Would the patient rather a call back or a response via [facility]?     Best call back number:[phone]

## 2020-01-13 NOTE — TELEPHONE ENCOUNTER
As may have been done, please discuss with the wife 1st.  Patient has a long history of headaches, daily headaches so we need to clarify if there is something new with the headaches.  Patient may be attributing his headaches the Lasix but doubtful

## 2020-01-21 ENCOUNTER — LAB VISIT (OUTPATIENT)
Dept: LAB | Facility: HOSPITAL | Age: 85
End: 2020-01-21
Attending: INTERNAL MEDICINE
Payer: MEDICARE

## 2020-01-21 ENCOUNTER — OFFICE VISIT (OUTPATIENT)
Dept: FAMILY MEDICINE | Facility: CLINIC | Age: 85
End: 2020-01-21
Payer: MEDICARE

## 2020-01-21 VITALS
BODY MASS INDEX: 25.93 KG/M2 | TEMPERATURE: 98 F | WEIGHT: 202 LBS | HEART RATE: 66 BPM | HEIGHT: 74 IN | DIASTOLIC BLOOD PRESSURE: 68 MMHG | OXYGEN SATURATION: 97 % | SYSTOLIC BLOOD PRESSURE: 150 MMHG

## 2020-01-21 DIAGNOSIS — F39 MOOD DISORDER: ICD-10-CM

## 2020-01-21 DIAGNOSIS — G50.0 TRIGEMINAL NEURALGIA OF RIGHT SIDE OF FACE: Primary | ICD-10-CM

## 2020-01-21 DIAGNOSIS — R60.0 BILATERAL LEG EDEMA: ICD-10-CM

## 2020-01-21 DIAGNOSIS — R51.9 CHRONIC NONINTRACTABLE HEADACHE, UNSPECIFIED HEADACHE TYPE: ICD-10-CM

## 2020-01-21 DIAGNOSIS — N18.30 CKD (CHRONIC KIDNEY DISEASE) STAGE 3, GFR 30-59 ML/MIN: ICD-10-CM

## 2020-01-21 DIAGNOSIS — D64.9 ANEMIA, UNSPECIFIED TYPE: ICD-10-CM

## 2020-01-21 DIAGNOSIS — R60.9 DEPENDENT EDEMA: ICD-10-CM

## 2020-01-21 DIAGNOSIS — E55.9 VITAMIN D DEFICIENCY DISEASE: ICD-10-CM

## 2020-01-21 DIAGNOSIS — F03.91 DEMENTIA WITH BEHAVIORAL DISTURBANCE, UNSPECIFIED DEMENTIA TYPE: ICD-10-CM

## 2020-01-21 DIAGNOSIS — I77.810 AORTIC ROOT DILATATION: ICD-10-CM

## 2020-01-21 DIAGNOSIS — I10 ESSENTIAL HYPERTENSION: ICD-10-CM

## 2020-01-21 DIAGNOSIS — E03.9 HYPOTHYROIDISM, UNSPECIFIED TYPE: ICD-10-CM

## 2020-01-21 DIAGNOSIS — I70.0 ATHEROSCLEROSIS OF AORTA: ICD-10-CM

## 2020-01-21 DIAGNOSIS — G89.29 CHRONIC NONINTRACTABLE HEADACHE, UNSPECIFIED HEADACHE TYPE: ICD-10-CM

## 2020-01-21 DIAGNOSIS — G20.A1 PARKINSON'S DISEASE: ICD-10-CM

## 2020-01-21 LAB
ALBUMIN SERPL BCP-MCNC: 3.7 G/DL (ref 3.5–5.2)
ALP SERPL-CCNC: 108 U/L (ref 55–135)
ALT SERPL W/O P-5'-P-CCNC: 62 U/L (ref 10–44)
ANION GAP SERPL CALC-SCNC: 11 MMOL/L (ref 8–16)
AST SERPL-CCNC: 59 U/L (ref 10–40)
BASOPHILS # BLD AUTO: 0.13 K/UL (ref 0–0.2)
BASOPHILS NFR BLD: 1.3 % (ref 0–1.9)
BILIRUB SERPL-MCNC: 0.4 MG/DL (ref 0.1–1)
BUN SERPL-MCNC: 25 MG/DL (ref 8–23)
CALCIUM SERPL-MCNC: 9.7 MG/DL (ref 8.7–10.5)
CHLORIDE SERPL-SCNC: 104 MMOL/L (ref 95–110)
CO2 SERPL-SCNC: 27 MMOL/L (ref 23–29)
CREAT SERPL-MCNC: 1.8 MG/DL (ref 0.5–1.4)
DIFFERENTIAL METHOD: ABNORMAL
EOSINOPHIL # BLD AUTO: 0.6 K/UL (ref 0–0.5)
EOSINOPHIL NFR BLD: 5.8 % (ref 0–8)
ERYTHROCYTE [DISTWIDTH] IN BLOOD BY AUTOMATED COUNT: 12.2 % (ref 11.5–14.5)
EST. GFR  (AFRICAN AMERICAN): 37.5 ML/MIN/1.73 M^2
EST. GFR  (NON AFRICAN AMERICAN): 32.4 ML/MIN/1.73 M^2
GLUCOSE SERPL-MCNC: 105 MG/DL (ref 70–110)
HCT VFR BLD AUTO: 46.9 % (ref 40–54)
HGB BLD-MCNC: 14.1 G/DL (ref 14–18)
IMM GRANULOCYTES # BLD AUTO: 0.03 K/UL (ref 0–0.04)
IMM GRANULOCYTES NFR BLD AUTO: 0.3 % (ref 0–0.5)
LYMPHOCYTES # BLD AUTO: 2.2 K/UL (ref 1–4.8)
LYMPHOCYTES NFR BLD: 21.2 % (ref 18–48)
MCH RBC QN AUTO: 29.6 PG (ref 27–31)
MCHC RBC AUTO-ENTMCNC: 30.1 G/DL (ref 32–36)
MCV RBC AUTO: 99 FL (ref 82–98)
MONOCYTES # BLD AUTO: 1.1 K/UL (ref 0.3–1)
MONOCYTES NFR BLD: 10.8 % (ref 4–15)
NEUTROPHILS # BLD AUTO: 6.1 K/UL (ref 1.8–7.7)
NEUTROPHILS NFR BLD: 60.6 % (ref 38–73)
NRBC BLD-RTO: 0 /100 WBC
PLATELET # BLD AUTO: 274 K/UL (ref 150–350)
PMV BLD AUTO: 10.5 FL (ref 9.2–12.9)
POTASSIUM SERPL-SCNC: 4 MMOL/L (ref 3.5–5.1)
PROT SERPL-MCNC: 7.7 G/DL (ref 6–8.4)
RBC # BLD AUTO: 4.76 M/UL (ref 4.6–6.2)
SODIUM SERPL-SCNC: 142 MMOL/L (ref 136–145)
WBC # BLD AUTO: 10.14 K/UL (ref 3.9–12.7)

## 2020-01-21 PROCEDURE — 83880 ASSAY OF NATRIURETIC PEPTIDE: CPT | Mod: HCNC

## 2020-01-21 PROCEDURE — 99499 RISK ADDL DX/OHS AUDIT: ICD-10-PCS | Mod: HCNC,S$GLB,, | Performed by: INTERNAL MEDICINE

## 2020-01-21 PROCEDURE — 99999 PR PBB SHADOW E&M-EST. PATIENT-LVL III: ICD-10-PCS | Mod: PBBFAC,HCNC,, | Performed by: INTERNAL MEDICINE

## 2020-01-21 PROCEDURE — 99499 UNLISTED E&M SERVICE: CPT | Mod: HCNC,S$GLB,, | Performed by: INTERNAL MEDICINE

## 2020-01-21 PROCEDURE — 82306 VITAMIN D 25 HYDROXY: CPT | Mod: HCNC

## 2020-01-21 PROCEDURE — 1101F PT FALLS ASSESS-DOCD LE1/YR: CPT | Mod: HCNC,CPTII,S$GLB, | Performed by: INTERNAL MEDICINE

## 2020-01-21 PROCEDURE — 99215 OFFICE O/P EST HI 40 MIN: CPT | Mod: HCNC,S$GLB,, | Performed by: INTERNAL MEDICINE

## 2020-01-21 PROCEDURE — 1126F AMNT PAIN NOTED NONE PRSNT: CPT | Mod: HCNC,S$GLB,, | Performed by: INTERNAL MEDICINE

## 2020-01-21 PROCEDURE — 99215 PR OFFICE/OUTPT VISIT, EST, LEVL V, 40-54 MIN: ICD-10-PCS | Mod: HCNC,S$GLB,, | Performed by: INTERNAL MEDICINE

## 2020-01-21 PROCEDURE — 99999 PR PBB SHADOW E&M-EST. PATIENT-LVL III: CPT | Mod: PBBFAC,HCNC,, | Performed by: INTERNAL MEDICINE

## 2020-01-21 PROCEDURE — 1126F PR PAIN SEVERITY QUANTIFIED, NO PAIN PRESENT: ICD-10-PCS | Mod: HCNC,S$GLB,, | Performed by: INTERNAL MEDICINE

## 2020-01-21 PROCEDURE — 1101F PR PT FALLS ASSESS DOC 0-1 FALLS W/OUT INJ PAST YR: ICD-10-PCS | Mod: HCNC,CPTII,S$GLB, | Performed by: INTERNAL MEDICINE

## 2020-01-21 PROCEDURE — 80053 COMPREHEN METABOLIC PANEL: CPT | Mod: HCNC

## 2020-01-21 PROCEDURE — 85025 COMPLETE CBC W/AUTO DIFF WBC: CPT | Mod: HCNC

## 2020-01-21 PROCEDURE — 1159F PR MEDICATION LIST DOCUMENTED IN MEDICAL RECORD: ICD-10-PCS | Mod: HCNC,S$GLB,, | Performed by: INTERNAL MEDICINE

## 2020-01-21 PROCEDURE — 36415 COLL VENOUS BLD VENIPUNCTURE: CPT | Mod: HCNC,PO

## 2020-01-21 PROCEDURE — 1159F MED LIST DOCD IN RCRD: CPT | Mod: HCNC,S$GLB,, | Performed by: INTERNAL MEDICINE

## 2020-01-21 RX ORDER — HYDROCODONE BITARTRATE AND ACETAMINOPHEN 5; 325 MG/1; MG/1
TABLET ORAL
Qty: 120 TABLET | Refills: 0 | Status: SHIPPED | OUTPATIENT
Start: 2020-01-21 | End: 2020-06-15 | Stop reason: SDUPTHER

## 2020-01-21 RX ORDER — IPRATROPIUM BROMIDE 21 UG/1
2 SPRAY, METERED NASAL
Qty: 60 ML | Refills: 12 | Status: SHIPPED | OUTPATIENT
Start: 2020-01-21

## 2020-01-21 NOTE — PROGRESS NOTES
Chief complaint, follow-up on edema, headaches, runny nose and various other issues        89-year-old white male here with his wife.  My mother was his psychiatrist for many many years.  It is therapeutic for him to come in to go over medical problems and discussed.  I encouraged him to do so even every month or so.  His renal insufficiency appears stable.  His last Lasix was about a week and a half ago.  His edema is better we discussed continuing to wear good socks since it is difficult to get on the compression stockings.  He does elevate his legs.  He has had no shortness of breath orthopnea or chest pain to suggest any angina or CHF and his echo just three or four months ago was normal.  His BNP level has fluctuated as would be expected for his age and we will continue to establish a baseline.  He is on gabapentin and Norvasc 10 mg which are obviously contributing and spends a lot of time sitting as it is difficult for him to walk around and so all of this is adding to his edema. I reassured him and his wife that the edema is not a problem unless it causes recurrent cellulitis.  He did have one episode of cellulitis.  Should he get any redness or heat to the skin they will call probably for antibiotics.    Does have some pain in the right upper trapezius muscle where it inserts on the skull and reassured it is muscular    Continues with his chronic headaches.  A lot of times there central over the forehead.  Nothing suggesting any active right-sided trigeminal neuralgia at this time.  He always seems to attribute every medicine giving him a headache.  He has chronic rhinitis and the Atrovent nose spray does seem to help before meals and we discussed using two sprays instead of one spray before meals and assess.  Wife says that he thought maybe the Atrovent spray was causing him a headache and even said the Lasix gave him a headache so it is very difficult to assess if medications are giving him a headache her  not since he pretty much always has a headache.  Wife agrees or does not appear to be any new features about his headache pattern.    He does take the tramadol 3 times per day for his headache.  He does report that the Percocet given after his neurosurgery seem to work great.  We discussed that hydrocodone would be less potent.  Sometimes he tries a pill in half and that seems to be effective.  Discussed with the wife to feel free to give him 1-1/2 or even two pills as it was prescribed as needed.  She says he might need it twice a week.  If indeed they use it more frequently for the severe pain we may need to increase his medication for constipation.  He takes MiraLax twice a day and some form of read pillar stool softener which may need to be increased to twice daily if he uses the pain medication more regularly.  I printed them another prescription.    Blood pressure runs pretty good at home.  We discussed reducing the amlodipine to 5 mg and if blood pressure worsens but edema is better we might add such medication as losartan.  He was on lisinopril in the past and we may well have discontinue that in the past due to some rise in creatinine or possibly related to cough for his rhinitis.  Either way will use an ARB possibly and wife is on losartan.      All these issues reviewed and patient counseled an evaluation and management we based upon time counselingTotal time over 45 minutes with over 50% counseling.  Wife does verbalize it is very therapeutic for him to come in and discuss all these issues even though many of them are not that much changed.     Creatinine was up to 1.9 so will repeat    ROS:   CONST: weight stable.  No new myalgias arthralgias, no new neurological symptoms, no new psychiatric symptoms, no new hallucinations or any worsening    PAST SURGERIES:  Total knee replacements bilaterally.                                                                                                     PAST  MEDICAL HISTORY:    Hypertension.   Hyperlipidemia.    Depression.           Generalized anxiety ([facility]).    Hypothyroidism.    Osteoarthritis.    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  Optic neuropathy, right eye.  Right-sided trigeminal neuralgia, seen by neurology, pain management and neurosurgery  Chronic constipation   Evaluation by [place] [facility] for NPH, apparently no response to spinal tap   Chronic vertigo   Vitamin D deficiency   Parkinsonism by [facility] evaluation    Morning hallucinations, possibly related to Seroquel                                                                                                           SOCIAL HISTORY:  He is  and lives with his spouse.  They have adult   children in the area and grandchildren.  He has never been a cigarette       smoker.      Vitals as above,  Gen: no distress  Pleasant male, very hard of hearing, sitting in a wheelchair    EYES: conjunctiva clear, non-icteric, PERRL  ENT: nose clear, nasal mucosa normal, oropharynx clear and moist, teeth good  NECK:supple, thyroid non-palpable  RESP: effort is good, lungs clear  CV: heart RRR w/o murmur, gallops or rubs; no carotid bruits, plus one pretibial edema above his sock line on the mid shin equally on both side.  No cellulitis  GI: abdomen soft, non-distended, non-tender,  MS: gait normal, no clubbing or cyanosis of the digits, some pain in the right upper trapezius muscle where it inserts on the back of the skull but very mild  SKIN: no rashes, warm to touch        Labs reviewed      Ifeanyi was seen today for foot swelling.    Diagnoses and all orders for this visit:    Trigeminal neuralgia of right side of face , do not feel this is extremely active at this time.  Reviewed the interval Neurology note.  Continue tramadol and we have added hydrocodone for the severity of pain when needed and we will make continued adjustments while also watching that we do  not worsened constipation which could be his biggest complication.  The pain medication does improve his quality of life    CKD stage 3, reassess, no Lasix in about a week and a half, may intermittently need some Lasix.  Follow closely especially if we institute an ARB for blood pressure control while trying to reduce amlodipine  -     Comprehensive metabolic panel; Future  -     CBC auto differential; Future  -     Vitamin D; Future  -     Brain natriuretic peptide; Future    Essential hypertension, plan as above with a reduction in amlodipine while monitoring blood pressure    Dementia with behavioral disturbance, unspecified dementia type, continue medications    Chronic nonintractable headache, unspecified headache type    Mood disorder    Parkinson's disease, seen Neurology    Aortic root dilatation    Atherosclerosis of aorta    Dependent edema, discussed differential the above, do not think this is CHF in any way, no pain or inflammation to suggest this might be DVT and so forth, did respond to leg elevation and Lasix, likely multitude of factors including gabapentin and Norvasc    Bilateral leg edema    Hypothyroidism, unspecified type, euthyroid    Anemia, unspecified type  -     CBC auto differential; Future    Vitamin D deficiency disease  -     Vitamin D; Future    Other orders  -     ipratropium (ATROVENT) 0.03 % nasal spray; 2 sprays by Nasal route before meals as needed for Rhinitis.  -     HYDROcodone-acetaminophen (NORCO) 5-325 mg per tablet; 1-2 pills every 4-6  hrs prn pain

## 2020-01-22 ENCOUNTER — TELEPHONE (OUTPATIENT)
Dept: FAMILY MEDICINE | Facility: CLINIC | Age: 85
End: 2020-01-22

## 2020-01-22 DIAGNOSIS — I10 ESSENTIAL HYPERTENSION: Primary | ICD-10-CM

## 2020-01-22 LAB
25(OH)D3+25(OH)D2 SERPL-MCNC: 32 NG/ML (ref 30–96)
BNP SERPL-MCNC: 212 PG/ML (ref 0–99)

## 2020-01-22 NOTE — TELEPHONE ENCOUNTER
Please call wife with his results, state the following    Dr. ROYAL says that the kidney test is holding steady and a little bit better than before going from 1.9 down to 1.8.    Something new is that two of the liver test went up slightly for some unknown reason.  Has he had any even minor alcohol intake lately before the labs?    Either way, all we need to do is probably repeat the lab in about two weeks or so just to make sure the liver test isn't going up.      The blood counts are back to normal.  The vitamin-D level is normal.  The heart congestion test is still very normal.      Repeat lab order in, CMP, let me know if he had any alcohol intake lately

## 2020-01-23 NOTE — TELEPHONE ENCOUNTER
Notified patient wife. She verbalized understanding. She also states no alcohol has been consumed by patient.     Scheduled lab appointment for Feb 5 at 11:45 AM.

## 2020-01-27 ENCOUNTER — OFFICE VISIT (OUTPATIENT)
Dept: HOME HEALTH SERVICES | Facility: CLINIC | Age: 85
End: 2020-01-27
Payer: MEDICARE

## 2020-01-27 VITALS
BODY MASS INDEX: 25.8 KG/M2 | HEIGHT: 74 IN | WEIGHT: 201 LBS | SYSTOLIC BLOOD PRESSURE: 152 MMHG | HEART RATE: 73 BPM | DIASTOLIC BLOOD PRESSURE: 68 MMHG

## 2020-01-27 DIAGNOSIS — F32.0 MILD MAJOR DEPRESSION: ICD-10-CM

## 2020-01-27 DIAGNOSIS — H52.7 REFRACTIVE ERROR: ICD-10-CM

## 2020-01-27 DIAGNOSIS — E03.9 HYPOTHYROIDISM, UNSPECIFIED TYPE: Chronic | ICD-10-CM

## 2020-01-27 DIAGNOSIS — Z96.1 PSEUDOPHAKIA: ICD-10-CM

## 2020-01-27 DIAGNOSIS — I10 ESSENTIAL HYPERTENSION: Chronic | ICD-10-CM

## 2020-01-27 DIAGNOSIS — D31.31 CHOROIDAL NEVUS OF RIGHT EYE: ICD-10-CM

## 2020-01-27 DIAGNOSIS — Z74.09 OTHER REDUCED MOBILITY: ICD-10-CM

## 2020-01-27 DIAGNOSIS — G20.A1 PARKINSON'S DISEASE: Chronic | ICD-10-CM

## 2020-01-27 DIAGNOSIS — R26.9 ABNORMALITY OF GAIT AND MOBILITY: ICD-10-CM

## 2020-01-27 DIAGNOSIS — Z12.5 PROSTATE CANCER SCREENING: ICD-10-CM

## 2020-01-27 DIAGNOSIS — G89.29 CHRONIC INTRACTABLE HEADACHE, UNSPECIFIED HEADACHE TYPE: ICD-10-CM

## 2020-01-27 DIAGNOSIS — R51.9 CHRONIC INTRACTABLE HEADACHE, UNSPECIFIED HEADACHE TYPE: ICD-10-CM

## 2020-01-27 DIAGNOSIS — Z99.89 DEPENDENCE ON OTHER ENABLING MACHINES AND DEVICES: ICD-10-CM

## 2020-01-27 DIAGNOSIS — R35.0 BENIGN PROSTATIC HYPERPLASIA WITH URINARY FREQUENCY: ICD-10-CM

## 2020-01-27 DIAGNOSIS — I70.0 ATHEROSCLEROSIS OF AORTA: ICD-10-CM

## 2020-01-27 DIAGNOSIS — N18.30 CKD (CHRONIC KIDNEY DISEASE) STAGE 3, GFR 30-59 ML/MIN: Chronic | ICD-10-CM

## 2020-01-27 DIAGNOSIS — F03.90 DEMENTIA WITHOUT BEHAVIORAL DISTURBANCE, UNSPECIFIED DEMENTIA TYPE: Chronic | ICD-10-CM

## 2020-01-27 DIAGNOSIS — N40.1 BENIGN PROSTATIC HYPERPLASIA WITH URINARY FREQUENCY: ICD-10-CM

## 2020-01-27 DIAGNOSIS — D69.2 SENILE PURPURA: ICD-10-CM

## 2020-01-27 DIAGNOSIS — H47.013 OPTIC NEUROPATHY, ISCHEMIC, BILATERAL: ICD-10-CM

## 2020-01-27 DIAGNOSIS — G50.0 TRIGEMINAL NEURALGIA OF RIGHT SIDE OF FACE: Chronic | ICD-10-CM

## 2020-01-27 DIAGNOSIS — Z00.00 ENCOUNTER FOR PREVENTIVE HEALTH EXAMINATION: Primary | ICD-10-CM

## 2020-01-27 DIAGNOSIS — E78.2 MIXED HYPERLIPIDEMIA: ICD-10-CM

## 2020-01-27 DIAGNOSIS — R39.15 URINARY URGENCY: ICD-10-CM

## 2020-01-27 PROBLEM — N40.0 BPH (BENIGN PROSTATIC HYPERPLASIA): Status: ACTIVE | Noted: 2020-01-27

## 2020-01-27 PROCEDURE — 99499 RISK ADDL DX/OHS AUDIT: ICD-10-PCS | Mod: S$GLB,,, | Performed by: NURSE PRACTITIONER

## 2020-01-27 PROCEDURE — G0439 PR MEDICARE ANNUAL WELLNESS SUBSEQUENT VISIT: ICD-10-PCS | Mod: S$GLB,,, | Performed by: NURSE PRACTITIONER

## 2020-01-27 PROCEDURE — 99499 UNLISTED E&M SERVICE: CPT | Mod: S$GLB,,, | Performed by: NURSE PRACTITIONER

## 2020-01-27 PROCEDURE — G0439 PPPS, SUBSEQ VISIT: HCPCS | Mod: S$GLB,,, | Performed by: NURSE PRACTITIONER

## 2020-01-27 NOTE — PROGRESS NOTES
"Ifeanyi Rutherford presented for a  Medicare AWV and comprehensive Health Risk Assessment today. The following components were reviewed and updated:    · Medical history  · Family History  · Social history  · Allergies and Current Medications  · Health Risk Assessment  · Health Maintenance  · Care Team     ** See Completed Assessments for Annual Wellness Visit within the encounter summary.**       The following assessments were completed:  · Living Situation  · CAGE  · Depression Screening  · Timed Get Up and Go  · Whisper Test  · Cognitive Function Screening  · Nutrition Screening  · ADL Screening  · PAQ Screening    Vitals:    01/27/20 1130   BP: (!) 152/68   Pulse: 73   Weight: 91.2 kg (201 lb)   Height: 6' 2" (1.88 m)     Body mass index is 25.81 kg/m².  Physical Exam   Constitutional: He is oriented to person, place, and time. He appears well-developed and well-nourished.   HENT:   Head: Normocephalic and atraumatic.   Eyes: Pupils are equal, round, and reactive to light. EOM are normal.   Neck: Normal range of motion.   Cardiovascular: Normal rate and regular rhythm.   Pulmonary/Chest: Effort normal and breath sounds normal. No respiratory distress.   Abdominal: Soft. Bowel sounds are normal. He exhibits no distension.   Musculoskeletal: Normal range of motion. He exhibits edema (BLE).   Unsteady gait  Walker present   Neurological: He is alert and oriented to person, place, and time.   Skin: Skin is warm and dry. Bruising (BUE) noted.   Psychiatric: He has a normal mood and affect. His behavior is normal. Thought content normal.         Diagnoses and health risks identified today and associated recommendations/orders:    1. Encounter for preventive health examination  Assessment completed. Preventive measures reviewed with patient and patients spouse.    2. Dependence on other enabling machines and devices  Stable, followed by PCP.    3. Abnormality of gait and mobility  Stable, followed by PCP.    4. Other reduced " mobility  Stable, followed by PCP.    5. Dementia without behavioral disturbance, unspecified dementia type  Stable, followed by Neurology.    6. Parkinson's disease  Stable, followed by Neurology.    7. Trigeminal neuralgia of right side of face  Stable, followed by Neurology.    8. Chronic intractable headache, unspecified headache type  Stable, followed by Neurology.    9. Mild major depression  Stable, followed by PCP.    10. Optic neuropathy, ischemic, bilateral  Stable, followed by Neurology.    11. Choroidal nevus of right eye  Stable, followed by Neurology.    12. Pseudophakia - Both Eyes  Stable, followed by Optometry.    13. Refractive error - Left Eye  Stable, followed by Optometry.    14. Essential hypertension  Stable, followed by PCP.    15. Atherosclerosis of aorta  Stable, followed by Cardiology.    16. Mixed hyperlipidemia  Stable, followed by PCP.    17. CKD stage 3  Stable, followed by PCP.    18. Urinary urgency  Stable, followed by Urology.    19. Hypothyroidism, unspecified type  Stable, followed by PCP.    20. Senile purpura  Stable, followed by PCP.    21. Benign prostatic hyperplasia with urinary frequency  Stable, followed by Urology.    22. Prostate cancer screening  Stable, followed by Urology.  - PSA, Screening; Future      Provided Suggs with a 5-10 year written screening schedule and personal prevention plan. Recommendations were developed using the USPSTF age appropriate recommendations. Education, counseling, and referrals were provided as needed. After Visit Summary printed and given to patient which includes a list of additional screenings\tests needed.    No follow-ups on file.    Renu Rosenberg NP  I offered to discuss end of life issues, including information on how to make advance directives that the patient could use to name someone who would make medical decisions on their behalf if they became too ill to make themselves.    ___Patient declined  _X_Patient is interested,  I provided paper work and offered to discuss.

## 2020-01-27 NOTE — PATIENT INSTRUCTIONS
Counseling and Referral of Other Preventative  (Italic type indicates deductible and co-insurance are waived)    Patient Name: Ifeanyi Rutherford  Today's Date: 1/27/2020    Health Maintenance       Date Due Completion Date    Shingles Vaccine (1 of 2) 07/04/1979 ---    PROSTATE-SPECIFIC ANTIGEN 04/26/2013 4/26/2012    Pneumococcal Vaccine (65+ High/Highest Risk) (2 of 2 - PPSV23) 06/07/2016 4/12/2016    Lipid Panel 12/27/2024 12/27/2019    TETANUS VACCINE 08/04/2027 8/4/2017        No orders of the defined types were placed in this encounter.    The following information is provided to all patients.  This information is to help you find resources for any of the problems found today that may be affecting your health:                Living healthy guide: www.Pending sale to [facility].[place].gov      Understanding Diabetes: www.diabetes.org      Eating healthy: www.cdc.gov/healthyweight      [facility] home safety checklist: www.cdc.gov/steadi/patient.html      Agency on Aging: www.goea.[place].gov      Alcoholics anonymous (AA): www.aa.org      Physical Activity: www.kath.nih.gov/qi5yqll      Tobacco use: www.quitwithusla.org

## 2020-02-03 ENCOUNTER — LAB VISIT (OUTPATIENT)
Dept: LAB | Facility: HOSPITAL | Age: 85
End: 2020-02-03
Attending: INTERNAL MEDICINE
Payer: MEDICARE

## 2020-02-03 DIAGNOSIS — I10 ESSENTIAL HYPERTENSION: ICD-10-CM

## 2020-02-03 LAB
ALBUMIN SERPL BCP-MCNC: 3.4 G/DL (ref 3.5–5.2)
ALP SERPL-CCNC: 138 U/L (ref 55–135)
ALT SERPL W/O P-5'-P-CCNC: 73 U/L (ref 10–44)
ANION GAP SERPL CALC-SCNC: 9 MMOL/L (ref 8–16)
AST SERPL-CCNC: 57 U/L (ref 10–40)
BILIRUB SERPL-MCNC: 0.4 MG/DL (ref 0.1–1)
BUN SERPL-MCNC: 28 MG/DL (ref 8–23)
CALCIUM SERPL-MCNC: 9.2 MG/DL (ref 8.7–10.5)
CHLORIDE SERPL-SCNC: 106 MMOL/L (ref 95–110)
CO2 SERPL-SCNC: 29 MMOL/L (ref 23–29)
CREAT SERPL-MCNC: 2 MG/DL (ref 0.5–1.4)
EST. GFR  (AFRICAN AMERICAN): 33 ML/MIN/1.73 M^2
EST. GFR  (NON AFRICAN AMERICAN): 28.5 ML/MIN/1.73 M^2
GLUCOSE SERPL-MCNC: 101 MG/DL (ref 70–110)
POTASSIUM SERPL-SCNC: 4.1 MMOL/L (ref 3.5–5.1)
PROT SERPL-MCNC: 7.4 G/DL (ref 6–8.4)
SODIUM SERPL-SCNC: 144 MMOL/L (ref 136–145)

## 2020-02-03 PROCEDURE — 36415 COLL VENOUS BLD VENIPUNCTURE: CPT | Mod: HCNC,PO

## 2020-02-03 PROCEDURE — 80053 COMPREHEN METABOLIC PANEL: CPT | Mod: HCNC

## 2020-02-04 ENCOUNTER — TELEPHONE (OUTPATIENT)
Dept: FAMILY MEDICINE | Facility: CLINIC | Age: 85
End: 2020-02-04

## 2020-02-04 DIAGNOSIS — I10 ESSENTIAL HYPERTENSION: Primary | ICD-10-CM

## 2020-02-04 NOTE — TELEPHONE ENCOUNTER
Please call wife with recent labs for her     Dr. ROYAL says that the repeat liver tests are still somewhat elevated and just a little bit higher.  It looks like this is all a new problem over the past couple of months in the only change was a switch in the cholesterol pill from pravastatin to Lipitor at the end of November and that may explain the liver issue.    Dr. ROYAL recommend stopping the Lipitor/atorvastatin 40 mg and we will recheck the blood work in about two weeks.    If it improves, we might restart a different cholesterol pill    The kidney test was about the same at 2.0 which is a little bit more dried out them before so try to get him to drink a little bit more water      Labs for about two weeks will be in, CMP

## 2020-02-05 ENCOUNTER — TELEPHONE (OUTPATIENT)
Dept: FAMILY MEDICINE | Facility: CLINIC | Age: 85
End: 2020-02-05

## 2020-02-05 NOTE — TELEPHONE ENCOUNTER
----- Message from Florencia Connelly sent at 2/5/2020 11:03 AM CST -----  Contact: [place]/ Spouse/  [phone]  Type: Patient Call Back    Who called:  Patient    What is the request in detail:  Patient has a very stiff and pain in his neck.  Patients spouse would like to know if there's something that can be called in for him.  Thank you    Would the patient rather a call back or a response via [facility]?   Call back    Best call back number:   [phone]

## 2020-02-05 NOTE — TELEPHONE ENCOUNTER
Called patient wife and gave her the test results. She verbally understood the results. Set up lab appointment for 2/19/2020

## 2020-02-06 ENCOUNTER — OFFICE VISIT (OUTPATIENT)
Dept: FAMILY MEDICINE | Facility: CLINIC | Age: 85
End: 2020-02-06
Payer: MEDICARE

## 2020-02-06 VITALS
OXYGEN SATURATION: 95 % | SYSTOLIC BLOOD PRESSURE: 120 MMHG | TEMPERATURE: 99 F | DIASTOLIC BLOOD PRESSURE: 60 MMHG | WEIGHT: 201 LBS | BODY MASS INDEX: 25.8 KG/M2 | HEIGHT: 74 IN | HEART RATE: 76 BPM

## 2020-02-06 DIAGNOSIS — M54.2 MUSCULOSKELETAL NECK PAIN: Primary | ICD-10-CM

## 2020-02-06 DIAGNOSIS — F39 MOOD DISORDER: ICD-10-CM

## 2020-02-06 DIAGNOSIS — G50.0 TRIGEMINAL NEURALGIA OF RIGHT SIDE OF FACE: ICD-10-CM

## 2020-02-06 DIAGNOSIS — D33.3 UNILATERAL VESTIBULAR SCHWANNOMA: ICD-10-CM

## 2020-02-06 PROCEDURE — 1159F MED LIST DOCD IN RCRD: CPT | Mod: HCNC,S$GLB,, | Performed by: INTERNAL MEDICINE

## 2020-02-06 PROCEDURE — 99499 RISK ADDL DX/OHS AUDIT: ICD-10-PCS | Mod: HCNC,S$GLB,, | Performed by: INTERNAL MEDICINE

## 2020-02-06 PROCEDURE — 1125F AMNT PAIN NOTED PAIN PRSNT: CPT | Mod: HCNC,S$GLB,, | Performed by: INTERNAL MEDICINE

## 2020-02-06 PROCEDURE — 1159F PR MEDICATION LIST DOCUMENTED IN MEDICAL RECORD: ICD-10-PCS | Mod: HCNC,S$GLB,, | Performed by: INTERNAL MEDICINE

## 2020-02-06 PROCEDURE — 1101F PR PT FALLS ASSESS DOC 0-1 FALLS W/OUT INJ PAST YR: ICD-10-PCS | Mod: HCNC,CPTII,S$GLB, | Performed by: INTERNAL MEDICINE

## 2020-02-06 PROCEDURE — 99999 PR PBB SHADOW E&M-EST. PATIENT-LVL IV: ICD-10-PCS | Mod: PBBFAC,HCNC,, | Performed by: INTERNAL MEDICINE

## 2020-02-06 PROCEDURE — 99499 UNLISTED E&M SERVICE: CPT | Mod: HCNC,S$GLB,, | Performed by: INTERNAL MEDICINE

## 2020-02-06 PROCEDURE — 99215 OFFICE O/P EST HI 40 MIN: CPT | Mod: HCNC,S$GLB,, | Performed by: INTERNAL MEDICINE

## 2020-02-06 PROCEDURE — 99999 PR PBB SHADOW E&M-EST. PATIENT-LVL IV: CPT | Mod: PBBFAC,HCNC,, | Performed by: INTERNAL MEDICINE

## 2020-02-06 PROCEDURE — 1101F PT FALLS ASSESS-DOCD LE1/YR: CPT | Mod: HCNC,CPTII,S$GLB, | Performed by: INTERNAL MEDICINE

## 2020-02-06 PROCEDURE — 99215 PR OFFICE/OUTPT VISIT, EST, LEVL V, 40-54 MIN: ICD-10-PCS | Mod: HCNC,S$GLB,, | Performed by: INTERNAL MEDICINE

## 2020-02-06 PROCEDURE — 1125F PR PAIN SEVERITY QUANTIFIED, PAIN PRESENT: ICD-10-PCS | Mod: HCNC,S$GLB,, | Performed by: INTERNAL MEDICINE

## 2020-02-06 NOTE — TELEPHONE ENCOUNTER
Patient's wife said that his neck pain has gotten progressively worse.  She said that she gave him one of his Norco tabs. this AM but it's still bothering him.   Appointment scheduled.

## 2020-02-06 NOTE — PROGRESS NOTES
Chief complaint, follow-up on edema, headaches, runny nose and various other issues        89-year-old white male here with his wife.  My mother was his psychiatrist for many many years.  It is therapeutic for him to come in to go over medical problems and discussed.  Patient been having more neck pain lately and they called him given an appointment this morning.  He has been awaking with pain in the right neck.  It hurts to turn to the right.  It was severe today.  Wife has been giving him one hydrocodone with some improvement and she is questioning if she can dose another pill.  He tolerates it well.  They have been applying some heat as well.  They have been using some topical rubs.  Patient verbalizes concern and anxiety that it is the acoustic neuroma pain returning.  He has no pain in the face nor any paresthesias or pain in the face to suspected is his right-sided trigeminal neuralgia acting up.  No other focal neurological deficits.  He does have Valium 5 mg that he takes on occasion for anxiety a muscle spasms but have not yet tried that with current symptoms.      ROS:   CONST: weight stable.  No new myalgias arthralgias, no new neurological symptoms, no new psychiatric symptoms, no new hallucinations or any worsening    PAST SURGERIES:  Total knee replacements bilaterally.                                                                                                     PAST MEDICAL HISTORY:    Hypertension.   Hyperlipidemia.    Depression.           Generalized anxiety ([facility]).    Hypothyroidism.    Osteoarthritis.    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  Optic neuropathy, right eye.  Right-sided trigeminal neuralgia, seen by neurology, pain management and neurosurgery  Chronic constipation   Evaluation by [place] [facility] for NPH, apparently no response to spinal tap   Chronic vertigo   Vitamin D deficiency   Parkinsonism by [facility] evaluation    Morning  hallucinations, possibly related to Seroquel                                                                                                           SOCIAL HISTORY:  He is  and lives with his spouse.  They have adult   children in the area and grandchildren.  He has never been a cigarette       smoker.      Vitals as above,  Gen: no distress  Pleasant male, very hard of hearing, sitting in a wheelchair  Patient does have pain in the upper right trapezius muscle and it does hurt when he turns to the right which is somewhat limited due to the pain.  He has no pain over the face of the nathan the side of the face.  There is no skin rashes or zoster.          Labs reviewed    Ifeanyi was seen today for neck pain.    Diagnoses and all orders for this visit:    Musculoskeletal neck pain, reassure them that the pain is reproducible and over the upper trapezius muscle and in the location where I would not suspect any issues related of the tumor any was greatly reassured about that.  We actually reviewed his last MRI for two years ago and it showed shrinkage of the tumor.  We discussed continuing to apply heat, possibly getting the over-the-counter lidocaine patches which may take a couple of days to work, continue the hydrocodone and possibly give him a pill and a half or even two pills at one time rather than spacing the medication out as a higher dose may provide better pain control.  He has had no issues with Percocet in the past we may have to increase the potency of the medication.  There does appear to be a component of muscle spasm which would be worse in the morning we discussed possibly using his Valium 5 mg as a muscle relaxer rather than introducing other muscle relaxers.  Wife is very cautious and concerned at monitors him and is obviously aware of the potential interactions of using the hydrocodone and the Valium at the same time.  She will watch him closely for any excessive sedation and so forth but his  quality of life and pain control are very important at this time.  Both of them were counseled at length today.Total time over 45 minutes with over 50% counseling.    Trigeminal neuralgia of right side of face    Mood disorder    Unilateral vestibular schwannoma

## 2020-02-19 ENCOUNTER — LAB VISIT (OUTPATIENT)
Dept: LAB | Facility: HOSPITAL | Age: 85
End: 2020-02-19
Attending: INTERNAL MEDICINE
Payer: MEDICARE

## 2020-02-19 DIAGNOSIS — I10 ESSENTIAL HYPERTENSION: ICD-10-CM

## 2020-02-19 LAB
ALBUMIN SERPL BCP-MCNC: 3.1 G/DL (ref 3.5–5.2)
ALP SERPL-CCNC: 96 U/L (ref 55–135)
ALT SERPL W/O P-5'-P-CCNC: 18 U/L (ref 10–44)
ANION GAP SERPL CALC-SCNC: 11 MMOL/L (ref 8–16)
AST SERPL-CCNC: 19 U/L (ref 10–40)
BILIRUB SERPL-MCNC: 0.3 MG/DL (ref 0.1–1)
BUN SERPL-MCNC: 26 MG/DL (ref 8–23)
CALCIUM SERPL-MCNC: 9.1 MG/DL (ref 8.7–10.5)
CHLORIDE SERPL-SCNC: 105 MMOL/L (ref 95–110)
CO2 SERPL-SCNC: 27 MMOL/L (ref 23–29)
CREAT SERPL-MCNC: 1.8 MG/DL (ref 0.5–1.4)
EST. GFR  (AFRICAN AMERICAN): 37.5 ML/MIN/1.73 M^2
EST. GFR  (NON AFRICAN AMERICAN): 32.4 ML/MIN/1.73 M^2
GLUCOSE SERPL-MCNC: 98 MG/DL (ref 70–110)
POTASSIUM SERPL-SCNC: 3.8 MMOL/L (ref 3.5–5.1)
PROT SERPL-MCNC: 6.9 G/DL (ref 6–8.4)
SODIUM SERPL-SCNC: 143 MMOL/L (ref 136–145)

## 2020-02-19 PROCEDURE — 36415 COLL VENOUS BLD VENIPUNCTURE: CPT | Mod: HCNC,PO

## 2020-02-19 PROCEDURE — 80053 COMPREHEN METABOLIC PANEL: CPT | Mod: HCNC

## 2020-02-20 ENCOUNTER — TELEPHONE (OUTPATIENT)
Dept: FAMILY MEDICINE | Facility: CLINIC | Age: 85
End: 2020-02-20

## 2020-02-20 NOTE — TELEPHONE ENCOUNTER
"call wife--       "good news -the liver is ALL BACK TO NORMAL and even the kidney test is a little better from 2 down to 1.8    Might repeat labs in 4-6 wks or if he needs to come to the clinic in between then etc  "

## 2020-02-28 ENCOUNTER — TELEPHONE (OUTPATIENT)
Dept: FAMILY MEDICINE | Facility: CLINIC | Age: 85
End: 2020-02-28

## 2020-02-28 NOTE — TELEPHONE ENCOUNTER
Please call and get a little bit of clarification.    Ask wife which one he has been on lately, which one did he seem to tolerate better realizing it might be difficult to assess if the medications were causing some of his pains at all either way.    Lipitor would probably be a better medication

## 2020-02-28 NOTE — TELEPHONE ENCOUNTER
"----- Message from Fern Olivera sent at 2/28/2020 12:43 PM CST -----  Contact: Krystal Rutherford (Spouse)  Name of Who is Calling:  Krystal Rutherford (Spouse)      What is the request in detail: Patient's spouse called requesting a call. Patient's spouse would like to know, "should he resume atorvastatin (LIPITOR) 40 MG tablet or pravastatin (PRAVACHOL) 40 MG tablet?"   Please give patient call back at your earliest convenience and further advise.        THANKS!    Reply by [facility]: no      Call Back:  Krystal Rutherford (Spouse) // # [phone]                                      "

## 2020-03-05 DIAGNOSIS — R60.0 BILATERAL LEG EDEMA: ICD-10-CM

## 2020-03-05 RX ORDER — FUROSEMIDE 20 MG/1
TABLET ORAL
Qty: 30 TABLET | Refills: 0 | Status: SHIPPED | OUTPATIENT
Start: 2020-03-05 | End: 2020-06-10 | Stop reason: SDUPTHER

## 2020-03-09 ENCOUNTER — EXTERNAL HOME HEALTH (OUTPATIENT)
Dept: HOME HEALTH SERVICES | Facility: HOSPITAL | Age: 85
End: 2020-03-09
Payer: MEDICARE

## 2020-04-27 ENCOUNTER — TELEPHONE (OUTPATIENT)
Dept: FAMILY MEDICINE | Facility: CLINIC | Age: 85
End: 2020-04-27

## 2020-04-27 ENCOUNTER — TELEPHONE (OUTPATIENT)
Dept: OPHTHALMOLOGY | Facility: CLINIC | Age: 85
End: 2020-04-27

## 2020-04-27 RX ORDER — CLINDAMYCIN HYDROCHLORIDE 150 MG/1
150 CAPSULE ORAL 3 TIMES DAILY
Qty: 30 CAPSULE | Refills: 0 | Status: SHIPPED | OUTPATIENT
Start: 2020-04-27 | End: 2020-08-12

## 2020-04-27 NOTE — TELEPHONE ENCOUNTER
Spoke to patients wife. They  his medication ans he started taking it. She stated the redness is in his toe. His feet are swelling and she is going to give him another Lasix today. She will elevate his feet. Gave her all the information per Dr. CAAL

## 2020-04-27 NOTE — TELEPHONE ENCOUNTER
----- Message from Vivian Rodriguez sent at 4/27/2020  7:51 AM CDT -----  Type: Patient Call Back    Who called:  Krystal- Wife     What is the request in detail:  Patient has fungus toenails and one has caused and infection in his toes and his foot is swelling. Please call     Can the clinic reply by [facility]? No     Would the patient rather a call back or a response via [facility]?  Call     Best call back number:[phone]

## 2020-05-15 ENCOUNTER — OFFICE VISIT (OUTPATIENT)
Dept: FAMILY MEDICINE | Facility: CLINIC | Age: 85
End: 2020-05-15
Payer: MEDICARE

## 2020-05-15 VITALS
DIASTOLIC BLOOD PRESSURE: 60 MMHG | WEIGHT: 205.69 LBS | HEART RATE: 64 BPM | TEMPERATURE: 98 F | OXYGEN SATURATION: 96 % | SYSTOLIC BLOOD PRESSURE: 140 MMHG | HEIGHT: 74 IN | BODY MASS INDEX: 26.4 KG/M2

## 2020-05-15 DIAGNOSIS — F03.90 DEMENTIA WITHOUT BEHAVIORAL DISTURBANCE, UNSPECIFIED DEMENTIA TYPE: Chronic | ICD-10-CM

## 2020-05-15 DIAGNOSIS — G20.A1 PARKINSON'S DISEASE: Chronic | ICD-10-CM

## 2020-05-15 DIAGNOSIS — G50.0 TRIGEMINAL NEURALGIA OF RIGHT SIDE OF FACE: Chronic | ICD-10-CM

## 2020-05-15 DIAGNOSIS — S46.811A TRAPEZIUS STRAIN, RIGHT, INITIAL ENCOUNTER: Primary | ICD-10-CM

## 2020-05-15 PROCEDURE — 1101F PR PT FALLS ASSESS DOC 0-1 FALLS W/OUT INJ PAST YR: ICD-10-PCS | Mod: HCNC,CPTII,S$GLB, | Performed by: PHYSICIAN ASSISTANT

## 2020-05-15 PROCEDURE — 99214 OFFICE O/P EST MOD 30 MIN: CPT | Mod: HCNC,S$GLB,, | Performed by: PHYSICIAN ASSISTANT

## 2020-05-15 PROCEDURE — 99499 RISK ADDL DX/OHS AUDIT: ICD-10-PCS | Mod: HCNC,S$GLB,, | Performed by: PHYSICIAN ASSISTANT

## 2020-05-15 PROCEDURE — 99999 PR PBB SHADOW E&M-EST. PATIENT-LVL III: CPT | Mod: PBBFAC,HCNC,, | Performed by: PHYSICIAN ASSISTANT

## 2020-05-15 PROCEDURE — 1159F MED LIST DOCD IN RCRD: CPT | Mod: HCNC,S$GLB,, | Performed by: PHYSICIAN ASSISTANT

## 2020-05-15 PROCEDURE — 99999 PR PBB SHADOW E&M-EST. PATIENT-LVL III: ICD-10-PCS | Mod: PBBFAC,HCNC,, | Performed by: PHYSICIAN ASSISTANT

## 2020-05-15 PROCEDURE — 99499 UNLISTED E&M SERVICE: CPT | Mod: HCNC,S$GLB,, | Performed by: PHYSICIAN ASSISTANT

## 2020-05-15 PROCEDURE — 99214 PR OFFICE/OUTPT VISIT, EST, LEVL IV, 30-39 MIN: ICD-10-PCS | Mod: HCNC,S$GLB,, | Performed by: PHYSICIAN ASSISTANT

## 2020-05-15 PROCEDURE — 1101F PT FALLS ASSESS-DOCD LE1/YR: CPT | Mod: HCNC,CPTII,S$GLB, | Performed by: PHYSICIAN ASSISTANT

## 2020-05-15 PROCEDURE — 1159F PR MEDICATION LIST DOCUMENTED IN MEDICAL RECORD: ICD-10-PCS | Mod: HCNC,S$GLB,, | Performed by: PHYSICIAN ASSISTANT

## 2020-05-15 NOTE — PROGRESS NOTES
"Patient Name: Ifeanyi Rutherford    : 1929  MRN: [id number]    Subjective:  Ifeanyi is a 89 y.o. male who presents today for:    Chief Complaint   Patient presents with    Shoulder Pain       HPI  Patient has multiple medical diagnoses as listed below in the history. He complains of right shoulder pain that has been intermittent for several weeks. He reports onset of pain after using right arm to push himself up out of bed. He pushes himself up using the night stand. The pain is in the trapezius area and radiates to the neck and deltoid. Pain is worse with activity. He denies decreased range of motion or numbness/tingling. He is very concerned about acoustic neuroma and chronic trigeminal neuralgia causing this new shoulder pain. He denies facial pain or paresthesias. He has been taking tramadol during the day and Norco at night with good effect. He also takes valium PRN which helps with anxiety and provides muscle relaxant effect. He has long history of chronic pain. Patient's wife/caretaker administers pain medications and is very cautious and aware of the potential interactions. As per patient's PCP, the patient's "quality of life and pain control are very important at this time." The patient has no other acute complaints today.     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    Chronic headache 2014    CKD stage 3 2019    Dementia     worked up for NPH at  and had taps with no improvement    Depression     Hyperlipidemia     Hypertension     Hypothyroidism     Insufficiency of tear film of both eyes 2016    Mild major depression 2012    Spinal stenosis     Trigeminal neuralgia pain 2015    Trouble in sleeping     Vertigo 2012    Vestibular schwannoma 2015       Past " Surgical History  Past Surgical History:   Procedure Laterality Date    bilateral total knee arthroplasties      CATARACT EXTRACTION W/  INTRAOCULAR LENS IMPLANT Bilateral     CHOLECYSTECTOMY      EYE SURGERY      GALLBLADDER SURGERY      THYROIDECTOMY      TONSILLECTOMY         Family History  Family History   Problem Relation Age of Onset    Heart disease Mother     No Known Problems Father     No Known Problems Sister     No Known Problems Brother     No Known Problems Daughter     No Known Problems Son     No Known Problems Sister     No Known Problems Brother     No Known Problems Brother     No Known Problems Brother     No Known Problems Maternal Aunt     No Known Problems Maternal Uncle     No Known Problems Paternal Aunt     No Known Problems Paternal Uncle     No Known Problems Maternal Grandmother     No Known Problems Maternal Grandfather     No Known Problems Paternal Grandmother     No Known Problems Paternal Grandfather     Suicide Neg Hx     Schizophrenia Neg Hx     Amblyopia Neg Hx     Blindness Neg Hx     Cataracts Neg Hx     Glaucoma Neg Hx     Macular degeneration Neg Hx     Retinal detachment Neg Hx     Strabismus Neg Hx     Cancer Neg Hx     Diabetes Neg Hx     Hypertension Neg Hx     Stroke Neg Hx     Thyroid disease Neg Hx        Social History  Social History     Socioeconomic History    Marital status:      Spouse name: Not on file    Number of children: Not on file    Years of education: Not on file    Highest education level: Not on file   Occupational History    Not on file   Social Needs    Financial resource strain: Not on file    Food insecurity:     Worry: Not on file     Inability: Not on file    Transportation needs:     Medical: Not on file     Non-medical: Not on file   Tobacco Use    Smoking status: Former Smoker    Smokeless tobacco: Never Used   Substance and Sexual Activity    Alcohol use: No     Alcohol/week: 0.0  standard drinks    Drug use: No    Sexual activity: Not Currently     Partners: Female   Lifestyle    Physical activity:     Days per week: Not on file     Minutes per session: Not on file    Stress: Not on file   Relationships    Social connections:     Talks on phone: Not on file     Gets together: Not on file     Attends [employer] service: Not on file     Active member of club or organization: Not on file     Attends meetings of clubs or organizations: Not on file     Relationship status: Not on file   Other Topics Concern    Not on file   Social History Narrative    Not on file       Current Medications  Current Outpatient Medications on File Prior to Visit   Medication Sig Dispense Refill    acetaminophen (TYLENOL) 325 MG tablet Take 2 tablets (650 mg total) by mouth every 6 (six) hours as needed for Pain.  0    amLODIPine (NORVASC) 10 MG tablet Take 1 tablet (10 mg total) by mouth once daily. 90 tablet 3    aspirin (ECOTRIN) 81 MG EC tablet Take 1 tablet (81 mg total) by mouth once daily. 90 tablet 3    atorvastatin (LIPITOR) 40 MG tablet Take 1 tablet (40 mg total) by mouth every evening. 90 tablet 3    clindamycin (CLEOCIN) 150 MG capsule Take 1 capsule (150 mg total) by mouth 3 (three) times daily. 30 capsule 0    diazePAM (VALIUM) 5 MG tablet Half to whole pill every 8 hours as needed for anxiety 90 tablet 3    furosemide (LASIX) 20 MG tablet TAKE 1 TABLET TWICE DAILY 30 tablet 0    gabapentin (NEURONTIN) 300 MG capsule Take 2 capsules (600 mg) by mouth three times daily. (Patient taking differently: Take 300 mg by mouth 4 (four) times daily. Take 2 capsules (600 mg) by mouth three times daily.) 540 capsule 3    HYDROcodone-acetaminophen (NORCO) 5-325 mg per tablet 1-2 pills every 4-6  hrs prn pain 120 tablet 0    ipratropium (ATROVENT) 0.03 % nasal spray 2 sprays by Nasal route before meals as needed for Rhinitis. 60 mL 12    levothyroxine (SYNTHROID) 75 MCG tablet TAKE 1 TABLET BEFORE  "BREAKFAST 90 tablet 12    lidocaine HCl 2% (LIDOCAINE VISCOUS) 2 % Soln by Mucous Membrane route every 6 (six) hours. 100 mL 3    polyethylene glycol (GLYCOLAX) 17 gram/dose powder MIX 1 CAPFUL (17GM) IN LIQUID AND DRINK ONE TIME DAILY 1530 g 12    traMADol (ULTRAM) 50 mg tablet 1-2 pills every 6 hr as needed for pain 180 tablet 5    venlafaxine (EFFEXOR-XR) 75 MG 24 hr capsule TAKE 2 CAPSULES EVERY  capsule 3    [DISCONTINUED] QUEtiapine (SEROQUEL) 50 MG tablet Take 1 tablet (50 mg total) by mouth every evening. (Patient taking differently: Take 25 mg by mouth every evening. ) 90 tablet 0     No current facility-administered medications on file prior to visit.        Allergies   Review of patient's allergies indicates:   Allergen Reactions    Carbamazepine Swelling    Trazodone Anxiety    Bactrim [sulfamethoxazole-trimethoprim]      Hallucinations     Demerol [meperidine] Hallucinations    Morphine Nausea Only         ROS  Review of Systems   Constitutional: Negative for fatigue and fever.   HENT: Negative for congestion and facial swelling.    Respiratory: Negative for shortness of breath.    Cardiovascular: Negative for leg swelling.   Gastrointestinal: Negative for nausea.   Genitourinary: Negative for difficulty urinating.   Musculoskeletal: Positive for myalgias and neck pain. Negative for arthralgias, back pain, gait problem, joint swelling and neck stiffness.   Skin: Negative for rash.   Neurological: Negative for facial asymmetry, weakness, light-headedness, numbness and headaches.   Hematological: Negative for adenopathy.         Objective:    BP (!) 140/60 (BP Location: Right arm, Patient Position: Sitting, BP Method: Medium (Manual))   Pulse 64   Temp 97.7 °F (36.5 °C) (Oral)   Ht 6' 2" (1.88 m)   Wt 93.3 kg (205 lb 11 oz)   SpO2 96%   BMI 26.41 kg/m²     Physical Exam   Constitutional:   Patient sitting comfortably in wheelchair.    Musculoskeletal:        Right shoulder: He " exhibits decreased range of motion and tenderness. He exhibits normal strength.        Left shoulder: He exhibits normal range of motion, no tenderness and normal strength.        Right elbow: He exhibits normal range of motion and no swelling. No tenderness found.        Left elbow: He exhibits normal range of motion and no swelling. No tenderness found.        Cervical back: He exhibits normal range of motion and no tenderness.        Arms:  Neurological: He has normal strength. No sensory deficit.   Skin: Skin is warm, dry and intact. Capillary refill takes less than 2 seconds.       Assessment/Plan:  Ifeanyi Rutherford is a 89 y.o. male who presents today for :    Ifeanyi was seen today for shoulder pain.    Diagnoses and all orders for this visit:    Trapezius strain, right, initial encounter  Counseled regarding continuing normal activity as recovery is very likely  Continue with pain medication as needed, prescribed by PCP for other chronic pain  Use muscle relaxer/benzo as needed for muscle spasms  Alternate heat/cold therapy as needed  Discussed activity modification and various stretches/exercises to aid in recovery   Counseled regarding risks, benefits, and limitations of medications  Sent patient with informational material about diagnosis  Patient gave verbal understanding and agreement of plan        Problem list issues addressed during this visit    Trigeminal neuralgia of right side of face  Chronic and stable    Dementia  Chronic, cared for by wife    Parkinsonism  Chronic and stable         I spent >50% of this 25 minute encounter counseling the patient on diagnoses, risk factors, and treatments.         Encouraged to call/return to clinic if symptoms persist or worsen    Maria Del Rosario Cagle PA-C  [facility]

## 2020-05-18 DIAGNOSIS — F03.90 DEMENTIA WITHOUT BEHAVIORAL DISTURBANCE, UNSPECIFIED DEMENTIA TYPE: ICD-10-CM

## 2020-05-18 RX ORDER — DONEPEZIL HYDROCHLORIDE 5 MG/1
TABLET, FILM COATED ORAL
Qty: 90 TABLET | Refills: 4 | Status: SHIPPED | OUTPATIENT
Start: 2020-05-18 | End: 2020-08-20

## 2020-05-19 RX ORDER — QUETIAPINE FUMARATE 50 MG/1
TABLET, FILM COATED ORAL
Qty: 90 TABLET | Refills: 0 | Status: SHIPPED | OUTPATIENT
Start: 2020-05-19 | End: 2020-08-10 | Stop reason: SDUPTHER

## 2020-06-04 ENCOUNTER — TELEPHONE (OUTPATIENT)
Dept: FAMILY MEDICINE | Facility: CLINIC | Age: 85
End: 2020-06-04

## 2020-06-04 ENCOUNTER — OFFICE VISIT (OUTPATIENT)
Dept: FAMILY MEDICINE | Facility: CLINIC | Age: 85
End: 2020-06-04
Payer: MEDICARE

## 2020-06-04 VITALS
OXYGEN SATURATION: 96 % | TEMPERATURE: 98 F | RESPIRATION RATE: 16 BRPM | WEIGHT: 210.31 LBS | BODY MASS INDEX: 26.99 KG/M2 | HEART RATE: 63 BPM | DIASTOLIC BLOOD PRESSURE: 60 MMHG | SYSTOLIC BLOOD PRESSURE: 142 MMHG | HEIGHT: 74 IN

## 2020-06-04 DIAGNOSIS — M25.511 ACUTE PAIN OF RIGHT SHOULDER: Primary | ICD-10-CM

## 2020-06-04 PROCEDURE — 1101F PT FALLS ASSESS-DOCD LE1/YR: CPT | Mod: HCNC,CPTII,S$GLB, | Performed by: NURSE PRACTITIONER

## 2020-06-04 PROCEDURE — 1159F PR MEDICATION LIST DOCUMENTED IN MEDICAL RECORD: ICD-10-PCS | Mod: HCNC,S$GLB,, | Performed by: NURSE PRACTITIONER

## 2020-06-04 PROCEDURE — 1125F PR PAIN SEVERITY QUANTIFIED, PAIN PRESENT: ICD-10-PCS | Mod: HCNC,S$GLB,, | Performed by: NURSE PRACTITIONER

## 2020-06-04 PROCEDURE — 1125F AMNT PAIN NOTED PAIN PRSNT: CPT | Mod: HCNC,S$GLB,, | Performed by: NURSE PRACTITIONER

## 2020-06-04 PROCEDURE — 1101F PR PT FALLS ASSESS DOC 0-1 FALLS W/OUT INJ PAST YR: ICD-10-PCS | Mod: HCNC,CPTII,S$GLB, | Performed by: NURSE PRACTITIONER

## 2020-06-04 PROCEDURE — 99214 OFFICE O/P EST MOD 30 MIN: CPT | Mod: HCNC,S$GLB,, | Performed by: NURSE PRACTITIONER

## 2020-06-04 PROCEDURE — 99214 PR OFFICE/OUTPT VISIT, EST, LEVL IV, 30-39 MIN: ICD-10-PCS | Mod: HCNC,S$GLB,, | Performed by: NURSE PRACTITIONER

## 2020-06-04 PROCEDURE — 99999 PR PBB SHADOW E&M-EST. PATIENT-LVL IV: ICD-10-PCS | Mod: PBBFAC,HCNC,, | Performed by: NURSE PRACTITIONER

## 2020-06-04 PROCEDURE — 99999 PR PBB SHADOW E&M-EST. PATIENT-LVL IV: CPT | Mod: PBBFAC,HCNC,, | Performed by: NURSE PRACTITIONER

## 2020-06-04 PROCEDURE — 1159F MED LIST DOCD IN RCRD: CPT | Mod: HCNC,S$GLB,, | Performed by: NURSE PRACTITIONER

## 2020-06-04 NOTE — TELEPHONE ENCOUNTER
----- Message from Yolanda Ambrosio sent at 6/4/2020  8:37 AM CDT -----  Contact: pt's ravin wife [phone]  Name of Caller pt's ravin wife [phone]  Reason for Visit/Symptom severe shoulder pain right  Best Contact Number or Confirm if Mychart Preferred   Preferred Date/Time of Appointment today  Interested in Virtual Visit (yes/no)no  Additional Information

## 2020-06-04 NOTE — PROGRESS NOTES
Routine Office Visit    Patient Name: Ifeanyi Rutherford    : 1929  MRN: [id number]    Chief Complaint:  Shoulder pain    Subjective:  Ifeanyi is a 89 y.o. male who presents today for:    1.  Shoulder pain - patient reports today with his wife for evaluation of right shoulder pain.  He states that he has been having shoulder pain on the right side for the last 2 months.  He denies any specific inciting event or injury for the symptoms, however his wife does state that when he uses the bedside commode at home he pulls himself up on the vanity with the right arm and this may be a contributory factor to his symptoms.  He describes the pain as sharp and located on the anterior and lateral sides of the right shoulder.  The shoulder pain does radiate down the arm to the hand sometimes.  He denies any associated numbness, tingling, or weakness of the right arm.  Aggravating motions include lifting the arm above his head and reaching behind his back.  He takes tramadol and Norco p.r.n. for trigeminal neuralgia as prescribed by his PCP and these medications have only provided minimal relief for the shoulder pain.  He is very concerned that his shoulder pain is indicative of his schwannoma increasing in size.  He denies any new neurologic complaints.    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    Chronic headache 2014    CKD stage 3 2019    Dementia     worked up for NPH at  and had taps with no improvement    Depression     Hyperlipidemia     Hypertension     Hypothyroidism     Insufficiency of tear film of both eyes 2016    Mild major depression 2012    Spinal stenosis     Trigeminal neuralgia pain 2015    Trouble in sleeping     Vertigo 2012    Vestibular schwannoma 2015        Past Surgical History  Past Surgical History:   Procedure Laterality Date    bilateral total knee arthroplasties      CATARACT EXTRACTION W/  INTRAOCULAR LENS IMPLANT Bilateral     CHOLECYSTECTOMY      EYE SURGERY      GALLBLADDER SURGERY      THYROIDECTOMY      TONSILLECTOMY         Family History  Family History   Problem Relation Age of Onset    Heart disease Mother     No Known Problems Father     No Known Problems Sister     No Known Problems Brother     No Known Problems Daughter     No Known Problems Son     No Known Problems Sister     No Known Problems Brother     No Known Problems Brother     No Known Problems Brother     No Known Problems Maternal Aunt     No Known Problems Maternal Uncle     No Known Problems Paternal Aunt     No Known Problems Paternal Uncle     No Known Problems Maternal Grandmother     No Known Problems Maternal Grandfather     No Known Problems Paternal Grandmother     No Known Problems Paternal Grandfather     Suicide Neg Hx     Schizophrenia Neg Hx     Amblyopia Neg Hx     Blindness Neg Hx     Cataracts Neg Hx     Glaucoma Neg Hx     Macular degeneration Neg Hx     Retinal detachment Neg Hx     Strabismus Neg Hx     Cancer Neg Hx     Diabetes Neg Hx     Hypertension Neg Hx     Stroke Neg Hx     Thyroid disease Neg Hx        Social History  Social History     Socioeconomic History    Marital status:      Spouse name: Not on file    Number of children: Not on file    Years of education: Not on file    Highest education level: Not on file   Occupational History    Not on file   Social Needs    Financial resource strain: Not on file    Food insecurity:     Worry: Not on file     Inability: Not on file    Transportation needs:     Medical: Not on file     Non-medical: Not on file   Tobacco Use    Smoking status: Former Smoker    Smokeless tobacco: Never Used   Substance and Sexual Activity    Alcohol use: No      Alcohol/week: 0.0 standard drinks    Drug use: No    Sexual activity: Not Currently     Partners: Female   Lifestyle    Physical activity:     Days per week: Not on file     Minutes per session: Not on file    Stress: Not on file   Relationships    Social connections:     Talks on phone: Not on file     Gets together: Not on file     Attends [employer] service: Not on file     Active member of club or organization: Not on file     Attends meetings of clubs or organizations: Not on file     Relationship status: Not on file   Other Topics Concern    Not on file   Social History Narrative    Not on file       Current Medications  Current Outpatient Medications on File Prior to Visit   Medication Sig Dispense Refill    acetaminophen (TYLENOL) 325 MG tablet Take 2 tablets (650 mg total) by mouth every 6 (six) hours as needed for Pain.  0    amLODIPine (NORVASC) 10 MG tablet Take 1 tablet (10 mg total) by mouth once daily. 90 tablet 3    aspirin (ECOTRIN) 81 MG EC tablet Take 1 tablet (81 mg total) by mouth once daily. 90 tablet 3    atorvastatin (LIPITOR) 40 MG tablet Take 1 tablet (40 mg total) by mouth every evening. 90 tablet 3    clindamycin (CLEOCIN) 150 MG capsule Take 1 capsule (150 mg total) by mouth 3 (three) times daily. 30 capsule 0    diazePAM (VALIUM) 5 MG tablet Half to whole pill every 8 hours as needed for anxiety 90 tablet 3    donepeziL (ARICEPT) 5 MG tablet TAKE 1 TABLET EVERY EVENING 90 tablet 4    furosemide (LASIX) 20 MG tablet TAKE 1 TABLET TWICE DAILY 30 tablet 0    gabapentin (NEURONTIN) 300 MG capsule Take 2 capsules (600 mg) by mouth three times daily. (Patient taking differently: Take 300 mg by mouth 4 (four) times daily. Take 2 capsules (600 mg) by mouth three times daily.) 540 capsule 3    HYDROcodone-acetaminophen (NORCO) 5-325 mg per tablet 1-2 pills every 4-6  hrs prn pain 120 tablet 0    ipratropium (ATROVENT) 0.03 % nasal spray 2 sprays by Nasal route before meals as  "needed for Rhinitis. 60 mL 12    levothyroxine (SYNTHROID) 75 MCG tablet TAKE 1 TABLET BEFORE BREAKFAST 90 tablet 12    lidocaine HCl 2% (LIDOCAINE VISCOUS) 2 % Soln by Mucous Membrane route every 6 (six) hours. 100 mL 3    polyethylene glycol (GLYCOLAX) 17 gram/dose powder MIX 1 CAPFUL (17GM) IN LIQUID AND DRINK ONE TIME DAILY 1530 g 12    QUEtiapine (SEROQUEL) 50 MG tablet TAKE 1 TABLET EVERY EVENING 90 tablet 0    traMADol (ULTRAM) 50 mg tablet 1-2 pills every 6 hr as needed for pain 180 tablet 5    venlafaxine (EFFEXOR-XR) 75 MG 24 hr capsule TAKE 2 CAPSULES EVERY  capsule 3     No current facility-administered medications on file prior to visit.        Allergies   Review of patient's allergies indicates:   Allergen Reactions    Carbamazepine Swelling    Trazodone Anxiety    Bactrim [sulfamethoxazole-trimethoprim]      Hallucinations     Demerol [meperidine] Hallucinations    Morphine Nausea Only       Review of Systems (Pertinent positives)  Review of Systems   Constitutional: Negative for chills, diaphoresis, fever, malaise/fatigue and weight loss.   HENT: Negative.    Eyes: Negative for blurred vision, double vision, photophobia, pain, discharge and redness.   Respiratory: Negative.    Cardiovascular: Negative.    Gastrointestinal: Negative.    Genitourinary: Negative.    Musculoskeletal: Positive for joint pain and myalgias. Negative for back pain, falls and neck pain.   Skin: Negative.    Neurological: Negative for dizziness, tingling, tremors, sensory change, speech change, focal weakness, seizures, loss of consciousness, weakness and headaches.        + occasional facial pain   Endo/Heme/Allergies: Negative.    Psychiatric/Behavioral: Negative.        BP (!) 142/60 (BP Location: Left arm, Patient Position: Sitting, BP Method: Small (Manual))   Pulse 63   Temp 97.6 °F (36.4 °C) (Oral)   Resp 16   Ht 6' 2" (1.88 m)   Wt 95.4 kg (210 lb 5.1 oz)   SpO2 96%   BMI 27.00 kg/m² "     Physical Exam   Constitutional: He is oriented to person, place, and time. He appears well-developed and well-nourished.  Non-toxic appearance. He does not have a sickly appearance. He does not appear ill. No distress.   Patient resting comfortably in examination room in no acute distress, conversant in complete sentences   Eyes: Pupils are equal, round, and reactive to light. Conjunctivae and EOM are normal.   Neck: Normal range of motion. Neck supple. No JVD present. No spinous process tenderness and no muscular tenderness present. Normal range of motion present.   Cardiovascular: Normal rate, regular rhythm, normal heart sounds and intact distal pulses. Exam reveals no gallop and no friction rub.   No murmur heard.  Pulmonary/Chest: Effort normal and breath sounds normal. No stridor. No respiratory distress. He has no wheezes. He has no rales. He exhibits no tenderness.   Musculoskeletal:        Right shoulder: He exhibits decreased range of motion and tenderness. He exhibits no bony tenderness, no swelling, no effusion, no crepitus, no deformity, no laceration, no pain, no spasm, normal pulse and normal strength.   Right shoulder - +TTP about the lateral and anterior aspect; decreased range of motion noted especially with abduction of the shoulder; patient reports pain to lateral shoulder with posterior scratch test   Lymphadenopathy:     He has no cervical adenopathy.   Neurological: He is alert and oriented to person, place, and time. He has normal strength. He displays no atrophy, no tremor and normal reflexes. No cranial nerve deficit or sensory deficit. He exhibits normal muscle tone. He displays a negative Romberg sign. He displays no seizure activity. Coordination and gait normal.   Skin: Skin is warm and dry. He is not diaphoretic.   Vitals reviewed.       Assessment/Plan:  Ifeanyi Rutherford is a 89 y.o. male who presents today for :    Ifeanyi was seen today for shoulder pain.    Diagnoses and all orders  for this visit:    Acute pain of right shoulder  -     Ambulatory referral/consult to Orthopedics; Future  -     Ambulatory referral/consult to Physical/Occupational Therapy     I assured both the patient and his wife that I highly doubt his shoulder pain is related to worsening of his schwannoma.  Educated the patient has wife that especially given his tenderness to palpation his shoulder pain is more likely due to some pathology inside the shoulder including bursitis, arthritis, or rotator cuff tendinitis.  He is already taking chronic narcotics followed by his PCP.  Not a candidate for NSAIDs due to kidney dysfunction.  Recommended topical heat and physical therapy.  Will refer to orthopedics and defer imaging to them.  Patient may be candidate for intra-articular steroid injection of the shoulder.  Both patient and his wife were in agreement with plan of care and verbalized understanding of instructions.  He has a follow-up scheduled with his PCP in 11 days and I encouraged him to maintain this follow-up.        This office note has been dictated.  This dictation has been generated using M-Modal Fluency Direct dictation; some phonetic errors may occur.   My collaborating physician is Dr. Román Marcano.

## 2020-06-08 ENCOUNTER — OFFICE VISIT (OUTPATIENT)
Dept: ORTHOPEDICS | Facility: CLINIC | Age: 85
End: 2020-06-08
Payer: MEDICARE

## 2020-06-08 ENCOUNTER — PATIENT OUTREACH (OUTPATIENT)
Dept: ADMINISTRATIVE | Facility: OTHER | Age: 85
End: 2020-06-08

## 2020-06-08 VITALS
OXYGEN SATURATION: 97 % | SYSTOLIC BLOOD PRESSURE: 174 MMHG | RESPIRATION RATE: 18 BRPM | BODY MASS INDEX: 26.95 KG/M2 | HEART RATE: 96 BPM | DIASTOLIC BLOOD PRESSURE: 72 MMHG | WEIGHT: 210 LBS | HEIGHT: 74 IN

## 2020-06-08 DIAGNOSIS — M25.511 ACUTE PAIN OF RIGHT SHOULDER: Primary | ICD-10-CM

## 2020-06-08 DIAGNOSIS — M75.81 RIGHT ROTATOR CUFF TENDINITIS: ICD-10-CM

## 2020-06-08 PROCEDURE — 20610 DRAIN/INJ JOINT/BURSA W/O US: CPT | Mod: HCNC,RT,S$GLB, | Performed by: ORTHOPAEDIC SURGERY

## 2020-06-08 PROCEDURE — 1101F PR PT FALLS ASSESS DOC 0-1 FALLS W/OUT INJ PAST YR: ICD-10-PCS | Mod: HCNC,CPTII,S$GLB, | Performed by: ORTHOPAEDIC SURGERY

## 2020-06-08 PROCEDURE — 99204 PR OFFICE/OUTPT VISIT, NEW, LEVL IV, 45-59 MIN: ICD-10-PCS | Mod: 25,HCNC,S$GLB, | Performed by: ORTHOPAEDIC SURGERY

## 2020-06-08 PROCEDURE — 1101F PT FALLS ASSESS-DOCD LE1/YR: CPT | Mod: HCNC,CPTII,S$GLB, | Performed by: ORTHOPAEDIC SURGERY

## 2020-06-08 PROCEDURE — 1159F PR MEDICATION LIST DOCUMENTED IN MEDICAL RECORD: ICD-10-PCS | Mod: HCNC,S$GLB,, | Performed by: ORTHOPAEDIC SURGERY

## 2020-06-08 PROCEDURE — 1159F MED LIST DOCD IN RCRD: CPT | Mod: HCNC,S$GLB,, | Performed by: ORTHOPAEDIC SURGERY

## 2020-06-08 PROCEDURE — 1125F AMNT PAIN NOTED PAIN PRSNT: CPT | Mod: HCNC,S$GLB,, | Performed by: ORTHOPAEDIC SURGERY

## 2020-06-08 PROCEDURE — 99999 PR PBB SHADOW E&M-EST. PATIENT-LVL IV: CPT | Mod: PBBFAC,HCNC,, | Performed by: ORTHOPAEDIC SURGERY

## 2020-06-08 PROCEDURE — 1125F PR PAIN SEVERITY QUANTIFIED, PAIN PRESENT: ICD-10-PCS | Mod: HCNC,S$GLB,, | Performed by: ORTHOPAEDIC SURGERY

## 2020-06-08 PROCEDURE — 99999 PR PBB SHADOW E&M-EST. PATIENT-LVL IV: ICD-10-PCS | Mod: PBBFAC,HCNC,, | Performed by: ORTHOPAEDIC SURGERY

## 2020-06-08 PROCEDURE — 99204 OFFICE O/P NEW MOD 45 MIN: CPT | Mod: 25,HCNC,S$GLB, | Performed by: ORTHOPAEDIC SURGERY

## 2020-06-08 PROCEDURE — 20610 LARGE JOINT ASPIRATION/INJECTION: R SUBACROMIAL BURSA: ICD-10-PCS | Mod: HCNC,RT,S$GLB, | Performed by: ORTHOPAEDIC SURGERY

## 2020-06-08 RX ORDER — TRIAMCINOLONE ACETONIDE 40 MG/ML
40 INJECTION, SUSPENSION INTRA-ARTICULAR; INTRAMUSCULAR
Status: DISCONTINUED | OUTPATIENT
Start: 2020-06-08 | End: 2020-06-08 | Stop reason: HOSPADM

## 2020-06-08 RX ORDER — LIDOCAINE HYDROCHLORIDE 10 MG/ML
5 INJECTION, SOLUTION EPIDURAL; INFILTRATION; INTRACAUDAL; PERINEURAL
Status: DISCONTINUED | OUTPATIENT
Start: 2020-06-08 | End: 2020-06-08 | Stop reason: HOSPADM

## 2020-06-08 RX ADMIN — TRIAMCINOLONE ACETONIDE 40 MG: 40 INJECTION, SUSPENSION INTRA-ARTICULAR; INTRAMUSCULAR at 02:06

## 2020-06-08 RX ADMIN — LIDOCAINE HYDROCHLORIDE 5 ML: 10 INJECTION, SOLUTION EPIDURAL; INFILTRATION; INTRACAUDAL; PERINEURAL at 02:06

## 2020-06-08 NOTE — PROGRESS NOTES
Chief Complaint   Patient presents with    Right Shoulder - Pain       This patient was seen in consultation at the request of Jose Elias Mendez     HPI: Ifeanyi Rutherford is a 89 y.o. male who presents today complaining of right shoulder pain  Duration of symptoms:  A few months  Trauma or new activity: no  Pain is intermittent  Aggravating factors: raising arm above his head   Relieving factors: rest   Night pain is absent   Does have some pain of the elbow and forearm as well - does radiate from the shoulder   Radicular symptoms: yes neck pain   Associated symptoms:  limited range of motion.    Prior treatment: Takes tramadol and norco chronically for trigeminal neuralgia   Pain does interfere with activities of daily living . Has pain when getting up from the toilet    This is the extent of the patient's complaints at this time.     Hand dominance: Right     Review of Systems   All other systems reviewed and are negative.        Review of patient's allergies indicates:   Allergen Reactions    Carbamazepine Swelling    Trazodone Anxiety    Bactrim [sulfamethoxazole-trimethoprim]      Hallucinations     Demerol [meperidine] Hallucinations    Morphine Nausea Only         Current Outpatient Medications:     acetaminophen (TYLENOL) 325 MG tablet, Take 2 tablets (650 mg total) by mouth every 6 (six) hours as needed for Pain., Disp: , Rfl: 0    amLODIPine (NORVASC) 10 MG tablet, Take 1 tablet (10 mg total) by mouth once daily., Disp: 90 tablet, Rfl: 3    aspirin (ECOTRIN) 81 MG EC tablet, Take 1 tablet (81 mg total) by mouth once daily., Disp: 90 tablet, Rfl: 3    atorvastatin (LIPITOR) 40 MG tablet, Take 1 tablet (40 mg total) by mouth every evening., Disp: 90 tablet, Rfl: 3    diazePAM (VALIUM) 5 MG tablet, Half to whole pill every 8 hours as needed for anxiety, Disp: 90 tablet, Rfl: 3    donepeziL (ARICEPT) 5 MG tablet, TAKE 1 TABLET EVERY EVENING, Disp: 90 tablet, Rfl: 4    furosemide (LASIX) 20 MG  tablet, TAKE 1 TABLET TWICE DAILY, Disp: 30 tablet, Rfl: 0    gabapentin (NEURONTIN) 300 MG capsule, Take 2 capsules (600 mg) by mouth three times daily. (Patient taking differently: Take 300 mg by mouth 4 (four) times daily. Take 2 capsules (600 mg) by mouth three times daily.), Disp: 540 capsule, Rfl: 3    levothyroxine (SYNTHROID) 75 MCG tablet, TAKE 1 TABLET BEFORE BREAKFAST, Disp: 90 tablet, Rfl: 12    QUEtiapine (SEROQUEL) 50 MG tablet, TAKE 1 TABLET EVERY EVENING, Disp: 90 tablet, Rfl: 0    venlafaxine (EFFEXOR-XR) 75 MG 24 hr capsule, TAKE 2 CAPSULES EVERY DAY, Disp: 180 capsule, Rfl: 3    clindamycin (CLEOCIN) 150 MG capsule, Take 1 capsule (150 mg total) by mouth 3 (three) times daily. (Patient not taking: Reported on 6/8/2020), Disp: 30 capsule, Rfl: 0    HYDROcodone-acetaminophen (NORCO) 5-325 mg per tablet, 1-2 pills every 4-6  hrs prn pain, Disp: 120 tablet, Rfl: 0    ipratropium (ATROVENT) 0.03 % nasal spray, 2 sprays by Nasal route before meals as needed for Rhinitis. 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     Spinal stenosis     Trigeminal neuralgia pain 1/20/2015    Trouble in sleeping     Vertigo 9/11/2012    Vestibular schwannoma 6/4/2015       Patient Active Problem List   Diagnosis    Essential hypertension    Fatigue    Mild major depression    Optic neuropathy, ischemic, bilateral    Choroidal nevus of right eye    Pseudophakia - Both Eyes    Refractive error - Left Eye    Debility    Dementia    Hypothyroidism    Chronic headache    Parkinsonism    Trigeminal neuralgia of right side of face    Insufficiency of tear film of both eyes    Atherosclerosis of aorta    Ciliary muscle spasm of right eye    Mouth pain    Dry eye syndrome, bilateral    Urinary urgency    Elevated troponin    Mixed hyperlipidemia    CKD stage 3    Senile purpura    BPH (benign prostatic hyperplasia)       Past Surgical History:   Procedure Laterality Date    bilateral total knee arthroplasties      CATARACT EXTRACTION W/  INTRAOCULAR LENS IMPLANT Bilateral     CHOLECYSTECTOMY      EYE SURGERY      GALLBLADDER SURGERY      THYROIDECTOMY      TONSILLECTOMY         Social History     Tobacco Use    Smoking status: Former Smoker    Smokeless tobacco: Never Used   Substance Use Topics    Alcohol use: No     Alcohol/week: 0.0 standard drinks    Drug use: No       Family History   Problem Relation Age of Onset    Heart disease Mother     No Known Problems Father     No Known Problems Sister     No Known Problems Brother     No Known Problems Daughter     No Known Problems Son     No Known Problems Sister     No Known Problems Brother     No Known Problems Brother     No Known Problems Brother     No Known Problems Maternal Aunt     No Known Problems Maternal Uncle     No Known Problems Paternal Aunt     No Known Problems Paternal Uncle     No Known Problems Maternal Grandmother     No Known Problems Maternal Grandfather     No Known Problems Paternal Grandmother     No Known Problems  "Paternal Grandfather     Suicide Neg Hx     Schizophrenia Neg Hx     Amblyopia Neg Hx     Blindness Neg Hx     Cataracts Neg Hx     Glaucoma Neg Hx     Macular degeneration Neg Hx     Retinal detachment Neg Hx     Strabismus Neg Hx     Cancer Neg Hx     Diabetes Neg Hx     Hypertension Neg Hx     Stroke Neg Hx     Thyroid disease Neg Hx        Physical Exam:   Vitals:    06/08/20 1409   BP: (!) 174/72   Pulse: 96   Resp: 18   SpO2: 97%   Weight: 95.3 kg (210 lb)   Height: 6' 2" (1.88 m)   PainSc:   9   PainLoc: Shoulder       General: Weight: 95.3 kg (210 lb) Body mass index is 26.96 kg/m².  Patient is alert, awake and oriented to time, place and person. Mood and affect are appropriate.  Patient does not appear to be in any distress, denies any constitutional symptoms and appears stated age.   HEENT: Pupils are equal and round, sclera are not injected. External examination of ears and nose reveals no abnormalities. Cranial nerves II-X are grossly intact  Neck:examination demonstrates painless limited active range of motion. Spurling's sign is negative  Skin: no rashes, abrasions or open wounds on the affected extremity   Resp: No respiratory distress or audible wheezing   CV: 2+  pulses, all extremities warm and well perfused   Right Shoulder    Shoulder Range of Motion    Right     Left   (Active/Passive)       Forward Elevation     120/150             165/165     External rotation (arm at side)  45/45             45/45    Internal rotation behind the back  SI             L5     Range of motion is painful     Acromioclavicular joint is not tender  Crossbody test: negative    Neer's positive  Hawkin's positive    Ellen's positive  Drop arm negative  Bearhug negative    Cuff Strength     Right     Left   Supraspinatus        5/5    5/5  Infraspinatus     5/5    5/5  Subscapularis     5/5    5/5    Deltoid testing            5/5    5/5      Speeds negative  Yergasons negative    Elbow examination " demonstrates no tenderness to palpation and has normal range of motion.     LTSI m/u/r  2+ RP  + EPL, IO, FDS, FDP      Imaging:  3 views of the right shoulder:  positive for degenerative changes of the AC joint. The humeral head is well centered on the AP and axillary views.  There are cystic changes at the rotator cuff insertion on the greater tuberosity. There is not significant degenerative change of the glenohumeral joint or posterior subluxation of the humeral head. No acute changes or fracture.      I personally reviewed and interpreted the patient's imaging obtained today in clinic     Assessment: 89 y.o. male with right subacromial bursitis  impingement syndrome rotator cuff tendinitis    I explained my diagnostic impression and the reasoning behind it in detail, using layman's terms.  Models and/or pictures were used to help in the explanation.  We discussed non operative and operative treatment modalities -- He would like to start with non-operative treatment in the form of injection.     Plan:   - Injection of the right subacromial space was performed, please see procedure note for more details.  Prior to the injection risks and benefits of corticosteroid injection were discussed with the patient including pain, infection, bleeding, skin color changes, swelling, steroid flare. We discussed that over time injections can results in chondral damage, acceleration of arthritis formation, damage to tendons and damage to joints.  The patient consented for the procedure.  Post-injection instructions were given to the patient in writing.  - RTC 3 months, sooner as needed        All questions were answered in detail. The patient is in full agreement with the treatment plan and will proceed accordingly.    A note notifying Jose Elias Mendez of my findings was sent via the electronic medical record     This note was created by combination of typed  and M-Modal dictation. Transcription and phonetic errors may  be present.  If there are any questions, please contact me.

## 2020-06-08 NOTE — PROGRESS NOTES
Chief Complaint   Patient presents with    Right Shoulder - Pain       This patient was seen in consultation at the request of Jose Elias Mendez     HPI: Ifeanyi Rutherford is a 89 y.o. male who presents today complaining of {RIGHT/LEFT/BILATERAL:20891} shoulder pain  Duration of symptoms:  ***{DAYS, WEEKS ,MONTHS:21894}  Trauma or new activity: {YES/NO:20890}  Pain is {Constant/Intermittent:55886}   ***/10 at best and ***/10 at its worst  Aggravating factors: ***  Relieving factors: ***  Night pain {lglnightpain:59502}  Radicular symptoms: {YES/NO:20890} {Radiculopathy symptoms:46025}   Associated symptoms:  {pain associated symptoms:28458}.    Prior treatment:  {prior treatment :77895} {WITH-WITHOUT:16806} improvement in {lgl pain swelling range of motion:84769}.     Pain {DOES_DOES NOT:60893} interfere with {activities :52331}.    This is the extent of the patient's complaints at this time.     Hand dominance: {Desc; Left/Right:35962}     Occupation:***    ROS ***     Review of patient's allergies indicates:   Allergen Reactions    Carbamazepine Swelling    Trazodone Anxiety    Bactrim [sulfamethoxazole-trimethoprim]      Hallucinations     Demerol [meperidine] Hallucinations    Morphine Nausea Only         Current Outpatient Medications:     acetaminophen (TYLENOL) 325 MG tablet, Take 2 tablets (650 mg total) by mouth every 6 (six) hours as needed for Pain., Disp: , Rfl: 0    amLODIPine (NORVASC) 10 MG tablet, Take 1 tablet (10 mg total) by mouth once daily., Disp: 90 tablet, Rfl: 3    aspirin (ECOTRIN) 81 MG EC tablet, Take 1 tablet (81 mg total) by mouth once daily., Disp: 90 tablet, Rfl: 3    atorvastatin (LIPITOR) 40 MG tablet, Take 1 tablet (40 mg total) by mouth every evening., Disp: 90 tablet, Rfl: 3    diazePAM (VALIUM) 5 MG tablet, Half to whole pill every 8 hours as needed for anxiety, Disp: 90 tablet, Rfl: 3    donepeziL (ARICEPT) 5 MG tablet, TAKE 1 TABLET EVERY EVENING, Disp: 90 tablet, Rfl:  4    furosemide (LASIX) 20 MG tablet, TAKE 1 TABLET TWICE DAILY, Disp: 30 tablet, Rfl: 0    gabapentin (NEURONTIN) 300 MG capsule, Take 2 capsules (600 mg) by mouth three times daily. (Patient taking differently: Take 300 mg by mouth 4 (four) times daily. Take 2 capsules (600 mg) by mouth three times daily.), Disp: 540 capsule, Rfl: 3    levothyroxine (SYNTHROID) 75 MCG tablet, TAKE 1 TABLET BEFORE BREAKFAST, Disp: 90 tablet, Rfl: 12    QUEtiapine (SEROQUEL) 50 MG tablet, TAKE 1 TABLET EVERY EVENING, Disp: 90 tablet, Rfl: 0    venlafaxine (EFFEXOR-XR) 75 MG 24 hr capsule, TAKE 2 CAPSULES EVERY DAY, Disp: 180 capsule, Rfl: 3    clindamycin (CLEOCIN) 150 MG capsule, Take 1 capsule (150 mg total) by mouth 3 (three) times daily. (Patient not taking: Reported on 6/8/2020), Disp: 30 capsule, Rfl: 0    HYDROcodone-acetaminophen (NORCO) 5-325 mg per tablet, 1-2 pills every 4-6  hrs prn pain, Disp: 120 tablet, Rfl: 0    ipratropium (ATROVENT) 0.03 % nasal spray, 2 sprays by Nasal route before meals as needed for Rhinitis. 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    Chronic headache 9/12/2014    CKD stage 3 9/13/2019    Dementia     worked up for NPH at  and had taps with no improvement    Depression     Hyperlipidemia     Hypertension     Hypothyroidism     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     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    Spinal stenosis     Trigeminal neuralgia pain 1/20/2015    Trouble in sleeping     Vertigo 9/11/2012    Vestibular schwannoma 6/4/2015       Patient Active Problem List   Diagnosis    Essential hypertension    Fatigue    Mild major depression    Optic neuropathy, ischemic, bilateral    Choroidal nevus of right eye    Pseudophakia - Both Eyes    Refractive error - Left Eye    Debility    Dementia    Hypothyroidism    Chronic headache    Parkinsonism    Trigeminal neuralgia of right side of face    Insufficiency of tear film of both eyes    Atherosclerosis of aorta    Ciliary muscle spasm of right eye    Mouth pain    Dry eye syndrome, bilateral    Urinary urgency    Elevated troponin    Mixed hyperlipidemia    CKD stage 3    Senile purpura    BPH (benign prostatic hyperplasia)       Past Surgical History:   Procedure Laterality Date    bilateral total knee arthroplasties      CATARACT EXTRACTION W/  INTRAOCULAR LENS IMPLANT Bilateral     CHOLECYSTECTOMY      EYE SURGERY      GALLBLADDER SURGERY      THYROIDECTOMY      TONSILLECTOMY         Social History     Tobacco Use    Smoking status: Former Smoker    Smokeless tobacco: Never Used   Substance Use Topics    Alcohol use: No     Alcohol/week: 0.0 standard drinks    Drug use: No       Family History   Problem Relation Age of Onset    Heart disease Mother     No Known Problems Father     No Known Problems Sister     No Known Problems Brother     No Known Problems Daughter     No Known Problems Son     No Known Problems Sister     No Known Problems Brother     No Known Problems Brother     No Known Problems Brother     No Known Problems Maternal Aunt     No Known Problems Maternal Uncle     No Known Problems Paternal Aunt     No Known Problems Paternal Uncle     No Known Problems Maternal Grandmother     No Known Problems Maternal Grandfather     No Known Problems Paternal  "Grandmother     No Known Problems Paternal Grandfather     Suicide Neg Hx     Schizophrenia Neg Hx     Amblyopia Neg Hx     Blindness Neg Hx     Cataracts Neg Hx     Glaucoma Neg Hx     Macular degeneration Neg Hx     Retinal detachment Neg Hx     Strabismus Neg Hx     Cancer Neg Hx     Diabetes Neg Hx     Hypertension Neg Hx     Stroke Neg Hx     Thyroid disease Neg Hx        Physical Exam:   Vitals:    06/08/20 1409   BP: (!) 174/72   Pulse: 96   Resp: 18   SpO2: 97%   Weight: 95.3 kg (210 lb)   Height: 6' 2" (1.88 m)   PainSc:   9   PainLoc: Shoulder       General: Weight: 95.3 kg (210 lb) Body mass index is 26.96 kg/m².  Patient is alert, awake and oriented to time, place and person. Mood and affect are appropriate.  Patient does not appear to be in any distress, denies any constitutional symptoms and appears stated age.   HEENT: Pupils are equal and round, sclera are not injected. External examination of ears and nose reveals no abnormalities. Cranial nerves II-X are grossly intact  Neck:*** examination demonstrates painless *** active range of motion. Spurling's sign is {NEGATIVE/POSITIVE:99312}  Skin: no rashes, abrasions or open wounds on the affected extremity   Resp: No respiratory distress or audible wheezing   CV: 2+ *** pulses, all extremities warm and well perfused   {Desc; Left/Right:90233} Shoulder    Shoulder Range of Motion    Right     Left   (Active/Passive)       Forward Elevation     ***/***             ***/***  External rotation (abduction)   ***/***             ***/***   External rotation (arm at side)  ***/***             ***/***   Internal rotation in abduction   ***/***                        ***/***   Internal rotation behind the back  ***             ***     Range of motion {IS / IS NOT:60612} painful     Scapular winging***  Scapular dyskinesia ***    Examination of the back shows atrophy of ***    Acromioclavicular joint {IS / IS NOT:12527} tender  Crossbody test: " "{NEGATIVE/POSITIVE:54162}    Neer's {NEGATIVE/POSITIVE:55241}  Hawkin's {NEGATIVE/POSITIVE:76004}    Ellen's {NEGATIVE/POSITIVE:74898}  Drop arm {NEGATIVE/POSITIVE:58188}  Belly press {NEGATIVE/POSITIVE:62412}  Liftoff {NEGATIVE/POSITIVE:10749}  External rotation lag sign {NEGATIVE/POSITIVE:24808}  Hornblowers sign {NEGATIVE/POSITIVE:81166}  Internal rotation lag sign {NEGATIVE/POSITIVE:83771}    Cuff Strength     Right     Left   Supraspinatus        ***/5    ***/5  Infraspinatus     ***/5    ***/5  Subscapularis     ***/5    ***/5    Deltoid testing            ***/5    ***/5    Vizcaino's test {NEGATIVE/POSITIVE:73045}  Speeds {NEGATIVE/POSITIVE:12788}  Yergasons {NEGATIVE/POSITIVE:45223}    Anterior instability testing:  Apprehension and relocation {NEGATIVE/POSITIVE:91686}  Anterior load and shift {NEGATIVE/POSITIVE:19393}  ***+    Posterior instability:   Kims test {NEGATIVE/POSITIVE:47017}  Posterior load and shift ***+  Posterior Stress {NEGATIVE/POSITIVE:37190}    Sulcus sign ***+  Labral shear test {NEGATIVE/POSITIVE:28846}  Signs of hyperlaxity ***    Elbow examination demonstrates no tenderness to palpation and has normal range of motion.     ***ltsi C5-T1  *** epl, io, fds, fdp   ***+ RP      Imaging: *** 3 views of the {Right/left:37636} shoulder:  {Exam:::"negative"} for degenerative changes of the AC joint. The humeral head {IS / IS NOT:75603} well centered on the AP and axillary views.  There is calcification at the rotator cuff insertion on the greater tuberosity. There {IS / IS NOT:84055} significant degenerative change of the glenohumeral joint or posterior subluxation of the humeral head. No acute changes or fracture.      I personally reviewed and interpreted the patient's imaging obtained {lglimagingtimin}     Assessment: 89 y.o. male with {RIGHT/LEFT/BILATERAL:63850} {lglshoulderdiagnoses:85230}    I explained my diagnostic impression and the reasoning behind it in detail, using " layman's terms.  Models and/or pictures were used to help in the explanation.  We discussed non operative and operative treatment modalities -- {HE/SHE:21762} would like to start with {lgltreatment:38814} treatment in the form of ***.     Plan:   - ***  - Return to clinic in {NUMBERS 1-20:88918} {DAYS, WEEKS ,MONTHS:23550}. Return sooner if symptoms worsen or fail to improve.    All questions were answered in detail. The patient is in full agreement with the treatment plan and will proceed accordingly.    A note notifying Jose Elias Mendez of my findings was sent via the electronic medical record     This note was created by combination of typed  and M-Modal dictation. Transcription and phonetic errors may be present.  If there are any questions, please contact me.

## 2020-06-08 NOTE — LETTER
June 8, 2020      Jose Elias Mendez, NP  [address]  [facility] [address]           [facility]  [address], [address]  [place] [address]  Phone: [phone]          Patient: Ifeanyi Rutherford   MR Number: [id number]   YOB: 1929   Date of Visit: 6/8/2020       Dear Jose Elias Mendez:    Thank you for referring Ifeanyi Rutherford to me for evaluation. Attached you will find relevant portions of my assessment and plan of care.    If you have questions, please do not hesitate to call me. I look forward to following Ifeanyi Rutherford along with you.    Sincerely,    Khloe Romero MD    Enclosure  CC:  No Recipients    If you would like to receive this communication electronically, please contact [email or web address] or [phone] to request more information on [employer] Link access.    For providers and/or their staff who would like to refer a patient to [facility], please contact us through our one-stop-shop provider referral line, [facility], at [phone].    If you feel you have received this communication in error or would no longer like to receive these types of communications, please e-mail [email or web address]

## 2020-06-08 NOTE — PROCEDURES
Large Joint Aspiration/Injection: R subacromial bursa  Date/Time: 6/8/2020 2:30 PM  Performed by: Khloe Romero MD  Authorized by: Khloe Romero MD     Consent Done?:  Yes (Written)  Indications:  Pain  Timeout: prior to procedure the correct patient, procedure, and site was verified    Prep: patient was prepped and draped in usual sterile fashion    Local anesthesia used?: No    Local anesthetic:  Topical anesthetic    Details:  Needle Size:  22 G  Approach:  Posterior  Location:  Shoulder  Site:  R subacromial bursa  Medications:  5 mL lidocaine (PF) 10 mg/ml (1%) 10 mg/mL (1 %); 40 mg triamcinolone acetonide 40 mg/mL  Patient tolerance:  Patient tolerated the procedure well with no immediate complications

## 2020-06-10 ENCOUNTER — TELEPHONE (OUTPATIENT)
Dept: FAMILY MEDICINE | Facility: CLINIC | Age: 85
End: 2020-06-10

## 2020-06-10 DIAGNOSIS — R60.0 BILATERAL LEG EDEMA: ICD-10-CM

## 2020-06-10 DIAGNOSIS — G50.0 TRIGEMINAL NEURALGIA OF RIGHT SIDE OF FACE: ICD-10-CM

## 2020-06-10 NOTE — TELEPHONE ENCOUNTER
----- Message from Janene Rodriguez sent at 6/10/2020  9:58 AM CDT -----  Contact: [employer]  Type: Patient Call Back    Who called: Patient    What is the request in detail: Refill-furosemide     Can the clinic reply by [facility]? No    Would the patient rather a call back or a response via [facility]? Call    Best call back number: [phone] (home)     Additional Information:  [facility] - [facility] [address]  [address] [facility] [address]  Phone: [phone] Fax: [phone]

## 2020-06-11 RX ORDER — FUROSEMIDE 20 MG/1
20 TABLET ORAL 2 TIMES DAILY
Qty: 180 TABLET | Refills: 3 | Status: ON HOLD | OUTPATIENT
Start: 2020-06-11 | End: 2020-11-18 | Stop reason: HOSPADM

## 2020-06-15 ENCOUNTER — OFFICE VISIT (OUTPATIENT)
Dept: FAMILY MEDICINE | Facility: CLINIC | Age: 85
End: 2020-06-15
Payer: MEDICARE

## 2020-06-15 VITALS
OXYGEN SATURATION: 95 % | SYSTOLIC BLOOD PRESSURE: 150 MMHG | TEMPERATURE: 98 F | RESPIRATION RATE: 18 BRPM | WEIGHT: 203.25 LBS | DIASTOLIC BLOOD PRESSURE: 90 MMHG | BODY MASS INDEX: 26.08 KG/M2 | HEIGHT: 74 IN | HEART RATE: 74 BPM

## 2020-06-15 DIAGNOSIS — R60.0 BILATERAL LEG EDEMA: Primary | ICD-10-CM

## 2020-06-15 DIAGNOSIS — I10 ESSENTIAL HYPERTENSION: ICD-10-CM

## 2020-06-15 DIAGNOSIS — G20.A1 PARKINSON'S DISEASE: ICD-10-CM

## 2020-06-15 DIAGNOSIS — E03.9 HYPOTHYROIDISM, UNSPECIFIED TYPE: ICD-10-CM

## 2020-06-15 DIAGNOSIS — G50.0 TRIGEMINAL NEURALGIA OF RIGHT SIDE OF FACE: ICD-10-CM

## 2020-06-15 DIAGNOSIS — F03.90 DEMENTIA WITHOUT BEHAVIORAL DISTURBANCE, UNSPECIFIED DEMENTIA TYPE: ICD-10-CM

## 2020-06-15 DIAGNOSIS — M54.2 MUSCULOSKELETAL NECK PAIN: ICD-10-CM

## 2020-06-15 DIAGNOSIS — R51.9 CHRONIC NONINTRACTABLE HEADACHE, UNSPECIFIED HEADACHE TYPE: ICD-10-CM

## 2020-06-15 DIAGNOSIS — D33.3 UNILATERAL VESTIBULAR SCHWANNOMA: ICD-10-CM

## 2020-06-15 DIAGNOSIS — I70.0 ATHEROSCLEROSIS OF AORTA: ICD-10-CM

## 2020-06-15 DIAGNOSIS — N18.30 CKD (CHRONIC KIDNEY DISEASE) STAGE 3, GFR 30-59 ML/MIN: ICD-10-CM

## 2020-06-15 DIAGNOSIS — G89.29 CHRONIC NONINTRACTABLE HEADACHE, UNSPECIFIED HEADACHE TYPE: ICD-10-CM

## 2020-06-15 DIAGNOSIS — F39 MOOD DISORDER: ICD-10-CM

## 2020-06-15 PROCEDURE — 1159F MED LIST DOCD IN RCRD: CPT | Mod: HCNC,S$GLB,, | Performed by: INTERNAL MEDICINE

## 2020-06-15 PROCEDURE — 1101F PR PT FALLS ASSESS DOC 0-1 FALLS W/OUT INJ PAST YR: ICD-10-PCS | Mod: HCNC,CPTII,S$GLB, | Performed by: INTERNAL MEDICINE

## 2020-06-15 PROCEDURE — 1126F PR PAIN SEVERITY QUANTIFIED, NO PAIN PRESENT: ICD-10-PCS | Mod: HCNC,S$GLB,, | Performed by: INTERNAL MEDICINE

## 2020-06-15 PROCEDURE — 1159F PR MEDICATION LIST DOCUMENTED IN MEDICAL RECORD: ICD-10-PCS | Mod: HCNC,S$GLB,, | Performed by: INTERNAL MEDICINE

## 2020-06-15 PROCEDURE — 99214 PR OFFICE/OUTPT VISIT, EST, LEVL IV, 30-39 MIN: ICD-10-PCS | Mod: HCNC,S$GLB,, | Performed by: INTERNAL MEDICINE

## 2020-06-15 PROCEDURE — 1126F AMNT PAIN NOTED NONE PRSNT: CPT | Mod: HCNC,S$GLB,, | Performed by: INTERNAL MEDICINE

## 2020-06-15 PROCEDURE — 99999 PR PBB SHADOW E&M-EST. PATIENT-LVL III: CPT | Mod: PBBFAC,HCNC,, | Performed by: INTERNAL MEDICINE

## 2020-06-15 PROCEDURE — 99214 OFFICE O/P EST MOD 30 MIN: CPT | Mod: HCNC,S$GLB,, | Performed by: INTERNAL MEDICINE

## 2020-06-15 PROCEDURE — 1101F PT FALLS ASSESS-DOCD LE1/YR: CPT | Mod: HCNC,CPTII,S$GLB, | Performed by: INTERNAL MEDICINE

## 2020-06-15 PROCEDURE — 99999 PR PBB SHADOW E&M-EST. PATIENT-LVL III: ICD-10-PCS | Mod: PBBFAC,HCNC,, | Performed by: INTERNAL MEDICINE

## 2020-06-15 RX ORDER — HYDROCODONE BITARTRATE AND ACETAMINOPHEN 5; 325 MG/1; MG/1
TABLET ORAL
Qty: 120 TABLET | Refills: 0 | Status: ON HOLD | OUTPATIENT
Start: 2020-06-15 | End: 2020-11-18 | Stop reason: HOSPADM

## 2020-06-15 RX ORDER — ATORVASTATIN CALCIUM 40 MG/1
40 TABLET, FILM COATED ORAL NIGHTLY
Qty: 90 TABLET | Refills: 3 | Status: SHIPPED | OUTPATIENT
Start: 2020-06-15

## 2020-06-15 RX ORDER — TRAMADOL HYDROCHLORIDE 50 MG/1
TABLET ORAL
Qty: 180 TABLET | Refills: 5 | Status: ON HOLD | OUTPATIENT
Start: 2020-06-15 | End: 2020-11-18 | Stop reason: HOSPADM
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
## 2020-06-19 ENCOUNTER — TELEPHONE (OUTPATIENT)
Dept: ORTHOPEDICS | Facility: CLINIC | Age: 85
End: 2020-06-19

## 2020-06-24 DIAGNOSIS — M54.2 NECK PAIN: Primary | ICD-10-CM

## 2020-07-07 ENCOUNTER — OFFICE VISIT (OUTPATIENT)
Dept: OPTOMETRY | Facility: CLINIC | Age: 85
End: 2020-07-07
Payer: MEDICARE

## 2020-07-07 DIAGNOSIS — H52.7 REFRACTIVE ERROR: ICD-10-CM

## 2020-07-07 DIAGNOSIS — Z96.1 PSEUDOPHAKIA: ICD-10-CM

## 2020-07-07 DIAGNOSIS — H47.013 OPTIC NEUROPATHY, ISCHEMIC, BILATERAL: Primary | ICD-10-CM

## 2020-07-07 DIAGNOSIS — D31.31 CHOROIDAL NEVUS OF RIGHT EYE: ICD-10-CM

## 2020-07-07 DIAGNOSIS — H04.123 DRY EYE SYNDROME, BILATERAL: ICD-10-CM

## 2020-07-07 PROCEDURE — 99999 PR PBB SHADOW E&M-EST. PATIENT-LVL III: CPT | Mod: PBBFAC,HCNC,, | Performed by: OPTOMETRIST

## 2020-07-07 PROCEDURE — 92015 DETERMINE REFRACTIVE STATE: CPT | Mod: HCNC,S$GLB,, | Performed by: OPTOMETRIST

## 2020-07-07 PROCEDURE — 92015 PR REFRACTION: ICD-10-PCS | Mod: HCNC,S$GLB,, | Performed by: OPTOMETRIST

## 2020-07-07 PROCEDURE — 92014 COMPRE OPH EXAM EST PT 1/>: CPT | Mod: HCNC,S$GLB,, | Performed by: OPTOMETRIST

## 2020-07-07 PROCEDURE — 99999 PR PBB SHADOW E&M-EST. PATIENT-LVL III: ICD-10-PCS | Mod: PBBFAC,HCNC,, | Performed by: OPTOMETRIST

## 2020-07-07 PROCEDURE — 92014 PR EYE EXAM, EST PATIENT,COMPREHESV: ICD-10-PCS | Mod: HCNC,S$GLB,, | Performed by: OPTOMETRIST

## 2020-07-07 NOTE — PROGRESS NOTES
Subjective:       Patient ID: Ifeanyi Rutherford is a 89 y.o. male      Chief Complaint   Patient presents with    Concerns About Ocular Health     History of Present Illness  Dls: 4/23/19 Dr. High     89 y/o male presents today for ocular health check.  Pt states blurry vision os at distance and near.   Pt has optic neuropathy od. Pt states since poor vision od.   Pt wears bifocal glasses .     No tearing  No itching  No burning  + od pain  + ha's  + floaters  No flashes    Eye meds  otc gtts ou prn          Assessment/Plan:     1. Optic neuropathy, ischemic, bilateral  OD > OS. Stable.    2. Choroidal nevus of right eye  Stable. Monitor.    3. Pseudophakia - Both Eyes  Clear. Centered.    4. Dry eye syndrome, bilateral  Pt has not been using drops. Discussed with pt and wife to restart drops, especially when reading to help  Lubricate eyes.    5. Refractive error  Optional change in Rx. VA has been stable since 2014, pt fluctuates between 20/40-20/60 OS.     Eyeglass Final Rx     Eyeglass Final Rx       Sphere Cylinder Axis Add    Right Balance   +2.75    Left -0.75 +1.25 030 +2.75    Expiration Date: 7/8/2021                  No follow-ups on file.

## 2020-07-13 ENCOUNTER — TELEPHONE (OUTPATIENT)
Dept: FAMILY MEDICINE | Facility: CLINIC | Age: 85
End: 2020-07-13

## 2020-07-23 ENCOUNTER — TELEPHONE (OUTPATIENT)
Dept: FAMILY MEDICINE | Facility: CLINIC | Age: 85
End: 2020-07-23

## 2020-07-23 DIAGNOSIS — I10 ESSENTIAL HYPERTENSION: ICD-10-CM

## 2020-07-23 DIAGNOSIS — F03.90 DEMENTIA WITHOUT BEHAVIORAL DISTURBANCE, UNSPECIFIED DEMENTIA TYPE: Primary | ICD-10-CM

## 2020-07-23 DIAGNOSIS — G20.A1 PARKINSON'S DISEASE: ICD-10-CM

## 2020-07-23 NOTE — TELEPHONE ENCOUNTER
Okay, I went to put in a home health order but it appears there is already a home health order in and was asking me to do a subsequent order which I did put in an sign.    Please take that subsequent order an internally send it/fax it internally to [facility]    Please call wife and state that there was already an order for home health in and had they had home health in the recent past?

## 2020-07-23 NOTE — TELEPHONE ENCOUNTER
Spoke with patient wife states dementia is getting worse ,request for home health. Please advise Thanks

## 2020-07-27 ENCOUNTER — TELEPHONE (OUTPATIENT)
Dept: FAMILY MEDICINE | Facility: CLINIC | Age: 85
End: 2020-07-27

## 2020-07-27 NOTE — TELEPHONE ENCOUNTER
"----- Message from Nandini Gutiérrez sent at 7/27/2020  8:43 AM CDT -----  Type: Patient Call Back    Who called: Krystal "Wife"    What is the request in detail: calling with questions regarding pain medication and home healthcare. Requesting to speak with Nurse Kareen     Can the clinic reply by [facility]? Call back     Would the patient rather a call back or a response via [facility]? Call back     Best call back number: [phone]        "

## 2020-07-27 NOTE — TELEPHONE ENCOUNTER
Patient's wife said that he's not getting enough relief from his neck pain taking the Tramadol 50 mg tab.  She said doesn't want to give him the Norco because it made him hallucinate the first time he took it.  Patient's wife is asking if it's OK to add 1 Tylenol 500 mg tab. to the Tramadol 50 mg instead of giving Patient 2 of the Tramadol tabs. as ordered.  I spoke with Dr. Levy, who said that it's OK to add the Tylenol to the Tramadol.  Patient's wife informed.  She verbalized understanding and intent to do so.  Patient's wife then said to ask Dr. Levy to change Patient's Home Health referral to [facility] instead of [facility] for his PT.

## 2020-07-29 NOTE — TELEPHONE ENCOUNTER
"Call wife    "Dr ROYAL noted that he has hallucinations even without the pain meds so MIGHT want to retry a half a pain pill or  A whole pill and see if it controls his pain better and REALLY be sure the meds cause the hallucinations   "

## 2020-07-29 NOTE — TELEPHONE ENCOUNTER
Patient's wife voices understanding and acceptance of this advice and will call back if any further questions or concerns.

## 2020-07-31 ENCOUNTER — TELEPHONE (OUTPATIENT)
Dept: FAMILY MEDICINE | Facility: CLINIC | Age: 85
End: 2020-07-31

## 2020-07-31 NOTE — TELEPHONE ENCOUNTER
----- Message from Sabina Kumar sent at 7/31/2020 12:23 PM CDT -----      Name of Who is Calling: ZEV BHATIA [[id number]] Wife      What is the request in detail: Pt wife called said that [facility] needs home health orders for pt.Please contact to further discuss and advise.          Can the clinic reply by [facility]: N      What Number to Call Back if not in [facility]: [facility] Genet Francis [phone]

## 2020-08-01 NOTE — TELEPHONE ENCOUNTER
Clarify -sounds like they HAVE [facility] already so, if so, they send all orders to the hub - ok to give VERBAL order to get something rolling if needed

## 2020-08-03 NOTE — TELEPHONE ENCOUNTER
Patients family would like [facility] to see patient. Spoke to Genet from [facility] and she stated they have worked with him in the past. Orders will need to be faxed to [facility] for PT. Verbal order given to start PT per Dr. Levy.

## 2020-08-04 ENCOUNTER — TELEPHONE (OUTPATIENT)
Dept: FAMILY MEDICINE | Facility: CLINIC | Age: 85
End: 2020-08-04

## 2020-08-04 NOTE — TELEPHONE ENCOUNTER
----- Message from Kiera Gary sent at 8/4/2020 10:00 AM CDT -----  Regarding: inquiry  Type: Patient Call Back    Who called:Krystal(Wife)    What is the request in detail:Called regarding excruciating pain, advise needed asap.    Can the clinic reply by [facility]? no    Would the patient rather a call back or a response via [facility]?Callback    Best call back number:  Click to [phone]

## 2020-08-04 NOTE — TELEPHONE ENCOUNTER
Genet said that Patient is coming back to them as new so they cannot accept a verbal order to start his care.  She is asking for written orders for SN and PT.

## 2020-08-04 NOTE — TELEPHONE ENCOUNTER
Patient's wife informed of message below.  She voices understanding and acceptance of this advice and will call back if any further questions or concerns.

## 2020-08-04 NOTE — TELEPHONE ENCOUNTER
"Given the pain, reassure wife ..."Dr ROYAL says you may really need to rely more on the hydrocodone and even try 1.5 to 2 pills at a time and it can be given every6 hrs. Tramadol can be used in between on a different every 6 hr schedule just watch how he does.     Quote to pt ...Hard to say if pain med makes him hallucinate since he sometimes already does that but watch for NEW issues  "

## 2020-08-04 NOTE — TELEPHONE ENCOUNTER
Patient's wife said that his neck pain this AM is 10/10 and she gave him 2 tramadol 50 mg tabs. at 9:30 AM with little relief noted.  She is asking how soon may she give Patient an additional hydrocodone-acetaminophen (NORCO) 5-325 mg tab.  Please advise.

## 2020-08-06 ENCOUNTER — TELEPHONE (OUTPATIENT)
Dept: FAMILY MEDICINE | Facility: CLINIC | Age: 85
End: 2020-08-06

## 2020-08-06 NOTE — TELEPHONE ENCOUNTER
----- Message from Belen Talamantes sent at 8/6/2020  9:34 AM CDT -----  Regarding: [facility]  Contact: Brittny  Type: Patient Call Back    Who called:Brittny    What is the request in detail:Brittny called because they are unable to accept patient because they are out of network with patient's insurance. Please call to discuss    Can the clinic reply by [facility]?    Would the patient rather a call back or a response via [facility]? Call    Best call back number:[phone]

## 2020-08-06 NOTE — TELEPHONE ENCOUNTER
Patient's wife informed of message below.  She verbalized understanding and said that she will call back after speaking with the nurse they desire.

## 2020-08-06 NOTE — TELEPHONE ENCOUNTER
Genet said that Patient's wife contacted her and she will be able to set up home health services for Patient via [facility].  I told her that the order is in Epic.  She verbalized understanding.

## 2020-08-10 PROCEDURE — G0180 PR HOME HEALTH MD CERTIFICATION: ICD-10-PCS | Mod: ,,, | Performed by: INTERNAL MEDICINE

## 2020-08-10 PROCEDURE — G0180 MD CERTIFICATION HHA PATIENT: HCPCS | Mod: ,,, | Performed by: INTERNAL MEDICINE

## 2020-08-10 RX ORDER — QUETIAPINE FUMARATE 50 MG/1
50 TABLET, FILM COATED ORAL NIGHTLY
Qty: 90 TABLET | Refills: 12 | Status: SHIPPED | OUTPATIENT
Start: 2020-08-10 | End: 2020-08-18 | Stop reason: SDUPTHER

## 2020-08-11 ENCOUNTER — TELEPHONE (OUTPATIENT)
Dept: FAMILY MEDICINE | Facility: CLINIC | Age: 85
End: 2020-08-11

## 2020-08-11 NOTE — TELEPHONE ENCOUNTER
Please call and clarify the issue.  I believe I have already ordered home health and they can add what is necessary       Are they calling for a verbal order?  If so, they can have a verbal order for what ever is listed below but otherwise home health really needs to send orders directly to the hub and they will send them to me for electronic signature

## 2020-08-11 NOTE — TELEPHONE ENCOUNTER
Returned call.  No answer.  Left detailed voice message on an identified recorder requesting a call back.

## 2020-08-11 NOTE — TELEPHONE ENCOUNTER
----- Message from Angeles Barahona sent at 8/11/2020  8:43 AM CDT -----  Type: Patient Call Back    Who called: Jak with [facility]     What is the request in detail: Rep asking for HH orders to add PT, OT, and HH aid to help pt.     Can the clinic reply by [facility]? No     Would the patient rather a call back or a response via [facility]? Call back     Best call back number: [phone]    Additional Information:

## 2020-08-11 NOTE — TELEPHONE ENCOUNTER
Jak was given verbal orders to add PT, OT, and HH Aide visits to Patient's home health care plan.  Jak verbalized understanding.

## 2020-08-11 NOTE — TELEPHONE ENCOUNTER
----- Message from Angeles Barahona sent at 8/11/2020  8:43 AM CDT -----  Type: Patient Call Back    Who called: Jak with [facility]     What is the request in detail: Rep asking for HH orders to add PT, OT, and HH aid to help pt.     Can the clinic reply by [facility]? No     Would the patient rather a call back or a response via [facility]? Call back     Best call back number: [phone]    Additional Information:

## 2020-08-12 ENCOUNTER — OFFICE VISIT (OUTPATIENT)
Dept: PAIN MEDICINE | Facility: CLINIC | Age: 85
End: 2020-08-12
Payer: MEDICARE

## 2020-08-12 VITALS
DIASTOLIC BLOOD PRESSURE: 84 MMHG | SYSTOLIC BLOOD PRESSURE: 185 MMHG | BODY MASS INDEX: 24.95 KG/M2 | TEMPERATURE: 98 F | HEIGHT: 74 IN | HEART RATE: 69 BPM | OXYGEN SATURATION: 97 % | WEIGHT: 194.44 LBS

## 2020-08-12 DIAGNOSIS — R53.81 DEBILITY: Primary | ICD-10-CM

## 2020-08-12 DIAGNOSIS — G20.C PARKINSONISM, UNSPECIFIED PARKINSONISM TYPE: Chronic | ICD-10-CM

## 2020-08-12 DIAGNOSIS — G50.0 TRIGEMINAL NEURALGIA OF RIGHT SIDE OF FACE: Chronic | ICD-10-CM

## 2020-08-12 DIAGNOSIS — M54.2 NECK PAIN: ICD-10-CM

## 2020-08-12 DIAGNOSIS — M50.30 DDD (DEGENERATIVE DISC DISEASE), CERVICAL: ICD-10-CM

## 2020-08-12 DIAGNOSIS — M47.812 CERVICAL SPONDYLOSIS: ICD-10-CM

## 2020-08-12 PROCEDURE — 1159F PR MEDICATION LIST DOCUMENTED IN MEDICAL RECORD: ICD-10-PCS | Mod: HCNC,S$GLB,, | Performed by: PAIN MEDICINE

## 2020-08-12 PROCEDURE — 1125F PR PAIN SEVERITY QUANTIFIED, PAIN PRESENT: ICD-10-PCS | Mod: HCNC,S$GLB,, | Performed by: PAIN MEDICINE

## 2020-08-12 PROCEDURE — 99999 PR PBB SHADOW E&M-EST. PATIENT-LVL V: CPT | Mod: PBBFAC,HCNC,, | Performed by: PAIN MEDICINE

## 2020-08-12 PROCEDURE — 99204 PR OFFICE/OUTPT VISIT, NEW, LEVL IV, 45-59 MIN: ICD-10-PCS | Mod: HCNC,S$GLB,, | Performed by: PAIN MEDICINE

## 2020-08-12 PROCEDURE — 1101F PR PT FALLS ASSESS DOC 0-1 FALLS W/OUT INJ PAST YR: ICD-10-PCS | Mod: HCNC,CPTII,S$GLB, | Performed by: PAIN MEDICINE

## 2020-08-12 PROCEDURE — 1159F MED LIST DOCD IN RCRD: CPT | Mod: HCNC,S$GLB,, | Performed by: PAIN MEDICINE

## 2020-08-12 PROCEDURE — 1125F AMNT PAIN NOTED PAIN PRSNT: CPT | Mod: HCNC,S$GLB,, | Performed by: PAIN MEDICINE

## 2020-08-12 PROCEDURE — 99204 OFFICE O/P NEW MOD 45 MIN: CPT | Mod: HCNC,S$GLB,, | Performed by: PAIN MEDICINE

## 2020-08-12 PROCEDURE — 1101F PT FALLS ASSESS-DOCD LE1/YR: CPT | Mod: HCNC,CPTII,S$GLB, | Performed by: PAIN MEDICINE

## 2020-08-12 PROCEDURE — 99999 PR PBB SHADOW E&M-EST. PATIENT-LVL V: ICD-10-PCS | Mod: PBBFAC,HCNC,, | Performed by: PAIN MEDICINE

## 2020-08-12 NOTE — PROGRESS NOTES
Subjective:     Patient ID: Ifeanyi Rutherford is a 89 y.o. male    Chief Complaint: Headache (pt takes medication and no PT . Had injections), Neck Pain, and Shoulder Pain      Referred by: Khloe Romero MD      HPI:    Initial Encounter (8/12/20):  Ifeanyi Rutherford is a 89 y.o. male who presents today with multiple chronic pain complaints.  Patient also has dementia.  The majority of the history was provided by his wife.  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  This is chronic pain.  Patient has been treated with right percutaneous glycerol trigeminal ganglionotomy on 02/27/18 done by Dr. Rodriguez.  According to the documentation, this procedure did provide significant relief of his trigeminal neuralgia.  Patient has not seen Dr. Rodriguez recently.  More recently, patient has been complaining of significant neck pain.  This has been happening for the past 2 months.  It is difficult to get an exact description of the pain given patient's dementia, but patient states that he has pain in the entire cervical region that radiates the bilateral upper extremities.  It is unclear if there is associated numbness, tingling, weakness or bowel bladder dysfunction.  Patient is prescribed pain medication by his primary care physician.  This medication does help with his pain tends to worsen his mental status.  Patient's wife reports that he gets more confused when taking the pain medication but no other adverse effects including agitation or sedation occur.  The pain is always present and is worsened by certain activities.  Patient states the pain causes him to become tearful at times.   This pain is described in detail below.    Physical Therapy:  No.  Plans to start home health therapy soon.    Non-pharmacologic Treatment:  Rest helps         · TENS?  No    Pain Medications:         · Currently taking:  Tylenol, tramadol, Norco, gabapentin    · Has tried in the past:  Carbamazepine (facial  swelling)    · Has not tried:  NSAIDs, Muscle relaxants, TCAs, SNRIs, topical creams    Blood thinners:  Aspirin 81 mg    Interventional Therapies:   02/27/18 - right percutaneous glycerol trigeminal ganglionotomy - good relief noted    Relevant Surgeries:   stereotactic radiosurgery for right schwannoma in 2012 and then again in 2015    Affecting sleep?  Yes    Affecting daily activities? yes    Depressive symptoms? yes          · SI/HI? No    Work status: Retired    Pain Scores:    Best:       N/A/10  Worst:     N/A/10  Usually:   N/A/10  Today:    N/A/10    Review of Systems   Constitutional: Negative for activity change, appetite change, chills, fatigue, fever and unexpected weight change.   HENT: Negative for hearing loss.    Eyes: Negative for visual disturbance.   Respiratory: Negative for chest tightness and shortness of breath.    Cardiovascular: Negative for chest pain.   Gastrointestinal: Negative for abdominal pain, constipation, diarrhea, nausea and vomiting.   Genitourinary: Negative for difficulty urinating.   Musculoskeletal: Positive for myalgias, neck pain and neck stiffness. Negative for back pain and gait problem.   Skin: Negative for rash.   Neurological: Negative for dizziness, weakness, light-headedness, numbness and headaches.   Psychiatric/Behavioral: Positive for sleep disturbance. Negative for hallucinations and suicidal ideas. The patient is not nervous/anxious.        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    Spinal  stenosis     Trigeminal neuralgia pain 1/20/2015    Trouble in sleeping     Vertigo 9/11/2012    Vestibular schwannoma 6/4/2015       Past Surgical History:   Procedure Laterality Date    bilateral total knee arthroplasties      CATARACT EXTRACTION W/  INTRAOCULAR LENS IMPLANT Bilateral     CHOLECYSTECTOMY      EYE SURGERY      GALLBLADDER SURGERY      THYROIDECTOMY      TONSILLECTOMY         Social History     Socioeconomic History    Marital status:      Spouse name: Not on file    Number of children: Not on file    Years of education: Not on file    Highest education level: Not on file   Occupational History    Not on file   Social Needs    Financial resource strain: Not on file    Food insecurity     Worry: Not on file     Inability: Not on file    Transportation needs     Medical: Not on file     Non-medical: Not on file   Tobacco Use    Smoking status: Former Smoker    Smokeless tobacco: Never Used   Substance and Sexual Activity    Alcohol use: No     Alcohol/week: 0.0 standard drinks    Drug use: No    Sexual activity: Not Currently     Partners: Female   Lifestyle    Physical activity     Days per week: Not on file     Minutes per session: Not on file    Stress: Not on file   Relationships    Social connections     Talks on phone: Not on file     Gets together: Not on file     Attends Zoroastrian service: Not on file     Active member of club or organization: Not on file     Attends meetings of clubs or organizations: Not on file     Relationship status: Not on file   Other Topics Concern    Not on file   Social History Narrative    Not on file       Review of patient's allergies indicates:   Allergen Reactions    Carbamazepine Swelling    Trazodone Anxiety    Bactrim [sulfamethoxazole-trimethoprim]      Hallucinations     Demerol [meperidine] Hallucinations    Morphine Nausea Only       Current Outpatient Medications on File Prior to Visit   Medication Sig Dispense  "Refill    acetaminophen (TYLENOL) 325 MG tablet Take 2 tablets (650 mg total) by mouth every 6 (six) hours as needed for Pain.  0    amLODIPine (NORVASC) 10 MG tablet Take 1 tablet (10 mg total) by mouth once daily. 90 tablet 3    aspirin (ECOTRIN) 81 MG EC tablet Take 1 tablet (81 mg total) by mouth once daily. 90 tablet 3    atorvastatin (LIPITOR) 40 MG tablet Take 1 tablet (40 mg total) by mouth every evening. 90 tablet 3    diazePAM (VALIUM) 5 MG tablet Half to whole pill every 8 hours as needed for anxiety 90 tablet 3    donepeziL (ARICEPT) 5 MG tablet TAKE 1 TABLET EVERY EVENING 90 tablet 4    furosemide (LASIX) 20 MG tablet Take 1 tablet (20 mg total) by mouth 2 (two) times daily. 180 tablet 3    gabapentin (NEURONTIN) 300 MG capsule Take 2 capsules (600 mg) by mouth three times daily. (Patient taking differently: Take 300 mg by mouth 4 (four) times daily. Take 2 capsules (600 mg) by mouth three times daily.) 540 capsule 3    HYDROcodone-acetaminophen (NORCO) 5-325 mg per tablet 1-2 pills every 4-6  hrs prn pain 120 tablet 0    ipratropium (ATROVENT) 0.03 % nasal spray 2 sprays by Nasal route before meals as needed for Rhinitis. 60 mL 12    levothyroxine (SYNTHROID) 75 MCG tablet TAKE 1 TABLET BEFORE BREAKFAST 90 tablet 12    lidocaine HCl 2% (LIDOCAINE VISCOUS) 2 % Soln by Mucous Membrane route every 6 (six) hours. 100 mL 3    QUEtiapine (SEROQUEL) 50 MG tablet Take 1 tablet (50 mg total) by mouth every evening. 90 tablet 12    traMADoL (ULTRAM) 50 mg tablet 1-2 pills every 6 hr as needed for pain 180 tablet 5    venlafaxine (EFFEXOR-XR) 75 MG 24 hr capsule TAKE 2 CAPSULES EVERY  capsule 3     No current facility-administered medications on file prior to visit.        Objective:      BP (!) 185/84 (BP Location: Left arm, Patient Position: Sitting, BP Method: Large (Automatic))   Pulse 69   Temp 98 °F (36.7 °C) (Oral)   Ht 6' 2" (1.88 m)   Wt 88.2 kg (194 lb 7.1 oz)   SpO2 97%   " BMI 24.97 kg/m²     Exam:  GEN:  Well developed, well nourished.  No acute distress.  Normal pain behavior.  HEENT:  No trauma.  Mucous membranes moist.  Nares patent bilaterally.  PSYCH: Normal affect. Thought content appropriate.  CHEST:  Breathing symmetric.  No audible wheezing.  ABD: Soft, non-distended.  SKIN:  Warm, pink, dry.  No rash on exposed areas.    EXT:  No cyanosis, clubbing, or edema.  No color change or changes in nail or hair growth.  NEURO/MUSCULOSKELETAL:  Fully alert, oriented, and appropriate. Speech normal tong. No cranial nerve deficits.   Gait:   in manual wheelchair.  5/5 motor strength throughout upper extremities.  Dementia does cause patient have difficulty following commands.  Sensory:   no  sensory deficit in the upper extremities.   Reflexes:   1 + and symmetric throughout.   absent  Flores's bilaterally.  C-Spine:   limited  ROM with pain on  all movements.  difficult to assess  facet loading bilaterally secondary to dementia.   unable to assess  Spurling's bilaterally secondary to dementia.    No  TTP over cervical facet joints, cervical paraspinal muscles, shoulders, elbows or hands.            Imaging:  No pertinent imaging at this time    Assessment:       Encounter Diagnoses   Name Primary?    Neck pain     Debility Yes    Trigeminal neuralgia of right side of face     Parkinsonism, unspecified Parkinsonism type     DDD (degenerative disc disease), cervical     Cervical spondylosis          Plan:       Ifeanyi was seen today for headache, neck pain and shoulder pain.    Diagnoses and all orders for this visit:    Debility    Neck pain  -     Ambulatory referral/consult to Pain Clinic  -     X-Ray Cervical Spine AP Lat with Flexion  Extension; Future    Trigeminal neuralgia of right side of face    Parkinsonism, unspecified Parkinsonism type    DDD (degenerative disc disease), cervical  -     X-Ray Cervical Spine AP Lat with Flexion  Extension; Future    Cervical  spondylosis  -     X-Ray Cervical Spine AP Lat with Flexion  Extension; Future        Ifeanyi Rutherford is a 89 y.o. male with multiple chronic pain complaints.  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  According to the chart this has been somewhat improved following a glycerol ganglionotomy done by Dr. Rodriguez in 2018.  It is unclear what duration of relief this provided.  Patient also with more acute bilateral neck pain.  Exact etiology of pain is somewhat unclear.  Patient does have dementia and this is a barrier to adequately assessing his neck pain.  No overt signs of radiculopathy or myelopathy on examination.  Patient does likely have some degree of cervical facet pain.    1.  Cervical x-rays to evaluate for spondylosis and to rule out instability.    2.  I agree with starting home health therapy.  I will review cervical x-rays when available and let patient/wife no if there are any contraindications to continuing with therapy.  3.  I feel that it is appropriate to utilize opioid pain medications for the management of his pain.  These do cause some confusion but no sedation or agitation noted by patient's wife.  I counseled patient's wife to utilize the medication if she feels it is warranted as patient does suffer quite a bit with his pain.  4.  Patient may benefit from a repeat trigeminal glycerol ganglionotomy.  I may refer him back to Dr. Rodriguez for repeat procedure if he is interested.  5.  Return to clinic in 4 weeks.  At that time we will review x-ray results and discuss efficacy of home health therapy.  We may consider cervical MRI if pain persists or worsens.

## 2020-08-12 NOTE — LETTER
August 13, 2020      Khloe Romero MD  [address]  [place] [address]           [facility]  [address]  [place] [address]  Phone: [phone]  Fax: [phone]          Patient: Ifeanyi Rutherford   MR Number: [id number]   YOB: 1929   Date of Visit: 8/12/2020       Dear Dr. Khloe Romero:    Thank you for referring Ifeanyi Rutherford to me for evaluation. Attached you will find relevant portions of my assessment and plan of care.    If you have questions, please do not hesitate to call me. I look forward to following Ifeanyi Rutherford along with you.    Sincerely,    Giovanny Pavon Jr., MD    Enclosure  CC:  No Recipients    If you would like to receive this communication electronically, please contact [email or web address] or [phone] to request more information on "[employer] Link access.    For providers and/or their staff who would like to refer a patient to [facility], please contact us through our one-stop-shop provider referral line, [facility], at [phone].    If you feel you have received this communication in error or would no longer like to receive these types of communications, please e-mail [email or web address]

## 2020-08-13 DIAGNOSIS — K13.79 MOUTH PAIN: ICD-10-CM

## 2020-08-13 DIAGNOSIS — G50.0 TRIGEMINAL NEURALGIA OF RIGHT SIDE OF FACE: ICD-10-CM

## 2020-08-13 PROBLEM — M50.30 DDD (DEGENERATIVE DISC DISEASE), CERVICAL: Status: ACTIVE | Noted: 2020-08-13

## 2020-08-13 PROBLEM — M47.812 CERVICAL SPONDYLOSIS: Status: ACTIVE | Noted: 2020-08-13

## 2020-08-13 NOTE — TELEPHONE ENCOUNTER
----- Message from Vivian Rodriguez sent at 8/13/2020  7:25 AM CDT -----      Type: RX Refill Request    Who Called: Avani Reeder    Have you contacted your pharmacy: yes     Refill or New Rx: Refill     RX Name and Strength:gabapentin (NEURONTIN) 300 MG capsule    Preferred Pharmacy with phone number:[facility] - [place] [id number] [facility] [phone] (Phone)  [phone] (Fax)        Local or Mail Order: Mail Order     Ordering Provider: Dr. Levy    Would the patient rather a call back or a response via [facility]?  Call

## 2020-08-14 RX ORDER — LIDOCAINE HYDROCHLORIDE 20 MG/ML
SOLUTION OROPHARYNGEAL EVERY 6 HOURS
Qty: 100 ML | Refills: 3 | Status: SHIPPED | OUTPATIENT
Start: 2020-08-14

## 2020-08-14 RX ORDER — DIAZEPAM 5 MG/1
TABLET ORAL
Qty: 90 TABLET | Refills: 3 | Status: ON HOLD | OUTPATIENT
Start: 2020-08-14 | End: 2020-11-18 | Stop reason: HOSPADM

## 2020-08-14 RX ORDER — GABAPENTIN 300 MG/1
300 CAPSULE ORAL 3 TIMES DAILY
Qty: 90 CAPSULE | Refills: 2 | Status: SHIPPED | OUTPATIENT
Start: 2020-08-14

## 2020-08-18 ENCOUNTER — TELEPHONE (OUTPATIENT)
Dept: NEUROLOGY | Facility: CLINIC | Age: 85
End: 2020-08-18

## 2020-08-18 DIAGNOSIS — F03.90 DEMENTIA WITHOUT BEHAVIORAL DISTURBANCE, UNSPECIFIED DEMENTIA TYPE: ICD-10-CM

## 2020-08-18 RX ORDER — QUETIAPINE FUMARATE 50 MG/1
50 TABLET, FILM COATED ORAL NIGHTLY
Qty: 90 TABLET | Refills: 12 | Status: SHIPPED | OUTPATIENT
Start: 2020-08-18

## 2020-08-18 NOTE — TELEPHONE ENCOUNTER
----- Message from Ja Harden sent at 8/18/2020 10:04 AM CDT -----  Contact: Mrs. Rutherford (wife) @ [phone]  Mrs. Rutherford states pt is taking 5 MG of ARICEPT and wants to confirm if should change dosage, due to increased dementia

## 2020-08-19 ENCOUNTER — TELEPHONE (OUTPATIENT)
Dept: NEUROSURGERY | Facility: CLINIC | Age: 85
End: 2020-08-19

## 2020-08-19 NOTE — TELEPHONE ENCOUNTER
----- Message from Pippa Verma sent at 8/19/2020  2:20 PM CDT -----  Regarding: pt advice  Contact: Krystal (wife) @ [phone]  Pt calling to speak with someone in Dr. Rodriguez's office regarding patient's condition.please call.

## 2020-08-19 NOTE — TELEPHONE ENCOUNTER
Wife has noticed a slight increase in dementia over the couple of months. More so in the PM.  Asks if Aricept 5mg PO QD.   Advised that I will ask another provider here, but may not be able to make a change.   Last seen by GL 12/19.

## 2020-08-20 RX ORDER — DONEPEZIL HYDROCHLORIDE 10 MG/1
10 TABLET, FILM COATED ORAL NIGHTLY
Qty: 30 TABLET | Refills: 12 | Status: SHIPPED | OUTPATIENT
Start: 2020-08-20

## 2020-08-24 ENCOUNTER — TELEPHONE (OUTPATIENT)
Dept: FAMILY MEDICINE | Facility: CLINIC | Age: 85
End: 2020-08-24

## 2020-08-24 NOTE — TELEPHONE ENCOUNTER
HH nurse states pt wife informed her on Sunday she thinks pt fell asleep on toilet.  Fell forward hit eye and head.  Denies bleeding, loss on consciousness. Has headache 4/10 pain but that is normal for him, mild swelling, black eye.  Denies N&V, confusion. Wife is asking if she needs to bring pt to ER or not.  Genet VICTORIA will be doing a visit this afternoon or tomorrow.

## 2020-08-24 NOTE — TELEPHONE ENCOUNTER
----- Message from Aileen Mesa sent at 8/24/2020  9:21 AM CDT -----   Name of Who is Calling:     What is the request in detail: request call back in reference to discuss patient had a fall and hit head /above eye area     Please contact to further discuss and advise      Can the clinic reply by [facility]: no     What Number to Call Back if not in [facility]:  [facility] / [facility] / [phone]

## 2020-08-25 ENCOUNTER — TELEPHONE (OUTPATIENT)
Dept: FAMILY MEDICINE | Facility: CLINIC | Age: 85
End: 2020-08-25

## 2020-08-25 NOTE — TELEPHONE ENCOUNTER
Notified patient that Dr. Levy sent the rx to [facility] on 08/14/2020, pt  verbalized understanding stating that will call the pharmacy.

## 2020-08-25 NOTE — TELEPHONE ENCOUNTER
----- Message from KyleMikoaraceli Rodriguez sent at 8/24/2020  4:44 PM CDT -----  Regarding: Refill  Contact: Spouse-  Type: Patient Call Back    Who called:Spouse-    What is the request in detail: Refill- diazepam and liquid lidocane    Can the clinic reply by [facility]? No    Would the patient rather a call back or a response via [facility]? Call    Best call back number:[phone] (home)     Additional Information: [phone] (home)     [facility] [address]  [address] [address]  Phone: [phone] Fax: [phone]

## 2020-08-25 NOTE — TELEPHONE ENCOUNTER
Call pharmacy and tell them it is perfectly okay with the physician to prescribe the tramadol and the Valium.  He is use them both for quite some time safely

## 2020-08-25 NOTE — TELEPHONE ENCOUNTER
----- Message from Trisha Barrera sent at 8/25/2020 11:35 AM CDT -----  Regarding: Pharmacy Calling to Clarify an RX  Contact: Marcia Maria  Type:  Pharmacy Calling to Clarify an RX    Name of Caller: Marcia Maria    Pharmacy Name: [facility]     Prescription Name: traMADoL (ULTRAM) 50 mg tablet and diazePAM (VALIUM) 5 MG tablet    What do they need to clarify? Drug interaction     Can you be contacted via [facility]? No     Best Call Back Number:   [facility] - [place], [place] - [address]  [address] [address]  Phone: [phone] Fax: [phone]

## 2020-08-26 ENCOUNTER — TELEPHONE (OUTPATIENT)
Dept: FAMILY MEDICINE | Facility: CLINIC | Age: 85
End: 2020-08-26

## 2020-08-26 NOTE — TELEPHONE ENCOUNTER
----- Message from Belen Talamantes sent at 8/26/2020 11:08 AM CDT -----  Regarding: Advice  Contact: Krystal  Type: Patient Call Back    Who called:Krystal    What is the request in detail:Krystal called to she received more messages for [employer] for diazepam Rx to be sent in. Please call to discuss    Can the clinic reply by [facility]?    Would the patient rather a call back or a response via [facility]? Call    Best call back number:[phone]

## 2020-08-26 NOTE — TELEPHONE ENCOUNTER
Kvng PAUL/ Pharmacist informed of message below.  He verbalized understanding and intent to flag Patient's medication record.

## 2020-08-27 ENCOUNTER — TELEPHONE (OUTPATIENT)
Dept: FAMILY MEDICINE | Facility: CLINIC | Age: 85
End: 2020-08-27

## 2020-08-27 NOTE — TELEPHONE ENCOUNTER
----- Message from Heydi Camargo sent at 8/27/2020 11:32 AM CDT -----  Contact: Ron with [facility]  Name of Who is Calling: Ron with [facility]       What is the request in detail:  Ron states patient had a fall couple days ago states patient is doing good but would like to continue home health PT for 3 more weeks, twice a week. Please caall      Can the clinic reply by [facility]: no      What [facility]: [phone]

## 2020-08-27 NOTE — TELEPHONE ENCOUNTER
Okay to give the verbal order to continue therapy as needed but ask home health if there is any way in the future they can just send orders or a request to continue therapy through the hub for electronic signature?

## 2020-08-27 NOTE — TELEPHONE ENCOUNTER
----- Message from Heydi Camargo sent at 8/27/2020 11:32 AM CDT -----  Contact: Ron with [facility]  Name of Who is Calling: Ron with [facility]       What is the request in detail:  Ron states patient had a fall couple days ago states patient is doing good but would like to continue home health PT for 3 more weeks, twice a week. Please caall      Can the clinic reply by [facility]: no      What [facility]: [phone]

## 2020-08-27 NOTE — TELEPHONE ENCOUNTER
Ron informed of message below and verbal order given to extend PT.  He verbalized understanding and intent to send order for electronic signature. .

## 2020-08-27 NOTE — TELEPHONE ENCOUNTER
Ron said that Patient was not accessed but is doing well after the fall .  Ron is asking for  a verbal OK to continue PT for 3 additional weeks for gait training.  Please advise.

## 2020-08-30 ENCOUNTER — NURSE TRIAGE (OUTPATIENT)
Dept: ADMINISTRATIVE | Facility: CLINIC | Age: 85
End: 2020-08-30

## 2020-08-30 NOTE — TELEPHONE ENCOUNTER
spoke with wife: Hx of dementia     Had a fall a week ago. Left eye injury.  No s/s of concussion at first,  But dementia has increased and worsened last night and this am.     Am: BP:150/81, HR 48/42. Recheck vital sign--O2 96%, repeat HR 57( wife states HR not usually this low)   Pt Was able to shuffle slowly this am. Has had a change in the way he speaks-- unintelligible. -- is worse than usually is.     instructed wife to call 911 for change in confusion and speech. Verbalizes understanding     Reason for Disposition   [1] Dizziness, lightheadedness, or weakness AND [2] heart beating very slowly  (e.g., < 50 / minute)   Difficult to awaken or acting confused (e.g., disoriented, slurred speech)   Dizziness, lightheadedness, or weakness    Additional Information   Negative: Passed out (i.e., lost consciousness, collapsed and was not responding)   Negative: Shock suspected (e.g., cold/pale/clammy skin, too weak to stand, low BP, rapid pulse)   Negative: [1] Heart beating very rapidly (e.g., > 140 / minute) AND [2] present now  (Exception: during exercise)   Negative: Difficulty breathing   Negative: Visible sweat on face or sweat dripping down face   Negative: Unable to walk, or can only walk with assistance (e.g., requires support)   Negative: [1] Received SHOCK from implantable cardiac defibrillator AND [2] persisting symptoms (i.e., palpitations, lightheadedness)   Negative: [1] Dizziness, lightheadedness, or weakness AND [2] heart beating very rapidly (e.g., > 140 / minute)    Protocols used: HEART RATE AND HEARTBEAT [id number]

## 2020-08-31 ENCOUNTER — TELEPHONE (OUTPATIENT)
Dept: FAMILY MEDICINE | Facility: CLINIC | Age: 85
End: 2020-08-31

## 2020-08-31 NOTE — TELEPHONE ENCOUNTER
Spoke to patient wife. Her  had a fall over the weekend. He hit his head and has a lump over his eye. Swelling has gone down but still hurts. She stated his dementia has gotten worse. One o his doctors changed his aricept to 10mg from mg. She wanted to know if that was ok. Needs appointment to come in the clinic

## 2020-08-31 NOTE — TELEPHONE ENCOUNTER
Patient wife called and sated her  fell over the weekend. He has a lump over his eye. Also his dementia and it seems to be getting worse/ Also one of his doctors increased his Aricept to 10mg for 5 mg. His wife wanted to know if this is ok. She wants him to come in and see you.

## 2020-09-08 ENCOUNTER — TELEPHONE (OUTPATIENT)
Dept: FAMILY MEDICINE | Facility: CLINIC | Age: 85
End: 2020-09-08

## 2020-09-08 ENCOUNTER — OFFICE VISIT (OUTPATIENT)
Dept: FAMILY MEDICINE | Facility: CLINIC | Age: 85
End: 2020-09-08
Payer: MEDICARE

## 2020-09-08 VITALS
WEIGHT: 191.56 LBS | HEART RATE: 67 BPM | HEIGHT: 74 IN | SYSTOLIC BLOOD PRESSURE: 138 MMHG | OXYGEN SATURATION: 96 % | RESPIRATION RATE: 16 BRPM | BODY MASS INDEX: 24.58 KG/M2 | DIASTOLIC BLOOD PRESSURE: 80 MMHG

## 2020-09-08 DIAGNOSIS — I10 ESSENTIAL HYPERTENSION: ICD-10-CM

## 2020-09-08 DIAGNOSIS — R51.9 CHRONIC NONINTRACTABLE HEADACHE, UNSPECIFIED HEADACHE TYPE: ICD-10-CM

## 2020-09-08 DIAGNOSIS — M54.2 MUSCULOSKELETAL NECK PAIN: Primary | ICD-10-CM

## 2020-09-08 DIAGNOSIS — F03.91 DEMENTIA WITH BEHAVIORAL DISTURBANCE, UNSPECIFIED DEMENTIA TYPE: ICD-10-CM

## 2020-09-08 DIAGNOSIS — G50.0 TRIGEMINAL NEURALGIA OF RIGHT SIDE OF FACE: ICD-10-CM

## 2020-09-08 DIAGNOSIS — R39.15 URINARY URGENCY: ICD-10-CM

## 2020-09-08 DIAGNOSIS — R60.0 BILATERAL LEG EDEMA: ICD-10-CM

## 2020-09-08 DIAGNOSIS — G89.29 CHRONIC NONINTRACTABLE HEADACHE, UNSPECIFIED HEADACHE TYPE: ICD-10-CM

## 2020-09-08 PROCEDURE — 1101F PT FALLS ASSESS-DOCD LE1/YR: CPT | Mod: HCNC,CPTII,S$GLB, | Performed by: INTERNAL MEDICINE

## 2020-09-08 PROCEDURE — 1101F PR PT FALLS ASSESS DOC 0-1 FALLS W/OUT INJ PAST YR: ICD-10-PCS | Mod: HCNC,CPTII,S$GLB, | Performed by: INTERNAL MEDICINE

## 2020-09-08 PROCEDURE — 99215 OFFICE O/P EST HI 40 MIN: CPT | Mod: HCNC,S$GLB,, | Performed by: INTERNAL MEDICINE

## 2020-09-08 PROCEDURE — 1159F PR MEDICATION LIST DOCUMENTED IN MEDICAL RECORD: ICD-10-PCS | Mod: HCNC,S$GLB,, | Performed by: INTERNAL MEDICINE

## 2020-09-08 PROCEDURE — 1125F AMNT PAIN NOTED PAIN PRSNT: CPT | Mod: HCNC,S$GLB,, | Performed by: INTERNAL MEDICINE

## 2020-09-08 PROCEDURE — 99215 PR OFFICE/OUTPT VISIT, EST, LEVL V, 40-54 MIN: ICD-10-PCS | Mod: HCNC,S$GLB,, | Performed by: INTERNAL MEDICINE

## 2020-09-08 PROCEDURE — 99999 PR PBB SHADOW E&M-EST. PATIENT-LVL III: CPT | Mod: PBBFAC,HCNC,, | Performed by: INTERNAL MEDICINE

## 2020-09-08 PROCEDURE — 1125F PR PAIN SEVERITY QUANTIFIED, PAIN PRESENT: ICD-10-PCS | Mod: HCNC,S$GLB,, | Performed by: INTERNAL MEDICINE

## 2020-09-08 PROCEDURE — 1159F MED LIST DOCD IN RCRD: CPT | Mod: HCNC,S$GLB,, | Performed by: INTERNAL MEDICINE

## 2020-09-08 PROCEDURE — 99999 PR PBB SHADOW E&M-EST. PATIENT-LVL III: ICD-10-PCS | Mod: PBBFAC,HCNC,, | Performed by: INTERNAL MEDICINE

## 2020-09-08 NOTE — PROGRESS NOTES
Chief complaint,Neck pain and dementia         89 year-old white male here with his wife.  My mother was his psychiatrist for many many years.  It is therapeutic for him to come in to go over medical problems and discussed.   Wife provides most of the history as patient is somewhat hard of hearing.  Patient's dementia has been worsening over the last month and a half or more.  More confused, much more of a burden on her during the night.  They have put up a bed rail to trying keep him from getting up at night on his own to go to the bathroom.  He did have his 1st fall simply falling forward off the toilet.  We discussed using his walker to put in front of him as they do have the railings on the side of the toilet.  There was no syncope.  He also had a 2nd fall trying to get up on his own without calling his wife during the night.  The 1st fall was nine days ago and he did have a black eye over the left eye that has all since resolved.  His 2nd fall was two days ago.  He has some minor bruising.  Wife did not want him take him to the hospital where she could not be with him due to his dementia.    It does not appear to be any acute worsening of his dementia or mental status since the head trauma.  We discussed watching for the development of a subdural but I do not think there is an indication for him to have a CT at this time.  He is using the tramadol.  When he uses the hydrocodone for his pain he sees more visual hallucinations and it worsens his dementia she feels.  He is only on 25 mg of Seroquel at night and we discussed increasing that.  She knows he has sleep apnea and that probably explain some of his somnolence during the day.  We discussed that we may need to accept some morning somnolence due to the Seroquel if indeed he sleeps better through the night.    Long discussion regarding having family assist in his care.  I will put in an order for home health to get a  involved.  They probably  need to get sitters at night to give the wife some rest.  Family apparently is very willing to assist physically and financially.    Lately was having some increase in right neck pain.  They are doing physical therapy and that is working in his pain on the right side of his neck is getting better.  Next para he does continue to complain about the pain in his right face.  The wife has called Neurosurgery and they are not quite sure if a repeat ganglionic me would be helpful.  The wife notes that when he came out of the prior procedure he was in much more pain and he had verbalized his wife he would never want to have that procedure again.  They need to take all of this into account and discuss with neurosurgery if indeed the increase in pain after the procedure was expected or unusual and that will help in the decision making.  Next if patient still has a good appetite and is eating well.  He has had visual hallucinations and so forth before but she feels overall his dementia and confusion especially during the night is increasing.  We discussed watching for infections that could acutely worsen his dementia.  There were counseled regarding this at great length today.Total time over 45 minutes with over 50% counseling.      Wife did put in a call to Neurology.  The covering neurologist did increase the Aricept from 5-10 mg.  The next morning he had some increasing blood pressure and so forth and some other nonspecific symptoms and reassured I do not think that was related to the Aricept but coincidental.  She can try 5 mg twice a day on the Aricept and if tolerated she can go to 10 mg daily.      Seen pain management 8/12   Ifeanyi Rutherford is a 89 y.o. male with multiple chronic pain complaints.  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  According to the chart this has been somewhat improved following a glycerol ganglionotomy done by Dr. Rodriguez in 2018.  It is unclear what  duration of relief this provided.  Patient also with more acute bilateral neck pain.  Exact etiology of pain is somewhat unclear.  Patient does have dementia and this is a barrier to adequately assessing his neck pain.  No overt signs of radiculopathy or myelopathy on examination.  Patient does likely have some degree of cervical facet pain.     1.  Cervical x-rays to evaluate for spondylosis and to rule out instability.    2.  I agree with starting home health therapy.  I will review cervical x-rays when available and let patient/wife no if there are any contraindications to continuing with therapy.  3.  I feel that it is appropriate to utilize opioid pain medications for the management of his pain.  These do cause some confusion but no sedation or agitation noted by patient's wife.  I counseled patient's wife to utilize the medication if she feels it is warranted as patient does suffer quite a bit with his pain.  4.  Patient may benefit from a repeat trigeminal glycerol ganglionotomy.  I may refer him back to Dr. Rodriguez for repeat procedure if he is interested.  5.  Return to clinic in 4 weeks.  At that time we will review x-ray results and discuss efficacy of home health therapy.  We may consider cervical MRI if pain persists or worsens.            ROS:   CONST:  He is losing some weight which was felt to be loss of muscle mass by the therapist, he continues to have a good appetite and is eating good..  No new myalgias arthralgias, no new neurological symptoms, no new psychiatric symptoms, no new hallucinations or any worsening    PAST SURGERIES:  Total knee replacements bilaterally.                                                                                                     PAST MEDICAL HISTORY:    Hypertension.   Hyperlipidemia.    Depression.           Generalized anxiety ([facility]).    Hypothyroidism.    Osteoarthritis.    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  Optic neuropathy,  right eye.  Right-sided trigeminal neuralgia, seen by neurology, pain management and neurosurgery  Chronic constipation   Evaluation by [place] [facility] for NPH, apparently no response to spinal tap   Chronic vertigo   Vitamin D deficiency   Parkinsonism by [facility] evaluation    Morning hallucinations, possibly related to Seroquel                                                                                                           SOCIAL HISTORY:  He is  and lives with his spouse.  They have adult   children in the area and grandchildren.  He has never been a cigarette       smoker.      Vitals as above,  Gen: no distress, falls asleep frequently during the interview but otherwise remembers my name and basically at his baseline from my opinion  Musculoskeletal:  Both trapezius muscles appeared to be soft and nontender.  He can look left and right without any restriction or pain.              Ifeanyi was seen today for neck pain.    Diagnoses and all orders for this visit:    Musculoskeletal neck pain, responding to therapy    Trigeminal neuralgia of right side of face, at times patient appears to be completely calm and without pain and falls asleep easily.  Wife has already considered that she wonders if how much pain he is feeling is real or if he feels the pain only when the issue was brought up and so forth.  Difficult to assess obviously.  He appears to respond somewhat to the tramadol but is intolerant to the hydrocodone.  They are taking the gabapentin as well and we discussed potential side effects of that but again weighing out all risks and benefits.    Chronic nonintractable headache, unspecified headache type    Essential hypertension, appears stable at this point    Bilateral leg edema, chronic    Dementia with behavioral disturbance, unspecified dementia type, worsening over the past month and a half or more and I do not think it has acutely worsened since his most recent  "falls and head trauma nine days ago.  Wife will watch for changes in mental status, decreases in appetite, nausea and so forth and we might obtain a CT scan but I do not think it is indicated at this time.  Will try increasing the Seroquel to 50 mg at night targeting nighttime symptoms.  They will keep in touch with neurology, try increasing the Aricept.  May attempt to trying get a urinalysis if possible to rule out the unlikely possibility of a UTI but there has been no change in his incontinence and so forth but wife says the urine does always smell bad but that may or may not be new.  -     Urine culture; Future  -     Urinalysis; Future    Urinary urgency  -     Urine culture; Future  -     Urinalysis; Future         "This note will not be shared with the patient."  "

## 2020-09-08 NOTE — TELEPHONE ENCOUNTER
----- Message from Trisha Raphael sent at 9/8/2020  2:06 PM CDT -----  Contact: Ron  Type: Patient Call Back    Who called: Ron from [facility]    What is the request in detail: Patient is requesting a call back.   Please advise. He states that the patient had a small fall with no injury. The patients wife requests a  be sent to see the patient. Ron needs a verbal consent.    Can the clinic reply by [facility]? No    Best call back number: [phone]    Additional Information: N/A

## 2020-09-10 ENCOUNTER — LAB VISIT (OUTPATIENT)
Dept: LAB | Facility: HOSPITAL | Age: 85
End: 2020-09-10
Attending: INTERNAL MEDICINE
Payer: MEDICARE

## 2020-09-10 DIAGNOSIS — F03.91 DEMENTIA WITH BEHAVIORAL DISTURBANCE, UNSPECIFIED DEMENTIA TYPE: ICD-10-CM

## 2020-09-10 DIAGNOSIS — R39.15 URINARY URGENCY: ICD-10-CM

## 2020-09-10 LAB
BACTERIA #/AREA URNS AUTO: ABNORMAL /HPF
BILIRUB UR QL STRIP: NEGATIVE
CLARITY UR REFRACT.AUTO: ABNORMAL
COLOR UR AUTO: YELLOW
GLUCOSE UR QL STRIP: NEGATIVE
HGB UR QL STRIP: ABNORMAL
HYALINE CASTS UR QL AUTO: 0 /LPF
KETONES UR QL STRIP: NEGATIVE
LEUKOCYTE ESTERASE UR QL STRIP: ABNORMAL
MICROSCOPIC COMMENT: ABNORMAL
NITRITE UR QL STRIP: NEGATIVE
PH UR STRIP: 6 [PH] (ref 5–8)
PROT UR QL STRIP: ABNORMAL
RBC #/AREA URNS AUTO: 2 /HPF (ref 0–4)
SP GR UR STRIP: 1.01 (ref 1–1.03)
URN SPEC COLLECT METH UR: ABNORMAL
WBC #/AREA URNS AUTO: 37 /HPF (ref 0–5)

## 2020-09-10 PROCEDURE — 87186 SC STD MICRODIL/AGAR DIL: CPT | Mod: HCNC,59

## 2020-09-10 PROCEDURE — 87184 SC STD DISK METHOD PER PLATE: CPT | Mod: HCNC

## 2020-09-10 PROCEDURE — 87077 CULTURE AEROBIC IDENTIFY: CPT | Mod: HCNC

## 2020-09-10 PROCEDURE — 87086 URINE CULTURE/COLONY COUNT: CPT | Mod: HCNC

## 2020-09-10 PROCEDURE — 81001 URINALYSIS AUTO W/SCOPE: CPT | Mod: HCNC

## 2020-09-10 PROCEDURE — 87088 URINE BACTERIA CULTURE: CPT | Mod: HCNC

## 2020-09-14 ENCOUNTER — TELEPHONE (OUTPATIENT)
Dept: FAMILY MEDICINE | Facility: CLINIC | Age: 85
End: 2020-09-14

## 2020-09-14 ENCOUNTER — NURSE TRIAGE (OUTPATIENT)
Dept: ADMINISTRATIVE | Facility: CLINIC | Age: 85
End: 2020-09-14

## 2020-09-14 DIAGNOSIS — R41.82 ALTERED MENTAL STATUS, UNSPECIFIED ALTERED MENTAL STATUS TYPE: ICD-10-CM

## 2020-09-14 LAB — BACTERIA UR CULT: ABNORMAL

## 2020-09-14 NOTE — TELEPHONE ENCOUNTER
Call and reassure wife that the blood pressure really should not have anything to do with his sleepiness in the morning    Dr. ROBLEDO remembers that he has had issues with being sleepy and wanting to go back to bed in the morning so this may not be really anything new.  He may well have just had a more sleepless night and so forth.    Dr. ROYAL points out that we did try to increase the Seroquel to 50 mg at night and that definitely could possibly make him more sleepy the next morning?      Also, if indeed he ax more sleepy throughout the rest of the day and generally just acting different, it might be time for a CT scan of his head

## 2020-09-14 NOTE — TELEPHONE ENCOUNTER
"Wife concerned had trouble waking pt up thi morning, "wakes up but goes right back, looks really tired & sleepy" last /111. Pt oriented, denies chest pain, c/o "being really cold". MD aware of 2 recent falls, pt also on abx for uti    Reason for Disposition   Systolic BP >= 160 OR Diastolic >= 100, and any cardiac or neurologic symptoms (e.g., chest pain, difficulty breathing, unsteady gait, blurred vision)    Protocols used: HIGH BLOOD PRESSURE-A-OH      "

## 2020-09-14 NOTE — TELEPHONE ENCOUNTER
Pt. Spoke with triage Ria Strickland and was told to go to ED for 185/111 blood pressure or office if ok with PCP . Pt. Wife did not go to ED, she  Called the office back to put pt. On nurse scheduled for blood pressure check. Called pt. Wife to check blood pressure 185/88. Appt. To [facility] schedule was canceled. Do you want to see pt. Or send them to ED

## 2020-09-14 NOTE — TELEPHONE ENCOUNTER
Please see the other message, there were two messages on this issue.  On that messages exactly what to say to patient    There really is no particular blood pressure reading that people should be sent to the ER for, only sent to the ER for certain symptoms, , messages there to ask wife some other issues and there was some concern about him being overly sleepy the next day and so forth.         he did hit his head lately and so if indeed blood pressure going up and he is having excessive somnolence during the rest of the day or by Tuesday when patient will be call back, he may need to CT scan of the brain    If somnolent throughout the day and just not acting right, we may need to recommend going to the ER for a CT of the brain

## 2020-09-15 NOTE — TELEPHONE ENCOUNTER
Spoke with patient's wife information given as charted below by verbalized understanding,states still have concerns about his blood pressure ,  Also thinks dementia is progressing.

## 2020-09-15 NOTE — TELEPHONE ENCOUNTER
Okay, since he is getting worse from a dementia standpoint and hit his head, I had previously spoken to the wife that he might need a CT scan of the head and I would take that is indicated at this point.      Please let her know that.  I will put in a order for a stat CT and please call [facility] scheduling to get that done when it works out for the wife so that family can help with transportation and so forth    Order in.  [facility] scheduling, [phone]

## 2020-09-15 NOTE — TELEPHONE ENCOUNTER
Patient informed of message below.  She verbalized understanding and intent to sched. the CT scan.

## 2020-09-15 NOTE — TELEPHONE ENCOUNTER
Spoke with pts. Wife, he has not fallen since his last 2 falls on 8/31/20 and hit head, and 9/6/20 that was reported. Wife feels that since he has fallen she feels his Dementia has gotten worst, other then that only thing different is the elevated blood pressure and bladder infection. The home health therapist compared there blood pressure machine with his and it had a big different.

## 2020-09-17 ENCOUNTER — HOSPITAL ENCOUNTER (OUTPATIENT)
Dept: RADIOLOGY | Facility: HOSPITAL | Age: 85
Discharge: HOME | End: 2020-09-17
Attending: INTERNAL MEDICINE
Payer: MEDICARE

## 2020-09-17 DIAGNOSIS — R41.82 ALTERED MENTAL STATUS, UNSPECIFIED ALTERED MENTAL STATUS TYPE: ICD-10-CM

## 2020-09-17 PROCEDURE — 70450 CT HEAD/BRAIN W/O DYE: CPT | Mod: 26,HCNC,, | Performed by: RADIOLOGY

## 2020-09-17 PROCEDURE — 70450 CT HEAD/BRAIN W/O DYE: CPT | Mod: TC,HCNC

## 2020-09-17 PROCEDURE — 70450 CT HEAD WITHOUT CONTRAST: ICD-10-PCS | Mod: 26,HCNC,, | Performed by: RADIOLOGY

## 2020-09-18 ENCOUNTER — TELEPHONE (OUTPATIENT)
Dept: FAMILY MEDICINE | Facility: CLINIC | Age: 85
End: 2020-09-18

## 2020-09-18 DIAGNOSIS — D33.3 UNILATERAL VESTIBULAR SCHWANNOMA: Primary | ICD-10-CM

## 2020-09-18 NOTE — TELEPHONE ENCOUNTER
----- Message from Belen Talamantes sent at 9/18/2020  3:16 PM CDT -----  Regarding: [facility]  Contact: Rm  Type: Patient Call Back    Who called:Rm    What is the request in detail:Rm called to request an extension on PT twice a week for 3 weeks. Please call to discuss    Can the clinic reply by [facility]?    Would the patient rather a call back or a response via [facility]? Call    Best call back number:[phone]

## 2020-09-18 NOTE — TELEPHONE ENCOUNTER
Rm from [facility] requesting an extension of patients PT twice a week for 3 weeks. Please advise.

## 2020-09-18 NOTE — TELEPHONE ENCOUNTER
Spoke to Mrs. Rutherford and she stated she would like to have the referral placed for the neurosurgeon.

## 2020-09-18 NOTE — TELEPHONE ENCOUNTER
Always okay to extend requested physical therapy if the therapist feel it is necessary.  Okay to give them a verbal but also tell them best to send orders directly through the hub for electronic signature

## 2020-09-18 NOTE — TELEPHONE ENCOUNTER
Please call wife and read the following.    The recent CT scan does not show any bruises on the brain which is good.  No new strokes or anything.    The radiologist did note that the acoustic neuroma tumor on the right might be a little bit bigger than before and might be pressing on the ventricles which hold the fluid in the brain.    There is no sign on the CT scan that this is causing any problem but they did recommend getting a better evaluation with an MRI but we would need to assess whether not he could handle going through an MRI and lay still for that amount of time.    It might be best that we get him to see a neurosurgeon about the issue and have him evaluate the current CT before deciding if we need to go further    If agreeable, we can put in a referral to our [facility] [place] to get his opinion.

## 2020-09-18 NOTE — TELEPHONE ENCOUNTER
Patient wife asking about results of CT of head. Also she wanted to let you know that his B/P in the morning is high. 178/78, 173/82. Mrs. Rice stated it comes down doing the day. Please advise

## 2020-10-06 ENCOUNTER — DOCUMENT SCAN (OUTPATIENT)
Dept: HOME HEALTH SERVICES | Facility: HOSPITAL | Age: 85
End: 2020-10-06
Payer: MEDICARE

## 2020-10-07 ENCOUNTER — EXTERNAL HOME HEALTH (OUTPATIENT)
Dept: HOME HEALTH SERVICES | Facility: HOSPITAL | Age: 85
End: 2020-10-07
Payer: MEDICARE

## 2020-10-07 NOTE — PROGRESS NOTES
CHIEF COMPLAINT:  Trigeminal neuralgia  Parkinsonism    HPI:  Ifeanyi Rutherford Sr is a 89 y.o.  male with long-standing h/o Vestibular schwannoma, Trigeminal neuralgia, parkinsonism, dementia, and optic neuropathy on the right, who is referred to me by  My colleague Dr. Rodriguez for evaluation of  Trigeminal neuralgia. Patient was diagnosed with a vestibular schwannoma on the right in 1983.  He was offered surgery by Dr. Watt at that time but the family declined after the patient developed complications due to knee replacement surgery.  He underwent CyberKnife treatment on 4/21/12 by Dr. Torres of [facility].  He underwent gamma knife treatment on 6/15/15 by Dr. Lopez.  His last treatment was on 2/20/18 by Dr. Rodriguez undergoing a glycerol injection.  According to the patient's wife no treatments have helped sustain pain relief.  He currently takes gabapentin, tramadol, and hydrocodone.  The pain is located over the right jaw and gum.  It is sometimes alleviated by putting pressure on the point.  They are interested in further treatments, in particular additional gamma knife.    Additionally, the patient has other neurologic conditions.  He was diagnosed with parkinsonism and dementia.  He he has been followed by Dr Deya Erickson however due to difficulty getting to [place] from the [facility], the patient and his wife would like to switch care to Dr. Velasquez who is closer to their home.  The wife reports that the patient has had 2 falls recently and that she notices that his dementia is getting worse.  He is sleeping more than usual, having increased hallucinations, has loss of appetite and is generally weaker.  She also notes that he has had increasing jerking and tremors over the past few weeks.    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    Spinal stenosis     Trigeminal neuralgia pain 1/20/2015    Trouble in sleeping     Vertigo 9/11/2012    Vestibular schwannoma 6/4/2015     Past Surgical History:   Procedure Laterality Date    bilateral total knee arthroplasties      CATARACT EXTRACTION W/  INTRAOCULAR LENS IMPLANT Bilateral     CHOLECYSTECTOMY      EYE SURGERY      GALLBLADDER SURGERY      THYROIDECTOMY      TONSILLECTOMY       Family History   Problem Relation Age of Onset    Heart disease Mother     No Known Problems Father     No Known Problems Sister     No Known Problems Brother     No Known Problems Daughter     No Known Problems Son     No Known Problems Sister     No Known Problems Brother     No Known Problems Brother     No Known Problems Brother     No Known Problems Maternal Aunt     No Known Problems Maternal Uncle     No Known Problems Paternal Aunt     No Known Problems Paternal Uncle     No Known Problems Maternal Grandmother     No Known Problems Maternal Grandfather     No Known Problems Paternal Grandmother     No Known Problems Paternal Grandfather     No Known Problems Daughter     No Known Problems Son     No Known Problems Son     Suicide Neg Hx     Schizophrenia Neg Hx     Amblyopia Neg Hx     Blindness Neg Hx     Cataracts Neg Hx     Glaucoma Neg Hx     Macular degeneration Neg Hx     Retinal detachment Neg Hx     Strabismus  Neg Hx     Cancer Neg Hx     Diabetes Neg Hx     Hypertension Neg Hx     Stroke Neg Hx     Thyroid disease Neg Hx      Social History     Tobacco Use    Smoking status: Former Smoker    Smokeless tobacco: Never Used   Substance Use Topics    Alcohol use: No     Alcohol/week: 0.0 standard drinks    Drug use: No        Review of Systems   Constitutional: Negative.    HENT: Positive for hearing loss (Right side). Negative for congestion, ear discharge, ear pain, nosebleeds, sinus pain and tinnitus.    Eyes: Negative.    Respiratory: Negative.    Cardiovascular: Negative for chest pain, palpitations, claudication and leg swelling.   Gastrointestinal: Negative for abdominal pain, blood in stool, constipation, diarrhea, melena and vomiting.   Genitourinary: Negative for flank pain, frequency and urgency.   Musculoskeletal: Positive for falls. Negative for back pain, joint pain, myalgias and neck pain.   Skin: Negative.    Neurological: Positive for tremors, weakness and headaches (Trigeminal neuralgia on right jaw and gum). Negative for dizziness, tingling, sensory change, speech change, focal weakness, seizures and loss of consciousness.   Endo/Heme/Allergies: Does not bruise/bleed easily.   Psychiatric/Behavioral: Positive for hallucinations and memory loss. Negative for depression, substance abuse and suicidal ideas. The patient is nervous/anxious (Perseverates about schwannoma). The patient does not have insomnia.        OBJECTIVE:   Vital Signs:  Temp: 98 °F (36.7 °C) (10/08/20 0856)  Pulse: (!) 51 (10/08/20 0856)  BP: 134/63 (10/08/20 0856)  SpO2: 97 % (10/08/20 0856)    Physical Exam:  Constitutional: Patient sitting comfortably in wheel chair. Appears well developed and well nourished.  Skin: Some bruising  HEENT: Normocephalic. Normal conjunctivae.  Cardiovascular: Normal rate and regular rhythm.  Respiratory: Chest wall rises and falls symmetrically, without signs of respiratory distress.  Abdomen: Soft  and non-tender.  Extremities: Warm and without edema. Calves supple, non-tender.  Psych/Behavior: Dementia, confused.    Neurological:    Mental status: Alert and oriented. Conversational and appropriate.       Cranial Nerves: VFF to confrontation. PERRL. EOMI. Facial STLT normal and symmetric. Strong, symmetric muscles of mastication. Facial strength full and symmetric. R deafness. Palate and uvula rise and fall normally in midline. Shoulder shrug 5/5 strength. Tongue midline.     Motor:  Gross intact    Upper:  Deltoids Triceps Biceps WE WF     R 5/5 5/5 5/5 5/5 5/5 5/5    L 5/5 5/5 5/5 5/5 5/5 5/5      Lower:  HF KE KF DF PF EHL    R 5/5 5/5 5/5 5/5 5/5 5/5    L 5/5 5/5 5/5 5/5 5/5 5/5     Sensory: Intact sensation to light touch in all extremities.    Reflexes:      DTR: 2+ symmetrically throughout.     Flores's: Negative.     Babinski's: Negative.     Clonus: Negative.    Gait: not tested, in wheel chair    Some mild hand tremors    Diagnostic Results:  All imaging was independently reviewed by me.    MRI brain, dated 2/8/18:  1. 1.2 x 1.5 cm R vest schwannoma (smaller than previous MRI)    CT head, dated 9/17/20:  1. Diffuse atrophy, hydro ex vacuo  2. R vest schwannoma    Cervical flex/ex, 8/12/20:  Diffuse spondylosis with mild listhesis at C3-4      ASSESSMENT/PLAN:     Problem List Items Addressed This Visit        Neuro    Parkinsonism (Chronic)    Relevant Orders    Ambulatory referral/consult to Neurology    Trigeminal neuralgia of right side of face - Primary (Chronic)    Relevant Orders    Ambulatory referral/consult to Neurology      Other Visit Diagnoses     Unilateral vestibular schwannoma            1. Trigeminal neuralgia - long-standing, failed glycerol injection.  Patient interested in repeating gamma knife or additional percutaneous procedures  2. Vestibular schwannoma on right - long standing s/p cyberknife and gamma knife, never wanting surgery.  Probable cause of trigeminal neuralgia.     3. Parkinsonism and dementia - worsening tremors, hallucinations, generalize weakness per wife.  Will refer to Dr Velasquez on [place] per patient request       Patient and wife are eager for more treatments.  I explained that I did not think that I had any grade treatments for him at this time.  But would check with Dr. Lopez regarding possible gamma knife radiation therapy to the trigeminal nerve complex verses additional percutaneous procedures with pain medicine.  The vestibular schwannoma has decreased in size following stereotactic radio surgery.  I reassured the patient his wife that the tumor is small and stable.  As for his generalized functional decline, this may be related to his  Parkinsonism and dementia, but will refer the patient to Dr. Velasquez on the [facility] closer to the patient's home.    1. Will inquire with Dr Lopez about GK to trigeminal   2. Will inquire with pain medicine about further percutaneous procedures  3. Referral to Dr Velasquez (neurology) per patient request    The patient understands and agrees with the plan of care. All questions were answered.            .    ;

## 2020-10-08 ENCOUNTER — OFFICE VISIT (OUTPATIENT)
Dept: NEUROSURGERY | Facility: CLINIC | Age: 85
End: 2020-10-08
Payer: MEDICARE

## 2020-10-08 VITALS
DIASTOLIC BLOOD PRESSURE: 63 MMHG | SYSTOLIC BLOOD PRESSURE: 134 MMHG | OXYGEN SATURATION: 97 % | HEART RATE: 51 BPM | BODY MASS INDEX: 24.6 KG/M2 | TEMPERATURE: 98 F | HEIGHT: 74 IN

## 2020-10-08 DIAGNOSIS — G20.C PARKINSONISM, UNSPECIFIED PARKINSONISM TYPE: Chronic | ICD-10-CM

## 2020-10-08 DIAGNOSIS — D33.3 UNILATERAL VESTIBULAR SCHWANNOMA: ICD-10-CM

## 2020-10-08 DIAGNOSIS — G50.0 TRIGEMINAL NEURALGIA OF RIGHT SIDE OF FACE: Primary | ICD-10-CM

## 2020-10-08 PROCEDURE — 99999 PR PBB SHADOW E&M-EST. PATIENT-LVL V: ICD-10-PCS | Mod: PBBFAC,HCNC,, | Performed by: NEUROLOGICAL SURGERY

## 2020-10-08 PROCEDURE — 1159F PR MEDICATION LIST DOCUMENTED IN MEDICAL RECORD: ICD-10-PCS | Mod: HCNC,S$GLB,, | Performed by: NEUROLOGICAL SURGERY

## 2020-10-08 PROCEDURE — 99999 PR PBB SHADOW E&M-EST. PATIENT-LVL V: CPT | Mod: PBBFAC,HCNC,, | Performed by: NEUROLOGICAL SURGERY

## 2020-10-08 PROCEDURE — 99215 OFFICE O/P EST HI 40 MIN: CPT | Mod: HCNC,S$GLB,, | Performed by: NEUROLOGICAL SURGERY

## 2020-10-08 PROCEDURE — 1159F MED LIST DOCD IN RCRD: CPT | Mod: HCNC,S$GLB,, | Performed by: NEUROLOGICAL SURGERY

## 2020-10-08 PROCEDURE — 1101F PR PT FALLS ASSESS DOC 0-1 FALLS W/OUT INJ PAST YR: ICD-10-PCS | Mod: HCNC,CPTII,S$GLB, | Performed by: NEUROLOGICAL SURGERY

## 2020-10-08 PROCEDURE — 1101F PT FALLS ASSESS-DOCD LE1/YR: CPT | Mod: HCNC,CPTII,S$GLB, | Performed by: NEUROLOGICAL SURGERY

## 2020-10-08 PROCEDURE — 1125F AMNT PAIN NOTED PAIN PRSNT: CPT | Mod: HCNC,S$GLB,, | Performed by: NEUROLOGICAL SURGERY

## 2020-10-08 PROCEDURE — 1125F PR PAIN SEVERITY QUANTIFIED, PAIN PRESENT: ICD-10-PCS | Mod: HCNC,S$GLB,, | Performed by: NEUROLOGICAL SURGERY

## 2020-10-08 PROCEDURE — 99215 PR OFFICE/OUTPT VISIT, EST, LEVL V, 40-54 MIN: ICD-10-PCS | Mod: HCNC,S$GLB,, | Performed by: NEUROLOGICAL SURGERY

## 2020-10-08 NOTE — LETTER
October 8, 2020      Jason Levy MD  [address]  [place] [address]           [facility]  [address]  [place] [address]  Phone: [phone]  Fax: [phone]          Patient: Ifeanyi Rutherford Sr   MR Number: [id number]   YOB: 1929   Date of Visit: 10/8/2020       Dear Dr. Jason Levy:    Thank you for referring Ifeanyi Rutherford Sr to me for evaluation. Attached you will find relevant portions of my assessment and plan of care.    If you have questions, please do not hesitate to call me. I look forward to following Ifeanyi Rutherford Sr along with you.    Sincerely,    Augustine Hudson, DO    Enclosure  CC:  No Recipients    If you would like to receive this communication electronically, please contact [email or web address] or [phone] to request more information on [facility] access.    For providers and/or their staff who would like to refer a patient to [facility], please contact us through our one-stop-shop provider referral line, [facility], at [phone].    If you feel you have received this communication in error or would no longer like to receive these types of communications, please e-mail [email or web address]

## 2020-10-16 ENCOUNTER — TELEPHONE (OUTPATIENT)
Dept: NEUROLOGY | Facility: CLINIC | Age: 85
End: 2020-10-16

## 2020-10-16 ENCOUNTER — TELEPHONE (OUTPATIENT)
Dept: PODIATRY | Facility: CLINIC | Age: 85
End: 2020-10-16

## 2020-10-16 NOTE — TELEPHONE ENCOUNTER
The patient wife called stating that the pt has severe fungus and It's painful. I scheduled him with Dr Wray because she had the soonest availability.    Luis YANG  ----- Message from Aileen Mesa sent at 10/16/2020  9:28 AM CDT -----   Name of Who is Calling:     What is the request in detail: request call back in reference to appointment Please contact to further discuss and advise      Can the clinic reply by [facility]: no     [facility]  [phone] / jc (wife)

## 2020-10-16 NOTE — TELEPHONE ENCOUNTER
----- Message from Aileen Mesa sent at 10/16/2020  9:29 AM CDT -----   Name of Who is Calling:     What is the request in detail:   request call back in reference to medication   aracept  // patient having side affects  Please contact to further discuss and advise      Can the clinic reply by [facility]: no     [facility]  [phone]/jc / (wife )

## 2020-10-20 NOTE — TELEPHONE ENCOUNTER
pts wife reports jerking symptoms since increasing to Aricept 10mg QD. She will go back to 5mg PO QD.  Will let Dr. Saleh let know what happened.

## 2020-10-24 ENCOUNTER — IMMUNIZATION (OUTPATIENT)
Dept: OBSTETRICS AND GYNECOLOGY | Facility: CLINIC | Age: 85
End: 2020-10-24
Payer: MEDICARE

## 2020-10-24 PROCEDURE — G0008 ADMIN INFLUENZA VIRUS VAC: HCPCS | Mod: HCNC,S$GLB,, | Performed by: FAMILY MEDICINE

## 2020-10-24 PROCEDURE — 90694 VACC AIIV4 NO PRSRV 0.5ML IM: CPT | Mod: HCNC,S$GLB,, | Performed by: FAMILY MEDICINE

## 2020-10-24 PROCEDURE — G0008 FLU VACCINE - QUADRIVALENT - ADJUVANTED: ICD-10-PCS | Mod: HCNC,S$GLB,, | Performed by: FAMILY MEDICINE

## 2020-10-24 PROCEDURE — 90694 FLU VACCINE - QUADRIVALENT - ADJUVANTED: ICD-10-PCS | Mod: HCNC,S$GLB,, | Performed by: FAMILY MEDICINE

## 2020-10-27 ENCOUNTER — OFFICE VISIT (OUTPATIENT)
Dept: PODIATRY | Facility: CLINIC | Age: 85
End: 2020-10-27
Payer: MEDICARE

## 2020-10-27 VITALS
WEIGHT: 191.56 LBS | SYSTOLIC BLOOD PRESSURE: 168 MMHG | HEIGHT: 74 IN | BODY MASS INDEX: 24.58 KG/M2 | HEART RATE: 80 BPM | DIASTOLIC BLOOD PRESSURE: 70 MMHG

## 2020-10-27 DIAGNOSIS — B35.1 ONYCHOMYCOSIS DUE TO DERMATOPHYTE: Primary | ICD-10-CM

## 2020-10-27 DIAGNOSIS — B35.3 TINEA PEDIS, UNSPECIFIED LATERALITY: ICD-10-CM

## 2020-10-27 PROCEDURE — 99214 OFFICE O/P EST MOD 30 MIN: CPT | Mod: HCNC,S$GLB,, | Performed by: PODIATRIST

## 2020-10-27 PROCEDURE — 99999 PR PBB SHADOW E&M-EST. PATIENT-LVL III: CPT | Mod: PBBFAC,HCNC,, | Performed by: PODIATRIST

## 2020-10-27 PROCEDURE — 1101F PR PT FALLS ASSESS DOC 0-1 FALLS W/OUT INJ PAST YR: ICD-10-PCS | Mod: HCNC,CPTII,S$GLB, | Performed by: PODIATRIST

## 2020-10-27 PROCEDURE — 99999 PR PBB SHADOW E&M-EST. PATIENT-LVL III: ICD-10-PCS | Mod: PBBFAC,HCNC,, | Performed by: PODIATRIST

## 2020-10-27 PROCEDURE — 1159F MED LIST DOCD IN RCRD: CPT | Mod: HCNC,S$GLB,, | Performed by: PODIATRIST

## 2020-10-27 PROCEDURE — 1101F PT FALLS ASSESS-DOCD LE1/YR: CPT | Mod: HCNC,CPTII,S$GLB, | Performed by: PODIATRIST

## 2020-10-27 PROCEDURE — 1126F PR PAIN SEVERITY QUANTIFIED, NO PAIN PRESENT: ICD-10-PCS | Mod: HCNC,S$GLB,, | Performed by: PODIATRIST

## 2020-10-27 PROCEDURE — 1126F AMNT PAIN NOTED NONE PRSNT: CPT | Mod: HCNC,S$GLB,, | Performed by: PODIATRIST

## 2020-10-27 PROCEDURE — 1159F PR MEDICATION LIST DOCUMENTED IN MEDICAL RECORD: ICD-10-PCS | Mod: HCNC,S$GLB,, | Performed by: PODIATRIST

## 2020-10-27 PROCEDURE — 99214 PR OFFICE/OUTPT VISIT, EST, LEVL IV, 30-39 MIN: ICD-10-PCS | Mod: HCNC,S$GLB,, | Performed by: PODIATRIST

## 2020-10-27 RX ORDER — KETOCONAZOLE 20 MG/G
CREAM TOPICAL DAILY
Qty: 1 TUBE | Refills: 3 | Status: SHIPPED | OUTPATIENT
Start: 2020-10-27

## 2020-10-28 NOTE — PROGRESS NOTES
Subjective:      Patient ID: Ifeanyi Rutherford Sr is a 89 y.o. male.    Chief Complaint: Nail Problem (fungus/ ov 9/8/20 [facility] p[facility]) and Nail Care    Ifeanyi is a 89 y.o. male who presents to the clinic complaining of thick and discolored toenails on both feet. Ifeanyi is inquiring about treatment options. Presents with wife, who states nails are too thick to cut.       Review of Systems   Constitution: Negative for chills.   Cardiovascular: Negative for chest pain and claudication.   Respiratory: Negative for cough.    Skin: Positive for color change, dry skin and nail changes.   Musculoskeletal: Positive for joint pain.   Gastrointestinal: Negative for nausea.   Neurological: Positive for paresthesias. Negative for numbness.   Psychiatric/Behavioral: The patient is not nervous/anxious.            Objective:      Physical Exam  Constitutional:       Appearance: He is well-developed.      Comments: Oriented to time, place, and person.   Cardiovascular:      Comments: DP and PT pulses are palpable bilaterally. 3 sec capillary refill time and toes and feet are warm to touch proximally .  There is  hair growth on the feet and toes b/l. There is no edema b/l. No spider veins or varicosities present b/l.     Musculoskeletal:      Comments: Equinus noted b/l ankles with < 10 deg DF noted. MMT 5/5 in DF/PF/Inv/Ev resistance with no reproduction of pain in any direction. Passive range of motion of ankle and pedal joints is painless b/l.     Feet:      Right foot:      Skin integrity: No callus or dry skin.      Left foot:      Skin integrity: No callus or dry skin.   Lymphadenopathy:      Comments: Negative lymphadenopathy bilateral popliteal fossa and tarsal tunnel.   Skin:     Comments: No open lesions, lacerations or wounds noted.Interdigital spaces clean, dry and intact b/l. No erythema noted to b/l foot.    Toenails 1-5 bilaterally are elongated by 2-3 mm, thickened by 2-3 mm, discolored/yellowed, dystrophic, brittle  with subungual debris.    Scaling dryness in a moccasin distribution is noted to the bilateral lower extremities .       Neurological:      Mental Status: He is alert.      Comments: Light touch, proprioception, and sharp/dull sensation are all intact bilaterally. Protective threshold with the Atkins-Wienstein monofilament is intact bilaterally.    Psychiatric:         Behavior: Behavior is cooperative.               Assessment:       Encounter Diagnoses   Name Primary?    Onychomycosis due to dermatophyte Yes    Tinea pedis, unspecified laterality          Plan:       Ifeanyi was seen today for nail problem and nail care.    Diagnoses and all orders for this visit:    Onychomycosis due to dermatophyte    Tinea pedis, unspecified laterality    Other orders  -     ketoconazole (NIZORAL) 2 % cream; Apply topically once daily.      I counseled the patient on his conditions, their implications and medical management.      Instructed patient on the importance of keeping feet dry. Patient instructed to use absorbent cotton socks and change them if they become sweaty; or wear an open-toe shoe or sandal. Wash the feet at least once a day with soap and water. Patient instructed to use lysol or over-the-counter antifungal powders or sprays to shoes daily and allow them to air dry, switching shoes from every other day would be optimal. Patient is to avoid barefoot walking in high-risk environments (public showers, gyms and locker rooms) may prevent future infections.     Ketoconazole 2% topical cream prescribed for treatment of aforementioned tinea pedis. Patient will use this medication as directed in addition to thourougly drying between toes daily, and applying powder as needed     Nails 1-5 B/L feet trimmed. Patient is aware that routine trimming of nails or calluses will not be covered service per insurance and he will fall under Proc B if this service is desired. Patient verbalized understanding of this. RTC as needed  for Proc B or any other pedal complaint.

## 2020-11-02 ENCOUNTER — TELEPHONE (OUTPATIENT)
Dept: FAMILY MEDICINE | Facility: CLINIC | Age: 85
End: 2020-11-02

## 2020-11-02 DIAGNOSIS — R39.89 SUSPECTED UTI: Primary | ICD-10-CM

## 2020-11-02 NOTE — TELEPHONE ENCOUNTER
----- Message from Belen Dong sent at 11/2/2020  8:28 AM CST -----  Regarding: wife  .Type: Patient Call Back    Who called: Wife    What is the request in detail: In regards to pt condition, wife did a uti and azo testing kit at home and have concerns     Can the clinic reply by [facility]? Call back     Would the patient rather a call back or a response via [facility]? Call back     Best call back number: [phone]

## 2020-11-02 NOTE — TELEPHONE ENCOUNTER
Patient's wife Natalee states the patient has been having urgency, frequency, burning and pain while urinating. Wife also states he has the shivers with no fever. Please advise.

## 2020-11-03 NOTE — TELEPHONE ENCOUNTER
Send urine asap if not yet done, call wife- if having UTI sx I will start him on ABx Tuesday so VERBALLY let me know after the call so we can get Abx to pharmacy

## 2020-11-04 ENCOUNTER — LAB VISIT (OUTPATIENT)
Dept: LAB | Facility: HOSPITAL | Age: 85
End: 2020-11-04
Attending: INTERNAL MEDICINE
Payer: MEDICARE

## 2020-11-04 DIAGNOSIS — R39.89 SUSPECTED UTI: ICD-10-CM

## 2020-11-04 LAB
BACTERIA #/AREA URNS AUTO: ABNORMAL /HPF
BILIRUB UR QL STRIP: NEGATIVE
CLARITY UR REFRACT.AUTO: CLEAR
COLOR UR AUTO: ABNORMAL
GLUCOSE UR QL STRIP: ABNORMAL
HGB UR QL STRIP: NEGATIVE
HYALINE CASTS UR QL AUTO: 3 /LPF
KETONES UR QL STRIP: NEGATIVE
LEUKOCYTE ESTERASE UR QL STRIP: NEGATIVE
MICROSCOPIC COMMENT: ABNORMAL
NITRITE UR QL STRIP: NEGATIVE
PH UR STRIP: 6 [PH] (ref 5–8)
PROT UR QL STRIP: ABNORMAL
RBC #/AREA URNS AUTO: 1 /HPF (ref 0–4)
SP GR UR STRIP: 1.01 (ref 1–1.03)
SQUAMOUS #/AREA URNS AUTO: 0 /HPF
URN SPEC COLLECT METH UR: ABNORMAL
WBC #/AREA URNS AUTO: 0 /HPF (ref 0–5)

## 2020-11-04 PROCEDURE — 81001 URINALYSIS AUTO W/SCOPE: CPT | Mod: HCNC

## 2020-11-04 PROCEDURE — 87086 URINE CULTURE/COLONY COUNT: CPT | Mod: HCNC

## 2020-11-05 LAB — BACTERIA UR CULT: NO GROWTH

## 2020-11-06 ENCOUNTER — TELEPHONE (OUTPATIENT)
Dept: FAMILY MEDICINE | Facility: CLINIC | Age: 85
End: 2020-11-06

## 2020-11-06 ENCOUNTER — TELEPHONE (OUTPATIENT)
Dept: NEUROLOGY | Facility: CLINIC | Age: 85
End: 2020-11-06

## 2020-11-06 DIAGNOSIS — Z74.09 IMPAIRED MOBILITY: ICD-10-CM

## 2020-11-06 DIAGNOSIS — F03.90 DEMENTIA WITHOUT BEHAVIORAL DISTURBANCE, UNSPECIFIED DEMENTIA TYPE: Chronic | ICD-10-CM

## 2020-11-06 DIAGNOSIS — G50.0 TRIGEMINAL NEURALGIA OF RIGHT SIDE OF FACE: Primary | ICD-10-CM

## 2020-11-06 NOTE — TELEPHONE ENCOUNTER
"Spoke with Mrs. Rutherford, she states "since Mr. Ruhterford has started taking the Donepezil 5 mg, he has begin experiencing shaking and tremors from the shoulders down, almost like a cold chill, moan with the shiver". Mrs. Rutherford informed a message will be forward to Dr. Saleh for review.    "

## 2020-11-06 NOTE — TELEPHONE ENCOUNTER
----- Message from Griselda Damon sent at 11/6/2020  2:20 PM CST -----  Type: Patient Call Back       What is the request in detail:  pt wife calling requesting re ordering home health to go to pt house.      Can the clinic reply by [facility]? No       Would the patient rather a call back or a response via [facility]? Call back       Best call back number: [phone]    Thank you.

## 2020-11-06 NOTE — TELEPHONE ENCOUNTER
Patient's wife said that his health is declining rapidly and she now need [facility] to return.  She is requesting the SN and PT.  Please advise.

## 2020-11-06 NOTE — TELEPHONE ENCOUNTER
----- Message from Keegan Pathak sent at 11/6/2020  9:13 AM CST -----  Contact: Krystal ( spouse ) @ [phone]  Caller requesting a return call to discuss side effects patient is experiencing to the ( donepeziL (ARICEPT) since the changes, pls call

## 2020-11-06 NOTE — TELEPHONE ENCOUNTER
----- Message from Cecily Bradshaw sent at 11/6/2020 11:56 AM CST -----  Contact: Wife [phone]  Type:  Patient Returning Call    Who Called: Wife    Who Left Message for Patient: Brittanie    Does the patient know what this is regarding?:Yes    Would the patient rather a call back or a response via [facility]? Call back    Best Call Back Number: [phone]

## 2020-11-06 NOTE — TELEPHONE ENCOUNTER
Please let wife know that we signed an order for home health on Friday evening and hopefully they will be contacting her as soon as possible.    Probably good to call [facility] and make sure they got the order and if they can process as soon as possible.  He is a very tall man and his very small wife really cannot handle him well and she needs some help and assessment for home health needs and so forth.

## 2020-11-09 NOTE — TELEPHONE ENCOUNTER
Spoke with brittanie at [facility] who has taken information for home care need and states they are not working from the office but will contact intake department to get the ball rolling to see patient

## 2020-11-10 PROCEDURE — G0180 PR HOME HEALTH MD CERTIFICATION: ICD-10-PCS | Mod: ,,, | Performed by: INTERNAL MEDICINE

## 2020-11-10 PROCEDURE — G0180 MD CERTIFICATION HHA PATIENT: HCPCS | Mod: ,,, | Performed by: INTERNAL MEDICINE

## 2020-11-11 ENCOUNTER — TELEPHONE (OUTPATIENT)
Dept: NEUROLOGY | Facility: CLINIC | Age: 85
End: 2020-11-11

## 2020-11-11 ENCOUNTER — HOSPITAL ENCOUNTER (INPATIENT)
Facility: HOSPITAL | Age: 85
LOS: 7 days | Discharge: HOSPICE | DRG: 682 | End: 2020-11-18
Attending: EMERGENCY MEDICINE | Admitting: INTERNAL MEDICINE
Payer: MEDICARE

## 2020-11-11 DIAGNOSIS — Z51.5 PALLIATIVE CARE ENCOUNTER: ICD-10-CM

## 2020-11-11 DIAGNOSIS — R41.82 AMS (ALTERED MENTAL STATUS): Primary | ICD-10-CM

## 2020-11-11 DIAGNOSIS — F03.91 DEMENTIA WITH BEHAVIORAL DISTURBANCE, UNSPECIFIED DEMENTIA TYPE: ICD-10-CM

## 2020-11-11 DIAGNOSIS — R46.89 CHANGE IN BEHAVIOR: ICD-10-CM

## 2020-11-11 DIAGNOSIS — R79.89 ELEVATED BRAIN NATRIURETIC PEPTIDE (BNP) LEVEL: ICD-10-CM

## 2020-11-11 DIAGNOSIS — R53.81 DEBILITY: ICD-10-CM

## 2020-11-11 DIAGNOSIS — G20.C PARKINSONISM, UNSPECIFIED PARKINSONISM TYPE: Chronic | ICD-10-CM

## 2020-11-11 DIAGNOSIS — G93.41 ACUTE METABOLIC ENCEPHALOPATHY: ICD-10-CM

## 2020-11-11 DIAGNOSIS — Z71.89 ADVANCE CARE PLANNING: ICD-10-CM

## 2020-11-11 DIAGNOSIS — G93.41 ENCEPHALOPATHY, METABOLIC: ICD-10-CM

## 2020-11-11 LAB
ALBUMIN SERPL BCP-MCNC: 3.3 G/DL (ref 3.5–5.2)
ALP SERPL-CCNC: 110 U/L (ref 55–135)
ALT SERPL W/O P-5'-P-CCNC: 19 U/L (ref 10–44)
AMPHET+METHAMPHET UR QL: NEGATIVE
ANION GAP SERPL CALC-SCNC: 12 MMOL/L (ref 8–16)
ANION GAP SERPL CALC-SCNC: 13 MMOL/L (ref 8–16)
AST SERPL-CCNC: 22 U/L (ref 10–40)
BACTERIA #/AREA URNS HPF: ABNORMAL /HPF
BARBITURATES UR QL SCN>200 NG/ML: NEGATIVE
BASOPHILS # BLD AUTO: 0.14 K/UL (ref 0–0.2)
BASOPHILS NFR BLD: 1 % (ref 0–1.9)
BENZODIAZ UR QL SCN>200 NG/ML: NEGATIVE
BILIRUB SERPL-MCNC: 0.5 MG/DL (ref 0.1–1)
BILIRUB UR QL STRIP: NEGATIVE
BNP SERPL-MCNC: 1124 PG/ML (ref 0–99)
BUN SERPL-MCNC: 57 MG/DL (ref 10–30)
BUN SERPL-MCNC: 59 MG/DL (ref 10–30)
BZE UR QL SCN: NEGATIVE
CALCIUM SERPL-MCNC: 8.4 MG/DL (ref 8.7–10.5)
CALCIUM SERPL-MCNC: 8.7 MG/DL (ref 8.7–10.5)
CANNABINOIDS UR QL SCN: NEGATIVE
CHLORIDE SERPL-SCNC: 103 MMOL/L (ref 95–110)
CHLORIDE SERPL-SCNC: 105 MMOL/L (ref 95–110)
CK SERPL-CCNC: 78 U/L (ref 20–200)
CLARITY UR: CLEAR
CO2 SERPL-SCNC: 22 MMOL/L (ref 23–29)
CO2 SERPL-SCNC: 23 MMOL/L (ref 23–29)
COLOR UR: YELLOW
CREAT SERPL-MCNC: 4.6 MG/DL (ref 0.5–1.4)
CREAT SERPL-MCNC: 4.7 MG/DL (ref 0.5–1.4)
CREAT UR-MCNC: 56 MG/DL (ref 23–375)
CTP QC/QA: YES
DIFFERENTIAL METHOD: ABNORMAL
EOSINOPHIL # BLD AUTO: 0.4 K/UL (ref 0–0.5)
EOSINOPHIL NFR BLD: 3.2 % (ref 0–8)
ERYTHROCYTE [DISTWIDTH] IN BLOOD BY AUTOMATED COUNT: 12.7 % (ref 11.5–14.5)
EST. GFR  (AFRICAN AMERICAN): 12 ML/MIN/1.73 M^2
EST. GFR  (AFRICAN AMERICAN): 12 ML/MIN/1.73 M^2
EST. GFR  (NON AFRICAN AMERICAN): 10 ML/MIN/1.73 M^2
EST. GFR  (NON AFRICAN AMERICAN): 10 ML/MIN/1.73 M^2
GLUCOSE SERPL-MCNC: 121 MG/DL (ref 70–110)
GLUCOSE SERPL-MCNC: 147 MG/DL (ref 70–110)
GLUCOSE UR QL STRIP: ABNORMAL
HCT VFR BLD AUTO: 36.6 % (ref 40–54)
HGB BLD-MCNC: 12.2 G/DL (ref 14–18)
HGB UR QL STRIP: ABNORMAL
HYALINE CASTS #/AREA URNS LPF: 0 /LPF
IMM GRANULOCYTES # BLD AUTO: 0.07 K/UL (ref 0–0.04)
IMM GRANULOCYTES NFR BLD AUTO: 0.5 % (ref 0–0.5)
KETONES UR QL STRIP: NEGATIVE
LACTATE SERPL-SCNC: 0.9 MMOL/L (ref 0.5–2.2)
LACTATE SERPL-SCNC: 0.9 MMOL/L (ref 0.5–2.2)
LEUKOCYTE ESTERASE UR QL STRIP: NEGATIVE
LYMPHOCYTES # BLD AUTO: 2.3 K/UL (ref 1–4.8)
LYMPHOCYTES NFR BLD: 16.9 % (ref 18–48)
MAGNESIUM SERPL-MCNC: 2.5 MG/DL (ref 1.6–2.6)
MCH RBC QN AUTO: 30.3 PG (ref 27–31)
MCHC RBC AUTO-ENTMCNC: 33.3 G/DL (ref 32–36)
MCV RBC AUTO: 91 FL (ref 82–98)
METHADONE UR QL SCN>300 NG/ML: NEGATIVE
MICROSCOPIC COMMENT: ABNORMAL
MONOCYTES # BLD AUTO: 1.7 K/UL (ref 0.3–1)
MONOCYTES NFR BLD: 12.5 % (ref 4–15)
NEUTROPHILS # BLD AUTO: 9 K/UL (ref 1.8–7.7)
NEUTROPHILS NFR BLD: 65.9 % (ref 38–73)
NITRITE UR QL STRIP: NEGATIVE
NRBC BLD-RTO: 0 /100 WBC
OPIATES UR QL SCN: NEGATIVE
PCP UR QL SCN>25 NG/ML: NEGATIVE
PH UR STRIP: 6 [PH] (ref 5–8)
PHOSPHATE SERPL-MCNC: 5.7 MG/DL (ref 2.7–4.5)
PLATELET # BLD AUTO: 294 K/UL (ref 150–350)
PMV BLD AUTO: 11.1 FL (ref 9.2–12.9)
POCT GLUCOSE: 137 MG/DL (ref 70–110)
POTASSIUM SERPL-SCNC: 4.6 MMOL/L (ref 3.5–5.1)
POTASSIUM SERPL-SCNC: 4.9 MMOL/L (ref 3.5–5.1)
PROT SERPL-MCNC: 7.5 G/DL (ref 6–8.4)
PROT UR QL STRIP: ABNORMAL
PROT UR-MCNC: 145 MG/DL
PROT/CREAT UR: 2.59 MG/G{CREAT} (ref 0–0.2)
RBC # BLD AUTO: 4.02 M/UL (ref 4.6–6.2)
RBC #/AREA URNS HPF: 5 /HPF (ref 0–4)
SARS-COV-2 RDRP RESP QL NAA+PROBE: NEGATIVE
SODIUM SERPL-SCNC: 139 MMOL/L (ref 136–145)
SODIUM SERPL-SCNC: 139 MMOL/L (ref 136–145)
SODIUM UR-SCNC: 40 MMOL/L (ref 20–250)
SP GR UR STRIP: 1.01 (ref 1–1.03)
SQUAMOUS #/AREA URNS HPF: 1 /HPF
TOXICOLOGY INFORMATION: NORMAL
TROPONIN I SERPL DL<=0.01 NG/ML-MCNC: 0.14 NG/ML (ref 0–0.03)
TROPONIN I SERPL DL<=0.01 NG/ML-MCNC: 0.15 NG/ML (ref 0–0.03)
TSH SERPL DL<=0.005 MIU/L-ACNC: 1.33 UIU/ML (ref 0.4–4)
URN SPEC COLLECT METH UR: ABNORMAL
UROBILINOGEN UR STRIP-ACNC: NEGATIVE EU/DL
WBC # BLD AUTO: 13.71 K/UL (ref 3.9–12.7)
WBC #/AREA URNS HPF: 0 /HPF (ref 0–5)

## 2020-11-11 PROCEDURE — 80307 DRUG TEST PRSMV CHEM ANLYZR: CPT | Mod: HCNC

## 2020-11-11 PROCEDURE — P9612 CATHETERIZE FOR URINE SPEC: HCPCS | Mod: HCNC

## 2020-11-11 PROCEDURE — 80048 BASIC METABOLIC PNL TOTAL CA: CPT | Mod: HCNC

## 2020-11-11 PROCEDURE — 25000003 PHARM REV CODE 250: Mod: HCNC | Performed by: EMERGENCY MEDICINE

## 2020-11-11 PROCEDURE — 84156 ASSAY OF PROTEIN URINE: CPT | Mod: HCNC

## 2020-11-11 PROCEDURE — 84484 ASSAY OF TROPONIN QUANT: CPT | Mod: HCNC

## 2020-11-11 PROCEDURE — U0002 COVID-19 LAB TEST NON-CDC: HCPCS | Mod: HCNC | Performed by: EMERGENCY MEDICINE

## 2020-11-11 PROCEDURE — 96365 THER/PROPH/DIAG IV INF INIT: CPT | Mod: HCNC

## 2020-11-11 PROCEDURE — 84300 ASSAY OF URINE SODIUM: CPT | Mod: HCNC

## 2020-11-11 PROCEDURE — 21400001 HC TELEMETRY ROOM: Mod: HCNC

## 2020-11-11 PROCEDURE — 93010 ELECTROCARDIOGRAM REPORT: CPT | Mod: HCNC,,, | Performed by: INTERNAL MEDICINE

## 2020-11-11 PROCEDURE — 25000003 PHARM REV CODE 250: Mod: HCNC | Performed by: INTERNAL MEDICINE

## 2020-11-11 PROCEDURE — 99285 EMERGENCY DEPT VISIT HI MDM: CPT | Mod: 25,HCNC

## 2020-11-11 PROCEDURE — 87040 BLOOD CULTURE FOR BACTERIA: CPT | Mod: HCNC

## 2020-11-11 PROCEDURE — 80053 COMPREHEN METABOLIC PANEL: CPT | Mod: HCNC

## 2020-11-11 PROCEDURE — 84484 ASSAY OF TROPONIN QUANT: CPT | Mod: 91,HCNC

## 2020-11-11 PROCEDURE — 85025 COMPLETE CBC W/AUTO DIFF WBC: CPT | Mod: HCNC

## 2020-11-11 PROCEDURE — 63600175 PHARM REV CODE 636 W HCPCS: Mod: HCNC | Performed by: EMERGENCY MEDICINE

## 2020-11-11 PROCEDURE — S5010 5% DEXTROSE AND 0.45% SALINE: HCPCS | Mod: HCNC | Performed by: INTERNAL MEDICINE

## 2020-11-11 PROCEDURE — 83735 ASSAY OF MAGNESIUM: CPT | Mod: HCNC

## 2020-11-11 PROCEDURE — 93010 EKG 12-LEAD: ICD-10-PCS | Mod: HCNC,,, | Performed by: INTERNAL MEDICINE

## 2020-11-11 PROCEDURE — 83605 ASSAY OF LACTIC ACID: CPT | Mod: 91,HCNC

## 2020-11-11 PROCEDURE — 83605 ASSAY OF LACTIC ACID: CPT | Mod: HCNC

## 2020-11-11 PROCEDURE — 82550 ASSAY OF CK (CPK): CPT | Mod: HCNC

## 2020-11-11 PROCEDURE — 63600175 PHARM REV CODE 636 W HCPCS: Mod: HCNC | Performed by: INTERNAL MEDICINE

## 2020-11-11 PROCEDURE — 93005 ELECTROCARDIOGRAM TRACING: CPT | Mod: HCNC

## 2020-11-11 PROCEDURE — 84100 ASSAY OF PHOSPHORUS: CPT | Mod: HCNC

## 2020-11-11 PROCEDURE — 84443 ASSAY THYROID STIM HORMONE: CPT | Mod: HCNC

## 2020-11-11 PROCEDURE — 81000 URINALYSIS NONAUTO W/SCOPE: CPT | Mod: HCNC

## 2020-11-11 PROCEDURE — 83880 ASSAY OF NATRIURETIC PEPTIDE: CPT | Mod: HCNC

## 2020-11-11 RX ORDER — DEXTROSE MONOHYDRATE AND SODIUM CHLORIDE 5; .45 G/100ML; G/100ML
INJECTION, SOLUTION INTRAVENOUS CONTINUOUS
Status: DISCONTINUED | OUTPATIENT
Start: 2020-11-11 | End: 2020-11-16

## 2020-11-11 RX ORDER — IBUPROFEN 200 MG
24 TABLET ORAL
Status: DISCONTINUED | OUTPATIENT
Start: 2020-11-11 | End: 2020-11-18 | Stop reason: HOSPADM

## 2020-11-11 RX ORDER — IBUPROFEN 200 MG
16 TABLET ORAL
Status: DISCONTINUED | OUTPATIENT
Start: 2020-11-11 | End: 2020-11-18 | Stop reason: HOSPADM

## 2020-11-11 RX ORDER — LORAZEPAM 2 MG/ML
2 INJECTION INTRAMUSCULAR
Status: DISCONTINUED | OUTPATIENT
Start: 2020-11-11 | End: 2020-11-18 | Stop reason: HOSPADM

## 2020-11-11 RX ORDER — SODIUM CHLORIDE 0.9 % (FLUSH) 0.9 %
10 SYRINGE (ML) INJECTION
Status: DISCONTINUED | OUTPATIENT
Start: 2020-11-11 | End: 2020-11-18 | Stop reason: HOSPADM

## 2020-11-11 RX ORDER — HEPARIN SODIUM 5000 [USP'U]/ML
5000 INJECTION, SOLUTION INTRAVENOUS; SUBCUTANEOUS EVERY 8 HOURS
Status: DISCONTINUED | OUTPATIENT
Start: 2020-11-11 | End: 2020-11-18 | Stop reason: HOSPADM

## 2020-11-11 RX ORDER — GLUCAGON 1 MG
1 KIT INJECTION
Status: DISCONTINUED | OUTPATIENT
Start: 2020-11-11 | End: 2020-11-18 | Stop reason: HOSPADM

## 2020-11-11 RX ORDER — DOCUSATE CALCIUM 240 MG
240 CAPSULE ORAL 2 TIMES DAILY
COMMUNITY

## 2020-11-11 RX ADMIN — SODIUM CHLORIDE 1000 ML: 0.9 INJECTION, SOLUTION INTRAVENOUS at 04:11

## 2020-11-11 RX ADMIN — CEFTRIAXONE SODIUM 1 G: 1 INJECTION, POWDER, FOR SOLUTION INTRAMUSCULAR; INTRAVENOUS at 04:11

## 2020-11-11 RX ADMIN — HEPARIN SODIUM 5000 UNITS: 5000 INJECTION INTRAVENOUS; SUBCUTANEOUS at 09:11

## 2020-11-11 RX ADMIN — DEXTROSE AND SODIUM CHLORIDE: 5; .45 INJECTION, SOLUTION INTRAVENOUS at 10:11

## 2020-11-11 RX ADMIN — DEXTROSE 250 MG: 50 INJECTION, SOLUTION INTRAVENOUS at 05:11

## 2020-11-11 NOTE — ED TRIAGE NOTES
"Pt presents to the ED via EMS afte family called with c/o "twitching and shaking". Family states that pt hasn't been as talkative as normal. Pt staring into blank stare. Pt disoriented x4. Pt stated name x1 then went back to a blank stare. VSS. NAD noted.   "

## 2020-11-11 NOTE — TELEPHONE ENCOUNTER
----- Message from Nandini Merlos sent at 11/11/2020  2:52 PM CST -----  Regarding: callback  Contact: pt wife  Type: Patient Call Back    Who called: PT Wife     What is the request in detail: PT wife called in and stated he has been sent to the emergency room and she wants to speak with a nurse     Can the clinic reply by [facility]? No     Would the patient rather a call back or a response via [facility]? Callback    Best call back number: [phone](Cell)      Additional Information:

## 2020-11-11 NOTE — ED PROVIDER NOTES
"Encounter Date: 11/11/2020    SCRIBE #1 NOTE: I, Roxann Mcdaniel, am scribing for, and in the presence of,  Mariano Dickson MD. I have scribed the following portions of the note - Other sections scribed: HPI, ROS.       History     Chief Complaint   Patient presents with    Altered Mental Status     family states altered mental status.  pt awake but not responding to questions.  pt will not make eye contact.  Hx: Dementia.     This is a 89 y.o. male who presents to the ED via EMS due to AMS per family. EMS states that the family called because the patient was "twitching and shaking" and he's not as talkative as he usually is. EMS reports that the patient is awake, but is not responding to questions. EMS states that he is also not making eye contact and has a blank stare. EMS notes that the patient is disoriented x4 and he has a PMHx of Dementia.     The history is provided by the EMS personnel. No  was used.            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    Chronic headache 9/12/2014    CKD stage 3 9/13/2019    Constipation     Dementia     worked up for NPH at  and had taps with no improvement    Depression     [place] (hard of hearing)     Hyperlipidemia     Hypertension     Hypothyroidism     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    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    Parkinsons     Requires assistance with all daily activities     Sleep apnea     Spinal stenosis     Trigeminal neuralgia pain 1/20/2015    Trouble in sleeping     Unsteady " gait     Vertigo 9/11/2012    Vestibular schwannoma 6/4/2015     Past Surgical History:   Procedure Laterality Date    bilateral total knee arthroplasties      CATARACT EXTRACTION W/  INTRAOCULAR LENS IMPLANT Bilateral     CHOLECYSTECTOMY      EYE SURGERY      GALLBLADDER SURGERY      JOINT REPLACEMENT Bilateral     TKR    THYROIDECTOMY      TONSILLECTOMY       Family History   Problem Relation Age of Onset    Heart disease Mother     No Known Problems Father     No Known Problems Sister     No Known Problems Brother     No Known Problems Daughter     No Known Problems Son     No Known Problems Sister     No Known Problems Brother     No Known Problems Brother     No Known Problems Brother     No Known Problems Maternal Aunt     No Known Problems Maternal Uncle     No Known Problems Paternal Aunt     No Known Problems Paternal Uncle     No Known Problems Maternal Grandmother     No Known Problems Maternal Grandfather     No Known Problems Paternal Grandmother     No Known Problems Paternal Grandfather     No Known Problems Daughter     No Known Problems Son     No Known Problems Son     Suicide Neg Hx     Schizophrenia Neg Hx     Amblyopia Neg Hx     Blindness Neg Hx     Cataracts Neg Hx     Glaucoma Neg Hx     Macular degeneration Neg Hx     Retinal detachment Neg Hx     Strabismus Neg Hx     Cancer Neg Hx     Diabetes Neg Hx     Hypertension Neg Hx     Stroke Neg Hx     Thyroid disease Neg Hx      Social History     Tobacco Use    Smoking status: Former Smoker    Smokeless tobacco: Never Used   Substance Use Topics    Alcohol use: No     Alcohol/week: 0.0 standard drinks    Drug use: No     Review of Systems   Unable to perform ROS: Mental status change       Physical Exam     Initial Vitals [11/11/20 1338]   BP Pulse Resp Temp SpO2   (!) 185/99 79 18 98.2 °F (36.8 °C) 95 %      MAP       --         Physical Exam    Nursing note and vitals reviewed.  Constitutional:  He is not diaphoretic. No distress.   Ill-appearing elderly male   HENT:   Head: Normocephalic.   Right Ear: Tympanic membrane normal.   Left Ear: Tympanic membrane normal.   Nose: Nose normal.   Mouth/Throat: Uvula is midline, oropharynx is clear and moist and mucous membranes are normal.   Eyes: EOM are normal. Pupils are equal, round, and reactive to light. Right eye exhibits no discharge. Left eye exhibits no discharge.   Neck: Trachea normal, normal range of motion, full passive range of motion without pain and phonation normal. Neck supple. No stridor present. No spinous process tenderness and no muscular tenderness present. No tracheal deviation and normal range of motion present. No neck rigidity. No JVD present.   Cardiovascular: Regular rhythm, normal heart sounds and intact distal pulses.   No murmur heard.  Pulmonary/Chest: Effort normal and breath sounds normal. No respiratory distress. He has no wheezes. He has no rhonchi. He has no rales.   Abdominal: Soft. Bowel sounds are normal. He exhibits no distension. There is no abdominal tenderness. There is no rebound and no guarding.   Musculoskeletal: No tenderness or edema.      Comments: Elderly male alert, not following commands, intermittently staring, moving all extremities spontaneously, withdrawing to pain.  Patient not making any sounds, unable to state name.   Neurological: He is alert.   Skin: Skin is warm and dry. Capillary refill takes less than 2 seconds. There is pallor.         ED Course   Procedures  Labs Reviewed   CBC W/ AUTO DIFFERENTIAL - Abnormal; Notable for the following components:       Result Value    WBC 13.71 (*)     RBC 4.02 (*)     Hemoglobin 12.2 (*)     Hematocrit 36.6 (*)     Gran # (ANC) 9.0 (*)     Immature Grans (Abs) 0.07 (*)     Mono # 1.7 (*)     Lymph % 16.9 (*)     All other components within normal limits   COMPREHENSIVE METABOLIC PANEL - Abnormal; Notable for the following components:    Glucose 121 (*)     BUN 59  (*)     Creatinine 4.7 (*)     Albumin 3.3 (*)     eGFR if  12 (*)     eGFR if non  10 (*)     All other components within normal limits   TROPONIN I - Abnormal; Notable for the following components:    Troponin I 0.154 (*)     All other components within normal limits   B-TYPE NATRIURETIC PEPTIDE - Abnormal; Notable for the following components:    BNP 1,124 (*)     All other components within normal limits   URINALYSIS, REFLEX TO URINE CULTURE - Abnormal; Notable for the following components:    Protein, UA 2+ (*)     Glucose, UA 1+ (*)     Occult Blood UA 2+ (*)     All other components within normal limits    Narrative:     Specimen Source->Urine   URINALYSIS MICROSCOPIC - Abnormal; Notable for the following components:    RBC, UA 5 (*)     Bacteria Moderate (*)     All other components within normal limits    Narrative:     Specimen Source->Urine   LACTIC ACID, PLASMA   CK   ALCOHOL,URINE MEDICAL (ETHANOL)   SARS-COV-2 RDRP GENE        ECG Results          EKG 12-lead (Final result)  Result time 11/11/20 21:41:22    Final result by Interface, Lab [facility] (11/11/20 21:41:22)                 Narrative:    Test Reason : R41.82,    Vent. Rate : 058 BPM     Atrial Rate : 058 BPM     P-R Int : 332 ms          QRS Dur : 114 ms      QT Int : 474 ms       P-R-T Axes : 071 036 -50 degrees     QTc Int : 465 ms    Sinus bradycardia with 1st degree A-V block with frequent Premature  ventricular complexes  Incomplete right bundle branch block  Anterior infarct ,age undetermined  ST and T wave abnormality, consider inferolateral ischemia  Abnormal ECG  When compared with ECG of 18-SEP-2019 08:55,  Significant changes have occurred  Confirmed by Giovanny Duong MD ([id number]) on 11/11/2020 9:41:17 PM    Referred By: [email or web address]   SELF           Confirmed By:Giovanny Duong MD                            Imaging Results          CT Head Without Contrast (Final result)  Result time 11/11/20 16:17:02     Final result by Smith Salmeron MD (11/11/20 16:17:02)                 Impression:      No acute intracranial process.    Stable 1.8 x 1.2 cm hyperdense lesion in the right CP angle, suggestive of a acoustic schwannoma.      Electronically signed by: Smith Salmeron MD  Date:    11/11/2020  Time:    16:17             Narrative:    EXAMINATION:  CT HEAD WITHOUT CONTRAST    CLINICAL HISTORY:  Altered mental status;    TECHNIQUE:  Low dose axial images were obtained through the head.  Coronal and sagittal reformations were also performed. Contrast was not administered.    COMPARISON:  CT head dated 09/17/2020 and MRI of the brain dated 06/03/2015.    FINDINGS:  The subcutaneous tissues are unremarkable.  The bony calvarium is intact.  The paranasal sinuses are unremarkable.  The mastoid air cells are clear.  There are postoperative changes in the orbits.    The craniocervical junction is unremarkable.  There are no extra-axial fluid collections.  There is a stable 1.8 x 1.0 cm hyperdensity in the right CP angle.  No additional lesions identified.  There is unchanged regional hypodensity in the right aspect of the cerebellum.  The ventricles and sulci are prominent, suggestive of cerebral volume loss.  There are extensive hypodensities within the periventricular and subcortical white matter.  The gray-white differentiation is maintained.  There is no evidence of mass effect.                               X-Ray Chest 1 View (Final result)  Result time 11/11/20 15:38:34    Final result by Maged Rios Jr., MD (11/11/20 15:38:34)                 Impression:      No acute cardiopulmonary disease      Electronically signed by: Maged Phoenix Jr  Date:    11/11/2020  Time:    15:38             Narrative:    EXAMINATION:  XR CHEST 1 VIEW    CLINICAL HISTORY:  Chest Pain;    TECHNIQUE:  Single frontal view of the chest was performed.    COMPARISON:  09/13/2019    FINDINGS:  Cardiac silhouette is magnified but is probably  borderline enlarged.  There is atherosclerosis of the thoracic aorta.    Lung volumes are low with resultant crowding pulmonary vascular markings.  Elevation of right hemidiaphragm and basilar compressive atelectasis redemonstrated.    No focal airspace opacity.                                 Medical Decision Making:   Clinical Tests:   Lab Tests: Ordered and Reviewed  Radiological Study: Ordered and Reviewed  Medical Tests: Ordered and Reviewed    89-year-old male with past medical history as noted above presenting with altered mental status x2 days.  Physical exam showing elderly male, no acute distress, alert however not making any sounds, not responding to any questions, withdrawing to painful stimuli, otherwise no further abnormality noted.  ED workup EKG sinus bradycardia 58 beats per minute, PVCs, right bundle-branch block present, nonspecific ST and T-wave changes present, no STEMI.  Chest x-ray unremarkable.  CT head showing stable lesion suggestive of an acoustic schwannoma.  CMP showing BUN 59, creatinine 4.7, lactate 0.9.  Patient presentation consistent and concerning for possible seizure-like activity, given significant findings on exam, as well as elevations in troponin, insignificant FADI.  Discussed further with Neurology who recommended after ongoing discussion 20 50 mg of Keppra, with a Keppra level drawn tomorrow morning for further use to see if therapeutic or not.  Discussed with inpatient medicine, hospitalist team, and will place inpatient for ongoing management, additional blood work ordered at this time.  Patient given Rocephin for possible cystitis, leukocytosis on workup as well as bacteria in urine.  Discussed diagnosis and further treatment with patient and family at bedside. All questions answered, patient transferred to floor improved and stable.                Scribe Attestation:   Scribe #1: I performed the above scribed service and the documentation accurately describes the  services I performed. I attest to the accuracy of the note.                      Clinical Impression:     ICD-10-CM ICD-9-CM   1. AMS (altered mental status)  R41.82 780.97   2. Elevated brain natriuretic peptide (BNP) level  R79.89 790.99                          ED Disposition Condition    Admit            Scribe Attestation: I, Mariano Dickson M.D., personally performed the services described in this documentation. All medical record entries made by the scribe were at my direction and in my presence. I have reviewed the chart and agree that the record reflects my personal performance and is accurate and complete.                   Mariano Dickson MD  11/11/20 [id number]

## 2020-11-12 PROBLEM — E78.2 MIXED HYPERLIPIDEMIA: Status: RESOLVED | Noted: 2019-09-13 | Resolved: 2020-11-12

## 2020-11-12 PROBLEM — G93.41 ENCEPHALOPATHY, METABOLIC: Status: ACTIVE | Noted: 2020-11-12

## 2020-11-12 PROBLEM — R56.9 SEIZURE: Status: ACTIVE | Noted: 2020-11-12

## 2020-11-12 LAB
ALBUMIN SERPL BCP-MCNC: 3.1 G/DL (ref 3.5–5.2)
ALP SERPL-CCNC: 106 U/L (ref 55–135)
ALT SERPL W/O P-5'-P-CCNC: 15 U/L (ref 10–44)
ANION GAP SERPL CALC-SCNC: 13 MMOL/L (ref 8–16)
AST SERPL-CCNC: 19 U/L (ref 10–40)
BASOPHILS # BLD AUTO: 0.15 K/UL (ref 0–0.2)
BASOPHILS NFR BLD: 1.1 % (ref 0–1.9)
BILIRUB SERPL-MCNC: 0.5 MG/DL (ref 0.1–1)
BUN SERPL-MCNC: 56 MG/DL (ref 10–30)
CALCIUM SERPL-MCNC: 8.9 MG/DL (ref 8.7–10.5)
CHLORIDE SERPL-SCNC: 106 MMOL/L (ref 95–110)
CO2 SERPL-SCNC: 22 MMOL/L (ref 23–29)
CREAT SERPL-MCNC: 4.5 MG/DL (ref 0.5–1.4)
DIFFERENTIAL METHOD: ABNORMAL
EOSINOPHIL # BLD AUTO: 0.5 K/UL (ref 0–0.5)
EOSINOPHIL NFR BLD: 3.4 % (ref 0–8)
ERYTHROCYTE [DISTWIDTH] IN BLOOD BY AUTOMATED COUNT: 12.8 % (ref 11.5–14.5)
EST. GFR  (AFRICAN AMERICAN): 12 ML/MIN/1.73 M^2
EST. GFR  (NON AFRICAN AMERICAN): 11 ML/MIN/1.73 M^2
ETHANOL UR-MCNC: <10 MG/DL
GLUCOSE SERPL-MCNC: 125 MG/DL (ref 70–110)
HCT VFR BLD AUTO: 35.6 % (ref 40–54)
HGB BLD-MCNC: 11.6 G/DL (ref 14–18)
IMM GRANULOCYTES # BLD AUTO: 0.07 K/UL (ref 0–0.04)
IMM GRANULOCYTES NFR BLD AUTO: 0.5 % (ref 0–0.5)
LYMPHOCYTES # BLD AUTO: 1.9 K/UL (ref 1–4.8)
LYMPHOCYTES NFR BLD: 13.9 % (ref 18–48)
MAGNESIUM SERPL-MCNC: 2.7 MG/DL (ref 1.6–2.6)
MCH RBC QN AUTO: 30.4 PG (ref 27–31)
MCHC RBC AUTO-ENTMCNC: 32.6 G/DL (ref 32–36)
MCV RBC AUTO: 93 FL (ref 82–98)
MONOCYTES # BLD AUTO: 1.9 K/UL (ref 0.3–1)
MONOCYTES NFR BLD: 14 % (ref 4–15)
NEUTROPHILS # BLD AUTO: 9.3 K/UL (ref 1.8–7.7)
NEUTROPHILS NFR BLD: 67.1 % (ref 38–73)
NRBC BLD-RTO: 0 /100 WBC
PHOSPHATE SERPL-MCNC: 5.8 MG/DL (ref 2.7–4.5)
PLATELET # BLD AUTO: 289 K/UL (ref 150–350)
PMV BLD AUTO: 11.3 FL (ref 9.2–12.9)
POCT GLUCOSE: 138 MG/DL (ref 70–110)
POCT GLUCOSE: 145 MG/DL (ref 70–110)
POTASSIUM SERPL-SCNC: 4.8 MMOL/L (ref 3.5–5.1)
PROT SERPL-MCNC: 7.1 G/DL (ref 6–8.4)
PTH-INTACT SERPL-MCNC: 104.7 PG/ML (ref 9–77)
RBC # BLD AUTO: 3.82 M/UL (ref 4.6–6.2)
SODIUM SERPL-SCNC: 141 MMOL/L (ref 136–145)
TROPONIN I SERPL DL<=0.01 NG/ML-MCNC: 0.16 NG/ML (ref 0–0.03)
WBC # BLD AUTO: 13.86 K/UL (ref 3.9–12.7)

## 2020-11-12 PROCEDURE — 83735 ASSAY OF MAGNESIUM: CPT | Mod: HCNC

## 2020-11-12 PROCEDURE — S5010 5% DEXTROSE AND 0.45% SALINE: HCPCS | Mod: HCNC | Performed by: INTERNAL MEDICINE

## 2020-11-12 PROCEDURE — 95816 EEG AWAKE AND DROWSY: CPT | Mod: HCNC

## 2020-11-12 PROCEDURE — 99223 PR INITIAL HOSPITAL CARE,LEVL III: ICD-10-PCS | Mod: HCNC,,, | Performed by: NEUROLOGICAL SURGERY

## 2020-11-12 PROCEDURE — 36415 COLL VENOUS BLD VENIPUNCTURE: CPT | Mod: HCNC

## 2020-11-12 PROCEDURE — 21400001 HC TELEMETRY ROOM: Mod: HCNC

## 2020-11-12 PROCEDURE — 80053 COMPREHEN METABOLIC PANEL: CPT | Mod: HCNC

## 2020-11-12 PROCEDURE — 99223 1ST HOSP IP/OBS HIGH 75: CPT | Mod: HCNC,,, | Performed by: NEUROLOGICAL SURGERY

## 2020-11-12 PROCEDURE — 84484 ASSAY OF TROPONIN QUANT: CPT | Mod: HCNC

## 2020-11-12 PROCEDURE — 95816 EEG AWAKE AND DROWSY: CPT | Mod: 26,HCNC,, | Performed by: PSYCHIATRY & NEUROLOGY

## 2020-11-12 PROCEDURE — 84100 ASSAY OF PHOSPHORUS: CPT | Mod: HCNC

## 2020-11-12 PROCEDURE — 80177 DRUG SCRN QUAN LEVETIRACETAM: CPT | Mod: HCNC

## 2020-11-12 PROCEDURE — S0166 INJ OLANZAPINE 2.5MG: HCPCS | Mod: HCNC | Performed by: INTERNAL MEDICINE

## 2020-11-12 PROCEDURE — 85025 COMPLETE CBC W/AUTO DIFF WBC: CPT | Mod: HCNC

## 2020-11-12 PROCEDURE — 25000003 PHARM REV CODE 250: Mod: HCNC | Performed by: INTERNAL MEDICINE

## 2020-11-12 PROCEDURE — 63600175 PHARM REV CODE 636 W HCPCS: Mod: HCNC | Performed by: INTERNAL MEDICINE

## 2020-11-12 PROCEDURE — 83970 ASSAY OF PARATHORMONE: CPT | Mod: HCNC

## 2020-11-12 PROCEDURE — 95816 PR EEG,W/AWAKE & DROWSY RECORD: ICD-10-PCS | Mod: 26,HCNC,, | Performed by: PSYCHIATRY & NEUROLOGY

## 2020-11-12 RX ORDER — GABAPENTIN 300 MG/1
300 CAPSULE ORAL 3 TIMES DAILY
Status: DISCONTINUED | OUTPATIENT
Start: 2020-11-12 | End: 2020-11-12

## 2020-11-12 RX ORDER — LEVOTHYROXINE SODIUM 75 UG/1
75 TABLET ORAL
Status: DISCONTINUED | OUTPATIENT
Start: 2020-11-12 | End: 2020-11-12

## 2020-11-12 RX ORDER — AMLODIPINE BESYLATE 5 MG/1
10 TABLET ORAL DAILY
Status: DISCONTINUED | OUTPATIENT
Start: 2020-11-12 | End: 2020-11-12

## 2020-11-12 RX ORDER — AMOXICILLIN 250 MG
1 CAPSULE ORAL DAILY
Status: DISCONTINUED | OUTPATIENT
Start: 2020-11-12 | End: 2020-11-12

## 2020-11-12 RX ORDER — VENLAFAXINE HYDROCHLORIDE 37.5 MG/1
150 CAPSULE, EXTENDED RELEASE ORAL DAILY
Status: DISCONTINUED | OUTPATIENT
Start: 2020-11-12 | End: 2020-11-12

## 2020-11-12 RX ORDER — DONEPEZIL HYDROCHLORIDE 10 MG/1
10 TABLET, FILM COATED ORAL NIGHTLY
Status: DISCONTINUED | OUTPATIENT
Start: 2020-11-12 | End: 2020-11-12

## 2020-11-12 RX ORDER — LEVOTHYROXINE SODIUM ANHYDROUS 100 UG/5ML
37.5 INJECTION, POWDER, LYOPHILIZED, FOR SOLUTION INTRAVENOUS DAILY
Status: DISCONTINUED | OUTPATIENT
Start: 2020-11-12 | End: 2020-11-12

## 2020-11-12 RX ORDER — ASPIRIN 81 MG/1
81 TABLET ORAL DAILY
Status: DISCONTINUED | OUTPATIENT
Start: 2020-11-12 | End: 2020-11-12

## 2020-11-12 RX ORDER — FUROSEMIDE 20 MG/1
20 TABLET ORAL 2 TIMES DAILY
Status: DISCONTINUED | OUTPATIENT
Start: 2020-11-12 | End: 2020-11-12

## 2020-11-12 RX ORDER — QUETIAPINE FUMARATE 25 MG/1
50 TABLET, FILM COATED ORAL NIGHTLY
Status: DISCONTINUED | OUTPATIENT
Start: 2020-11-12 | End: 2020-11-12

## 2020-11-12 RX ORDER — DOCUSATE CALCIUM 240 MG
240 CAPSULE ORAL 2 TIMES DAILY
Status: DISCONTINUED | OUTPATIENT
Start: 2020-11-12 | End: 2020-11-12

## 2020-11-12 RX ORDER — ATORVASTATIN CALCIUM 40 MG/1
40 TABLET, FILM COATED ORAL NIGHTLY
Status: DISCONTINUED | OUTPATIENT
Start: 2020-11-12 | End: 2020-11-12

## 2020-11-12 RX ORDER — OLANZAPINE 10 MG/2ML
2.5 INJECTION, POWDER, FOR SOLUTION INTRAMUSCULAR ONCE AS NEEDED
Status: COMPLETED | OUTPATIENT
Start: 2020-11-12 | End: 2020-11-12

## 2020-11-12 RX ADMIN — LEVOTHYROXINE SODIUM ANHYDROUS 37.5 MCG: 100 INJECTION, POWDER, LYOPHILIZED, FOR SOLUTION INTRAVENOUS at 10:11

## 2020-11-12 RX ADMIN — HEPARIN SODIUM 5000 UNITS: 5000 INJECTION INTRAVENOUS; SUBCUTANEOUS at 05:11

## 2020-11-12 RX ADMIN — HEPARIN SODIUM 5000 UNITS: 5000 INJECTION INTRAVENOUS; SUBCUTANEOUS at 10:11

## 2020-11-12 RX ADMIN — DEXTROSE AND SODIUM CHLORIDE: 5; .45 INJECTION, SOLUTION INTRAVENOUS at 10:11

## 2020-11-12 RX ADMIN — OLANZAPINE 2.5 MG: 10 INJECTION, POWDER, FOR SOLUTION INTRAMUSCULAR at 06:11

## 2020-11-12 RX ADMIN — CEFTRIAXONE 2 G: 2 INJECTION, SOLUTION INTRAVENOUS at 05:11

## 2020-11-12 NOTE — PLAN OF CARE
Discharge planning and home needs addressed with spouse, Krystal at bedside; confirmed information on face sheet as correct;     TN Role Explained.  Patient identified by using 2 identifiers:  Name and date of birth    Pt is dependent on care; lives at home with spouse who provides care; stated that she recently acquired a sitter to assist 2x's a week and has Home Health with [facility] which she would like to have resumed upon discharge    Wife stated that patient has walker, wheelchair, recently updated bathroom with walk in shower with integrated bench and grab bars; would like to have hospital bed when discharged; pending PT eval     TN name and means of contact given to patient and encouraged to call for any discharge needs or questions     Jason Levy MD prefers afternoon appts    Payor: [employer] MANAGED MEDICARE / Plan: [employer] TOTAL CARE ADVANTAGE / Product Type: Medicare Advantage /     Extended Emergency Contact Information  Primary Emergency Contact: Db Swenson  Address: [address] [place] [address]  Home Phone: [phone]  Relation: Daughter  Secondary Emergency Contact: Shaina Guzmán  Address: [address] [place] [address]  Mobile Phone: [phone]  Relation: Daughter      [facility] - [place], [place] - [address] at the [facility]  [address] [facility] [address]  Phone: [phone] Fax: [phone]    [id number] [place], [place] - [address] [address]  Phone: [phone] Fax: [phone]       11/12/20 1137   Discharge Assessment   Assessment Type Discharge Planning Assessment   Confirmed/corrected address and phone number on facesheet? Yes   Assessment information obtained from? Caregiver   Expected Length of Stay (days) 2   Communicated expected length of stay with patient/caregiver yes   Prior to hospitilization cognitive status: Unable to Assess   Prior to  hospitalization functional status: Completely Dependent   Current cognitive status: Unable to Assess   Current Functional Status: Completely Dependent   Lives With spouse   Able to Return to Prior Arrangements yes   Is patient able to care for self after discharge? No   Who are your caregiver(s) and their phone number(s)? Krystal Rutherford (spouse) [phone]   Patient's perception of discharge disposition home health   Readmission Within the Last 30 Days no previous admission in last 30 days   Patient currently being followed by outpatient case management? No   Patient currently receives any other outside agency services? No   Equipment Currently Used at Home wheelchair;walker, rolling;rollator;respiratory supplies;grab bar   Part D Coverage [employer]   Do you have any problems affording any of your prescribed medications? No   Is the patient taking medications as prescribed? yes   Does the patient have transportation home? Yes   Transportation Anticipated family or friend will provide   Does the patient receive services at the [facility]? No   Discharge Plan A Home Health   DME Needed Upon Discharge  hospital bed   Patient/Family in Agreement with Plan yes

## 2020-11-12 NOTE — HPI
"Mr. Rutherford is a 89 y.o. male who presents to the ED via EMS due to AMS per family. EMS states that the family called because the patient was "twitching and shaking" and he's not as talkative as he usually is. EMS reports that the patient is awake, but is not responding to questions. EMS states that he is also not making eye contact and has a blank stare. EMS notes that the patient is disoriented x4 and he has a PMHx of Dementia.      In the ER he was found to have lecucytosis, Cr 4.6 with poor response to IV fluid, UA with Leucocytes as well as very high Urine Protein to creatinine ratio.     The patient was also communicated with Neurology by ED physician, who suspect seizure for the patient has been on Tramadol.;     Pt also has elevation in the BNP and slight elevation in cardiac troponin.     -Dr Kwabena Adame         "

## 2020-11-12 NOTE — ASSESSMENT & PLAN NOTE
Acute worsening of CKD-3 and suspected UTI in the setting of preexisting advanced dementia and dependency of Tramadol for pain   Continue IV fluid, to see for reversal of FADI on CKD.   Nephrology consults  Frequent reorientation   Continue with delirium prevention protocol and precautions.

## 2020-11-12 NOTE — HOSPITAL COURSE
"Mr Rutherford presented with acute metabolic encephalopathy. Seizures and/or post-ictal state highly suspected given worsening of mental status overtime, "staring into space", frequent use of tramadol and gabapentin at home. Workup also significant with worsened renal function with moderate uremia and abnormal urinalysis possible suggesting urinary tract infection. Given dose of levetiracetam IV, intravenous fluids and empiric ceftriaxone. Urine cultures obtained.grow proteus, CT of brain with no acute pathology. EEG negative for epileptiform/seizure activity. Neurology and nephrology consulted. Although renal function remained unchanged despite fluids, patient became more interactive but did require plenty of redirection and frequently disoriented. Speech evaluated and cleared for pureed then mechanical soft. . Completed 5 days of abx therapy while inpatient. PT/OT recommending HH with 24 hour assistance. Patient may also have progressive dementia of unknown type (has Parkinson's features). Palliative care consulted for goals of care.his mental status was fluctuating,likley worsening dementia,worsening CKD,he was also not any candidate  for HD,spoke in long with wife and son,all agree with Hospice placement,patient was discharged to inpatient Hospice.family also agree with DNR status.    "

## 2020-11-12 NOTE — CONSULTS
89 yo m admitted with AMS, generalized twitching and tremors. Renal consult requested because elevated creatinine.    Pt is minimally responsive has obvious tremors and myoclonic jerks. Unable to provide an HPI or ROS    Wife at bedside and tells me that he normally talks and move about with help, urine wise he voids all the time in quantity. 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    Parkinsons     Requires assistance with all daily activities     Seizure 11/12/2020    Sleep apnea     Spinal stenosis     Trigeminal neuralgia pain 1/20/2015    Trouble in sleeping     Unsteady gait     Vertigo 9/11/2012    Vestibular schwannoma 6/4/2015     Past Surgical History:   Procedure Laterality Date    bilateral total knee arthroplasties      CATARACT EXTRACTION W/  INTRAOCULAR LENS IMPLANT Bilateral     CHOLECYSTECTOMY      EYE SURGERY      GALLBLADDER SURGERY      JOINT REPLACEMENT Bilateral     TKR    THYROIDECTOMY      TONSILLECTOMY       Review of patient's allergies indicates:   Allergen Reactions    Carbamazepine Swelling    Trazodone Anxiety    Bactrim [sulfamethoxazole-trimethoprim]      Hallucinations     Demerol [meperidine] Hallucinations    Morphine Nausea Only     Social History     Socioeconomic History    Marital status:      Spouse name: Not on file    Number of children: Not on file    Years of education: Not  on file    Highest education level: Not on file   Occupational History    Not on file   Social Needs    Financial resource strain: Not on file    Food insecurity     Worry: Not on file     Inability: Not on file    Transportation needs     Medical: Not on file     Non-medical: Not on file   Tobacco Use    Smoking status: Former Smoker    Smokeless tobacco: Never Used   Substance and Sexual Activity    Alcohol use: No     Alcohol/week: 0.0 standard drinks    Drug use: No    Sexual activity: Not Currently     Partners: Female   Lifestyle    Physical activity     Days per week: Not on file     Minutes per session: Not on file    Stress: Not on file   Relationships    Social connections     Talks on phone: Not on file     Gets together: Not on file     Attends Tenriism service: Not on file     Active member of club or organization: Not on file     Attends meetings of clubs or organizations: Not on file     Relationship status: Not on file   Other Topics Concern    Not on file   Social History Narrative    Not on file     Family History   Problem Relation Age of Onset    Heart disease Mother     No Known Problems Father     No Known Problems Sister     No Known Problems Brother     No Known Problems Daughter     No Known Problems Son     No Known Problems Sister     No Known Problems Brother     No Known Problems Brother     No Known Problems Brother     No Known Problems Maternal Aunt     No Known Problems Maternal Uncle     No Known Problems Paternal Aunt     No Known Problems Paternal Uncle     No Known Problems Maternal Grandmother     No Known Problems Maternal Grandfather     No Known Problems Paternal Grandmother     No Known Problems Paternal Grandfather     No Known Problems Daughter     No Known Problems Son     No Known Problems Son     Suicide Neg Hx     Schizophrenia Neg Hx     Amblyopia Neg Hx     Blindness Neg Hx     Cataracts Neg Hx     Glaucoma Neg Hx      Macular degeneration Neg Hx     Retinal detachment Neg Hx     Strabismus Neg Hx     Cancer Neg Hx     Diabetes Neg Hx     Hypertension Neg Hx     Stroke Neg Hx     Thyroid disease Neg Hx        Current Facility-Administered Medications   Medication    cefTRIAXone (ROCEPHIN) 2 g/50 mL D5W IVPB    dextrose 5 % and 0.45 % NaCl infusion    dextrose 50% injection 12.5 g    dextrose 50% injection 25 g    glucagon (human recombinant) injection 1 mg    glucose chewable tablet 16 g    glucose chewable tablet 24 g    heparin (porcine) injection 5,000 Units    levothyroxine (SYNTHROID) 37.5 mcg in sodium chloride 0.9% 100 mL IVPB    lorazepam injection 2 mg    sodium chloride 0.9% flush 10 mL       LABS    Recent Results (from the past 24 hour(s))   CBC auto differential    Collection Time: 11/11/20  2:00 PM   Result Value Ref Range    WBC 13.71 (H) 3.90 - 12.70 K/uL    RBC 4.02 (L) 4.60 - 6.20 M/uL    Hemoglobin 12.2 (L) 14.0 - 18.0 g/dL    Hematocrit 36.6 (L) 40.0 - 54.0 %    MCV 91 82 - 98 fL    MCH 30.3 27.0 - 31.0 pg    MCHC 33.3 32.0 - 36.0 g/dL    RDW 12.7 11.5 - 14.5 %    Platelets 294 150 - 350 K/uL    MPV 11.1 9.2 - 12.9 fL    Immature Granulocytes 0.5 0.0 - 0.5 %    Gran # (ANC) 9.0 (H) 1.8 - 7.7 K/uL    Immature Grans (Abs) 0.07 (H) 0.00 - 0.04 K/uL    Lymph # 2.3 1.0 - 4.8 K/uL    Mono # 1.7 (H) 0.3 - 1.0 K/uL    Eos # 0.4 0.0 - 0.5 K/uL    Baso # 0.14 0.00 - 0.20 K/uL    nRBC 0 0 /100 WBC    Gran % 65.9 38.0 - 73.0 %    Lymph % 16.9 (L) 18.0 - 48.0 %    Mono % 12.5 4.0 - 15.0 %    Eosinophil % 3.2 0.0 - 8.0 %    Basophil % 1.0 0.0 - 1.9 %    Differential Method Automated    Comprehensive metabolic panel    Collection Time: 11/11/20  2:00 PM   Result Value Ref Range    Sodium 139 136 - 145 mmol/L    Potassium 4.9 3.5 - 5.1 mmol/L    Chloride 103 95 - 110 mmol/L    CO2 23 23 - 29 mmol/L    Glucose 121 (H) 70 - 110 mg/dL    BUN 59 (H) 10 - 30 mg/dL    Creatinine 4.7 (H) 0.5 - 1.4 mg/dL    Calcium  8.7 8.7 - 10.5 mg/dL    Total Protein 7.5 6.0 - 8.4 g/dL    Albumin 3.3 (L) 3.5 - 5.2 g/dL    Total Bilirubin 0.5 0.1 - 1.0 mg/dL    Alkaline Phosphatase 110 55 - 135 U/L    AST 22 10 - 40 U/L    ALT 19 10 - 44 U/L    Anion Gap 13 8 - 16 mmol/L    eGFR if African American 12 (A) >60 mL/min/1.73 m^2    eGFR if non African American 10 (A) >60 mL/min/1.73 m^2   Troponin I    Collection Time: 11/11/20  2:00 PM   Result Value Ref Range    Troponin I 0.154 (H) 0.000 - 0.026 ng/mL   Brain natriuretic peptide    Collection Time: 11/11/20  2:00 PM   Result Value Ref Range    BNP 1,124 (H) 0 - 99 pg/mL   Lactic acid, plasma    Collection Time: 11/11/20  2:00 PM   Result Value Ref Range    Lactate (Lactic Acid) 0.9 0.5 - 2.2 mmol/L   Blood Culture #1 **CANNOT BE ORDERED STAT**    Collection Time: 11/11/20  2:00 PM    Specimen: Peripheral, Antecubital, Right; Blood   Result Value Ref Range    Blood Culture, Routine No Growth to date    CPK    Collection Time: 11/11/20  2:00 PM   Result Value Ref Range    CPK 78 20 - 200 U/L   Blood Culture #2 **CANNOT BE ORDERED STAT**    Collection Time: 11/11/20  2:29 PM    Specimen: Peripheral, Hand, Left; Blood   Result Value Ref Range    Blood Culture, Routine No Growth to date    Urinalysis, Reflex to Urine Culture Urine, Clean Catch    Collection Time: 11/11/20  2:44 PM    Specimen: Urine   Result Value Ref Range    Specimen UA Urine, Catheterized     Color, UA Yellow Yellow, Straw, [place] Marie    Appearance, UA Clear Clear    pH, UA 6.0 5.0 - 8.0    Specific Gravity, UA 1.010 1.005 - 1.030    Protein, UA 2+ (A) Negative    Glucose, UA 1+ (A) Negative    Ketones, UA Negative Negative    Bilirubin (UA) Negative Negative    Occult Blood UA 2+ (A) Negative    Nitrite, UA Negative Negative    Urobilinogen, UA Negative <2.0 EU/dL    Leukocytes, UA Negative Negative   Urinalysis Microscopic    Collection Time: 11/11/20  2:44 PM   Result Value Ref Range    RBC, UA 5 (H) 0 - 4 /hpf    WBC, UA 0 0 - 5  /hpf    Bacteria Moderate (A) None-Occ /hpf    Squam Epithel, UA 1 /hpf    Hyaline Casts, UA 0 0-1/lpf /lpf    Microscopic Comment SEE COMMENT    POCT COVID-19 Rapid Screening    Collection Time: 11/11/20  3:03 PM   Result Value Ref Range    POC Rapid COVID Negative Negative     Acceptable Yes    Sodium, urine, random    Collection Time: 11/11/20  6:13 PM   Result Value Ref Range    Sodium, Urine 40 20 - 250 mmol/L   Creatinine, urine, random    Collection Time: 11/11/20  6:13 PM   Result Value Ref Range    Creatinine, Urine 56.0 23.0 - 375.0 mg/dL   Protein / creatinine ratio, urine    Collection Time: 11/11/20  6:13 PM   Result Value Ref Range    Protein, Urine Random 145 mg/dL    Creatinine, Urine 56.0 23.0 - 375.0 mg/dL    Prot/Creat Ratio, Urine 2.59 (H) 0.00 - 0.20   Drug screen panel, emergency    Collection Time: 11/11/20  6:13 PM   Result Value Ref Range    Benzodiazepines Negative     Methadone metabolites Negative     Cocaine (Metab.) Negative     Opiate Scrn, Ur Negative     Barbiturate Screen, Ur Negative     Amphetamine Screen, Ur Negative     THC Negative     Phencyclidine Negative     Creatinine, Urine 56.0 23.0 - 375.0 mg/dL    Toxicology Information SEE COMMENT    Ethanol, urine    Collection Time: 11/11/20  6:13 PM   Result Value Ref Range    Alcohol, Urine <10 <10 mg/dL   Magnesium    Collection Time: 11/11/20  6:42 PM   Result Value Ref Range    Magnesium 2.5 1.6 - 2.6 mg/dL   Phosphorus    Collection Time: 11/11/20  6:42 PM   Result Value Ref Range    Phosphorus 5.7 (H) 2.7 - 4.5 mg/dL   Lactic acid, plasma    Collection Time: 11/11/20  6:42 PM   Result Value Ref Range    Lactate (Lactic Acid) 0.9 0.5 - 2.2 mmol/L   TSH    Collection Time: 11/11/20  6:42 PM   Result Value Ref Range    TSH 1.334 0.400 - 4.000 uIU/mL   Troponin I    Collection Time: 11/11/20  6:42 PM   Result Value Ref Range    Troponin I 0.144 (H) 0.000 - 0.026 ng/mL   Basic metabolic panel    Collection Time:  11/11/20  6:42 PM   Result Value Ref Range    Sodium 139 136 - 145 mmol/L    Potassium 4.6 3.5 - 5.1 mmol/L    Chloride 105 95 - 110 mmol/L    CO2 22 (L) 23 - 29 mmol/L    Glucose 147 (H) 70 - 110 mg/dL    BUN 57 (H) 10 - 30 mg/dL    Creatinine 4.6 (H) 0.5 - 1.4 mg/dL    Calcium 8.4 (L) 8.7 - 10.5 mg/dL    Anion Gap 12 8 - 16 mmol/L    eGFR if African American 12 (A) >60 mL/min/1.73 m^2    eGFR if non African American 10 (A) >60 mL/min/1.73 m^2   POCT glucose    Collection Time: 11/11/20  7:43 PM   Result Value Ref Range    POCT Glucose 137 (H) 70 - 110 mg/dL   Troponin I    Collection Time: 11/12/20 12:35 AM   Result Value Ref Range    Troponin I 0.162 (H) 0.000 - 0.026 ng/mL   Comprehensive Metabolic Panel (CMP)    Collection Time: 11/12/20  3:33 AM   Result Value Ref Range    Sodium 141 136 - 145 mmol/L    Potassium 4.8 3.5 - 5.1 mmol/L    Chloride 106 95 - 110 mmol/L    CO2 22 (L) 23 - 29 mmol/L    Glucose 125 (H) 70 - 110 mg/dL    BUN 56 (H) 10 - 30 mg/dL    Creatinine 4.5 (H) 0.5 - 1.4 mg/dL    Calcium 8.9 8.7 - 10.5 mg/dL    Total Protein 7.1 6.0 - 8.4 g/dL    Albumin 3.1 (L) 3.5 - 5.2 g/dL    Total Bilirubin 0.5 0.1 - 1.0 mg/dL    Alkaline Phosphatase 106 55 - 135 U/L    AST 19 10 - 40 U/L    ALT 15 10 - 44 U/L    Anion Gap 13 8 - 16 mmol/L    eGFR if African American 12 (A) >60 mL/min/1.73 m^2    eGFR if non African American 11 (A) >60 mL/min/1.73 m^2   Magnesium    Collection Time: 11/12/20  3:33 AM   Result Value Ref Range    Magnesium 2.7 (H) 1.6 - 2.6 mg/dL   Phosphorus    Collection Time: 11/12/20  3:33 AM   Result Value Ref Range    Phosphorus 5.8 (H) 2.7 - 4.5 mg/dL   CBC with Automated Differential    Collection Time: 11/12/20  3:33 AM   Result Value Ref Range    WBC 13.86 (H) 3.90 - 12.70 K/uL    RBC 3.82 (L) 4.60 - 6.20 M/uL    Hemoglobin 11.6 (L) 14.0 - 18.0 g/dL    Hematocrit 35.6 (L) 40.0 - 54.0 %    MCV 93 82 - 98 fL    MCH 30.4 27.0 - 31.0 pg    MCHC 32.6 32.0 - 36.0 g/dL    RDW 12.8 11.5 -  14.5 %    Platelets 289 150 - 350 K/uL    MPV 11.3 9.2 - 12.9 fL    Immature Granulocytes 0.5 0.0 - 0.5 %    Gran # (ANC) 9.3 (H) 1.8 - 7.7 K/uL    Immature Grans (Abs) 0.07 (H) 0.00 - 0.04 K/uL    Lymph # 1.9 1.0 - 4.8 K/uL    Mono # 1.9 (H) 0.3 - 1.0 K/uL    Eos # 0.5 0.0 - 0.5 K/uL    Baso # 0.15 0.00 - 0.20 K/uL    nRBC 0 0 /100 WBC    Gran % 67.1 38.0 - 73.0 %    Lymph % 13.9 (L) 18.0 - 48.0 %    Mono % 14.0 4.0 - 15.0 %    Eosinophil % 3.4 0.0 - 8.0 %    Basophil % 1.1 0.0 - 1.9 %    Differential Method Automated    ]    I/O last 3 completed shifts:  In: 1908.3 [I.V.:758.3; IV Piggyback:1150]  Out: -     Vitals:    11/11/20 2300 11/12/20 0539 11/12/20 0628 11/12/20 0746   BP: (!) 158/89  (!) 174/98 (!) 175/96   Pulse: 70 63  (!) 50   Resp: 17   18   Temp: 98.5 °F (36.9 °C) 97.7 °F (36.5 °C)  97.6 °F (36.4 °C)   TempSrc: Oral Oral  Oral   SpO2: 95% (!) 93%  (!) 93%   Weight:  83.1 kg (183 lb 3.2 oz)     Height:           No Jvd, Thyromegaly or Lymphadenopathy  Lungs: Fairly clear anteriorly and laterally  Cor: RRR no G or rubs  Abd: Soft benign good bowel sounds non tender  Ext: No E C C    A)  FADI cause unclear   Consider infections, obstruction, bladder malfx,meds  CKD3-4with 2.5  grams of proteinuria   AMS (on good doses of gabapentin)  Elevated WBC  Consider sepsis  DJD  Dementia  HTN  ARNOLDO  SCS  Hypothyroid    Renal Diet  Home meds adjust  All meds to the degree of renal fx  Protect access  HD not needed  EPO   Binders  Close follow up I/O and weights  Maintain Hydration

## 2020-11-12 NOTE — PROGRESS NOTES
11/12/20 0918   Missed Time Reason   Missed Time Reason Other (Comment)   PT orders received from Dr. Adame at 0212. Per chart, pt does not follow commands and troponins are uptrending. D/w Dr. Lorenzana. PT orders to be D/Cd for now and reordered if/when appropriate.

## 2020-11-12 NOTE — NURSING
Dr. Adame notified of increase of troponin from 0.144 to 0.162. No new orders at this time. Continuing to monitor status. Also, Dr. Adame approved to hold PO medication scheduled for 0200 since pt is currently NPO.

## 2020-11-12 NOTE — H&P
"[facility] Medicine  History & Physical    Patient Name: Ifeanyi Rutherford   MRN: [id number]  Admission Date: 11/11/2020  Attending Physician: Kwabena Adame MD  Primary Care Provider: Jason Levy MD         Patient information was obtained from ER records.     Subjective:     Principal Problem:Encephalopathy, metabolic    Chief Complaint:   Chief Complaint   Patient presents with    Altered Mental Status     family states altered mental status.  pt awake but not responding to questions.  pt will not make eye contact.  Hx: Dementia.        HPI: Mr. Rutherford is a 89 y.o. male who presents to the ED via EMS due to AMS per family. EMS states that the family called because the patient was "twitching and shaking" and he's not as talkative as he usually is. EMS reports that the patient is awake, but is not responding to questions. EMS states that he is also not making eye contact and has a blank stare. EMS notes that the patient is disoriented x4 and he has a PMHx of Dementia.      In the ER he was found to have lecucytosis, Cr 4.6 with poor response to IV fluid, UA with Leucocytes as well as very high Urine Protein to creatinine ratio.     The patient was also communicated with Neurology by Dr Lorenzana, who suspect seizure for the patient has been on Tramadol.;     Pt also has elevation in the BNP and slight elevation in cardiac troponin.            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    Parkinsons     Requires assistance with all daily activities     Sleep apnea     Spinal stenosis     Trigeminal neuralgia pain 1/20/2015    Trouble in sleeping     Unsteady gait     Vertigo 9/11/2012    Vestibular schwannoma 6/4/2015       Past Surgical History:   Procedure Laterality Date    bilateral total knee arthroplasties      CATARACT EXTRACTION W/  INTRAOCULAR LENS IMPLANT Bilateral     CHOLECYSTECTOMY      EYE SURGERY      GALLBLADDER SURGERY      JOINT REPLACEMENT Bilateral     TKR    THYROIDECTOMY      TONSILLECTOMY         Review of patient's allergies indicates:   Allergen Reactions    Carbamazepine Swelling    Trazodone Anxiety    Bactrim [sulfamethoxazole-trimethoprim]      Hallucinations     Demerol [meperidine] Hallucinations    Morphine Nausea Only       No current facility-administered medications on file prior to encounter.      Current Outpatient Medications on File Prior to Encounter   Medication Sig    acetaminophen (TYLENOL) 325 MG tablet Take 2 tablets (650 mg total) by mouth every 6 (six) hours as needed for Pain.    amLODIPine (NORVASC) 10 MG tablet TAKE 1 TABLET (10 MG TOTAL) BY MOUTH ONCE DAILY.    aspirin (ECOTRIN) 81 MG EC tablet Take 1 tablet (81 mg total) by mouth once daily.    atorvastatin (LIPITOR) 40 MG tablet Take 1 tablet (40 mg total) by mouth every evening.    diazePAM (VALIUM) 5 MG tablet Half to whole pill every 8 hours as needed for anxiety    docusate calcium (SURFAK) 240 mg capsule Take 240 mg by mouth 2 (two) times daily.    furosemide (LASIX) 20 MG tablet Take 1 tablet (20 mg total) by mouth 2 (two) times daily.    gabapentin (NEURONTIN) 300 MG capsule Take 1 capsule (300 mg total) by mouth 3 (three) times daily. Take 2 capsules (600 mg) by mouth three times daily.    ipratropium (ATROVENT) 0.03 % nasal spray 2 sprays by Nasal route before meals as needed for Rhinitis.    levothyroxine (SYNTHROID) 75 MCG tablet TAKE 1  TABLET BEFORE BREAKFAST    lidocaine HCl 2% (LIDOCAINE VISCOUS) 2 % Soln by Mucous Membrane route every 6 (six) hours.    QUEtiapine (SEROQUEL) 50 MG tablet Take 1 tablet (50 mg total) by mouth every evening.    sennosides-docusate sodium 8.6-50 mg Cap Take by mouth.    traMADoL (ULTRAM) 50 mg tablet 1-2 pills every 6 hr as needed for pain    venlafaxine (EFFEXOR-XR) 75 MG 24 hr capsule TAKE 2 CAPSULES EVERY DAY    donepeziL (ARICEPT) 10 MG tablet Take 1 tablet (10 mg total) by mouth every evening.    HYDROcodone-acetaminophen (NORCO) 5-325 mg per tablet 1-2 pills every 4-6  hrs prn pain    ketoconazole (NIZORAL) 2 % cream Apply topically once daily.    [DISCONTINUED] levothyroxine (SYNTHROID) 75 MCG tablet TAKE 1 TABLET BEFORE BREAKFAST     Family History     Problem Relation (Age of Onset)    Heart disease Mother    No Known Problems Father, Sister, Brother, Daughter, Son, Sister, Brother, Brother, Brother, Maternal Aunt, Maternal Uncle, Paternal Aunt, Paternal Uncle, Maternal Grandmother, Maternal Grandfather, Paternal Grandmother, Paternal Grandfather, Daughter, Son, Son        Tobacco Use    Smoking status: Former Smoker    Smokeless tobacco: Never Used   Substance and Sexual Activity    Alcohol use: No     Alcohol/week: 0.0 standard drinks    Drug use: No    Sexual activity: Not Currently     Partners: Female     Review of Systems   Unable to perform ROS: Dementia     Objective:     Vital Signs (Most Recent):  Temp: 97.7 °F (36.5 °C) (11/11/20 1942)  Pulse: 69 (11/11/20 1942)  Resp: 18 (11/11/20 1942)  BP: (!) 175/76 (11/11/20 1942)  SpO2: (!) 92 % (11/11/20 1942) Vital Signs (24h Range):  Temp:  [97.7 °F (36.5 °C)-98.2 °F (36.8 °C)] 97.7 °F (36.5 °C)  Pulse:  [64-79] 69  Resp:  [18] 18  SpO2:  [92 %-95 %] 92 %  BP: (171-185)/(76-99) 175/76     Weight: 85.1 kg (187 lb 9.8 oz)  Body mass index is 24.09 kg/m².    Physical Exam  Constitutional:       Appearance: Normal appearance.   HENT:      Head:  Normocephalic and atraumatic.      Nose: Nose normal.   Eyes:      Pupils: Pupils are equal, round, and reactive to light.   Cardiovascular:      Rate and Rhythm: Normal rate and regular rhythm.   Pulmonary:      Effort: Pulmonary effort is normal.      Breath sounds: Normal breath sounds.   Skin:     General: Skin is warm and dry.   Neurological:      Mental Status: He is alert. He is disoriented.      Comments: Tracking, at baseline when compared with previous documented physical exam.    Psychiatric:         Mood and Affect: Mood normal.         Behavior: Behavior normal.           CRANIAL NERVES     CN III, IV, VI   Pupils are equal, round, and reactive to light.       Significant Labs:   CBC:   Recent Labs   Lab 11/11/20  1400   WBC 13.71*   HGB 12.2*   HCT 36.6*        CMP:   Recent Labs   Lab 11/11/20  1400 11/11/20  1842    139   K 4.9 4.6    105   CO2 23 22*   * 147*   BUN 59* 57*   CREATININE 4.7* 4.6*   CALCIUM 8.7 8.4*   PROT 7.5  --    ALBUMIN 3.3*  --    BILITOT 0.5  --    ALKPHOS 110  --    AST 22  --    ALT 19  --    ANIONGAP 13 12   EGFRNONAA 10* 10*       Significant Imaging: I have reviewed all pertinent imaging results/findings within the past 24 hours.    Assessment/Plan:     * Encephalopathy, metabolic  Acute worsening of CKD-3 and suspected UTI in the setting of preexisting advanced dementia and dependency of Tramadol for pain   Continue IV fluid, to see for reversal of FADI on CKD.   Nephrology consults  Frequent reorientation   Continue with delirium prevention protocol and precautions.       Seizure  Observed body shaking in the NH  Received Keppra in the ER, plan for checking levels in the AM  Neurology consult for further management.        AMS (altered mental status)  See metabolic encephalopathy.        BPH (benign prostatic hyperplasia)  Chronic, will do bladder scan for any residual        Elevated troponin  Suspect cause worsening of renal failure  Continue  to monitor, the change is mild  Card consult if worsening.   Currently not complaining of Anginal pain.      Dementia  Chronic, suspected acute delirium in the setting of metabolic insults.   Continue DOnepezil 10 mg po  Treat the acute metabloic derangements.         Debility  Multiple chronic medical condition  Get nutrition, PT/OT and speech and swallow on board.         Essential hypertension  Chronic, stable, continue home medication, Amlodipine 10 mg po qd          VTE Risk Mitigation (From admission, onward)         Ordered     heparin (porcine) injection 5,000 Units  Every 8 hours      11/11/20 1753     IP VTE HIGH RISK PATIENT  Once      11/11/20 1753     Place sequential compression device  Until discontinued      11/11/20 1753                   Kwabena Adame MD  Department of Hospital Medicine   [facility]

## 2020-11-12 NOTE — NURSING
[employer] alarmed. Pt found in bed, gown/tele on floor, and brief shredded. Replaced brief and attempted to replace tele. Pt removing leads and placing in his mouth. Will attempt to replace tele when pt seems less restless.

## 2020-11-12 NOTE — PROCEDURES
Routine EEG Report      Ifeanyi Rutherford   [id number]  7/4/1929    DATE OF SERVICE:  11/12/2020  REASON FOR CONSULT:  89-year-old man with acute on chronic renal dysfunction, myoclonic jerks, and confusion.  Evaluate for evidence of epileptiform activity.    METHODOLOGY   Electroencephalographic (EEG) recording is with electrodes placed according to the International 10-20 placement system.  Thirty two (32) channels of digital signal (sampling rate of 512/sec) including T1 and T2 was simultaneously recorded from the scalp and may include  EKG, EMG, and/or eye monitors.  Recording band pass was 0.1 to 512 hz.  Digital video recording of the patient is simultaneously recorded with the EEG.  The patient is instructed report clinical symptoms which may occur during the recording session.  EEG and video recording is stored and archived in digital format. Activation procedures which include photic stimulation, hyperventilation and instructing patients to perform simple task are done in selected patients.    The EEG is displayed on a monitor screen and can be reviewed using different montages.  Computer assisted analysis is employed to detect spike and electrographic seizure activity.   The entire record is submitted for computer analysis.  The entire recording is visually reviewed and the times identified by computer analysis as being spikes or seizures are reviewed again.  Compresses spectral analysis (CSA) is also performed on the activity recorded from each individual channel.  This is displayed as a power display of frequencies from 0 to 30 Hz over time.   The CSA is reviewed looking for asymmetries in power between homologous areas of the scalp and then compared with the original EEG recording.     "[employer] software is also utilized in the review of this study.  This software suite analyzes the EEG recording in multiple domains.  Coherence and rhythmicity is computed to identify EEG sections which may contain  "organized seizures.  Each channel undergoes analysis to detect presence of spike and sharp waves which have special and morphological characteristic of epileptic activity.  The routine EEG recording is converted from spacial into frequency domain.  This is then displayed comparing homologous areas to identify areas of significant asymmetry.  Algorithm to identify non-cortically generated artifact is used to separate eye movement, EMG and other artifact from the EEG.      EEG FINDINGS  Background activity:   The background is continuous and symmetric predominantly theta/delta activity with a moderately well-formed 7 hz maximal posterior dominant rhythm seen bilaterally.    On select portions of the video, the patient has myoclonic jerks of the legs, arms, and trunk as well as chewing movements with no EEG correlate.    Sleep:  Sleep is not captured during this recording session.    Activation procedures:   The patient is awake, appears restless, and needs frequent redirection to not remove the electrodes.  He says simple phrases to his wife.  "Leave this hand alone."    Cardiac Monitor:   Irregular    Impression:   This is an abnormal routine awake EEG because of generalized background slowing consistent with diffuse cortical dysfunction and a moderate encephalopathy.  This finding is nonspecific with regards to etiology but can be seen in the setting of toxic/metabolic derangements, infection, advanced dementia, and as a medication effect.  On select portions of the video, the patient has brief myoclonic jerks of his arms, legs, and trunk with no EEG correlate, which are felt to be subcortical/metabolic in origin.  There are no prominent focal findings however encephalopathy obscures focal findings.  There are no epileptiform discharges and no electrographic seizures.    Paola Mckeon MD PhD  Neurology-Epilepsy  [facility].  [facility]    "

## 2020-11-12 NOTE — PT/OT/SLP PROGRESS
Speech Language Pathology      Ifeanyi Rutherford   MRN: [id number]    Patient not seen today secondary MD canceled ST orders for b/s swallow evaluation. Pending pt progress, please re-consult if warranted.     Aida Faulkner, SALINAS-SLP

## 2020-11-12 NOTE — ASSESSMENT & PLAN NOTE
Chronic, suspected acute delirium in the setting of metabolic insults.   Continue DOnepezil 10 mg po  Treat the acute metabloic derangements.

## 2020-11-12 NOTE — ASSESSMENT & PLAN NOTE
Suspect cause worsening of renal failure  Continue to monitor, the change is mild  Card consult if worsening.   Currently not complaining of Anginal pain.

## 2020-11-12 NOTE — SUBJECTIVE & OBJECTIVE
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    Chronic headache 9/12/2014    CKD stage 3 9/13/2019    Constipation     Dementia     worked up for NPH at  and had taps with no improvement    Depression     [place] (hard of hearing)     Hyperlipidemia     Hypertension     Hypothyroidism     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    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    Parkinsons     Requires assistance with all daily activities     Sleep apnea     Spinal stenosis     Trigeminal neuralgia pain 1/20/2015    Trouble in sleeping     Unsteady gait     Vertigo 9/11/2012    Vestibular schwannoma 6/4/2015       Past Surgical History:   Procedure Laterality Date    bilateral total knee arthroplasties      CATARACT EXTRACTION W/  INTRAOCULAR LENS IMPLANT Bilateral     CHOLECYSTECTOMY      EYE SURGERY      GALLBLADDER SURGERY      JOINT REPLACEMENT Bilateral     TKR    THYROIDECTOMY      TONSILLECTOMY         Review of patient's allergies indicates:   Allergen Reactions    Carbamazepine Swelling    Trazodone Anxiety    Bactrim [sulfamethoxazole-trimethoprim]      Hallucinations     Demerol [meperidine] Hallucinations    Morphine Nausea Only       No current facility-administered medications on file prior to encounter.      Current Outpatient Medications on File Prior to Encounter   Medication Sig    acetaminophen (TYLENOL) 325 MG tablet Take 2 tablets (650 mg total) by mouth every 6 (six) hours as needed for Pain.    amLODIPine (NORVASC) 10 MG tablet TAKE 1 TABLET (10 MG TOTAL) BY MOUTH ONCE DAILY.    aspirin (ECOTRIN) 81 MG EC tablet Take 1 tablet (81 mg total) by mouth once daily.    atorvastatin (LIPITOR) 40 MG tablet Take 1 tablet (40 mg total) by mouth every evening.    diazePAM (VALIUM) 5 MG tablet  Half to whole pill every 8 hours as needed for anxiety    docusate calcium (SURFAK) 240 mg capsule Take 240 mg by mouth 2 (two) times daily.    furosemide (LASIX) 20 MG tablet Take 1 tablet (20 mg total) by mouth 2 (two) times daily.    gabapentin (NEURONTIN) 300 MG capsule Take 1 capsule (300 mg total) by mouth 3 (three) times daily. Take 2 capsules (600 mg) by mouth three times daily.    ipratropium (ATROVENT) 0.03 % nasal spray 2 sprays by Nasal route before meals as needed for Rhinitis.    levothyroxine (SYNTHROID) 75 MCG tablet TAKE 1 TABLET BEFORE BREAKFAST    lidocaine HCl 2% (LIDOCAINE VISCOUS) 2 % Soln by Mucous Membrane route every 6 (six) hours.    QUEtiapine (SEROQUEL) 50 MG tablet Take 1 tablet (50 mg total) by mouth every evening.    sennosides-docusate sodium 8.6-50 mg Cap Take by mouth.    traMADoL (ULTRAM) 50 mg tablet 1-2 pills every 6 hr as needed for pain    venlafaxine (EFFEXOR-XR) 75 MG 24 hr capsule TAKE 2 CAPSULES EVERY DAY    donepeziL (ARICEPT) 10 MG tablet Take 1 tablet (10 mg total) by mouth every evening.    HYDROcodone-acetaminophen (NORCO) 5-325 mg per tablet 1-2 pills every 4-6  hrs prn pain    ketoconazole (NIZORAL) 2 % cream Apply topically once daily.    [DISCONTINUED] levothyroxine (SYNTHROID) 75 MCG tablet TAKE 1 TABLET BEFORE BREAKFAST     Family History     Problem Relation (Age of Onset)    Heart disease Mother    No Known Problems Father, Sister, Brother, Daughter, Son, Sister, Brother, Brother, Brother, Maternal Aunt, Maternal Uncle, Paternal Aunt, Paternal Uncle, Maternal Grandmother, Maternal Grandfather, Paternal Grandmother, Paternal Grandfather, Daughter, Son, Son        Tobacco Use    Smoking status: Former Smoker    Smokeless tobacco: Never Used   Substance and Sexual Activity    Alcohol use: No     Alcohol/week: 0.0 standard drinks    Drug use: No    Sexual activity: Not Currently     Partners: Female     Review of Systems   Unable to perform ROS:  Dementia     Objective:     Vital Signs (Most Recent):  Temp: 97.7 °F (36.5 °C) (11/11/20 1942)  Pulse: 69 (11/11/20 1942)  Resp: 18 (11/11/20 1942)  BP: (!) 175/76 (11/11/20 1942)  SpO2: (!) 92 % (11/11/20 1942) Vital Signs (24h Range):  Temp:  [97.7 °F (36.5 °C)-98.2 °F (36.8 °C)] 97.7 °F (36.5 °C)  Pulse:  [64-79] 69  Resp:  [18] 18  SpO2:  [92 %-95 %] 92 %  BP: (171-185)/(76-99) 175/76     Weight: 85.1 kg (187 lb 9.8 oz)  Body mass index is 24.09 kg/m².    Physical Exam  Constitutional:       Appearance: Normal appearance.   HENT:      Head: Normocephalic and atraumatic.      Nose: Nose normal.   Eyes:      Pupils: Pupils are equal, round, and reactive to light.   Cardiovascular:      Rate and Rhythm: Normal rate and regular rhythm.   Pulmonary:      Effort: Pulmonary effort is normal.      Breath sounds: Normal breath sounds.   Skin:     General: Skin is warm and dry.   Neurological:      Mental Status: He is alert. He is disoriented.      Comments: Tracking, at baseline when compared with previous documented physical exam.    Psychiatric:         Mood and Affect: Mood normal.         Behavior: Behavior normal.           CRANIAL NERVES     CN III, IV, VI   Pupils are equal, round, and reactive to light.       Significant Labs:   CBC:   Recent Labs   Lab 11/11/20  1400   WBC 13.71*   HGB 12.2*   HCT 36.6*        CMP:   Recent Labs   Lab 11/11/20  1400 11/11/20  1842    139   K 4.9 4.6    105   CO2 23 22*   * 147*   BUN 59* 57*   CREATININE 4.7* 4.6*   CALCIUM 8.7 8.4*   PROT 7.5  --    ALBUMIN 3.3*  --    BILITOT 0.5  --    ALKPHOS 110  --    AST 22  --    ALT 19  --    ANIONGAP 13 12   EGFRNONAA 10* 10*       Significant Imaging: I have reviewed all pertinent imaging results/findings within the past 24 hours.

## 2020-11-12 NOTE — ASSESSMENT & PLAN NOTE
Observed body shaking in the NH  Received Keppra in the ER, plan for checking levels in the AM  Neurology consult for further management.

## 2020-11-12 NOTE — PT/OT/SLP PROGRESS
Occupational Therapy      Patient Name:  Ifeanyi Rutherford    MRN:  [id number]    MD canceled OT orders for evaluation. If pt becomes appropriate for OT services, please re-consult. Thank you.     Fawn Gann, OT  11/12/2020

## 2020-11-12 NOTE — CARE UPDATE
"Ms Rutherford was seen and examined by me today. He is accompanied by his wife at bedside. Presented with worsening encephalopathy over the last week. Per wife, he had been feeding himself and ambulating some although mainly wheelchair dependent, but this past week he had barely been eating nor doing much; just very confused and combative at times. Also with "the shakes". Is on tramadol twice daily for chronic pain 2/2 trigeminal neuralgia. Had been recently taken off of his aricept recently for his "jerking movements". He was staring into space, not quite following commands and with acute renal failure. Also with non sustained clonus to extremities. Had hearing aide in place. Tramadol held due to suspicion for seizures and loaded with levetiracetam (renally dosed). On intravenous fluids and NPO pending clinical improvement. No acute pathology on brain imaging. No indication for RRT at this time but renal function not quite improving with fluids. Renal ultrasound pending. EEG pending. Neuro checks. Appreciate further recs from nephro and neurology. Discussed with patient's wife at bedside. Needs 24 hour supervision. On empiric abx in case of UTI. Cultures thus far negative.   "

## 2020-11-12 NOTE — NURSING
Handoff report given to PRECIOUS [place] at bedside. Patient resting comfortably in bed. Patient has D51/2NS infusing at 100ml/h. NAD noted. Fall/Safety precautions maintained. Call light and personal items within reach. Sitter at bedside. Communication board updated.

## 2020-11-13 ENCOUNTER — TELEPHONE (OUTPATIENT)
Dept: FAMILY MEDICINE | Facility: CLINIC | Age: 85
End: 2020-11-13

## 2020-11-13 LAB
ALBUMIN SERPL BCP-MCNC: 2.6 G/DL (ref 3.5–5.2)
ALP SERPL-CCNC: 91 U/L (ref 55–135)
ALT SERPL W/O P-5'-P-CCNC: 12 U/L (ref 10–44)
ANION GAP SERPL CALC-SCNC: 13 MMOL/L (ref 8–16)
ASCENDING AORTA: 3.32 CM
AST SERPL-CCNC: 22 U/L (ref 10–40)
AV INDEX (PROSTH): 0.78
AV MEAN GRADIENT: 5 MMHG
AV PEAK GRADIENT: 7 MMHG
AV VALVE AREA: 3.52 CM2
AV VELOCITY RATIO: 0.91
BASOPHILS # BLD AUTO: 0.12 K/UL (ref 0–0.2)
BASOPHILS NFR BLD: 0.9 % (ref 0–1.9)
BILIRUB SERPL-MCNC: 0.4 MG/DL (ref 0.1–1)
BSA FOR ECHO PROCEDURE: 2.11 M2
BUN SERPL-MCNC: 51 MG/DL (ref 10–30)
CALCIUM SERPL-MCNC: 8.6 MG/DL (ref 8.7–10.5)
CHLORIDE SERPL-SCNC: 107 MMOL/L (ref 95–110)
CO2 SERPL-SCNC: 21 MMOL/L (ref 23–29)
CREAT SERPL-MCNC: 4.2 MG/DL (ref 0.5–1.4)
CV ECHO LV RWT: 0.48 CM
DIFFERENTIAL METHOD: ABNORMAL
DOP CALC AO PEAK VEL: 1.36 M/S
DOP CALC AO VTI: 30.05 CM
DOP CALC LVOT AREA: 4.5 CM2
DOP CALC LVOT DIAMETER: 2.39 CM
DOP CALC LVOT PEAK VEL: 1.24 M/S
DOP CALC LVOT STROKE VOLUME: 105.73 CM3
DOP CALCLVOT PEAK VEL VTI: 23.58 CM
E WAVE DECELERATION TIME: 235.56 MSEC
E/A RATIO: 0.64
E/E' RATIO: 12.92 M/S
ECHO LV POSTERIOR WALL: 1.11 CM (ref 0.6–1.1)
EOSINOPHIL # BLD AUTO: 0.3 K/UL (ref 0–0.5)
EOSINOPHIL NFR BLD: 2.3 % (ref 0–8)
ERYTHROCYTE [DISTWIDTH] IN BLOOD BY AUTOMATED COUNT: 12.7 % (ref 11.5–14.5)
EST. GFR  (AFRICAN AMERICAN): 13 ML/MIN/1.73 M^2
EST. GFR  (NON AFRICAN AMERICAN): 12 ML/MIN/1.73 M^2
FRACTIONAL SHORTENING: 55 % (ref 28–44)
GLUCOSE SERPL-MCNC: 133 MG/DL (ref 70–110)
HCT VFR BLD AUTO: 36.1 % (ref 40–54)
HGB BLD-MCNC: 11.7 G/DL (ref 14–18)
IMM GRANULOCYTES # BLD AUTO: 0.08 K/UL (ref 0–0.04)
IMM GRANULOCYTES NFR BLD AUTO: 0.6 % (ref 0–0.5)
INTERVENTRICULAR SEPTUM: 1.37 CM (ref 0.6–1.1)
IVRT: 133.79 MSEC
LA MAJOR: 5.89 CM
LA MINOR: 5.75 CM
LA WIDTH: 4.14 CM
LEFT ATRIUM SIZE: 5.08 CM
LEFT ATRIUM VOLUME INDEX: 49.7 ML/M2
LEFT ATRIUM VOLUME: 104.03 CM3
LEFT INTERNAL DIMENSION IN SYSTOLE: 2.11 CM (ref 2.1–4)
LEFT VENTRICLE DIASTOLIC VOLUME INDEX: 47.69 ML/M2
LEFT VENTRICLE DIASTOLIC VOLUME: 99.84 ML
LEFT VENTRICLE MASS INDEX: 104 G/M2
LEFT VENTRICLE SYSTOLIC VOLUME INDEX: 6.9 ML/M2
LEFT VENTRICLE SYSTOLIC VOLUME: 14.52 ML
LEFT VENTRICULAR INTERNAL DIMENSION IN DIASTOLE: 4.65 CM (ref 3.5–6)
LEFT VENTRICULAR MASS: 218.52 G
LV LATERAL E/E' RATIO: 12 M/S
LV SEPTAL E/E' RATIO: 14 M/S
LYMPHOCYTES # BLD AUTO: 1.8 K/UL (ref 1–4.8)
LYMPHOCYTES NFR BLD: 12.5 % (ref 18–48)
MAGNESIUM SERPL-MCNC: 2.3 MG/DL (ref 1.6–2.6)
MCH RBC QN AUTO: 30.1 PG (ref 27–31)
MCHC RBC AUTO-ENTMCNC: 32.4 G/DL (ref 32–36)
MCV RBC AUTO: 93 FL (ref 82–98)
MONOCYTES # BLD AUTO: 2.1 K/UL (ref 0.3–1)
MONOCYTES NFR BLD: 15 % (ref 4–15)
MV PEAK A VEL: 1.31 M/S
MV PEAK E VEL: 0.84 M/S
MV STENOSIS PRESSURE HALF TIME: 72.88 MS
MV VALVE AREA P 1/2 METHOD: 3.02 CM2
NEUTROPHILS # BLD AUTO: 9.7 K/UL (ref 1.8–7.7)
NEUTROPHILS NFR BLD: 68.7 % (ref 38–73)
NRBC BLD-RTO: 0 /100 WBC
PISA TR MAX VEL: 1.21 M/S
PLATELET # BLD AUTO: 288 K/UL (ref 150–350)
PMV BLD AUTO: 11.1 FL (ref 9.2–12.9)
POCT GLUCOSE: 109 MG/DL (ref 70–110)
POCT GLUCOSE: 126 MG/DL (ref 70–110)
POCT GLUCOSE: 133 MG/DL (ref 70–110)
POTASSIUM SERPL-SCNC: 4 MMOL/L (ref 3.5–5.1)
PROT SERPL-MCNC: 6.3 G/DL (ref 6–8.4)
RA MAJOR: 6.13 CM
RA PRESSURE: 3 MMHG
RA WIDTH: 3.98 CM
RBC # BLD AUTO: 3.89 M/UL (ref 4.6–6.2)
RIGHT VENTRICULAR END-DIASTOLIC DIMENSION: 2.97 CM
RV TISSUE DOPPLER FREE WALL SYSTOLIC VELOCITY 1 (APICAL 4 CHAMBER VIEW): 9.58 CM/S
SINUS: 3.69 CM
SODIUM SERPL-SCNC: 141 MMOL/L (ref 136–145)
STJ: 3.3 CM
TDI LATERAL: 0.07 M/S
TDI SEPTAL: 0.06 M/S
TDI: 0.07 M/S
TR MAX PG: 6 MMHG
TRICUSPID ANNULAR PLANE SYSTOLIC EXCURSION: 2.13 CM
TV REST PULMONARY ARTERY PRESSURE: 9 MMHG
WBC # BLD AUTO: 14.1 K/UL (ref 3.9–12.7)

## 2020-11-13 PROCEDURE — 21400001 HC TELEMETRY ROOM: Mod: HCNC

## 2020-11-13 PROCEDURE — 36415 COLL VENOUS BLD VENIPUNCTURE: CPT | Mod: HCNC

## 2020-11-13 PROCEDURE — 63600175 PHARM REV CODE 636 W HCPCS: Mod: HCNC | Performed by: INTERNAL MEDICINE

## 2020-11-13 PROCEDURE — 25000003 PHARM REV CODE 250: Mod: HCNC | Performed by: NURSE PRACTITIONER

## 2020-11-13 PROCEDURE — 92610 EVALUATE SWALLOWING FUNCTION: CPT | Mod: HCNC

## 2020-11-13 PROCEDURE — 83735 ASSAY OF MAGNESIUM: CPT | Mod: HCNC

## 2020-11-13 PROCEDURE — 25000003 PHARM REV CODE 250: Mod: HCNC | Performed by: INTERNAL MEDICINE

## 2020-11-13 PROCEDURE — S5010 5% DEXTROSE AND 0.45% SALINE: HCPCS | Mod: HCNC | Performed by: INTERNAL MEDICINE

## 2020-11-13 PROCEDURE — 85025 COMPLETE CBC W/AUTO DIFF WBC: CPT | Mod: HCNC

## 2020-11-13 PROCEDURE — S0166 INJ OLANZAPINE 2.5MG: HCPCS | Mod: HCNC | Performed by: NURSE PRACTITIONER

## 2020-11-13 PROCEDURE — 97161 PT EVAL LOW COMPLEX 20 MIN: CPT | Mod: HCNC

## 2020-11-13 PROCEDURE — 80053 COMPREHEN METABOLIC PANEL: CPT | Mod: HCNC

## 2020-11-13 PROCEDURE — 97165 OT EVAL LOW COMPLEX 30 MIN: CPT | Mod: HCNC

## 2020-11-13 PROCEDURE — 97535 SELF CARE MNGMENT TRAINING: CPT | Mod: HCNC

## 2020-11-13 RX ORDER — QUETIAPINE FUMARATE 25 MG/1
25 TABLET, FILM COATED ORAL NIGHTLY
Status: DISCONTINUED | OUTPATIENT
Start: 2020-11-13 | End: 2020-11-15

## 2020-11-13 RX ORDER — ACETAMINOPHEN 650 MG/20.3ML
650 LIQUID ORAL EVERY 6 HOURS PRN
Status: DISCONTINUED | OUTPATIENT
Start: 2020-11-13 | End: 2020-11-18 | Stop reason: HOSPADM

## 2020-11-13 RX ORDER — ATORVASTATIN CALCIUM 40 MG/1
40 TABLET, FILM COATED ORAL NIGHTLY
Status: DISCONTINUED | OUTPATIENT
Start: 2020-11-13 | End: 2020-11-18 | Stop reason: HOSPADM

## 2020-11-13 RX ORDER — HYDRALAZINE HYDROCHLORIDE 20 MG/ML
10 INJECTION INTRAMUSCULAR; INTRAVENOUS EVERY 8 HOURS PRN
Status: DISCONTINUED | OUTPATIENT
Start: 2020-11-13 | End: 2020-11-18

## 2020-11-13 RX ORDER — LEVOTHYROXINE SODIUM 75 UG/1
75 TABLET ORAL
Status: DISCONTINUED | OUTPATIENT
Start: 2020-11-14 | End: 2020-11-18 | Stop reason: HOSPADM

## 2020-11-13 RX ORDER — AMLODIPINE BESYLATE 5 MG/1
10 TABLET ORAL DAILY
Status: DISCONTINUED | OUTPATIENT
Start: 2020-11-14 | End: 2020-11-18 | Stop reason: HOSPADM

## 2020-11-13 RX ORDER — OLANZAPINE 10 MG/2ML
5 INJECTION, POWDER, FOR SOLUTION INTRAMUSCULAR ONCE AS NEEDED
Status: COMPLETED | OUTPATIENT
Start: 2020-11-13 | End: 2020-11-13

## 2020-11-13 RX ADMIN — HEPARIN SODIUM 5000 UNITS: 5000 INJECTION INTRAVENOUS; SUBCUTANEOUS at 01:11

## 2020-11-13 RX ADMIN — ATORVASTATIN CALCIUM 40 MG: 40 TABLET, FILM COATED ORAL at 10:11

## 2020-11-13 RX ADMIN — HYDRALAZINE HYDROCHLORIDE 10 MG: 20 INJECTION, SOLUTION INTRAMUSCULAR; INTRAVENOUS at 12:11

## 2020-11-13 RX ADMIN — DEXTROSE AND SODIUM CHLORIDE: 5; .45 INJECTION, SOLUTION INTRAVENOUS at 07:11

## 2020-11-13 RX ADMIN — ACETAMINOPHEN 650 MG: 160 SOLUTION ORAL at 01:11

## 2020-11-13 RX ADMIN — LEVOTHYROXINE SODIUM ANHYDROUS 37.5 MCG: 100 INJECTION, POWDER, LYOPHILIZED, FOR SOLUTION INTRAVENOUS at 10:11

## 2020-11-13 RX ADMIN — HEPARIN SODIUM 5000 UNITS: 5000 INJECTION INTRAVENOUS; SUBCUTANEOUS at 10:11

## 2020-11-13 RX ADMIN — CEFTRIAXONE 2 G: 2 INJECTION, SOLUTION INTRAVENOUS at 06:11

## 2020-11-13 RX ADMIN — QUETIAPINE FUMARATE 25 MG: 25 TABLET ORAL at 10:11

## 2020-11-13 RX ADMIN — OLANZAPINE 5 MG: 10 INJECTION, POWDER, FOR SOLUTION INTRAMUSCULAR at 03:11

## 2020-11-13 RX ADMIN — DEXTROSE AND SODIUM CHLORIDE: 5; .45 INJECTION, SOLUTION INTRAVENOUS at 02:11

## 2020-11-13 RX ADMIN — HEPARIN SODIUM 5000 UNITS: 5000 INJECTION INTRAVENOUS; SUBCUTANEOUS at 05:11

## 2020-11-13 NOTE — PLAN OF CARE
Important Message from Medicare and discharge appeal process reviewed with spouse at bedside; she was given opportunity to ask questions; after which, verbalized understanding of rights; copy was placed in medical record chart and one copy given to spouse to place in his blue [employer] folder for her review and records       11/13/20 1217   Medicare Message   Important Message from Medicare regarding Discharge Appeal Rights Given to patient/caregiver;Explained to patient/caregiver;Signed/date by patient/caregiver   Date IMM was signed 11/13/20   Time IMM was signed 1210

## 2020-11-13 NOTE — PT/OT/SLP EVAL
Speech Language Pathology Evaluation  Bedside Swallow    Patient Name:  Ifeanyi Rutherford    MRN:  [id number]  Admitting Diagnosis: Encephalopathy, metabolic    Recommendations:                 General Recommendations:  Dysphagia therapy  Diet recommendations:  Puree, Thin   Aspiration Precautions: 1 bite/sip at a time, Alternating bites/sips, Assistance with meals, Feed only when awake/alert, HOB to 90 degrees, Small bites/sips and Standard aspiration precautions   General Precautions: Standard, pureed diet  Communication strategies:  provide increased time to answer    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    Chronic headache 9/12/2014    CKD stage 3 9/13/2019    Constipation     Dementia     worked up for NPH at  and had taps with no improvement    Depression     [place] (hard of hearing)     Hyperlipidemia     Hypertension     Hypothyroidism     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    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    Parkinsons     Requires assistance with all daily activities     Seizure 11/12/2020    Sleep apnea     Spinal stenosis     Trigeminal neuralgia pain 1/20/2015    Trouble in sleeping     Unsteady gait     Vertigo 9/11/2012    Vestibular schwannoma 6/4/2015       Past Surgical History:   Procedure Laterality Date    bilateral total knee arthroplasties      CATARACT EXTRACTION W/  INTRAOCULAR LENS IMPLANT Bilateral     CHOLECYSTECTOMY      EYE SURGERY      GALLBLADDER SURGERY      JOINT REPLACEMENT Bilateral     TKR    THYROIDECTOMY      TONSILLECTOMY         Social History: Patient lives with spouse. Baseline dementia.    Modified Barium Swallow: none on file    Chest X-Rays: Cardiac silhouette is magnified but is probably borderline enlarged.  There is  atherosclerosis of the thoracic aorta.     Lung volumes are low with resultant crowding pulmonary vascular markings.  Elevation of right hemidiaphragm and basilar compressive atelectasis redemonstrated.     No focal airspace opacity.       Prior diet: unrestricted per spouse report    Subjective     Pt asleep upon SLP arrival. Spouse stated pt hasn't eaten in 2 days, however, pt has baseline good appetite and denies swallowing difficulty. Pt extremely lethargic during swallow eval requiring max tactile and verbal cues to wake up and accept PO trials. Pt with poor participation and poor oral acceptance.    Patient goals: To resume PO per spouse.    Pain/Comfort:  · Pain Rating 1: 0/10    Objective:     Oral Musculature Evaluation  · Oral Musculature: unable to assess due to poor participation/comprehension  · Dentition: edentulous  · Secretion Management: adequate  · Mucosal Quality: good    Bedside Swallow Eval:   Consistencies Assessed:  · Thin liquids via spoon x6 trials & straw x1 trial  · Puree x3 trials     Oral Phase: Across trials  · Decreased closure around utensil  · Poor oral acceptance  · Slow oral transit time    Pharyngeal Phase:   · delayed swallow initation    Compensatory Strategies  · None    Treatment: Rec: PO diet pureed/thin liquids    Please note silent aspiration cannot be ruled out at bedside.    Education: Patient and family educated on aspiration precautions( feed only when awake/alert, small sips/bites. HOB elevated during meals.    Nurse Humza and Dr. Lorenzana notified regarding diet recs. White board update in patient's room regarding diet recs.      Assessment:     Ifeanyi Rutherford Sr is a 89 y.o. male with a dx of Encephalopathy, metabolic. Pt presents with moderate oropharyngeal dysphagia 2/2 AMS and cognitive impairments. ST will follow per POC.    Goals:   Multidisciplinary Problems     SLP Goals        Problem: SLP Goal    Goal Priority Disciplines Outcome   SLP Goal     SLP  Ongoing, Progressing   Description: ST. Pt will tolerate PO diet of pureed/thin liquids without overt s/s of aspiration.                   Plan:     · Patient to be seen:  3 x/week   · Plan of Care expires:  20  · Plan of Care reviewed with:  patient, spouse, other (see comments)(sitter)   · SLP Follow-Up:  Yes       Discharge recommendations:  other (see comments)(TBD)   Barriers to Discharge:  None    Time Tracking:     SLP Treatment Date:   20  Speech Start Time:  949  Speech Stop Time:       Speech Total Time (min):  26 min    Billable Minutes: Eval Swallow and Oral Function 18 min and Seld Care/Home Management Training 8 min    Sangeeta Jain CCC-SLP  2020

## 2020-11-13 NOTE — PROGRESS NOTES
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    Parkinsons     Requires assistance with all daily activities     Seizure 11/12/2020    Sleep apnea     Spinal stenosis     Trigeminal neuralgia pain 1/20/2015    Trouble in sleeping     Unsteady gait     Vertigo 9/11/2012    Vestibular schwannoma 6/4/2015     Past Surgical History:   Procedure Laterality Date    bilateral total knee arthroplasties      CATARACT EXTRACTION W/  INTRAOCULAR LENS IMPLANT Bilateral     CHOLECYSTECTOMY      EYE SURGERY      GALLBLADDER SURGERY      JOINT REPLACEMENT Bilateral     TKR    THYROIDECTOMY      TONSILLECTOMY       Review of patient's allergies indicates:   Allergen Reactions    Carbamazepine Swelling    Trazodone Anxiety    Bactrim [sulfamethoxazole-trimethoprim]      Hallucinations     Demerol [meperidine] Hallucinations    Morphine Nausea Only       Current Facility-Administered Medications   Medication    acetaminophen oral solution 650 mg    [START ON 11/14/2020] amLODIPine tablet 10 mg    cefTRIAXone (ROCEPHIN) 2 g/50 mL D5W IVPB    dextrose 5 % and 0.45 % NaCl infusion    dextrose 50% injection 12.5 g    dextrose 50% injection 25 g    glucagon (human recombinant) injection 1 mg    glucose chewable tablet 16 g    glucose chewable tablet 24 g    heparin (porcine) injection 5,000 Units    hydrALAZINE injection 10 mg     levothyroxine (SYNTHROID) 37.5 mcg in sodium chloride 0.9% 100 mL IVPB    lorazepam injection 2 mg    sodium chloride 0.9% flush 10 mL       LABS    Recent Results (from the past 24 hour(s))   POCT glucose    Collection Time: 11/12/20  5:24 PM   Result Value Ref Range    POCT Glucose 145 (H) 70 - 110 mg/dL   POCT glucose    Collection Time: 11/13/20 12:49 AM   Result Value Ref Range    POCT Glucose 126 (H) 70 - 110 mg/dL   POCT glucose    Collection Time: 11/13/20  6:28 AM   Result Value Ref Range    POCT Glucose 133 (H) 70 - 110 mg/dL   Comprehensive Metabolic Panel (CMP)    Collection Time: 11/13/20  7:17 AM   Result Value Ref Range    Sodium 141 136 - 145 mmol/L    Potassium 4.0 3.5 - 5.1 mmol/L    Chloride 107 95 - 110 mmol/L    CO2 21 (L) 23 - 29 mmol/L    Glucose 133 (H) 70 - 110 mg/dL    BUN 51 (H) 10 - 30 mg/dL    Creatinine 4.2 (H) 0.5 - 1.4 mg/dL    Calcium 8.6 (L) 8.7 - 10.5 mg/dL    Total Protein 6.3 6.0 - 8.4 g/dL    Albumin 2.6 (L) 3.5 - 5.2 g/dL    Total Bilirubin 0.4 0.1 - 1.0 mg/dL    Alkaline Phosphatase 91 55 - 135 U/L    AST 22 10 - 40 U/L    ALT 12 10 - 44 U/L    Anion Gap 13 8 - 16 mmol/L    eGFR if African American 13 (A) >60 mL/min/1.73 m^2    eGFR if non African American 12 (A) >60 mL/min/1.73 m^2   Magnesium    Collection Time: 11/13/20  7:17 AM   Result Value Ref Range    Magnesium 2.3 1.6 - 2.6 mg/dL   CBC with Automated Differential    Collection Time: 11/13/20  7:17 AM   Result Value Ref Range    WBC 14.10 (H) 3.90 - 12.70 K/uL    RBC 3.89 (L) 4.60 - 6.20 M/uL    Hemoglobin 11.7 (L) 14.0 - 18.0 g/dL    Hematocrit 36.1 (L) 40.0 - 54.0 %    MCV 93 82 - 98 fL    MCH 30.1 27.0 - 31.0 pg    MCHC 32.4 32.0 - 36.0 g/dL    RDW 12.7 11.5 - 14.5 %    Platelets 288 150 - 350 K/uL    MPV 11.1 9.2 - 12.9 fL    Immature Granulocytes 0.6 (H) 0.0 - 0.5 %    Gran # (ANC) 9.7 (H) 1.8 - 7.7 K/uL    Immature Grans (Abs) 0.08 (H) 0.00 - 0.04 K/uL    Lymph # 1.8 1.0 - 4.8 K/uL    Mono # 2.1 (H) 0.3 - 1.0  K/uL    Eos # 0.3 0.0 - 0.5 K/uL    Baso # 0.12 0.00 - 0.20 K/uL    nRBC 0 0 /100 WBC    Gran % 68.7 38.0 - 73.0 %    Lymph % 12.5 (L) 18.0 - 48.0 %    Mono % 15.0 4.0 - 15.0 %    Eosinophil % 2.3 0.0 - 8.0 %    Basophil % 0.9 0.0 - 1.9 %    Differential Method Automated    Echo Color Flow Doppler? Yes    Collection Time: 11/13/20  9:26 AM   Result Value Ref Range    BSA 2.11 m2    TDI SEPTAL 0.06 m/s    LV LATERAL E/E' RATIO 12.00 m/s    LV SEPTAL E/E' RATIO 14.00 m/s    LA WIDTH 4.14 cm    TDI LATERAL 0.07 m/s    LVIDd 4.65 3.5 - 6.0 cm    IVS 1.37 (A) 0.6 - 1.1 cm    Posterior Wall 1.11 (A) 0.6 - 1.1 cm    LVIDs 2.11 2.1 - 4.0 cm    FS 55 28 - 44 %    LA volume 104.03 cm3    Sinus 3.69 cm    STJ 3.30 cm    Ascending aorta 3.32 cm    LV mass 218.52 g    LA size 5.08 cm    RVDD 2.97 cm    TAPSE 2.13 cm    RV S' 9.58 cm/s    Left Ventricle Relative Wall Thickness 0.48 cm    AV mean gradient 5 mmHg    AV valve area 3.52 cm2    AV Velocity Ratio 0.91     AV index (prosthetic) 0.78     MV valve area p 1/2 method 3.02 cm2    E/A ratio 0.64     Mean e' 0.07 m/s    E wave decelartion time 235.56 msec    IVRT 133.79 msec    LVOT diameter 2.39 cm    LVOT area 4.5 cm2    LVOT peak prabhjot 1.24 m/s    LVOT peak VTI 23.58 cm    Ao peak prabhjot 1.36 m/s    Ao VTI 30.05 cm    LVOT stroke volume 105.73 cm3    AV peak gradient 7 mmHg    E/E' ratio 12.92 m/s    MV Peak E Prabhjot 0.84 m/s    TR Max Prabhjot 1.21 m/s    MV stenosis pressure 1/2 time 72.88 ms    MV Peak A Prabhjot 1.31 m/s    LV Systolic Volume 14.52 mL    LV Systolic Volume Index 6.9 mL/m2    LV Diastolic Volume 99.84 mL    LV Diastolic Volume Index 47.69 mL/m2    LA Volume Index 49.7 mL/m2    LV Mass Index 104 g/m2    RA Major Axis 6.13 cm    Left Atrium Minor Axis 5.75 cm    Left Atrium Major Axis 5.89 cm    Triscuspid Valve Regurgitation Peak Gradient 6 mmHg    RA Width 3.98 cm   POCT glucose    Collection Time: 11/13/20 11:25 AM   Result Value Ref Range    POCT Glucose 109 70 - 110  mg/dL   ]    I/O last 3 completed shifts:  In: 2208.3 [I.V.:2058.3; IV Piggyback:150]  Out: -     Vitals:    11/13/20 0046 11/13/20 0626 11/13/20 0738 11/13/20 1125   BP: (!) 166/75 (!) 152/83  (!) 215/84   Pulse: 69 83 80 78   Resp: 18 18 20 20   Temp: 97.6 °F (36.4 °C) 98.5 °F (36.9 °C) 97.6 °F (36.4 °C) 98.3 °F (36.8 °C)   TempSrc: Oral Oral Oral Oral   SpO2: (!) 94% 96% 96% (!) 94%   Weight:       Height:           No Jvd, Thyromegaly or Lymphadenopathy  Lungs: Fairly clear anteriorly and laterally  Cor: RRR no G or rubs  Abd: Soft benign good bowel sounds non tender  Ext: No E C C    A)    FADI non oliguric with improving numbers  Consider infections, obstruction, bladder malfx,meds  CKD3-4with 2.5  grams of proteinuria   High PTH speak of advanced ckd  AMS (on good doses of gabapentin)  Elevated WBC  Consider sepsis  DJD  Dementia  HTN  ARNOLDO  SCS  Hypothyroid     Renal Diet  Home meds adjust  All meds to the degree of renal fx  Protect access  HD not needed  EPO   Binders  Close follow up I/O and weights  Maintain Hydration

## 2020-11-13 NOTE — TELEPHONE ENCOUNTER
----- Message from Griselda Damon sent at 11/13/2020  7:12 AM CST -----  Type: Patient Call Back       What is the request in detail:  pt wife calling to speak to let dr know pt is in the [facility]      Can the clinic reply by [facility]? No       Would the patient rather a call back or a response via [facility]? Call back       Best call back number: [phone]        Thank you.

## 2020-11-13 NOTE — PLAN OF CARE
Problem: SLP Goal  Goal: SLP Goal  Description: ST. Pt will tolerate PO diet of pureed/thin liquids without overt s/s of aspiration.  Outcome: Ongoing, Progressing     2020 ST recs: Pt extremely lethargic during swallow eval. Max tactile & verbal stimulation to wake pt. Rec: PO diet of pureed/thin liquids. ST will follow.

## 2020-11-13 NOTE — PT/OT/SLP EVAL
"Physical Therapy Evaluation    Patient Name:  Ifeanyi Rutherford Sr   MRN:  [id number]    Recommendations:     Discharge Recommendations:  home health PT(24hr care)   Discharge Equipment Recommendations: hospital bed(May need maynor lift)   Barriers to discharge: Pt will require 24 hr supervision/care    Assessment:     Ifeanyi Rutherford Sr is a 89 y.o. male admitted with a medical diagnosis of Acute metabolic encephalopathy.  He presents with the following impairments/functional limitations:  weakness, decreased lower extremity function, impaired coordination, decreased safety awareness, pain, impaired self care skills, impaired functional mobilty .    Rehab Prognosis: Fair; patient would benefit from acute skilled PT services to address these deficits and reach maximum level of function.    Recent Surgery: * No surgery found *      Plan:     During this hospitalization, patient to be seen (3-5x/wk) to address the identified rehab impairments via gait training, therapeutic activities, therapeutic exercises, wheelchair management/training, neuromuscular re-education and progress toward the following goals:    · Plan of Care Expires:  11/27/20    Subjective     Chief Complaint: see below  Patient/Family Comments/goals: Spouse would like to take pt home with a hospital bed  Pain/Comfort:  · Pain Rating 1: (Unable to rate, facial grimacing and groaning, "I need my drugs")  · Location 1: (Wife reports R sided trigeminal nerve pain for which he normally takes pain meds at home.  Only able to take tylenol at present time)  · Pain Addressed 1: Pre-medicate for activity, Reposition, Distraction, Cessation of Activity, Nurse notified  · Pain Rating Post-Intervention 1: (No c/o)    Patients cultural, spiritual, Sikhism conflicts given the current situation: no    Living Environment:  Pt lives with his spouse in a [facility] with [facility]  Prior to admission, patients level of function was ambulatory with RW approx 2wks ago. Mostly in " W/C since.   Equipment used at home: raised toilet, rollator, walker, rolling, wheelchair.  DME owned (not currently used): rollator.  Upon discharge, patient will have assistance from spouse and private help.    Objective:     Communicated with nsg prior to session.  Patient found low in the bed with feet touching footboard with bed alarm, peripheral IV  upon PT entry to room.    General Precautions: Standard, fall, seizure   Orthopedic Precautions:N/A   Braces: N/A     Exams:  · Cognitive Exam:  Patient is oriented to Person, able to follow simple commands  · Gross Motor Coordination:  Impaired  · Skin Integrity/Edema:      · -       Skin integrity: Visible skin intact  · RLE ROM: PROM WFL's except knee ext approx 15*   · RLE Strength: grossly 3/5  · LLE ROM: PROM WFL's except knee ext approx 15*  · LLE Strength: grossly 2+/5    Functional Mobility:  · Bed Mobility:     · Scooting: dependence and of 2 persons    Therapeutic Activities and Exercises:  Heels floated and pt repositioned     AM-PAC 6 CLICK MOBILITY  Total Score:8     Patient left HOB elevated with all lines intact, call button in reach, bed alarm on, nsg notified and sposue and safety sitter present.    GOALS:   Multidisciplinary Problems     Physical Therapy Goals        Problem: Physical Therapy Goal    Goal Priority Disciplines Outcome Goal Variances Interventions   Physical Therapy Goal     PT, PT/OT Ongoing, Progressing     Description: Goals to be met by: 20     Patient will increase functional independence with mobility by performin. Supine to sit to be assessed   2. Rolling to Left and Right to be assessed  3.  Scooting with CGA.  4. Bed to chair transfer to be assessed  5. Gait to be assessed    6. 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    Chronic headache 9/12/2014    CKD stage 3 9/13/2019    Constipation     Dementia     worked up for NPH at  and had taps with no improvement    Depression     [place] (hard of hearing)     Hyperlipidemia     Hypertension     Hypothyroidism     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    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    Parkinsons     Requires assistance with all daily activities     Seizure 11/12/2020    Sleep apnea     Spinal stenosis     Trigeminal neuralgia pain 1/20/2015    Trouble in sleeping     Unsteady gait     Vertigo 9/11/2012    Vestibular schwannoma 6/4/2015       Past Surgical History:   Procedure Laterality Date    bilateral total knee arthroplasties      CATARACT EXTRACTION W/  INTRAOCULAR LENS IMPLANT Bilateral     CHOLECYSTECTOMY      EYE SURGERY      GALLBLADDER SURGERY      JOINT REPLACEMENT Bilateral     TKR    THYROIDECTOMY      TONSILLECTOMY         Time Tracking:     PT Received On: 11/13/20  PT Start Time: 1358     PT Stop Time: 1420  PT Total Time (min): 22 min     Billable Minutes: Evaluation 22      Neelima Piña, PT  11/13/2020

## 2020-11-13 NOTE — SUBJECTIVE & OBJECTIVE
Interval History: BP very high. Patient is way more interactive and conversational. Recent memory loss very evident and unaware of him being in the hospital despite telling him where he is multiple times.     Review of Systems   Unable to perform ROS: Dementia     Objective:     Vital Signs (Most Recent):  Temp: 98.3 °F (36.8 °C) (11/13/20 1125)  Pulse: 78 (11/13/20 1125)  Resp: 20 (11/13/20 1125)  BP: (!) 215/84 (11/13/20 1125)  SpO2: (!) 94 % (11/13/20 1125) Vital Signs (24h Range):  Temp:  [97.4 °F (36.3 °C)-99.4 °F (37.4 °C)] 98.3 °F (36.8 °C)  Pulse:  [57-83] 78  Resp:  [18-20] 20  SpO2:  [92 %-96 %] 94 %  BP: (152-221)/(75-89) 215/84     Weight: 83.1 kg (183 lb 3.2 oz)  Body mass index is 23.52 kg/m².    Intake/Output Summary (Last 24 hours) at 11/13/2020 1433  Last data filed at 11/12/2020 1900  Gross per 24 hour   Intake 1350 ml   Output --   Net 1350 ml      Physical Exam  Vitals signs and nursing note reviewed.   Constitutional:       General: He is not in acute distress.     Appearance: He is not toxic-appearing.   Cardiovascular:      Rate and Rhythm: Normal rate and regular rhythm.   Pulmonary:      Effort: Pulmonary effort is normal.      Breath sounds: No wheezing or rales.   Abdominal:      General: Abdomen is flat.      Palpations: Abdomen is soft.   Skin:     General: Skin is warm.      Capillary Refill: Capillary refill takes less than 2 seconds.   Neurological:      Mental Status: He is alert.      Comments: Oriented to self  Very hard of hearing. Has hearing aide to left ear in place  Episodes of sudden jerking movement to BUE  Cogwheel rigidity   Psychiatric:         Attention and Perception: He is inattentive. He does not perceive auditory or visual hallucinations.         Mood and Affect: Mood normal.         Speech: Speech is delayed.         Behavior: Behavior normal.         Cognition and Memory: Cognition is impaired. Memory is impaired.         Significant Labs: All pertinent labs within  the past 24 hours have been reviewed.    Significant Imaging: I have reviewed all pertinent imaging results/findings within the past 24 hours.  I have reviewed and interpreted all pertinent imaging results/findings within the past 24 hours.

## 2020-11-13 NOTE — ASSESSMENT & PLAN NOTE
"Per wife, patient able to ambulate with moderate assistance but lately more often on his wheelchair due to "jerking" movements he had prior to presentation. PT/OT for possible SNF as encephalopathy appears to be improving.       "

## 2020-11-13 NOTE — NURSING
MEWS Monitoring: Chart check completed, abnormal VS noted, bedside RNHumza contacted, no concerns verbalized at this time, instructed to call [phone] for further concerns or assistance..

## 2020-11-13 NOTE — PROGRESS NOTES
"[facility] [place]  Progress Note    Patient Name: Ifeanyi Rutherford   MRN: [id number]  Patient Class: IP- Inpatient   Admission Date: 11/11/2020  Length of Stay: 2 days  Attending Physician: Alma Medina MD  Primary Care Provider: Jason Levy MD        Subjective:     Principal Problem:Acute metabolic encephalopathy        HPI:  Mr. Rutherford is a 89 y.o. male who presents to the ED via EMS due to AMS per family. EMS states that the family called because the patient was "twitching and shaking" and he's not as talkative as he usually is. EMS reports that the patient is awake, but is not responding to questions. EMS states that he is also not making eye contact and has a blank stare. EMS notes that the patient is disoriented x4 and he has a PMHx of Dementia.      In the ER he was found to have lecucytosis, Cr 4.6 with poor response to IV fluid, UA with Leucocytes as well as very high Urine Protein to creatinine ratio.     The patient was also communicated with Neurology by ED physician, who suspect seizure for the patient has been on Tramadol.;     Pt also has elevation in the BNP and slight elevation in cardiac troponin.     -Dr Kwabena Adame           Overview/Hospital Course:        Interval History: BP very high. Patient is way more interactive and conversational. Recent memory loss very evident and unaware of him being in the hospital despite telling him where he is multiple times.     Review of Systems   Unable to perform ROS: Dementia     Objective:     Vital Signs (Most Recent):  Temp: 98.3 °F (36.8 °C) (11/13/20 1125)  Pulse: 78 (11/13/20 1125)  Resp: 20 (11/13/20 1125)  BP: (!) 215/84 (11/13/20 1125)  SpO2: (!) 94 % (11/13/20 1125) Vital Signs (24h Range):  Temp:  [97.4 °F (36.3 °C)-99.4 °F (37.4 °C)] 98.3 °F (36.8 °C)  Pulse:  [57-83] 78  Resp:  [18-20] 20  SpO2:  [92 %-96 %] 94 %  BP: (152-221)/(75-89) 215/84     Weight: 83.1 kg (183 lb 3.2 oz)  Body mass index is " 23.52 kg/m².    Intake/Output Summary (Last 24 hours) at 11/13/2020 1433  Last data filed at 11/12/2020 1900  Gross per 24 hour   Intake 1350 ml   Output --   Net 1350 ml      Physical Exam  Vitals signs and nursing note reviewed.   Constitutional:       General: He is not in acute distress.     Appearance: He is not toxic-appearing.   Cardiovascular:      Rate and Rhythm: Normal rate and regular rhythm.   Pulmonary:      Effort: Pulmonary effort is normal.      Breath sounds: No wheezing or rales.   Abdominal:      General: Abdomen is flat.      Palpations: Abdomen is soft.   Skin:     General: Skin is warm.      Capillary Refill: Capillary refill takes less than 2 seconds.   Neurological:      Mental Status: He is alert.      Comments: Oriented to self  Very hard of hearing. Has hearing aide to left ear in place  Episodes of sudden jerking movement to BUE  Cogwheel rigidity   Psychiatric:         Attention and Perception: He is inattentive. He does not perceive auditory or visual hallucinations.         Mood and Affect: Mood normal.         Speech: Speech is delayed.         Behavior: Behavior normal.         Cognition and Memory: Cognition is impaired. Memory is impaired.         Significant Labs: All pertinent labs within the past 24 hours have been reviewed.    Significant Imaging: I have reviewed all pertinent imaging results/findings within the past 24 hours.  I have reviewed and interpreted all pertinent imaging results/findings within the past 24 hours.      Assessment/Plan:      * Acute metabolic encephalopathy  Per my conversation with patient's wife, he has underlying dementia but it is likely acute worsening due to possible seizure while on tramadol, medication induced due to long lasting effect of gabapentin and tramadol while renal function poor. Holding off on sedating agents except for atypical antipsychotics for episodes of non redirectable agitation and insomnia. Possible UTI. On rocephin  "empirically. UCx/BCx thus far negative.  Is gradually improving. Wife at bedside at all times. Speech/PT/OT. Delirium precautions in place.       BPH (benign prostatic hyperplasia)  No acute issues. Will treat constipation  Bladder scan PRN        CKD stage 3  With acute renal failure  Continue intravenous fluids for supportive treatment  Starting diet today      Mixed hyperlipidemia  Resume statin    Elevated troponin  Likely demand      Trigeminal neuralgia of right side of face  Will hold off on tramadol and gabapentin  Will use use acetaminophen PRN instead      Parkinsonism  Per exam       Hypothyroidism  Resume oral home regimen      Dementia  Unsure stage or type. Has some parkinsonism. Will discuss with neurology  Would he benefit from any medication? Is off aricept         Debility  Per wife, patient able to ambulate with moderate assistance but lately more often on his wheelchair due to "jerking" movements he had prior to presentation. PT/OT for possible SNF as encephalopathy appears to be improving.         Essential hypertension  Poorly controlled. Restart oral amlodipine 10 mg daily and watch  Vitals every 4 hours      VTE Risk Mitigation (From admission, onward)         Ordered     heparin (porcine) injection 5,000 Units  Every 8 hours      11/11/20 1753     IP VTE HIGH RISK PATIENT  Once      11/11/20 1753     Place sequential compression device  Until discontinued      11/11/20 1753                Discharge Planning   YOLA:      Code Status: DNR   Is the patient medically ready for discharge?:     Reason for patient still in hospital (select all that apply): Treatment  Discharge Plan A: Home Health          Assessment and plan discussed with patient's wife at bedside        Alma Lorenzana MD  Department of Hospital Medicine   [facility]    "

## 2020-11-13 NOTE — ASSESSMENT & PLAN NOTE
Patient has Lewy body dementia vs Parkinson disease with dementia. Has significant visual hallucinations and has been disoriented and agitated. Increased tremulousness from a metabolic? source

## 2020-11-13 NOTE — ASSESSMENT & PLAN NOTE
With acute renal failure  Continue intravenous fluids for supportive treatment  Starting diet today

## 2020-11-13 NOTE — ASSESSMENT & PLAN NOTE
Per my conversation with patient's wife, he has underlying dementia but it is likely acute worsening due to possible seizure while on tramadol, medication induced due to long lasting effect of gabapentin and tramadol while renal function poor. Holding off on sedating agents except for atypical antipsychotics for episodes of non redirectable agitation and insomnia. Possible UTI. On rocephin empirically. UCx/BCx thus far negative.  Is gradually improving. Wife at bedside at all times. Speech/PT/OT. Delirium precautions in place.

## 2020-11-13 NOTE — PLAN OF CARE
Problem: Occupational Therapy Goal  Goal: Occupational Therapy Goal  Description: Goals to be met by: 11/27/20     Patient will increase functional independence with ADLs by performing:    Feeding with Set-up Assistance.  Grooming while HOB elevated with Set-up Assistance.  Rolling to Bilateral with Moderate Assistance.   Upper extremity exercise program x10 reps per handout, with assistance as needed.    Outcome: Ongoing, Progressing    Bed level eval completed 2* elevated BP (taken from RLE) 220/93. Nurse, Humza, notified. Supportive wife present. OT rec HHOT with 24 hour care and hospital bed; pending if need for maynor lift d/t unable to assess further mobility today.

## 2020-11-13 NOTE — CONSULTS
[facility]  Neurology  Consult Note    Patient Name: Ifeanyi Rutherford Sr  MRN: [id number]  Admission Date: 11/11/2020  Hospital Length of Stay: 1 days  Code Status: DNR   Attending Provider: Alma Medina MD   Consulting Provider: Quinn Velasquez MD  Primary Care Physician: Jason Levy MD  Principal Problem:Encephalopathy, metabolic    Inpatient consult to neurology  Consult performed by: Quinn Velasquez MD  Consult ordered by: Alma Medina MD         Subjective:     Chief Complaint:  Encephalopathy     HPI:   89 year old male with LBD vs PD dementia has been admitted to the hospital for evaluation of worsening encephalopathy. He has been getting progressively worse over several years, but his wife at the bedside said that he has been noticeably worse in the last seven days. She feels that he has been increasingly confused, tremulous, and agitated over the last week more so and more rapidly than he has been in the past.     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    Parkinsons     Requires assistance with all daily activities     Seizure 11/12/2020    Sleep apnea     Spinal stenosis     Trigeminal neuralgia pain 1/20/2015    Trouble in sleeping     Unsteady gait     Vertigo 9/11/2012    Vestibular schwannoma 6/4/2015       Past Surgical History:   Procedure Laterality Date    bilateral total knee arthroplasties      CATARACT EXTRACTION W/  INTRAOCULAR  LENS IMPLANT Bilateral     CHOLECYSTECTOMY      EYE SURGERY      GALLBLADDER SURGERY      JOINT REPLACEMENT Bilateral     TKR    THYROIDECTOMY      TONSILLECTOMY         Review of patient's allergies indicates:   Allergen Reactions    Carbamazepine Swelling    Trazodone Anxiety    Bactrim [sulfamethoxazole-trimethoprim]      Hallucinations     Demerol [meperidine] Hallucinations    Morphine Nausea Only       Current Neurological Medications:     No current facility-administered medications on file prior to encounter.      Current Outpatient Medications on File Prior to Encounter   Medication Sig    acetaminophen (TYLENOL) 325 MG tablet Take 2 tablets (650 mg total) by mouth every 6 (six) hours as needed for Pain.    amLODIPine (NORVASC) 10 MG tablet TAKE 1 TABLET (10 MG TOTAL) BY MOUTH ONCE DAILY.    aspirin (ECOTRIN) 81 MG EC tablet Take 1 tablet (81 mg total) by mouth once daily.    atorvastatin (LIPITOR) 40 MG tablet Take 1 tablet (40 mg total) by mouth every evening.    diazePAM (VALIUM) 5 MG tablet Half to whole pill every 8 hours as needed for anxiety    docusate calcium (SURFAK) 240 mg capsule Take 240 mg by mouth 2 (two) times daily.    furosemide (LASIX) 20 MG tablet Take 1 tablet (20 mg total) by mouth 2 (two) times daily.    gabapentin (NEURONTIN) 300 MG capsule Take 1 capsule (300 mg total) by mouth 3 (three) times daily. Take 2 capsules (600 mg) by mouth three times daily.    ipratropium (ATROVENT) 0.03 % nasal spray 2 sprays by Nasal route before meals as needed for Rhinitis.    levothyroxine (SYNTHROID) 75 MCG tablet TAKE 1 TABLET BEFORE BREAKFAST    lidocaine HCl 2% (LIDOCAINE VISCOUS) 2 % Soln by Mucous Membrane route every 6 (six) hours.    QUEtiapine (SEROQUEL) 50 MG tablet Take 1 tablet (50 mg total) by mouth every evening.    sennosides-docusate sodium 8.6-50 mg Cap Take by mouth.    traMADoL (ULTRAM) 50 mg tablet 1-2 pills every 6 hr as needed for pain    venlafaxine  (EFFEXOR-XR) 75 MG 24 hr capsule TAKE 2 CAPSULES EVERY DAY    donepeziL (ARICEPT) 10 MG tablet Take 1 tablet (10 mg total) by mouth every evening.    HYDROcodone-acetaminophen (NORCO) 5-325 mg per tablet 1-2 pills every 4-6  hrs prn pain    ketoconazole (NIZORAL) 2 % cream Apply topically once daily.    [DISCONTINUED] levothyroxine (SYNTHROID) 75 MCG tablet TAKE 1 TABLET BEFORE BREAKFAST     Family History     Problem Relation (Age of Onset)    Heart disease Mother    No Known Problems Father, Sister, Brother, Daughter, Son, Sister, Brother, Brother, Brother, Maternal Aunt, Maternal Uncle, Paternal Aunt, Paternal Uncle, Maternal Grandmother, Maternal Grandfather, Paternal Grandmother, Paternal Grandfather, Daughter, Son, Son        Tobacco Use    Smoking status: Former Smoker    Smokeless tobacco: Never Used   Substance and Sexual Activity    Alcohol use: No     Alcohol/week: 0.0 standard drinks    Drug use: No    Sexual activity: Not Currently     Partners: Female     Review of Systems   Unable to perform ROS: Acuity of condition     Objective:     Vital Signs (Most Recent):  Temp: 97.4 °F (36.3 °C) (11/12/20 1938)  Pulse: 62 (11/12/20 1938)  Resp: 18 (11/12/20 1938)  BP: (!) 180/76 (11/12/20 1938)  SpO2: (!) 92 % (11/12/20 1938) Vital Signs (24h Range):  Temp:  [97.4 °F (36.3 °C)-99.4 °F (37.4 °C)] 97.4 °F (36.3 °C)  Pulse:  [50-70] 62  Resp:  [17-18] 18  SpO2:  [92 %-95 %] 92 %  BP: (158-221)/(76-98) 180/76     Weight: 83.1 kg (183 lb 3.2 oz)  Body mass index is 23.52 kg/m².    Physical Exam  Eyes:      Extraocular Movements: EOM normal.      Pupils: Pupils are equal, round, and reactive to light.   Neurological:      Deep Tendon Reflexes:      Reflex Scores:       Bicep reflexes are 2+ on the right side and 2+ on the left side.       Patellar reflexes are 2+ on the right side and 2+ on the left side.  Psychiatric:         Speech: Speech is slurred.         NEUROLOGICAL EXAMINATION:     MENTAL STATUS    Oriented to person.   Follows 1 step commands.   Attention: decreased. Concentration: decreased.   Speech: slurred   Level of consciousness: alert    CRANIAL NERVES     CN III, IV, VI   Pupils are equal, round, and reactive to light.  Extraocular motions are normal.     MOTOR EXAM        Move arms and legs equally.     REFLEXES     Reflexes   Right biceps: 2+  Left biceps: 2+  Right patellar: 2+  Left patellar: 2+      Significant Labs: All pertinent lab results from the past 24 hours have been reviewed.    Significant Imaging: I have reviewed all pertinent imaging results/findings within the past 24 hours.    Assessment and Plan:     Dementia  Patient has Lewy body dementia vs Parkinson disease with dementia. Has significant visual hallucinations and has been disoriented and agitated. Increased tremulousness from a metabolic? source        VTE Risk Mitigation (From admission, onward)         Ordered     heparin (porcine) injection 5,000 Units  Every 8 hours      11/11/20 1753     IP VTE HIGH RISK PATIENT  Once      11/11/20 1753     Place sequential compression device  Until discontinued      11/11/20 1753                Thank you for your consult. I will follow-up with patient. Please contact us if you have any additional questions.    Quinn Velasquez MD  Neurology  [facility]

## 2020-11-13 NOTE — ASSESSMENT & PLAN NOTE
Unsure stage or type. Has some parkinsonism. Will discuss with neurology  Would he benefit from any medication? Is off aricept

## 2020-11-13 NOTE — SUBJECTIVE & OBJECTIVE
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    Parkinsons     Requires assistance with all daily activities     Seizure 11/12/2020    Sleep apnea     Spinal stenosis     Trigeminal neuralgia pain 1/20/2015    Trouble in sleeping     Unsteady gait     Vertigo 9/11/2012    Vestibular schwannoma 6/4/2015       Past Surgical History:   Procedure Laterality Date    bilateral total knee arthroplasties      CATARACT EXTRACTION W/  INTRAOCULAR LENS IMPLANT Bilateral     CHOLECYSTECTOMY      EYE SURGERY      GALLBLADDER SURGERY      JOINT REPLACEMENT Bilateral     TKR    THYROIDECTOMY      TONSILLECTOMY         Review of patient's allergies indicates:   Allergen Reactions    Carbamazepine Swelling    Trazodone Anxiety    Bactrim [sulfamethoxazole-trimethoprim]      Hallucinations     Demerol [meperidine] Hallucinations    Morphine Nausea Only       Current Neurological Medications:     No current facility-administered medications on file prior to encounter.      Current Outpatient Medications on File Prior to Encounter   Medication Sig    acetaminophen (TYLENOL) 325 MG tablet Take 2 tablets (650 mg total) by mouth every 6 (six) hours as needed for Pain.    amLODIPine (NORVASC) 10 MG tablet TAKE 1 TABLET (10 MG TOTAL) BY MOUTH ONCE DAILY.    aspirin (ECOTRIN) 81 MG EC tablet Take 1 tablet (81 mg total) by mouth once daily.    atorvastatin (LIPITOR) 40 MG tablet Take 1 tablet (40 mg total)  by mouth every evening.    diazePAM (VALIUM) 5 MG tablet Half to whole pill every 8 hours as needed for anxiety    docusate calcium (SURFAK) 240 mg capsule Take 240 mg by mouth 2 (two) times daily.    furosemide (LASIX) 20 MG tablet Take 1 tablet (20 mg total) by mouth 2 (two) times daily.    gabapentin (NEURONTIN) 300 MG capsule Take 1 capsule (300 mg total) by mouth 3 (three) times daily. Take 2 capsules (600 mg) by mouth three times daily.    ipratropium (ATROVENT) 0.03 % nasal spray 2 sprays by Nasal route before meals as needed for Rhinitis.    levothyroxine (SYNTHROID) 75 MCG tablet TAKE 1 TABLET BEFORE BREAKFAST    lidocaine HCl 2% (LIDOCAINE VISCOUS) 2 % Soln by Mucous Membrane route every 6 (six) hours.    QUEtiapine (SEROQUEL) 50 MG tablet Take 1 tablet (50 mg total) by mouth every evening.    sennosides-docusate sodium 8.6-50 mg Cap Take by mouth.    traMADoL (ULTRAM) 50 mg tablet 1-2 pills every 6 hr as needed for pain    venlafaxine (EFFEXOR-XR) 75 MG 24 hr capsule TAKE 2 CAPSULES EVERY DAY    donepeziL (ARICEPT) 10 MG tablet Take 1 tablet (10 mg total) by mouth every evening.    HYDROcodone-acetaminophen (NORCO) 5-325 mg per tablet 1-2 pills every 4-6  hrs prn pain    ketoconazole (NIZORAL) 2 % cream Apply topically once daily.    [DISCONTINUED] levothyroxine (SYNTHROID) 75 MCG tablet TAKE 1 TABLET BEFORE BREAKFAST     Family History     Problem Relation (Age of Onset)    Heart disease Mother    No Known Problems Father, Sister, Brother, Daughter, Son, Sister, Brother, Brother, Brother, Maternal Aunt, Maternal Uncle, Paternal Aunt, Paternal Uncle, Maternal Grandmother, Maternal Grandfather, Paternal Grandmother, Paternal Grandfather, Daughter, Son, Son        Tobacco Use    Smoking status: Former Smoker    Smokeless tobacco: Never Used   Substance and Sexual Activity    Alcohol use: No     Alcohol/week: 0.0 standard drinks    Drug use: No    Sexual activity: Not Currently      Partners: Female     Review of Systems   Unable to perform ROS: Acuity of condition     Objective:     Vital Signs (Most Recent):  Temp: 97.4 °F (36.3 °C) (11/12/20 1938)  Pulse: 62 (11/12/20 1938)  Resp: 18 (11/12/20 1938)  BP: (!) 180/76 (11/12/20 1938)  SpO2: (!) 92 % (11/12/20 1938) Vital Signs (24h Range):  Temp:  [97.4 °F (36.3 °C)-99.4 °F (37.4 °C)] 97.4 °F (36.3 °C)  Pulse:  [50-70] 62  Resp:  [17-18] 18  SpO2:  [92 %-95 %] 92 %  BP: (158-221)/(76-98) 180/76     Weight: 83.1 kg (183 lb 3.2 oz)  Body mass index is 23.52 kg/m².    Physical Exam  Eyes:      Extraocular Movements: EOM normal.      Pupils: Pupils are equal, round, and reactive to light.   Neurological:      Deep Tendon Reflexes:      Reflex Scores:       Bicep reflexes are 2+ on the right side and 2+ on the left side.       Patellar reflexes are 2+ on the right side and 2+ on the left side.  Psychiatric:         Speech: Speech is slurred.         NEUROLOGICAL EXAMINATION:     MENTAL STATUS   Oriented to person.   Follows 1 step commands.   Attention: decreased. Concentration: decreased.   Speech: slurred   Level of consciousness: alert    CRANIAL NERVES     CN III, IV, VI   Pupils are equal, round, and reactive to light.  Extraocular motions are normal.     MOTOR EXAM        Move arms and legs equally.     REFLEXES     Reflexes   Right biceps: 2+  Left biceps: 2+  Right patellar: 2+  Left patellar: 2+      Significant Labs: All pertinent lab results from the past 24 hours have been reviewed.    Significant Imaging: I have reviewed all pertinent imaging results/findings within the past 24 hours.

## 2020-11-13 NOTE — HPI
89 year old male with LBD vs PD dementia has been admitted to the hospital for evaluation of worsening encephalopathy. He has been getting progressively worse over several years, but his wife at the bedside said that he has been noticeably worse in the last seven days. She feels that he has been increasingly confused, tremulous, and agitated over the last week more so and more rapidly than he has been in the past.

## 2020-11-13 NOTE — PT/OT/SLP EVAL
Occupational Therapy   Evaluation    Name: Ifeanyi Rutherford Sr  MRN: [id number]  Admitting Diagnosis:  Acute metabolic encephalopathy      Recommendations:     Discharge Recommendations: home health OT(with 24 hour care)  Discharge Equipment Recommendations:  hospital bed(TBD on further DME)  Barriers to discharge:  (pt will require 24 hour care; elevated /93)    Assessment:     Ifeanyi Rutherford Sr is a 89 y.o. male with a medical diagnosis of Acute metabolic encephalopathy. Performance deficits affecting function: weakness, impaired skin, decreased safety awareness, pain, visual deficits, impaired cognition, impaired self care skills, impaired functional mobilty, decreased lower extremity function, decreased upper extremity function, decreased coordination.      Bed level eval completed 2* elevated BP (taken from RLE) 220/93. Nurse, Humza, notified. Supportive wife present. OT rec HHOT with 24 hour care and hospital bed; pending if need for maynor lift d/t unable to assess further mobility today    Rehab Prognosis: Fair; patient would benefit from acute skilled OT services to address these deficits and reach maximum level of function.       Plan:     Patient to be seen (3-5x/week) to address the above listed problems via self-care/home management, therapeutic activities, therapeutic exercises  · Plan of Care Expires: 11/27/20  · Plan of Care Reviewed with: patient, spouse(safety sitter)    Subjective     Chief Complaint: appeared in pain upon entry; was hallucinating some- when oriented to location, pt became worried about money   Patient/Family Comments/goals: supportive wife present- agrees taking pt home will be best for him. She realizes she will need sitter service more than 2x/wk     Occupational Profile:  Living Environment: Pt lives with his wife in a [facility] with threshold at entry. Bathroom set-up: walk-in shower with built-in shower chair and grab bars.   Previous level of function: Pt is typically  "oriented to self and location at baseline; sometimes oriented to time; wife reports that he has hallucinated some, but she has not witnessed any agitation at home. In the last two weeks, he has been in the wheelchair. Prior, pt could walk a few steps with RW. Wife just started having a sitter come 2x/wk.   Roles and Routines: wife reports he was very active until his 80s- yard work, wood work, played ball   Equipment Used at Home:  raised toilet, rollator, walker, rolling, wheelchair(built-in shower chair with grab bars)  Assistance upon Discharge: elderly wife; sitters; pt has 5 adult children, but they have been quarantined from family d/t pandemic     Pain/Comfort:  · Pain Rating 1: (appeared in pain upon entry: wife reports R trigeminal nerve pain which can be severe; pt asking for "his drugs")  · Pain Addressed 1: Pre-medicate for activity, Cessation of Activity, Nurse notified, Reposition    Patients cultural, spiritual, Mormonism conflicts given the current situation: no    Objective:     Communicated with: nurseHumza, prior to session.  Patient found HOB elevated with bed alarm, peripheral IV upon OT entry to room.    General Precautions: Standard, fall, seizure(blind R eye; deaf R ear)   Orthopedic Precautions:N/A   Braces: N/A     Occupational Performance:    Bed Mobility:    · Patient completed Scooting with total assistance and 2 persons    Functional Mobility/Transfers:  · Unable to further assess 2* /93.     Activities of Daily Living:  · NT at this time     Cognitive/Visual Perceptual:  Cognitive/Psychosocial Skills:     -       Oriented to: , age  -       Follows Commands/attention:was calm with simple cueing; followed 1 one-step command by OT  -       Communication: some unintelligible speech   -       Memory: impaired  -       Safety awareness/insight to disability: impaired   -       Mood/Affect/Coping skills/emotional control: hallucinating some, calm with bed mobility; confused " and worrisome at times   Visual/Perceptual:      -blind in R eye      Physical Exam:  Balance:    -       NT as described above  Skin integrity: bruising to BUE   Upper Extremity Range of Motion:   -       Pt was able to spontaneously lift BUE at bed level to elevate on pillows   Upper Extremity Strength:    -       R/L Upper Extremity: unable to assess 2* limited participation       AMPA 6 Click ADL:  [facility] Total Score: 11    Treatment & Education:  · Pt and wife educated on OT role/POC.   · Explained to wife limitation of eval d/t elevated BP   · White board updated: level 1, turn every 2 hours, elevate extremities   · OT provided pillows to offload BLE and elevate BUE   · All questions/concerns answered within OT scope of practice     Education:    Patient left HOB elevated with BUE elevated and BLE elevated with all lines intact, call button in reach, bed alarm on, nurse, Humza, notified and wife and safety sitter present    GOALS:   Multidisciplinary Problems     Occupational Therapy Goals        Problem: Occupational Therapy Goal    Goal Priority Disciplines Outcome Interventions   Occupational Therapy Goal     OT, PT/OT Ongoing, Progressing    Description: Goals to be met by: 11/27/20     Patient will increase functional independence with ADLs by performing:    Feeding with Set-up Assistance.  Grooming while HOB elevated with Set-up Assistance.  Rolling to Bilateral with Moderate Assistance.   Upper extremity exercise program x10 reps per handout, with assistance as needed.                     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    Parkinsons     Requires assistance with all daily activities     Seizure 11/12/2020    Sleep apnea     Spinal stenosis     Trigeminal neuralgia pain 1/20/2015    Trouble in sleeping     Unsteady gait     Vertigo 9/11/2012    Vestibular schwannoma 6/4/2015       Past Surgical History:   Procedure Laterality Date    bilateral total knee arthroplasties      CATARACT EXTRACTION W/  INTRAOCULAR LENS IMPLANT Bilateral     CHOLECYSTECTOMY      EYE SURGERY      GALLBLADDER SURGERY      JOINT REPLACEMENT Bilateral     TKR    THYROIDECTOMY      TONSILLECTOMY         Time Tracking:     OT Date of Treatment: 11/13/20  OT Start Time: 1358  OT Stop Time: 1420  OT Total Time (min): 22 min    Billable Minutes:Evaluation 15 min  Total Time 22 min (co-eval with PT)      Fawn Gann OT  11/13/2020

## 2020-11-13 NOTE — NURSING
Handoff report given to PRECIOUS Ching at bedside. Patient resting comfortably in bed. Patient has D51/2NS infusing at 100ml/h. NAD noted. Fall/Safety precautions maintained.Safety sitter at bedside. Call light and personal items within reach. Communication board updated.

## 2020-11-13 NOTE — PLAN OF CARE
Problem: Physical Therapy Goal  Goal: Physical Therapy Goal  Description: Goals to be met by: 20     Patient will increase functional independence with mobility by performin. Supine to sit to be assessed   2. Rolling to Left and Right to be assessed  3.  Scooting with CGA.  4. Bed to chair transfer to be assessed  5. Gait to be assessed    6. Lower extremity exercise program x10 reps per handout, with assistance as needed    Outcome: Ongoing, Progressing   Limited initial evaluation performed 2/2 confusion, hallucinations, pain, High BP, and agitation.  Pt may benefit from skilled PT services 3-5x/wk in order to maximize function prior to D/C.  HHPT and hospital bed recommended.  Pt may need maynor lift as well.

## 2020-11-14 LAB
ALBUMIN SERPL BCP-MCNC: 2.3 G/DL (ref 3.5–5.2)
ALP SERPL-CCNC: 88 U/L (ref 55–135)
ALT SERPL W/O P-5'-P-CCNC: 17 U/L (ref 10–44)
ANION GAP SERPL CALC-SCNC: 11 MMOL/L (ref 8–16)
AST SERPL-CCNC: 27 U/L (ref 10–40)
BASOPHILS # BLD AUTO: 0.15 K/UL (ref 0–0.2)
BASOPHILS NFR BLD: 1.1 % (ref 0–1.9)
BILIRUB SERPL-MCNC: 0.3 MG/DL (ref 0.1–1)
BUN SERPL-MCNC: 50 MG/DL (ref 10–30)
CALCIUM SERPL-MCNC: 8.3 MG/DL (ref 8.7–10.5)
CHLORIDE SERPL-SCNC: 108 MMOL/L (ref 95–110)
CO2 SERPL-SCNC: 21 MMOL/L (ref 23–29)
CREAT SERPL-MCNC: 4.2 MG/DL (ref 0.5–1.4)
DIFFERENTIAL METHOD: ABNORMAL
EOSINOPHIL # BLD AUTO: 0.6 K/UL (ref 0–0.5)
EOSINOPHIL NFR BLD: 4.6 % (ref 0–8)
ERYTHROCYTE [DISTWIDTH] IN BLOOD BY AUTOMATED COUNT: 12.9 % (ref 11.5–14.5)
EST. GFR  (AFRICAN AMERICAN): 13 ML/MIN/1.73 M^2
EST. GFR  (NON AFRICAN AMERICAN): 12 ML/MIN/1.73 M^2
GLUCOSE SERPL-MCNC: 114 MG/DL (ref 70–110)
HCT VFR BLD AUTO: 34 % (ref 40–54)
HGB BLD-MCNC: 11.3 G/DL (ref 14–18)
IMM GRANULOCYTES # BLD AUTO: 0.11 K/UL (ref 0–0.04)
IMM GRANULOCYTES NFR BLD AUTO: 0.8 % (ref 0–0.5)
LEVETIRACETAM SERPL-MCNC: 4 UG/ML (ref 3–60)
LYMPHOCYTES # BLD AUTO: 2.4 K/UL (ref 1–4.8)
LYMPHOCYTES NFR BLD: 18.1 % (ref 18–48)
MAGNESIUM SERPL-MCNC: 2.2 MG/DL (ref 1.6–2.6)
MCH RBC QN AUTO: 30.5 PG (ref 27–31)
MCHC RBC AUTO-ENTMCNC: 33.2 G/DL (ref 32–36)
MCV RBC AUTO: 92 FL (ref 82–98)
MONOCYTES # BLD AUTO: 2.1 K/UL (ref 0.3–1)
MONOCYTES NFR BLD: 15.6 % (ref 4–15)
NEUTROPHILS # BLD AUTO: 7.9 K/UL (ref 1.8–7.7)
NEUTROPHILS NFR BLD: 59.8 % (ref 38–73)
NRBC BLD-RTO: 0 /100 WBC
PLATELET # BLD AUTO: 305 K/UL (ref 150–350)
PMV BLD AUTO: 11.2 FL (ref 9.2–12.9)
POCT GLUCOSE: 161 MG/DL (ref 70–110)
POCT GLUCOSE: 164 MG/DL (ref 70–110)
POTASSIUM SERPL-SCNC: 3.7 MMOL/L (ref 3.5–5.1)
PROT SERPL-MCNC: 6 G/DL (ref 6–8.4)
RBC # BLD AUTO: 3.71 M/UL (ref 4.6–6.2)
SODIUM SERPL-SCNC: 140 MMOL/L (ref 136–145)
WBC # BLD AUTO: 13.18 K/UL (ref 3.9–12.7)

## 2020-11-14 PROCEDURE — 21400001 HC TELEMETRY ROOM: Mod: HCNC

## 2020-11-14 PROCEDURE — 80053 COMPREHEN METABOLIC PANEL: CPT | Mod: HCNC

## 2020-11-14 PROCEDURE — S0166 INJ OLANZAPINE 2.5MG: HCPCS | Mod: HCNC | Performed by: INTERNAL MEDICINE

## 2020-11-14 PROCEDURE — 63600175 PHARM REV CODE 636 W HCPCS: Mod: HCNC | Performed by: INTERNAL MEDICINE

## 2020-11-14 PROCEDURE — 83735 ASSAY OF MAGNESIUM: CPT | Mod: HCNC

## 2020-11-14 PROCEDURE — S5010 5% DEXTROSE AND 0.45% SALINE: HCPCS | Mod: HCNC | Performed by: INTERNAL MEDICINE

## 2020-11-14 PROCEDURE — 25000003 PHARM REV CODE 250: Mod: HCNC | Performed by: INTERNAL MEDICINE

## 2020-11-14 PROCEDURE — 85025 COMPLETE CBC W/AUTO DIFF WBC: CPT | Mod: HCNC

## 2020-11-14 PROCEDURE — 36415 COLL VENOUS BLD VENIPUNCTURE: CPT | Mod: HCNC

## 2020-11-14 RX ORDER — POLYETHYLENE GLYCOL 3350 17 G/17G
17 POWDER, FOR SOLUTION ORAL DAILY
Status: DISCONTINUED | OUTPATIENT
Start: 2020-11-14 | End: 2020-11-14

## 2020-11-14 RX ORDER — OLANZAPINE 10 MG/2ML
2.5 INJECTION, POWDER, FOR SOLUTION INTRAMUSCULAR ONCE AS NEEDED
Status: COMPLETED | OUTPATIENT
Start: 2020-11-14 | End: 2020-11-14

## 2020-11-14 RX ORDER — AMOXICILLIN 250 MG
1 CAPSULE ORAL DAILY
Status: DISCONTINUED | OUTPATIENT
Start: 2020-11-14 | End: 2020-11-14

## 2020-11-14 RX ORDER — AMOXICILLIN 250 MG
1 CAPSULE ORAL DAILY
Status: DISCONTINUED | OUTPATIENT
Start: 2020-11-15 | End: 2020-11-18 | Stop reason: HOSPADM

## 2020-11-14 RX ADMIN — ATORVASTATIN CALCIUM 40 MG: 40 TABLET, FILM COATED ORAL at 09:11

## 2020-11-14 RX ADMIN — HYDRALAZINE HYDROCHLORIDE 10 MG: 20 INJECTION, SOLUTION INTRAMUSCULAR; INTRAVENOUS at 06:11

## 2020-11-14 RX ADMIN — HEPARIN SODIUM 5000 UNITS: 5000 INJECTION INTRAVENOUS; SUBCUTANEOUS at 06:11

## 2020-11-14 RX ADMIN — CEFTRIAXONE 2 G: 2 INJECTION, SOLUTION INTRAVENOUS at 04:11

## 2020-11-14 RX ADMIN — LEVOTHYROXINE SODIUM 75 MCG: 75 TABLET ORAL at 06:11

## 2020-11-14 RX ADMIN — HYDRALAZINE HYDROCHLORIDE 10 MG: 20 INJECTION, SOLUTION INTRAMUSCULAR; INTRAVENOUS at 05:11

## 2020-11-14 RX ADMIN — HEPARIN SODIUM 5000 UNITS: 5000 INJECTION INTRAVENOUS; SUBCUTANEOUS at 02:11

## 2020-11-14 RX ADMIN — OLANZAPINE 2.5 MG: 10 INJECTION, POWDER, FOR SOLUTION INTRAMUSCULAR at 05:11

## 2020-11-14 RX ADMIN — DEXTROSE AND SODIUM CHLORIDE: 5; .45 INJECTION, SOLUTION INTRAVENOUS at 10:11

## 2020-11-14 RX ADMIN — AMLODIPINE BESYLATE 10 MG: 5 TABLET ORAL at 09:11

## 2020-11-14 RX ADMIN — ACETAMINOPHEN 650 MG: 160 SOLUTION ORAL at 09:11

## 2020-11-14 RX ADMIN — QUETIAPINE FUMARATE 25 MG: 25 TABLET ORAL at 09:11

## 2020-11-14 RX ADMIN — DEXTROSE AND SODIUM CHLORIDE: 5; .45 INJECTION, SOLUTION INTRAVENOUS at 06:11

## 2020-11-14 RX ADMIN — HEPARIN SODIUM 5000 UNITS: 5000 INJECTION INTRAVENOUS; SUBCUTANEOUS at 09:11

## 2020-11-14 RX ADMIN — DEXTROSE AND SODIUM CHLORIDE: 5; .45 INJECTION, SOLUTION INTRAVENOUS at 05:11

## 2020-11-14 NOTE — ASSESSMENT & PLAN NOTE
With acute renal failure  Continue intravenous fluids for supportive treatment  Encouraged PO intake

## 2020-11-14 NOTE — NURSING
Handoff received from PRECIOUS Mattson      Pt resting in bed quietly. NAD noted. No c/o pain.     Fall and safety precautions maintained. Bed alarm activated and audible..Safety sitter at bedside  Bed locked in lowest position, with side rails up x2. Call bell and personal items within reach

## 2020-11-14 NOTE — SUBJECTIVE & OBJECTIVE
Interval History: clinically improved     Review of Systems   Unable to perform ROS: Dementia     Objective:     Vital Signs (Most Recent):  Temp: 99.3 °F (37.4 °C) (11/14/20 1100)  Pulse: 84 (11/14/20 1100)  Resp: 17 (11/14/20 1100)  BP: (!) 143/64 (11/14/20 1100)  SpO2: 96 % (11/14/20 1100) Vital Signs (24h Range):  Temp:  [97.5 °F (36.4 °C)-100.2 °F (37.9 °C)] 99.3 °F (37.4 °C)  Pulse:  [81-92] 84  Resp:  [16-20] 17  SpO2:  [95 %-98 %] 96 %  BP: (143-225)/(64-98) 143/64     Weight: 83.1 kg (183 lb 3.2 oz)  Body mass index is 23.52 kg/m².    Intake/Output Summary (Last 24 hours) at 11/14/2020 1402  Last data filed at 11/13/2020 1917  Gross per 24 hour   Intake 576.67 ml   Output --   Net 576.67 ml      Physical Exam  Vitals signs and nursing note reviewed.   Constitutional:       General: He is not in acute distress.     Appearance: He is not toxic-appearing.   Cardiovascular:      Rate and Rhythm: Normal rate and regular rhythm.   Pulmonary:      Effort: Pulmonary effort is normal.      Breath sounds: No wheezing or rales.   Abdominal:      General: Abdomen is flat.      Palpations: Abdomen is soft.   Skin:     General: Skin is warm.      Capillary Refill: Capillary refill takes less than 2 seconds.   Neurological:      Mental Status: He is alert.      Comments: Oriented to self  Very hard of hearing. Has hearing aide to left ear in place     Psychiatric:         Attention and Perception: He is attentive. He does not perceive auditory or visual hallucinations.         Mood and Affect: Mood normal.         Speech: Speech is delayed.         Behavior: Behavior normal.         Cognition and Memory: Cognition is impaired. Memory is impaired.      Comments: Able to follow some commands with frequent redirection.          Significant Labs: All pertinent labs within the past 24 hours have been reviewed.    Significant Imaging: I have reviewed all pertinent imaging results/findings within the past 24 hours.  I have  reviewed and interpreted all pertinent imaging results/findings within the past 24 hours.

## 2020-11-14 NOTE — ASSESSMENT & PLAN NOTE
Per my conversation with patient's wife, he has underlying dementia but it is likely acute worsening due to possible seizure while on tramadol, medication induced due to long lasting effect of gabapentin and tramadol while renal function poor. Holding off on sedating agents except for atypical antipsychotics for episodes of non redirectable agitation and insomnia. Possible UTI. On rocephin empirically. UCx/BCx thus far negative.  Is gradually improving. Wife at bedside at all times. Speech/PT/OT. Delirium precautions in place.     Patient has significantly improved. Will attempt SNF. Hopefully we can upgrade diet soon.

## 2020-11-14 NOTE — PLAN OF CARE
Problem: Infection  Goal: Infection Symptom Resolution  Outcome: Ongoing, Progressing  Intervention: Prevent or Manage Infection  Flowsheets (Taken 11/14/2020 0251)  Infection Management: aseptic technique maintained     Problem: Fall Injury Risk  Goal: Absence of Fall and Fall-Related Injury  Outcome: Ongoing, Progressing  Intervention: Identify and Manage Contributors to Fall Injury Risk  Flowsheets (Taken 11/14/2020 0251)  Self-Care Promotion:   independence encouraged   BADL personal objects within reach  Medication Review/Management: medications reviewed  Intervention: Promote Injury-Free Environment  Flowsheets (Taken 11/14/2020 0251)  Safety Promotion/Fall Prevention:   assistive device/personal item within reach   bed alarm set   side rails raised x 3   instructed to call staff for mobility   /camera at bedside   room near unit station   medications reviewed  Environmental Safety Modification:   assistive device/personal items within reach   clutter free environment maintained   room near unit station     Problem: Skin Injury Risk Increased  Goal: Skin Health and Integrity  Outcome: Ongoing, Progressing  Intervention: Optimize Skin Protection  Flowsheets (Taken 11/14/2020 0251)  Pressure Reduction Techniques:   frequent weight shift encouraged   weight shift assistance provided  Skin Protection:   adhesive use limited   incontinence pads utilized   tubing/devices free from skin contact  Head of Bed (HOB): HOB lowered  Intervention: Promote and Optimize Oral Intake  Flowsheets (Taken 11/14/2020 0251)  Oral Nutrition Promotion: rest periods promoted     Problem: Adult Inpatient Plan of Care  Goal: Plan of Care Review  Outcome: Ongoing, Progressing  Goal: Absence of Hospital-Acquired Illness or Injury  Outcome: Ongoing, Progressing  Goal: Optimal Comfort and Wellbeing  Outcome: Ongoing, Progressing  Shift free from falls or other incidence. Pt remains confused, but noticeably decreased slurred  speech, slight increase in ability to follow basic commands, and increased ability to answer some questions. Maintained D51/2NS infusion, but still unable to maintain pt with telemetry.

## 2020-11-14 NOTE — PLAN OF CARE
Problem: Infection  Goal: Infection Symptom Resolution  Outcome: Ongoing, Progressing     Problem: Fall Injury Risk  Goal: Absence of Fall and Fall-Related Injury  Outcome: Ongoing, Progressing  Intervention: Identify and Manage Contributors to Fall Injury Risk  Flowsheets (Taken 11/14/2020 1616)  Self-Care Promotion:   independence encouraged   BADL personal objects within reach   BADL personal routines maintained   meal setup provided   safe use of adaptive equipment encouraged  Medication Review/Management:   medications reviewed   high risk medications identified  Intervention: Promote Injury-Free Environment  Flowsheets (Taken 11/14/2020 1616)  Safety Promotion/Fall Prevention:   assistive device/personal item within reach   high risk medications identified   medications reviewed   side rails raised x 3   /camera at bedside   room near unit station  Environmental Safety Modification:   assistive device/personal items within reach   clutter free environment maintained   lighting adjusted   room near unit station   room organization consistent     Problem: Skin Injury Risk Increased  Goal: Skin Health and Integrity  Outcome: Ongoing, Progressing  Intervention: Optimize Skin Protection  Flowsheets (Taken 11/14/2020 1616)  Pressure Reduction Techniques:   frequent weight shift encouraged   weight shift assistance provided  Head of Bed (HOB): HOB elevated     Problem: Adult Inpatient Plan of Care  Goal: Plan of Care Review  Outcome: Ongoing, Progressing  Flowsheets (Taken 11/14/2020 1616)  Plan of Care Reviewed With: spouse  Goal: Patient-Specific Goal (Individualization)  Outcome: Ongoing, Progressing  Goal: Absence of Hospital-Acquired Illness or Injury  Outcome: Ongoing, Progressing  Goal: Optimal Comfort and Wellbeing  Outcome: Ongoing, Progressing  Intervention: Provide Person-Centered Care  Flowsheets (Taken 11/14/2020 1616)  Trust Relationship/Rapport:   care explained   emotional support  provided   choices provided   empathic listening provided   questions answered   questions encouraged   reassurance provided   thoughts/feelings acknowledged  Goal: Readiness for Transition of Care  Outcome: Ongoing, Progressing  Goal: Rounds/Family Conference  Outcome: Ongoing, Progressing

## 2020-11-15 LAB
ALBUMIN SERPL BCP-MCNC: 2.3 G/DL (ref 3.5–5.2)
ALP SERPL-CCNC: 89 U/L (ref 55–135)
ALT SERPL W/O P-5'-P-CCNC: 21 U/L (ref 10–44)
ANION GAP SERPL CALC-SCNC: 10 MMOL/L (ref 8–16)
AST SERPL-CCNC: 29 U/L (ref 10–40)
BILIRUB SERPL-MCNC: 0.3 MG/DL (ref 0.1–1)
BUN SERPL-MCNC: 54 MG/DL (ref 10–30)
CALCIUM SERPL-MCNC: 8.4 MG/DL (ref 8.7–10.5)
CHLORIDE SERPL-SCNC: 110 MMOL/L (ref 95–110)
CO2 SERPL-SCNC: 21 MMOL/L (ref 23–29)
CREAT SERPL-MCNC: 4.3 MG/DL (ref 0.5–1.4)
EST. GFR  (AFRICAN AMERICAN): 13 ML/MIN/1.73 M^2
EST. GFR  (NON AFRICAN AMERICAN): 11 ML/MIN/1.73 M^2
GLUCOSE SERPL-MCNC: 123 MG/DL (ref 70–110)
MAGNESIUM SERPL-MCNC: 2.2 MG/DL (ref 1.6–2.6)
POCT GLUCOSE: 131 MG/DL (ref 70–110)
POCT GLUCOSE: 133 MG/DL (ref 70–110)
POCT GLUCOSE: 148 MG/DL (ref 70–110)
POCT GLUCOSE: 158 MG/DL (ref 70–110)
POTASSIUM SERPL-SCNC: 3.8 MMOL/L (ref 3.5–5.1)
PROT SERPL-MCNC: 6.1 G/DL (ref 6–8.4)
SODIUM SERPL-SCNC: 141 MMOL/L (ref 136–145)

## 2020-11-15 PROCEDURE — 97530 THERAPEUTIC ACTIVITIES: CPT | Mod: HCNC

## 2020-11-15 PROCEDURE — S0166 INJ OLANZAPINE 2.5MG: HCPCS | Mod: HCNC | Performed by: INTERNAL MEDICINE

## 2020-11-15 PROCEDURE — 63600175 PHARM REV CODE 636 W HCPCS: Mod: HCNC | Performed by: INTERNAL MEDICINE

## 2020-11-15 PROCEDURE — 80053 COMPREHEN METABOLIC PANEL: CPT | Mod: HCNC

## 2020-11-15 PROCEDURE — 25000003 PHARM REV CODE 250: Mod: HCNC | Performed by: INTERNAL MEDICINE

## 2020-11-15 PROCEDURE — 83735 ASSAY OF MAGNESIUM: CPT | Mod: HCNC

## 2020-11-15 PROCEDURE — 21400001 HC TELEMETRY ROOM: Mod: HCNC

## 2020-11-15 PROCEDURE — S5010 5% DEXTROSE AND 0.45% SALINE: HCPCS | Mod: HCNC | Performed by: INTERNAL MEDICINE

## 2020-11-15 PROCEDURE — 36415 COLL VENOUS BLD VENIPUNCTURE: CPT | Mod: HCNC

## 2020-11-15 PROCEDURE — 97110 THERAPEUTIC EXERCISES: CPT | Mod: HCNC

## 2020-11-15 RX ORDER — QUETIAPINE FUMARATE 25 MG/1
50 TABLET, FILM COATED ORAL NIGHTLY
Status: DISCONTINUED | OUTPATIENT
Start: 2020-11-15 | End: 2020-11-16

## 2020-11-15 RX ORDER — OLANZAPINE 10 MG/2ML
2.5 INJECTION, POWDER, FOR SOLUTION INTRAMUSCULAR EVERY 8 HOURS PRN
Status: DISCONTINUED | OUTPATIENT
Start: 2020-11-15 | End: 2020-11-18 | Stop reason: HOSPADM

## 2020-11-15 RX ORDER — OLANZAPINE 10 MG/2ML
5 INJECTION, POWDER, FOR SOLUTION INTRAMUSCULAR ONCE
Status: COMPLETED | OUTPATIENT
Start: 2020-11-15 | End: 2020-11-15

## 2020-11-15 RX ADMIN — DOCUSATE SODIUM 50 MG AND SENNOSIDES 8.6 MG 1 TABLET: 8.6; 5 TABLET, FILM COATED ORAL at 09:11

## 2020-11-15 RX ADMIN — QUETIAPINE FUMARATE 50 MG: 25 TABLET ORAL at 08:11

## 2020-11-15 RX ADMIN — HEPARIN SODIUM 5000 UNITS: 5000 INJECTION INTRAVENOUS; SUBCUTANEOUS at 01:11

## 2020-11-15 RX ADMIN — HEPARIN SODIUM 5000 UNITS: 5000 INJECTION INTRAVENOUS; SUBCUTANEOUS at 06:11

## 2020-11-15 RX ADMIN — HYDRALAZINE HYDROCHLORIDE 10 MG: 20 INJECTION, SOLUTION INTRAMUSCULAR; INTRAVENOUS at 04:11

## 2020-11-15 RX ADMIN — AMLODIPINE BESYLATE 10 MG: 5 TABLET ORAL at 09:11

## 2020-11-15 RX ADMIN — DEXTROSE AND SODIUM CHLORIDE: 5; .45 INJECTION, SOLUTION INTRAVENOUS at 10:11

## 2020-11-15 RX ADMIN — ACETAMINOPHEN 650 MG: 160 SOLUTION ORAL at 05:11

## 2020-11-15 RX ADMIN — OLANZAPINE 5 MG: 10 INJECTION, POWDER, LYOPHILIZED, FOR SOLUTION INTRAMUSCULAR at 01:11

## 2020-11-15 RX ADMIN — CEFTRIAXONE 2 G: 2 INJECTION, SOLUTION INTRAVENOUS at 05:11

## 2020-11-15 RX ADMIN — LEVOTHYROXINE SODIUM 75 MCG: 75 TABLET ORAL at 06:11

## 2020-11-15 RX ADMIN — ATORVASTATIN CALCIUM 40 MG: 40 TABLET, FILM COATED ORAL at 08:11

## 2020-11-15 RX ADMIN — HEPARIN SODIUM 5000 UNITS: 5000 INJECTION INTRAVENOUS; SUBCUTANEOUS at 09:11

## 2020-11-15 NOTE — PT/OT/SLP PROGRESS
Physical Therapy Treatment    Patient Name:  Ifeanyi Rutherford Sr   MRN:  [id number]    Recommendations:     Discharge Recommendations:  home health PT(24hr care); Daughter inquired about SNF placement for pt, however concerned about pt's cognition with unfamiliar setting.    Discharge Equipment Recommendations: hospital bed(May need maynor lift); Spouse also requesting for w/c 2* only has a transport w/c at home.     Barriers to discharge home: Pt with impaired cognition, decreased mobility, decreased safety awareness, and increased fall risk.    Assessment:     Ifeanyi Rutherford Sr is a 89 y.o. male admitted with a medical diagnosis of Acute metabolic encephalopathy.  He presents with the following impairments/functional limitations:  weakness, decreased lower extremity function, impaired coordination, decreased safety awareness, pain, impaired self care skills, impaired functional mobilty, gait instability, impaired balance, visual deficits, impaired cognition, impaired fine motor, decreased ROM.    Rehab Prognosis: Fair; patient would benefit from acute skilled PT services to address these deficits and reach maximum level of function.    Recent Surgery: * No surgery found *      Plan:     During this hospitalization, patient to be seen (3-5x/wk) to address the identified rehab impairments via gait training, therapeutic activities, therapeutic exercises, wheelchair management/training, neuromuscular re-education and progress toward the following goals:    · Plan of Care Expires:  11/27/20    Subjective     Chief Complaint: N/A  Patient/Family Comments/goals: Pt asking for some water.   Pain/Comfort:  Pain Rating 1: 0/10      Objective:     Communicated with nurse Miranda prior to session.  Patient found HOB elevated with spouse and daughter present with bed alarm, peripheral IV, telemetry([employer] monitor) upon PT entry to room.     General Precautions: Standard, fall, seizure, [place] and visual deficits   Orthopedic  Precautions:N/A   Braces: N/A     Functional Mobility:  Pt pleasantly confused, able to follow some simple commands however required max VC's/TC's and demonstration to participate in therapy.  Pt very happy and talkative today, however conversation was not appropriate to situation.  Pt engaging with spouse and daughter.  Pt also with visual deficits and had some difficulty making eye contact.   · Bed Mobility:     · Scooting: maximal assistance and of 2 persons for anterior scooting EOB; CGA for lateral scooting in bedside chair  · Supine to Sit: maximal assistance and of 2 persons with HOB elevated; Pt with delayed initiation, required max VC's/TC's.    · Transfers:     · Sit to Stand: moderate assistance and of 2 persons with rolling walker on 1st trial and min A of 2 persons with RW on 2nd trial; Pt with posterior trunk lean, able to self correct intermittently.     · Bed to Chair: dependence and of 2 persons with  no AD  using  Squat Pivot; Pt attempting to assist by pivoting on LE and reaching for bedside chair, however due to visual and cognitive deficits was not successful with decreased safety awareness requiring dep of 2 person.   · Gait: Pt ambulated ~6 sidesteps along bedside with mod A of 2 persons using RW.  Pt with posterior trunk lean, decreased weight shifting, decreased foot clearance, and decreased step length.  Pt required max VC's/TC's to correct posterior trunk lean and for sequencing during gait.  Pt required mod A with RW management.   · Balance: Pt with fair/fair- static sit balance and fair-/poor static stand balance.   Pt with intermittent posterior trunk lean while sitting EOB for activities, required mod-min A to maintain static sit balance.       AM-PAC 6 CLICK MOBILITY  Turning over in bed (including adjusting bedclothes, sheets and blankets)?: 2  Sitting down on and standing up from a chair with arms (e.g., wheelchair, bedside commode, etc.): 2  Moving from lying on back to sitting on  the side of the bed?: 2  Moving to and from a bed to a chair (including a wheelchair)?: 2  Need to walk in hospital room?: 2  Climbing 3-5 steps with a railing?: 1  Basic Mobility Total Score: 11       Therapeutic Activities and Exercises:  BLE seated therex x10 reps: hip flex, hip abd/add, LAQ (unable to perform, required PROM), and AP    Balance Training  Static Standing:  Patient performed static standing on level surface  using rolling walker with mod-Minimal Assistance of 2 persons and maximal verbal cues to correct posterior trunk lean x 2 trials.     Patient left up in chair reclined on seat cushion with all lines intact, call button in reach, chair alarm on, nurse Ruth and PCT Asiya notified and spouse, daughter, and [employer] monitor present.  Lunch tray set-up.    GOALS:   Multidisciplinary Problems     Physical Therapy Goals        Problem: Physical Therapy Goal    Goal Priority Disciplines Outcome Goal Variances Interventions   Physical Therapy Goal     PT, PT/OT Ongoing, Progressing     Description: Goals to be met by: 20     Patient will increase functional independence with mobility by performin. Supine to sit with min A  2. Rolling to Left and Right min A  3. Sit to stand with min A using RW  4. Bed to chair transfer with min A using RW  5. Gait ~20-30 ft with min A using RW    6. Lower extremity exercise program x10 reps per handout, with assistance as needed                     Time Tracking:     PT Received On: 11/15/20  PT Start Time: 1123     PT Stop Time: 1153  PT Total Time (min): 30 min     Billable Minutes: Therapeutic Activity 15 min co-tx with OT             PTA Visit Number: 0     Tiffanie RUSSELL Solorzano, PT  11/15/2020

## 2020-11-15 NOTE — NURSING
Handoff received from ANG García     Pt resting in bed quietly. NAD noted. No c/o pain.     Fall and safety precautions maintained. Bed alarm activated and audible.. Bed locked in lowest position, with side rails up x2. Call bell and personal items within reach. Telesitter at bedside

## 2020-11-15 NOTE — NURSING
PER handoff given to ANG García     Pt resting in bed quietly. NAD noted. No c/o pain.     Fall and safety precautions maintained. Bed alarm activated and audible.. Bed locked in lowest position, with side rails up x2. Call bell and personal items within reach

## 2020-11-15 NOTE — ASSESSMENT & PLAN NOTE
"Per wife, patient able to ambulate with moderate assistance but lately more often on his wheelchair due to "jerking" movements he had prior to presentation. PT/OT       "

## 2020-11-15 NOTE — PT/OT/SLP PROGRESS
Occupational Therapy   Treatment    Name: Ifeanyi Rutherford Sr  MRN: [id number]  Admitting Diagnosis:  Acute metabolic encephalopathy       Recommendations:     Discharge Recommendations: home health OT(with 24 hr assist; Daughter inquired about SNF placement for pt, however concerned about pt's cognition with unfamiliar setting)  Discharge Equipment Recommendations:  hospital bed(may need maynor lift; Spouse also requesting for w/c 2* only has a transport w/c at home)  Barriers to discharge:  (decreased mobility; confusion; increased assist with ADLs; will require 24 hour vickie)    Assessment:     Ifeanyi Rutherford Sr is a 89 y.o. male with a medical diagnosis of Acute metabolic encephalopathy. Performance deficits affecting function are weakness, visual deficits, impaired cognition, impaired endurance, decreased ROM, decreased coordination, impaired fine motor, decreased upper extremity function, impaired self care skills, impaired functional mobilty, decreased lower extremity function, impaired skin, edema, decreased safety awareness, gait instability, impaired balance.     Pt was calm and participatory with cueing for therapeutic exercises and activities. Supportive wife and daughter present. MOD A x2 for first sit to stand transfer with RW; second attempt with MIN A x2. Dependent x2 for squat pivot t/f to the chair with increased confusion with body mechanics/transfer.     Pt was calm and cooperative during session, confused conversation at times.     Rehab Prognosis:  Fair; patient would benefit from acute skilled OT services to address these deficits and reach maximum level of function.       Plan:     Patient to be seen (3-5x/week) to address the above listed problems via self-care/home management, therapeutic activities, therapeutic exercises  · Plan of Care Expires: 11/27/20  · Plan of Care Reviewed with: patient, spouse, daughter    Subjective     Chief complaint: pt became fatigued with standing trials  "  Patient's/family comments/goals: daughter worried about pt's level of function; family wanted pt up in the chair     Pain/Comfort:  Pain Rating 1: 0/10    Objective:     Communicated with: nurseRuth, prior to session.  Patient found HOB elevated with bed alarm, peripheral IV, telemetry(avasys) upon OT entry to room.    General Precautions: Standard, fall, seizure(deaf R ear, blind R eye)   Orthopedic Precautions:N/A   Braces: N/A     Occupational Performance:     Bed Mobility:  Pt required verbal and tactile cueing with all aspects of bed mobility.    · Patient completed Scooting with maximal assistance and 2 persons anteriorly; scooting laterally in the chair with contact guard assistance  · Patient completed Supine to Sit with maximal assistance, 2 persons and HOB elevated     Functional Mobility/Transfers:  · Patient completed Sit <> Stand Transfer with moderate assistance and of 2 persons  with  rolling walker on 1st trial; 2nd trial, pt required minimal assistance and of 2 persons with rolling walker  · On second standing trial, pt took a few sidesteps at EOB with moderate assist of 2 persons with RW; max cueing for step and RW sequencing. Pt had difficulty correcting forward flexed posture with posterior lean   · Patient completed Bed <> Chair Transfer using Squat Pivot technique with dependence and of 2 persons with no assistive device    Activities of Daily Living:  · Feeding:  contact guard assistance with cup of water while pt held cup with RUE; pt required assist for straw placement to take sips of water multiple times at EOB. no coughing episodes afterwards. OT and pt completed hand-over-hand assist with spoon with small bite of mashed potatoes (pureed diet), but spit it out.    · Grooming: OT placed comb in pt's hand and pt wasn't able to initiate use of object. with cueing "brush your hair," pt still unable to use object. OT then completed hand-over-hand use of object, and pt was able to continue " brushing just the front area of his hair without assist. Max a to comb the rest of his hair while seated EOB with CGA for sit balance       [facility] 6 Click ADL: 11    Treatment & Education:  · Pt re-educated on OT role/POC.   · Importance of OOB activity with staff assistance.  · Safety during functional t/f and mobility   · Seated EOB, pt fatigued with time. Initially pt required CGA then demo'd posterior lean with difficulty self-correcting, requiring min to mod A. At times, pt was able to lean forward with max cueing   · Seated EOB, pt completed the following BUE ROM therapeutic exercises:   · 1x10 elbow flex/ext AROM  · 1x10 shoulder flex/ext AAROM  · 1x15 shoulder horizontal ab/dduction AAROM  · With all exercises, pt was able to count reps along    · Multiple self-care tasks/functional mobility completed- assistance level noted above   · Pt's wife reported pt is not eating d/t dislike of pureed diet; OT edu of last taste to diminish is the sweet taste bud; OT encouraged wife and daughter to ask the nurse if he is allowed to put sugar on his pureed food, as it has been shown that this can help increase people's desire to eat this diet consistency in order for pt to increase his nutritional intake.   · All questions/concerns answered within OT scope of practice       Patient left reclined up in the chair seated on air cushion with all lines intact, call button in reach, chair alarm on, nurseRuth, notified and wife and daughter presentEducation:  . Door left open- across from the nurses station     GOALS:   Multidisciplinary Problems     Occupational Therapy Goals        Problem: Occupational Therapy Goal    Goal Priority Disciplines Outcome Interventions   Occupational Therapy Goal     OT, PT/OT Ongoing, Progressing    Description: Goals to be met by: 11/27/20     Patient will increase functional independence with ADLs by performing:    Feeding with Set-up Assistance.  Grooming while HOB elevated with Set-up  Assistance.  Upper extremity exercise program x10 reps per handout, with assistance as needed.  Rolling L<>R with minimal assistance  Supine <> sit with minimal assistance  Sit to stand with minimal assistance   Sit EOB for 15 min with contact guard assistance  Step transfer with minimal assistance   Stand pivot transfer with minimal assistance   Wheelchair functional mobility with BUE or BLE household distance with minimal contact guard assistance                      Time Tracking:     OT Date of Treatment: 11/15/20  OT Start Time: 1123  OT Stop Time: 1201  OT Total Time (min): 38 min    Billable Minutes:Therapeutic Activity 8 min  Therapeutic Exercise 15 min  Total Time 38 min (co-tx with PT)    Fawn Gann, OT  11/15/2020

## 2020-11-15 NOTE — ASSESSMENT & PLAN NOTE
Per my conversation with patient's wife, he has underlying dementia but it is likely acute worsening due to possible seizure while on tramadol, medication induced due to long lasting effect of gabapentin and tramadol while renal function poor. Holding off on sedating agents except for atypical antipsychotics for episodes of non redirectable agitation and insomnia. Possible UTI. On day # 4 of rocephin empirically. UCx/BCx thus far negative. Needs to sleep better at night therefore increasing dose of quetiapine. Wife at bedside at all times. Speech/PT/OT. Delirium precautions in place.     Will attempt SNF vs HH vs home with hospice pending conversation with palliative care.

## 2020-11-15 NOTE — PROGRESS NOTES
"[facility] Hospital Medicine  Progress Note    Patient Name: Ifeanyi Rutherford   MRN: [id number]  Patient Class: IP- Inpatient   Admission Date: 11/11/2020  Length of Stay: 4 days  Attending Physician: Alma Medina MD  Primary Care Provider: Jason Levy MD        Subjective:     Principal Problem:Acute metabolic encephalopathy        HPI:  Mr. Rutherford is a 89 y.o. male who presents to the ED via EMS due to AMS per family. EMS states that the family called because the patient was "twitching and shaking" and he's not as talkative as he usually is. EMS reports that the patient is awake, but is not responding to questions. EMS states that he is also not making eye contact and has a blank stare. EMS notes that the patient is disoriented x4 and he has a PMHx of Dementia.      In the ER he was found to have lecucytosis, Cr 4.6 with poor response to IV fluid, UA with Leucocytes as well as very high Urine Protein to creatinine ratio.     The patient was also communicated with Neurology by ED physician, who suspect seizure for the patient has been on Tramadol.;     Pt also has elevation in the BNP and slight elevation in cardiac troponin.     -Dr Kwabena Adame           Overview/Hospital Course:        Interval History: with episodes of non redirectable agitation. Did not sleep well last night. Coughing with meals at times, per wife and daughter.     Review of Systems   Unable to perform ROS: Dementia     Objective:     Vital Signs (Most Recent):  Temp: 98.5 °F (36.9 °C) (11/15/20 0731)  Pulse: 91 (11/15/20 0731)  Resp: 19 (11/15/20 0731)  BP: (!) 168/80 (11/15/20 0731)  SpO2: 95 % (11/15/20 0731) Vital Signs (24h Range):  Temp:  [97 °F (36.1 °C)-98.5 °F (36.9 °C)] 98.5 °F (36.9 °C)  Pulse:  [81-91] 91  Resp:  [18-19] 19  SpO2:  [95 %-98 %] 95 %  BP: (126-182)/(69-83) 168/80     Weight: 83.1 kg (183 lb 3.2 oz)  Body mass index is 23.52 kg/m².  No intake or output data in the 24 hours ending " 11/15/20 1531   Physical Exam  Vitals signs and nursing note reviewed.   Constitutional:       General: He is not in acute distress.     Appearance: He is not toxic-appearing.   Cardiovascular:      Rate and Rhythm: Normal rate and regular rhythm.   Pulmonary:      Effort: Pulmonary effort is normal.      Breath sounds: No wheezing or rales.   Abdominal:      General: Abdomen is flat.      Palpations: Abdomen is soft.   Skin:     General: Skin is warm.      Capillary Refill: Capillary refill takes less than 2 seconds.   Neurological:      Mental Status: He is alert.      Comments: Oriented to self  Very hard of hearing. Has hearing aide to left ear in place     Psychiatric:         Attention and Perception: He is attentive. He does not perceive auditory or visual hallucinations.         Mood and Affect: Mood normal.         Speech: Speech is delayed.         Behavior: Behavior normal.         Cognition and Memory: Cognition is impaired. Memory is impaired.      Comments: Able to follow some commands with frequent redirection.          Significant Labs: All pertinent labs within the past 24 hours have been reviewed.    Significant Imaging: I have reviewed all pertinent imaging results/findings within the past 24 hours.  I have reviewed and interpreted all pertinent imaging results/findings within the past 24 hours.      Assessment/Plan:      * Acute metabolic encephalopathy  Per my conversation with patient's wife, he has underlying dementia but it is likely acute worsening due to possible seizure while on tramadol, medication induced due to long lasting effect of gabapentin and tramadol while renal function poor. Holding off on sedating agents except for atypical antipsychotics for episodes of non redirectable agitation and insomnia. Possible UTI. On day # 4 of rocephin empirically. UCx/BCx thus far negative. Needs to sleep better at night therefore increasing dose of quetiapine. Wife at bedside at all times.  "Speech/PT/OT. Delirium precautions in place.     Will attempt SNF vs HH vs home with hospice pending conversation with palliative care.       BPH (benign prostatic hyperplasia)  No acute issues. Will treat constipation  Bladder scan PRN        CKD stage 3  With acute renal failure  Continue intravenous fluids for supportive treatment  Encouraged PO intake      Mixed hyperlipidemia  Resume statin    Elevated troponin  Likely demand      Trigeminal neuralgia of right side of face  Will hold off on tramadol and gabapentin  Will use use acetaminophen PRN instead      Parkinsonism  Per exam       Hypothyroidism  Resume oral home regimen      Dementia  Unsure stage or type. Has some parkinsonism. Will discuss with neurology  Would he benefit from any medication? Is off aricept         Debility  Per wife, patient able to ambulate with moderate assistance but lately more often on his wheelchair due to "jerking" movements he had prior to presentation. PT/OT         Essential hypertension  At goal on amlodipine 10 mg daily   Vitals every 4 hours      VTE Risk Mitigation (From admission, onward)         Ordered     heparin (porcine) injection 5,000 Units  Every 8 hours      11/11/20 1753     IP VTE HIGH RISK PATIENT  Once      11/11/20 1753     Place sequential compression device  Until discontinued      11/11/20 1753                Discharge Planning   YOLA:      Code Status: DNR   Is the patient medically ready for discharge?:     Reason for patient still in hospital (select all that apply): Treatment  Discharge Plan A: Home Health            Discussed with wife and daughter at bedside.       Alma Lorenzana MD  Department of Hospital Medicine   [facility]    "

## 2020-11-15 NOTE — PLAN OF CARE
Problem: Physical Therapy Goal  Goal: Physical Therapy Goal  Description: Goals to be met by: 20     Patient will increase functional independence with mobility by performin. Supine to sit with min A  2. Rolling to Left and Right min A  3. Sit to stand with min A using RW  4. Bed to chair transfer with min A using RW  5. Gait ~20-30 ft with min A using RW    6. Lower extremity exercise program x10 reps per handout, with assistance as needed    Outcome: Ongoing, Progressing     Pt was dep of 2 for bed>chair transfer.

## 2020-11-15 NOTE — PLAN OF CARE
Important Message from Medicare and discharge appeal process reviewed with patient's spouse and daughter at bedside; they were given opportunity to ask questions; after which, verbalized understanding of rights; copy was placed in medical record chart and one copy given to spouse to place in patient's [employer] folder for their review and records         11/15/20 1051   Medicare Message   Important Message from Medicare regarding Discharge Appeal Rights Given to patient/caregiver;Explained to patient/caregiver;Signed/date by patient/caregiver   Date IMM was signed 11/15/20   Time IMM was signed 1000

## 2020-11-15 NOTE — PLAN OF CARE
Problem: Infection  Goal: Infection Symptom Resolution  Outcome: Ongoing, Progressing     Problem: Fall Injury Risk  Goal: Absence of Fall and Fall-Related Injury  Outcome: Ongoing, Progressing  Intervention: Identify and Manage Contributors to Fall Injury Risk  Flowsheets (Taken 11/15/2020 1524)  Self-Care Promotion:   independence encouraged   BADL personal objects within reach   BADL personal routines maintained   safe use of adaptive equipment encouraged   meal setup provided  Medication Review/Management:   medications reviewed   high risk medications identified  Intervention: Promote Injury-Free Environment  Flowsheets (Taken 11/15/2020 1524)  Safety Promotion/Fall Prevention:   assistive device/personal item within reach   bed alarm set   Fall Risk signage in place   high risk medications identified   nonskid shoes/socks when out of bed   room near unit station   /camera at bedside   side rails raised x 3  Environmental Safety Modification:   assistive device/personal items within reach   room near unit station   room organization consistent     Problem: Skin Injury Risk Increased  Goal: Skin Health and Integrity  Outcome: Ongoing, Progressing  Intervention: Optimize Skin Protection  Flowsheets (Taken 11/15/2020 1524)  Pressure Reduction Techniques:   frequent weight shift encouraged   weight shift assistance provided  Pressure Reduction Devices: elbow protectors utilized  Head of Bed (HOB): HOB elevated     Problem: Adult Inpatient Plan of Care  Goal: Plan of Care Review  Outcome: Ongoing, Progressing  Flowsheets (Taken 11/15/2020 1524)  Plan of Care Reviewed With:   spouse   daughter   son  Goal: Patient-Specific Goal (Individualization)  Outcome: Ongoing, Progressing  Goal: Absence of Hospital-Acquired Illness or Injury  Outcome: Ongoing, Progressing  Goal: Optimal Comfort and Wellbeing  Outcome: Ongoing, Progressing  Intervention: Provide Person-Centered Care  Flowsheets (Taken  11/15/2020 1524)  Trust Relationship/Rapport:   care explained   choices provided   emotional support provided   empathic listening provided   questions answered   questions encouraged   reassurance provided   thoughts/feelings acknowledged  Goal: Readiness for Transition of Care  Outcome: Ongoing, Progressing  Goal: Rounds/Family Conference  Outcome: Ongoing, Progressing

## 2020-11-15 NOTE — PLAN OF CARE
Problem: Occupational Therapy Goal  Goal: Occupational Therapy Goal  Description: Goals to be met by: 11/27/20     Patient will increase functional independence with ADLs by performing:    Feeding with Set-up Assistance.  Grooming while HOB elevated with Set-up Assistance.  Upper extremity exercise program x10 reps per handout, with assistance as needed.  Rolling L<>R with minimal assistance  Supine <> sit with minimal assistance  Sit to stand with minimal assistance   Sit EOB for 15 min with contact guard assistance  Step transfer with minimal assistance   Stand pivot transfer with minimal assistance   Wheelchair functional mobility with BUE or BLE household distance with minimal contact guard assistance     Outcome: Ongoing, Progressing  Pt was calm and participatory with cueing for therapeutic exercises and activities. Supportive wife and daughter present. MOD A x2 for first sit to stand transfer with RW; second attempt with MIN A x2. Dependent x2 for squat pivot t/f to the chair with increased confusion with body mechanics/transfer.

## 2020-11-15 NOTE — SUBJECTIVE & OBJECTIVE
Interval History: with episodes of non redirectable agitation. Did not sleep well last night. Coughing with meals at times, per wife and daughter.     Review of Systems   Unable to perform ROS: Dementia     Objective:     Vital Signs (Most Recent):  Temp: 98.5 °F (36.9 °C) (11/15/20 0731)  Pulse: 91 (11/15/20 0731)  Resp: 19 (11/15/20 0731)  BP: (!) 168/80 (11/15/20 0731)  SpO2: 95 % (11/15/20 0731) Vital Signs (24h Range):  Temp:  [97 °F (36.1 °C)-98.5 °F (36.9 °C)] 98.5 °F (36.9 °C)  Pulse:  [81-91] 91  Resp:  [18-19] 19  SpO2:  [95 %-98 %] 95 %  BP: (126-182)/(69-83) 168/80     Weight: 83.1 kg (183 lb 3.2 oz)  Body mass index is 23.52 kg/m².  No intake or output data in the 24 hours ending 11/15/20 1531   Physical Exam  Vitals signs and nursing note reviewed.   Constitutional:       General: He is not in acute distress.     Appearance: He is not toxic-appearing.   Cardiovascular:      Rate and Rhythm: Normal rate and regular rhythm.   Pulmonary:      Effort: Pulmonary effort is normal.      Breath sounds: No wheezing or rales.   Abdominal:      General: Abdomen is flat.      Palpations: Abdomen is soft.   Skin:     General: Skin is warm.      Capillary Refill: Capillary refill takes less than 2 seconds.   Neurological:      Mental Status: He is alert.      Comments: Oriented to self  Very hard of hearing. Has hearing aide to left ear in place     Psychiatric:         Attention and Perception: He is attentive. He does not perceive auditory or visual hallucinations.         Mood and Affect: Mood normal.         Speech: Speech is delayed.         Behavior: Behavior normal.         Cognition and Memory: Cognition is impaired. Memory is impaired.      Comments: Able to follow some commands with frequent redirection.          Significant Labs: All pertinent labs within the past 24 hours have been reviewed.    Significant Imaging: I have reviewed all pertinent imaging results/findings within the past 24 hours.  I have  reviewed and interpreted all pertinent imaging results/findings within the past 24 hours.

## 2020-11-15 NOTE — PLAN OF CARE
Problem: Infection  Goal: Infection Symptom Resolution  Outcome: Ongoing, Progressing     Problem: Fall Injury Risk  Goal: Absence of Fall and Fall-Related Injury  Outcome: Ongoing, Progressing     Problem: Skin Injury Risk Increased  Goal: Skin Health and Integrity  Outcome: Ongoing, Progressing     Problem: Adult Inpatient Plan of Care  Goal: Plan of Care Review  Outcome: Ongoing, Progressing  Goal: Patient-Specific Goal (Individualization)  Outcome: Ongoing, Progressing  Goal: Absence of Hospital-Acquired Illness or Injury  Outcome: Ongoing, Progressing  Goal: Optimal Comfort and Wellbeing  Outcome: Ongoing, Progressing  Goal: Readiness for Transition of Care  Outcome: Ongoing, Progressing  Goal: Rounds/Family Conference  Outcome: Ongoing, Progressing

## 2020-11-16 LAB
ALBUMIN SERPL BCP-MCNC: 2.5 G/DL (ref 3.5–5.2)
ALP SERPL-CCNC: 87 U/L (ref 55–135)
ALT SERPL W/O P-5'-P-CCNC: 24 U/L (ref 10–44)
ANION GAP SERPL CALC-SCNC: 13 MMOL/L (ref 8–16)
AST SERPL-CCNC: 27 U/L (ref 10–40)
BACTERIA BLD CULT: NORMAL
BACTERIA BLD CULT: NORMAL
BILIRUB SERPL-MCNC: 0.3 MG/DL (ref 0.1–1)
BUN SERPL-MCNC: 53 MG/DL (ref 10–30)
CALCIUM SERPL-MCNC: 8.3 MG/DL (ref 8.7–10.5)
CHLORIDE SERPL-SCNC: 109 MMOL/L (ref 95–110)
CO2 SERPL-SCNC: 19 MMOL/L (ref 23–29)
CREAT SERPL-MCNC: 4.3 MG/DL (ref 0.5–1.4)
EST. GFR  (AFRICAN AMERICAN): 13 ML/MIN/1.73 M^2
EST. GFR  (NON AFRICAN AMERICAN): 11 ML/MIN/1.73 M^2
GLUCOSE SERPL-MCNC: 134 MG/DL (ref 70–110)
MAGNESIUM SERPL-MCNC: 2.3 MG/DL (ref 1.6–2.6)
POCT GLUCOSE: 121 MG/DL (ref 70–110)
POCT GLUCOSE: 128 MG/DL (ref 70–110)
POCT GLUCOSE: 134 MG/DL (ref 70–110)
POTASSIUM SERPL-SCNC: 3.7 MMOL/L (ref 3.5–5.1)
PROT SERPL-MCNC: 6.3 G/DL (ref 6–8.4)
SODIUM SERPL-SCNC: 141 MMOL/L (ref 136–145)

## 2020-11-16 PROCEDURE — 99497 PR ADVNCD CARE PLAN 30 MIN: ICD-10-PCS | Mod: 25,HCNC,, | Performed by: NURSE PRACTITIONER

## 2020-11-16 PROCEDURE — 25000003 PHARM REV CODE 250: Mod: HCNC | Performed by: INTERNAL MEDICINE

## 2020-11-16 PROCEDURE — 99497 ADVNCD CARE PLAN 30 MIN: CPT | Mod: 25,HCNC,, | Performed by: NURSE PRACTITIONER

## 2020-11-16 PROCEDURE — S0166 INJ OLANZAPINE 2.5MG: HCPCS | Mod: HCNC | Performed by: INTERNAL MEDICINE

## 2020-11-16 PROCEDURE — 83735 ASSAY OF MAGNESIUM: CPT | Mod: HCNC

## 2020-11-16 PROCEDURE — 97110 THERAPEUTIC EXERCISES: CPT | Mod: HCNC

## 2020-11-16 PROCEDURE — S5010 5% DEXTROSE AND 0.45% SALINE: HCPCS | Mod: HCNC | Performed by: INTERNAL MEDICINE

## 2020-11-16 PROCEDURE — 80053 COMPREHEN METABOLIC PANEL: CPT | Mod: HCNC

## 2020-11-16 PROCEDURE — 21400001 HC TELEMETRY ROOM: Mod: HCNC

## 2020-11-16 PROCEDURE — 97802 MEDICAL NUTRITION INDIV IN: CPT | Mod: HCNC

## 2020-11-16 PROCEDURE — 63600175 PHARM REV CODE 636 W HCPCS: Mod: HCNC | Performed by: INTERNAL MEDICINE

## 2020-11-16 PROCEDURE — 99223 PR INITIAL HOSPITAL CARE,LEVL III: ICD-10-PCS | Mod: HCNC,,, | Performed by: NURSE PRACTITIONER

## 2020-11-16 PROCEDURE — 99223 1ST HOSP IP/OBS HIGH 75: CPT | Mod: HCNC,,, | Performed by: NURSE PRACTITIONER

## 2020-11-16 PROCEDURE — 97116 GAIT TRAINING THERAPY: CPT | Mod: HCNC

## 2020-11-16 PROCEDURE — 36415 COLL VENOUS BLD VENIPUNCTURE: CPT | Mod: HCNC

## 2020-11-16 PROCEDURE — 92507 TX SP LANG VOICE COMM INDIV: CPT | Mod: HCNC

## 2020-11-16 RX ORDER — QUETIAPINE FUMARATE 25 MG/1
75 TABLET, FILM COATED ORAL NIGHTLY
Status: DISCONTINUED | OUTPATIENT
Start: 2020-11-16 | End: 2020-11-18 | Stop reason: HOSPADM

## 2020-11-16 RX ORDER — POLYETHYLENE GLYCOL 3350 17 G/17G
17 POWDER, FOR SOLUTION ORAL DAILY
Status: DISCONTINUED | OUTPATIENT
Start: 2020-11-16 | End: 2020-11-18 | Stop reason: HOSPADM

## 2020-11-16 RX ADMIN — HEPARIN SODIUM 5000 UNITS: 5000 INJECTION INTRAVENOUS; SUBCUTANEOUS at 05:11

## 2020-11-16 RX ADMIN — QUETIAPINE FUMARATE 75 MG: 25 TABLET ORAL at 10:11

## 2020-11-16 RX ADMIN — HYDRALAZINE HYDROCHLORIDE 10 MG: 20 INJECTION, SOLUTION INTRAMUSCULAR; INTRAVENOUS at 12:11

## 2020-11-16 RX ADMIN — ATORVASTATIN CALCIUM 40 MG: 40 TABLET, FILM COATED ORAL at 10:11

## 2020-11-16 RX ADMIN — LEVOTHYROXINE SODIUM 75 MCG: 75 TABLET ORAL at 05:11

## 2020-11-16 RX ADMIN — SODIUM BICARBONATE: 84 INJECTION, SOLUTION INTRAVENOUS at 05:11

## 2020-11-16 RX ADMIN — AMLODIPINE BESYLATE 10 MG: 5 TABLET ORAL at 08:11

## 2020-11-16 RX ADMIN — OLANZAPINE 2.5 MG: 10 INJECTION, POWDER, FOR SOLUTION INTRAMUSCULAR at 10:11

## 2020-11-16 RX ADMIN — HEPARIN SODIUM 5000 UNITS: 5000 INJECTION INTRAVENOUS; SUBCUTANEOUS at 10:11

## 2020-11-16 RX ADMIN — DOCUSATE SODIUM 50 MG AND SENNOSIDES 8.6 MG 1 TABLET: 8.6; 5 TABLET, FILM COATED ORAL at 08:11

## 2020-11-16 RX ADMIN — DEXTROSE AND SODIUM CHLORIDE: 5; .45 INJECTION, SOLUTION INTRAVENOUS at 08:11

## 2020-11-16 RX ADMIN — POLYETHYLENE GLYCOL 3350 17 G: 17 POWDER, FOR SOLUTION ORAL at 05:11

## 2020-11-16 RX ADMIN — ACETAMINOPHEN 650 MG: 160 SOLUTION ORAL at 08:11

## 2020-11-16 NOTE — PT/OT/SLP PROGRESS
Physical Therapy Treatment    Patient Name:  Ifeanyi Rutherford Sr   MRN:  [id number]    Recommendations:     Discharge Recommendations:  home health PT(24hr care)   Discharge Equipment Recommendations: hospital bed, wheelchair, bedside commode   Barriers to discharge home: Pt with confusion and decreased safety awareness.     Assessment:     Ifeanyi Rutherford Sr is a 89 y.o. male admitted with a medical diagnosis of Acute metabolic encephalopathy.  He presents with the following impairments/functional limitations:  weakness, decreased lower extremity function, impaired coordination, decreased safety awareness, pain, impaired self care skills, impaired functional mobilty, gait instability, impaired balance, visual deficits, impaired cognition, impaired fine motor, decreased ROM.    Rehab Prognosis: Fair; patient would benefit from acute skilled PT services to address these deficits and reach maximum level of function.    Recent Surgery: * No surgery found *      Plan:     During this hospitalization, patient to be seen (3-5x/wk) to address the identified rehab impairments via gait training, therapeutic activities, therapeutic exercises, wheelchair management/training, neuromuscular re-education and progress toward the following goals:    · Plan of Care Expires:  11/27/20    Subjective     Chief Complaint: Pt reported back, R shoulder, and R hip pain.  Patient/Family Comments/goals: Pt agreeable to therapy.   Pain/Comfort:  · Pain Rating 1: 0/10      Objective:     Patient found HOB elevated with bed alarm, peripheral IV, telemetry([employer] monitor) upon PT entry to room.     General Precautions: Standard, fall, seizure, [place] to R ear, and visual deficits (R eye blind)  Orthopedic Precautions:N/A   Braces: N/A    Functional Mobility:  Pt pleasantly confused and able to follow simple commands to participate in therapy.  Pt with limited eye contact 2* visual deficits.  Pt required max VC's/TC's 2* cognitive impairment, [place]  and visual deficits.  Pt also required extra time to initiate and complete tasks.   · Bed Mobility:     · Scooting: contact guard assistance for anterior and posterior scooting  · Supine to Sit: minimum assistance with HOB elevated   · Transfers:     · Sit to Stand:  minimum assistance-CGA with rolling walker  · Bed to Chair: minimum assistance with  rolling walker  using  Step Transfer  · Gait: Pt ambulated ~30 ft with min A-CGA using RW and chair to follow.  Pt with decreased foot clearance, decreased step length, and decreased tong.   · Balance: Pt with fair dynamic standing balance.       AM-PAC 6 CLICK MOBILITY  Turning over in bed (including adjusting bedclothes, sheets and blankets)?: 3  Sitting down on and standing up from a chair with arms (e.g., wheelchair, bedside commode, etc.): 3  Moving from lying on back to sitting on the side of the bed?: 3  Moving to and from a bed to a chair (including a wheelchair)?: 3  Need to walk in hospital room?: 3  Climbing 3-5 steps with a railing?: 2  Basic Mobility Total Score: 17       Therapeutic Activities and Exercises:  BLE supine therex x10 reps: AP, hip abd/add    Trunk flex and rotation R/L while sitting EOB    Patient left up in chair reclined on air cushion with BLE elevated on pillows with all lines intact, call button in reach, chair alarm on, nurse Humza notified and spouse and son present.  Lunch tray set-up.      GOALS:   Multidisciplinary Problems     Physical Therapy Goals        Problem: Physical Therapy Goal    Goal Priority Disciplines Outcome Goal Variances Interventions   Physical Therapy Goal     PT, PT/OT Ongoing, Progressing     Description: Goals to be met by: 20     Patient will increase functional independence with mobility by performin. Supine to sit with min A  2. Rolling to Left and Right min A  3. Sit to stand with min A using RW  4. Bed to chair transfer with min A using RW  5. Gait ~20-30 ft with min A using RW    6.  Lower extremity exercise program x10 reps per handout, with assistance as needed                     Time Tracking:     PT Received On: 11/16/20  PT Start Time: 1130     PT Stop Time: 1154  PT Total Time (min): 24 min     Billable Minutes: Gait Training 12 min             PTA Visit Number: 0     Tiffanie U Jeanine, PT  11/16/2020

## 2020-11-16 NOTE — PLAN OF CARE
Problem: Occupational Therapy Goal  Goal: Occupational Therapy Goal  Description: Goals to be met by: 11/27/20     Patient will increase functional independence with ADLs by performing:    Feeding with Set-up Assistance.  Grooming while standing at the sink with Stand-by assistance. (goal adjusted from bed level with set up 11/16/20)   Upper extremity exercise program x10 reps per handout, with assistance as needed.  Rolling L<>R with minimal assistance  Supine <> sit with Stand-by assistance (goal updated from MIN A 11/16/20)  Sit to stand with Stand-by assistance (goal updated from MIN A 11/16/20)   Sit EOB for 15 min with contact guard assistance  Step transfer with stand-by assistance (goal updated from MIN A 11/16/20)  Stand pivot transfer with minimal assistance   Wheelchair functional mobility with BUE or BLE household distance with minimal contact guard assistance     Outcome: Ongoing, Progressing   Pt progressing well with therapy goals with POC updated. MIN A for bed mobility, sit to stand from the bed, and functional mobility with RW and verbal cueing and supportive family (son, wife) present. OT rec HHOT with 24 hour sup/assist, hospital bed, w/c, and BSC.

## 2020-11-16 NOTE — PT/OT/SLP PROGRESS
Speech Language Pathology Treatment    Patient Name:  Ifeanyi Rutherford Sr   MRN:  [id number]  Admitting Diagnosis: Acute metabolic encephalopathy    Recommendations:                 General Recommendations:  Follow-up not indicated  Diet recommendations:  mech soft with thin liquids  Aspiration Precautions: supervise meals and 1 bite/sip at a time   General Precautions: Standard, pureed diet  Communication strategies:  provide increased time to answer, Pt is [place]    Subjective   Family reporting they are pleased with care they have received here and that Pt is speaking and communicating more than baseline.   Patient goals: upgrade diet    Pain/Comfort:  · Pain Rating 1: 0/10  · Pain Rating Post-Intervention 1: 0/10    Objective:     Has the patient been evaluated by SLP for swallowing?   Yes  Keep patient NPO? No   Current Respiratory Status: room air      Pt seen upright in chair, self presented trials of solids X3 revealed good attention to bolus, mild impulsivity, however adequate bolus prep and A-P transport. Poor dentition noted, family reporting he usually eats without dentures in place. Trials of thin liquids via straw revealed tongue thrust however no overt s/s of aspiration. Family educated regarding upgrade to mech soft and how given his poor dentition and PMhx of dementia this is least restrictive in hospital environment. They report they are and have been in agreement with ST POC including d/c from acute services today.     Assessment:     Ifeanyi Rutherford Sr is a 89 y.o. male with dx Acute metabolic encephalopathy he presents with mild oral dysphagia c/b impulsivity negatively impacted by poor dentition and decreased cognition.      Goals:   Multidisciplinary Problems     SLP Goals        Problem: SLP Goal    Goal Priority Disciplines Outcome   SLP Goal     SLP Ongoing, Progressing   Description: ST. Pt will tolerate PO diet of pureed/thin liquids without overt s/s of aspiration.                    Plan:     · Patient to be seen:  3 x/week   · Plan of Care expires:  11/27/20  · Plan of Care reviewed with:  patient, family   · SLP Follow-Up:  Yes       Discharge recommendations:  No further ST is warranted  Barriers to Discharge:  None    Time Tracking:     SLP Treatment Date:   11/16/20  Speech Start Time:  1240  Speech Stop Time:  1250     Speech Total Time (min):  10 min    Billable Minutes: Treatment Swallowing Dysfunction 10    Lady Faust CCC-SLP  11/16/2020

## 2020-11-16 NOTE — SUBJECTIVE & OBJECTIVE
Interval History: stable, however did not sleep much last night despite increase in seroquel. I saw walking in hallway with walker with moderate assistance.      Review of Systems   Unable to perform ROS: Dementia     Objective:     Vital Signs (Most Recent):  Temp: 97.8 °F (36.6 °C) (11/16/20 1700)  Pulse: 67 (11/16/20 1700)  Resp: 18 (11/16/20 1700)  BP: 133/77 (11/16/20 1700)  SpO2: 97 % (11/16/20 1700) Vital Signs (24h Range):  Temp:  [97.8 °F (36.6 °C)-98.6 °F (37 °C)] 97.8 °F (36.6 °C)  Pulse:  [67-84] 67  Resp:  [17-18] 18  SpO2:  [93 %-98 %] 97 %  BP: (120-174)/(55-77) 133/77     Weight: 87.9 kg (193 lb 12.6 oz)  Body mass index is 24.88 kg/m².  No intake or output data in the 24 hours ending 11/16/20 1706   Physical Exam  Vitals signs and nursing note reviewed.   Constitutional:       General: He is not in acute distress.     Appearance: He is not toxic-appearing.   Cardiovascular:      Rate and Rhythm: Normal rate and regular rhythm.   Pulmonary:      Effort: Pulmonary effort is normal.      Breath sounds: No wheezing or rales.   Abdominal:      General: Abdomen is flat.      Palpations: Abdomen is soft.   Skin:     General: Skin is warm.      Capillary Refill: Capillary refill takes less than 2 seconds.   Neurological:      Mental Status: He is alert.      Comments: Oriented to self  Very hard of hearing. Has hearing aide to left ear in place     Psychiatric:         Attention and Perception: He is attentive. He does not perceive auditory or visual hallucinations.         Mood and Affect: Mood normal.         Speech: Speech is delayed.         Behavior: Behavior normal.         Cognition and Memory: Cognition is impaired. Memory is impaired.      Comments: Able to follow some commands with frequent redirection.          Significant Labs: All pertinent labs within the past 24 hours have been reviewed.    Significant Imaging: I have reviewed all pertinent imaging results/findings within the past 24  hours.  I have reviewed and interpreted all pertinent imaging results/findings within the past 24 hours.

## 2020-11-16 NOTE — CONSULTS
[facility]  Palliative Medicine  Consult Note    Patient Name: Ifeanyi Rutherford Sr  MRN: [id number]  Admission Date: 11/11/2020  Hospital Length of Stay: 5 days  Code Status: Full Code   Attending Provider: Alma Medina MD  Consulting Provider: Yolanda Del Valle NP  Primary Care Physician: Jason Levy MD  Principal Problem:Acute metabolic encephalopathy    Patient information was obtained from patient, spouse/SO, past medical records and ER records and son.      Inpatient consult to Palliative Care  Consult performed by: Yolanda Del Valle NP  Consult ordered by: Alma Medina MD  Reason for consult: GOC        Assessment/Plan:     Palliative consult for GOC discussion see below    Plan:  -continue current treatment  -spouse and son feel patient would not want life sustaining treatments if no meaningful recovery     -Patient has a HPOA and Living Will (copy on chart belonged to spouse not patient) requested copy of patient's and spouse will bring tomorrow  -Palliative to continue to follow for ongoing GOC discussion       Advance Care Planning     GOC  I engaged the patient and family spouse and one of son's in a conversation about advance care planning and we specifically addressed what the goals of care would be moving forward, in light of the patient's change in clinical status. We discussed his current clinical condition and his QOL prior to hospitalization. We did not specifically address the patient's likely prognosis, which is undetermined at this time but we did discuss the progression of Dementia, anticiapted long term outcomes and options. The patient had AD on chart which was very detailed but after review it belonged to his spouse. I asked her if he had done one at the same time she did and she said he did but she does not recall what was on it. She will bring his copy tomorrow for us to review.     We explored the patient's values and preferences for future care. We  "discussed CPR, life sustaining treatments, tracheostomy and PEG. Spouse and son do not feel that he would ever want life sustaining treatments if no meaningful recovery and no trach or PEG. I let her know that when she brings in copy of his AD we can go over again so we can make sure we follow his direction and wishes. Spouse agreed. We did discuss disposition and care required at home. HH, Palliative NP visits, and hospice as option in future. The family are leaning toward HH and Palliative. Addressed all questions and concerns. Once the spouse brings copy of AD the family will be able to make better decisions.     ACP 26 minutes spent in GOC discussion    Thank you for your consult. I will follow-up with patient. Please contact us if you have any additional questions.    Subjective:     HPI:   Principal Problem:Acute metabolic encephalopathy           HPI:  Mr. Rutherford is a 89 y.o. male who presents to the ED via EMS due to AMS per family. EMS states that the family called because the patient was "twitching and shaking" and he's not as talkative as he usually is. EMS reports that the patient is awake, but is not responding to questions. EMS states that he is also not making eye contact and has a blank stare. EMS notes that the patient is disoriented x4 and he has a PMHx of Dementia.      In the ER he was found to have lecucytosis, Cr 4.6 with poor response to IV fluid, UA with Leucocytes as well as very high Urine Protein to creatinine ratio.      The patient was also communicated with Neurology by ED physician, who suspect seizure for the patient has been on Tramadol.;      Pt also has elevation in the BNP and slight elevation in cardiac troponin.     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    Chronic headache 9/12/2014    CKD stage 3 9/13/2019    Constipation     Dementia     worked up for NPH at  and had taps with no improvement    Depression     [place] (hard of hearing)     Hyperlipidemia     Hypertension     Hypothyroidism     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    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    Parkinsons     Requires assistance with all daily activities     Seizure 11/12/2020    Sleep apnea     Spinal stenosis     Trigeminal neuralgia pain 1/20/2015    Trouble in sleeping     Unsteady gait     Vertigo 9/11/2012    Vestibular schwannoma 6/4/2015       Past Surgical History:   Procedure Laterality Date    bilateral total knee arthroplasties      CATARACT EXTRACTION W/  INTRAOCULAR LENS IMPLANT Bilateral     CHOLECYSTECTOMY      EYE SURGERY      GALLBLADDER SURGERY      JOINT REPLACEMENT Bilateral     TKR    THYROIDECTOMY      TONSILLECTOMY         Review of patient's allergies indicates:   Allergen Reactions    Carbamazepine Swelling    Trazodone Anxiety    Bactrim [sulfamethoxazole-trimethoprim]      Hallucinations     Demerol [meperidine] Hallucinations    Morphine Nausea Only       Medications:  Continuous Infusions:   dextrose 5 % and 0.45 % NaCl 100 mL/hr at 11/16/20 0839     Scheduled Meds:   amLODIPine  10 mg Oral Daily    atorvastatin  40 mg Oral QHS    cefTRIAXone (ROCEPHIN) IVPB  2 g Intravenous Q24H    heparin (porcine)  5,000 Units Subcutaneous Q8H    levothyroxine  75 mcg Oral Before breakfast    QUEtiapine  50 mg Oral QHS    senna-docusate 8.6-50 mg  1 tablet Oral Daily     PRN Meds:acetaminophen, dextrose 50%, dextrose 50%, glucagon (human recombinant), glucose, glucose, hydrALAZINE, lorazepam, OLANZapine, sodium chloride 0.9%    Family History     Problem Relation (Age of Onset)    Heart disease Mother    No Known Problems Father, Sister, Brother, Daughter, Son, Sister, Brother,  Brother, Brother, Maternal Aunt, Maternal Uncle, Paternal Aunt, Paternal Uncle, Maternal Grandmother, Maternal Grandfather, Paternal Grandmother, Paternal Grandfather, Daughter, Son, Son        Tobacco Use    Smoking status: Former Smoker    Smokeless tobacco: Never Used   Substance and Sexual Activity    Alcohol use: No     Alcohol/week: 0.0 standard drinks    Drug use: No    Sexual activity: Not Currently     Partners: Female       Review of Systems   Unable to perform ROS: Dementia   Constitutional: Positive for activity change and appetite change.   Musculoskeletal: Positive for arthralgias (right shoulder).   Psychiatric/Behavioral: Positive for sleep disturbance.     Objective:     Vital Signs (Most Recent):  Temp: 98 °F (36.7 °C) (11/16/20 1159)  Pulse: 72 (11/16/20 1159)  Resp: 18 (11/16/20 1159)  BP: (!) 142/71 (11/16/20 1159)  SpO2: 97 % (11/16/20 1159) Vital Signs (24h Range):  Temp:  [97.9 °F (36.6 °C)-98.6 °F (37 °C)] 98 °F (36.7 °C)  Pulse:  [70-88] 72  Resp:  [17-19] 18  SpO2:  [93 %-98 %] 97 %  BP: (120-201)/(55-91) 142/71     Weight: 87.9 kg (193 lb 12.6 oz)  Body mass index is 24.88 kg/m².    Physical Exam  Vitals signs and nursing note reviewed.   Constitutional:       General: He is not in acute distress.  HENT:      Head: Normocephalic and atraumatic.      Mouth/Throat:      Mouth: Mucous membranes are moist.   Cardiovascular:      Rate and Rhythm: Normal rate and regular rhythm.   Pulmonary:      Effort: Pulmonary effort is normal. No respiratory distress.   Abdominal:      General: Abdomen is flat.      Palpations: Abdomen is soft.   Musculoskeletal:      Right lower leg: No edema.      Left lower leg: No edema.   Skin:     General: Skin is warm and dry.      Findings: Bruising present.   Neurological:      Mental Status: He is alert.      Comments: Oriented x 1, appropriate responses and clarity in conversation, able to follow commands, memory impairment         Review of  Symptoms    Symptom Assessment (ESAS 0-10 Scale)  Pain:  0  Dyspnea:  0  Anxiety:  0  Nausea:  0  Depression:  0  Anorexia:  0  Fatigue:  0  Insomnia:  0  Restlessness:  0  Agitation:  0         Comments:  Patient unable to complete rating due to cognitive impairment but able to tell me his right shoulder hurts and that he does not sleep well             Advance Care Planning   Advance Directives:   Living Will: Yes (has AD per spouse; requested copy)    Medical Power of : Yes (Has AD per spouse; requested copy)      Decision Making:  Patient answered questions and Family answered questions (patient able to answer simple questions )         Significant Labs: All pertinent labs within the past 24 hours have been reviewed.  CBC:   Recent Labs   Lab 11/14/20  0515   WBC 13.18*   HGB 11.3*   HCT 34.0*   MCV 92        BMP:  Recent Labs   Lab 11/16/20  0522   *      K 3.7      CO2 19*   BUN 53*   CREATININE 4.3*   CALCIUM 8.3*   MG 2.3     LFT:  Lab Results   Component Value Date    AST 27 11/16/2020    ALKPHOS 87 11/16/2020    BILITOT 0.3 11/16/2020     Albumin:   Albumin   Date Value Ref Range Status   11/16/2020 2.5 (L) 3.5 - 5.2 g/dL Final     Protein:   Total Protein   Date Value Ref Range Status   11/16/2020 6.3 6.0 - 8.4 g/dL Final     Lactic acid:   Lab Results   Component Value Date    LACTATE 0.9 11/11/2020    LACTATE 0.9 11/11/2020       Significant Imaging: I have reviewed all pertinent imaging results/findings within the past 24 hours.     Discussed patient with Dr Lorenzana      > 50% of 60 min visit spent in chart review, face to face discussion of goals of care,  symptom assessment, coordination of care and emotional support.    Yolanda Del Valle, NP  Palliative Medicine  [facility]

## 2020-11-16 NOTE — PLAN OF CARE
Problem: Physical Therapy Goal  Goal: Physical Therapy Goal  Description: Goals to be met by: 20     Patient will increase functional independence with mobility by performin. Supine to sit with SBA  2. Rolling to Left and Right with SBA  3. Sit to stand with SBA using RW  4. Bed to chair transfer with SBA using RW  5. Gait ~50 ft with SBA using RW    6. Lower extremity exercise program x10 reps per handout, with SBA    Outcome: Ongoing, Progressing    Pt ambulated ~30 ft with min A using RW and chair to follow.

## 2020-11-16 NOTE — PLAN OF CARE
Problem: Infection  Goal: Infection Symptom Resolution  Outcome: Ongoing, Progressing     Problem: Fall Injury Risk  Goal: Absence of Fall and Fall-Related Injury  Outcome: Ongoing, Progressing     Problem: Skin Injury Risk Increased  Goal: Skin Health and Integrity  Outcome: Ongoing, Progressing     Problem: Adult Inpatient Plan of Care  Goal: Plan of Care Review  Outcome: Ongoing, Progressing  Goal: Patient-Specific Goal (Individualization)  Outcome: Ongoing, Progressing  Goal: Absence of Hospital-Acquired Illness or Injury  Outcome: Ongoing, Progressing  Goal: Optimal Comfort and Wellbeing  Outcome: Ongoing, Progressing  Goal: Readiness for Transition of Care  Outcome: Ongoing, Progressing  Goal: Rounds/Family Conference  Outcome: Ongoing, Progressing     Problem: Coping Ineffective  Goal: Effective Coping  Outcome: Ongoing, Progressing

## 2020-11-16 NOTE — PLAN OF CARE
Recommendations    1. Continue mechanical soft diet, encourage PO intake    2. RD to order Boost Plus BID, and Boost Pudding once daily    3. Weight pt weeklly    4. RD to follow 11/20      Goals: Consume 50% of all meals and supplements at this time  Nutrition Goal Status: new  Communication of RD Recs: (POC)    Thanks for the consult!

## 2020-11-16 NOTE — PROGRESS NOTES
"[facility] Medicine  Progress Note    Patient Name: Ifeanyi Rutherford   MRN: [id number]  Patient Class: IP- Inpatient   Admission Date: 11/11/2020  Length of Stay: 5 days  Attending Physician: Alma Medina MD  Primary Care Provider: Jason Levy MD        Subjective:     Principal Problem:Acute metabolic encephalopathy        HPI:  Mr. Rutherford is a 89 y.o. male who presents to the ED via EMS due to AMS per family. EMS states that the family called because the patient was "twitching and shaking" and he's not as talkative as he usually is. EMS reports that the patient is awake, but is not responding to questions. EMS states that he is also not making eye contact and has a blank stare. EMS notes that the patient is disoriented x4 and he has a PMHx of Dementia.      In the ER he was found to have lecucytosis, Cr 4.6 with poor response to IV fluid, UA with Leucocytes as well as very high Urine Protein to creatinine ratio.     The patient was also communicated with Neurology by ED physician, who suspect seizure for the patient has been on Tramadol.;     Pt also has elevation in the BNP and slight elevation in cardiac troponin.     -Dr Kwabena Adame           Overview/Hospital Course:  Mr Rutherford presented with acute metabolic encephalopathy. Seizures and/or post-ictal state highly suspected given worsening of mental status overtime, "staring into space", frequent use of tramadol and gabapentin at home. Workup also significant with worsened renal function with moderate uremia and abnormal urinalysis possible suggesting urinary tract infection. Given dose of levetiracetam IV, intravenous fluids and empiric ceftriaxone. Urine cultures obtained. CT of brain with no acute pathology. EEG negative for epileptiform/seizure activity. Neurology and nephrology consulted. Although renal function remained unchanged despite fluids, patient became more interactive but did require plenty of redirection " and frequently disoriented. Speech evaluated and cleared for pureed then mechanical soft. UCx/BCx remained negative. Completed 5 days of abx therapy while inpatient. PT/OT recommending HH with 24 hour assistance. Patient may also have progressive dementia of unknown type (has Parkinson's features). Palliative care consulted for goals of care.     Interval History: stable, however did not sleep much last night despite increase in seroquel. I saw walking in hallway with walker with moderate assistance.      Review of Systems   Unable to perform ROS: Dementia     Objective:     Vital Signs (Most Recent):  Temp: 97.8 °F (36.6 °C) (11/16/20 1700)  Pulse: 67 (11/16/20 1700)  Resp: 18 (11/16/20 1700)  BP: 133/77 (11/16/20 1700)  SpO2: 97 % (11/16/20 1700) Vital Signs (24h Range):  Temp:  [97.8 °F (36.6 °C)-98.6 °F (37 °C)] 97.8 °F (36.6 °C)  Pulse:  [67-84] 67  Resp:  [17-18] 18  SpO2:  [93 %-98 %] 97 %  BP: (120-174)/(55-77) 133/77     Weight: 87.9 kg (193 lb 12.6 oz)  Body mass index is 24.88 kg/m².  No intake or output data in the 24 hours ending 11/16/20 1706   Physical Exam  Vitals signs and nursing note reviewed.   Constitutional:       General: He is not in acute distress.     Appearance: He is not toxic-appearing.   Cardiovascular:      Rate and Rhythm: Normal rate and regular rhythm.   Pulmonary:      Effort: Pulmonary effort is normal.      Breath sounds: No wheezing or rales.   Abdominal:      General: Abdomen is flat.      Palpations: Abdomen is soft.   Skin:     General: Skin is warm.      Capillary Refill: Capillary refill takes less than 2 seconds.   Neurological:      Mental Status: He is alert.      Comments: Oriented to self  Very hard of hearing. Has hearing aide to left ear in place     Psychiatric:         Attention and Perception: He is attentive. He does not perceive auditory or visual hallucinations.         Mood and Affect: Mood normal.         Speech: Speech is delayed.         Behavior: Behavior  normal.         Cognition and Memory: Cognition is impaired. Memory is impaired.      Comments: Able to follow some commands with frequent redirection.          Significant Labs: All pertinent labs within the past 24 hours have been reviewed.    Significant Imaging: I have reviewed all pertinent imaging results/findings within the past 24 hours.  I have reviewed and interpreted all pertinent imaging results/findings within the past 24 hours.      Assessment/Plan:      * Acute metabolic encephalopathy  Per my conversation with patient's wife, he has underlying dementia but it is likely acute worsening due to possible seizure while on tramadol, medication induced due to long lasting effect of gabapentin and tramadol while renal function poor. Holding off on sedating agents except for atypical antipsychotics for episodes of non redirectable agitation and insomnia. Possible UTI. On day # 5 of rocephin empirically. UCx/BCx thus far negative. Needs to sleep better at night therefore increasing dose of quetiapine. Wife at bedside at all times. Speech/PT/OT. Delirium precautions in place.     Family would be unable to be with patient 24-7 if in a SNF. Family presence if very important for patient's mentation. Plan is for home with  + palliative care vs home hospice. Has a walker. Will order hospital bed and commode.       BPH (benign prostatic hyperplasia)  No acute issues. Will treat constipation  Bladder scan PRN        CKD stage 3  With acute renal failure  Continue intravenous fluids for supportive treatment  Encouraged PO intake      Mixed hyperlipidemia  Resume statin    Elevated troponin  Likely demand      Trigeminal neuralgia of right side of face  Will hold off on tramadol and gabapentin  Will use use acetaminophen PRN instead      Parkinsonism  Per exam       Hypothyroidism  Resume oral home regimen      Dementia  Unsure stage or type. Has some parkinsonism. Will discuss with neurology  Would he benefit from  "any medication? Is off aricept         Debility  Per wife, patient able to ambulate with moderate assistance but lately more often on his wheelchair due to "jerking" movements he had prior to presentation. PT/OT         Essential hypertension  At goal on amlodipine 10 mg daily   Vitals every 4 hours        VTE Risk Mitigation (From admission, onward)         Ordered     heparin (porcine) injection 5,000 Units  Every 8 hours      11/11/20 1753     IP VTE HIGH RISK PATIENT  Once      11/11/20 1753     Place sequential compression device  Until discontinued      11/11/20 1753                Discharge Planning   YOLA:      Code Status: Full Code   Is the patient medically ready for discharge?:     Reason for patient still in hospital (select all that apply): Treatment  Discharge Plan A: Home Health        Assessment and plan discussed with patient's wife at bedside. All questions answered. Advised having 24-hour sitter and a decision for home health with pall care vs hospice as soon as her and her family can decide on this as prolonged hospital stay can result in more harm and hospital associated encephalopathy. Verbalized understanding.     Alma Lorenzana MD  Department of Hospital Medicine   [facility]    "

## 2020-11-16 NOTE — ASSESSMENT & PLAN NOTE
Per my conversation with patient's wife, he has underlying dementia but it is likely acute worsening due to possible seizure while on tramadol, medication induced due to long lasting effect of gabapentin and tramadol while renal function poor. Holding off on sedating agents except for atypical antipsychotics for episodes of non redirectable agitation and insomnia. Possible UTI. On day # 5 of rocephin empirically. UCx/BCx thus far negative. Needs to sleep better at night therefore increasing dose of quetiapine. Wife at bedside at all times. Speech/PT/OT. Delirium precautions in place.     Family would be unable to be with patient 24-7 if in a SNF. Family presence if very important for patient's mentation. Plan is for home with HH + palliative care vs home hospice. Has a walker. Will order hospital bed and commode.

## 2020-11-16 NOTE — PT/OT/SLP PROGRESS
Occupational Therapy   Treatment    Name: Ifeanyi Rutherford Sr  MRN: [id number]  Admitting Diagnosis:  Acute metabolic encephalopathy       Recommendations:     Discharge Recommendations: home health OT(with 24 hr assist)  Discharge Equipment Recommendations:  hospital bed, bedside commode, wheelchair  Barriers to discharge:  (confusion; requires assist with ADLs and MIN A with v/c for functional mobility; high risk of falls if unsupervised)    Assessment:     Ifeanyi Rutherford Sr is a 89 y.o. male with a medical diagnosis of Acute metabolic encephalopathy. Performance deficits affecting function are weakness, visual deficits, impaired cognition, impaired endurance, decreased ROM, decreased coordination, decreased upper extremity function, impaired self care skills, decreased lower extremity function, impaired functional mobilty, gait instability, decreased safety awareness, impaired balance.     Pt progressing well with therapy goals with POC updated. MIN A for bed mobility, sit to stand from the bed, and functional mobility with RW and verbal cueing and supportive family (son, wife) present. OT rec HHOT with 24 hour sup/assist, hospital bed, w/c, and BSC. Pt would benefit from hospital bed in order to increase pt's active participation with bed mobility and decrease caregiver burden. Pt is a high fall risk and is unsafe to ambulate to the bathroom in the night especially with noted dementia and confusion; pt would, therefore, benefit from BSC in order to increase safety with toileting ADL. Pt fatigues with short distance of functional mobility with RW and requires the chair to follow for immediate seated breaks- pt will require w/c in order to increase safety with functional mobility, reduce risk of falls, and decrease caregiver burden.     Pt is continuously motivated and pleasant with therapy services each day. Increased POC to 5x/day week d/t progressing and tolerating therapy sessions.    Rehab Prognosis:   Fair +; patient would benefit from acute skilled OT services to address these deficits and reach maximum level of function.       Plan:     Patient to be seen 5 x/week to address the above listed problems via self-care/home management, therapeutic activities, therapeutic exercises  · Plan of Care Expires: 11/27/20  · Plan of Care Reviewed with: patient, spouse, son    Subjective     Chief complaint: none reported   Patient's/family comments/goals: wife happy to see him moving better; pt likes therapy     Son present and pt able to accurately report his relationship to him.     Pain/Comfort:  · Pain Rating 1: 0/10    Objective:     Patient found HOB elevated with bed alarm, telemetry, peripheral IV(Avasys) upon OT entry to room.    General Precautions: Standard, fall, seizure(deaf R ear, blind R eye)   Orthopedic Precautions:N/A   Braces: N/A     Occupational Performance:     Bed Mobility:  pt required verbal cueing to initiate movements  · Patient completed Scooting anteriorly with contact guard assistance  · Patient completed Supine to Sit with minimum assistance, with side rail and HOB elevated     Functional Mobility/Transfers:  · Patient completed Sit <> Stand Transfer with contact guard assistance  with  rolling walker   · Functional Mobility: Pt completed short distance functional mobility in the hallway with MIN A using RW with chair following. Son in the hallway for visual aide for pt to follow. Pt followed cueing for safe hand placement for stand>sit into the recliner chair.     Activities of Daily Living:  · Feeding:  set-up with utensils and straw into drinks. OT handed pt his cold drink and he was able to use BUE to hold cup and take sips- pt was able to recognize it was a coke (his favorite).     · Upper body dressing: Pt had difficulty following simple commands to don/doff face mask- unfamiliar task to pt so OT provided total assist for hallway functional mobility.       [facility] 6 Click ADL:  16    Treatment & Education:  · Pt re-educated on OT role/POC.   · Importance of OOB activity with staff assistance.  · Safety during functional t/f and mobility   · Seated EOB in unsupported sit, pt completed x10 dynamic reach with anterior lean to ADL object. Object primarily placed on pt's L side d/t R eye blind with inconsistent scanning to R side. SBA for sit balance  · In supported sit, pt completed x12 elbow flexion/extension; x12 shoulder flexion/extension; pt able to keep count of reps   · Multiple self-care tasks/functional mobility completed- assistance level noted above   · All questions/concerns answered within OT scope of practice       Patient left reclined in chair while seated on green air cushion and BLE offloaded with all lines intact, call button in reach, chair alarm on, nursing notified, wife and son present and door left open; lunch tray set-up in front of pt; all needs met. Education:      GOALS:   Multidisciplinary Problems     Occupational Therapy Goals        Problem: Occupational Therapy Goal    Goal Priority Disciplines Outcome Interventions   Occupational Therapy Goal     OT, PT/OT Ongoing, Progressing    Description: Goals to be met by: 11/27/20     Patient will increase functional independence with ADLs by performing:    Feeding with Set-up Assistance.  Grooming while standing at the sink with Stand-by assistance. (goal adjusted from bed level with set up 11/16/20)   Upper extremity exercise program x10 reps per handout, with assistance as needed.  Rolling L<>R with minimal assistance  Supine <> sit with Stand-by assistance (goal updated from MIN A 11/16/20)  Sit to stand with Stand-by assistance (goal updated from MIN A 11/16/20)   Sit EOB for 15 min with contact guard assistance  Step transfer with stand-by assistance (goal updated from MIN A 11/16/20)  Stand pivot transfer with minimal assistance   Wheelchair functional mobility with BUE or BLE household distance with minimal  contact guard assistance                      Time Tracking:     OT Date of Treatment: 11/16/20  OT Start Time: 1131  OT Stop Time: 1155  OT Total Time (min): 24 min    Billable Minutes:Therapeutic Exercise 12 min  Total Time 24 min (co-treat with PT)    Fawn Gann, KARTIK  11/16/2020

## 2020-11-16 NOTE — CONSULTS
"  [facility]  Adult Nutrition  Consult Note    SUMMARY     Recommendations    1. Continue mechanical soft diet, encourage PO intake    2. RD to order Boost Plus BID, and Boost Pudding once daily    3. Weight pt weeklly    4. RD to follow 11/20      Goals: Consume 50% of all meals and supplements at this time  Nutrition Goal Status: new  Communication of RD Recs: (POC)    Thanks for the consult!    Reason for Assessment    Reason For Assessment: consult(decreased appeite, refusing)  Diagnosis: (Acute metabolic encephalopathy)  Relevant Medical History: CKD stage 3, HTN, Hypothirodism, Depression, requries assistance with all daily activities  Interdisciplinary Rounds: did not attend  General Information Comments: 89 year old male who presented to the ED due to pt becoming unresponsive. Pt awake sitting up in chair with wife at bedside. Pt has severe dementia. Per wife, pt hasn't been eating much of the pureed diet becuase it makes him gag but eats the gram cracker cookes just fine. Pt graduated from pureed to mechanical soft diet. Pt ate less than 25% of tray due to disliking pureed food and gagging. Notified Dietary to bring a mechanical soft tray. Per wife pt drinks 1/2 boost plus per meal. Wife request that pt have ice cream, pt loves it and will eat it. PT, OT following. There is a 8% weight loss in 5 months, however pt demosntrates no wasting at this time. NFPE not perfomed.Spouce and son feel pt would not want life sustaining treatment if no meaninful recovery. LBM 11/14     Nutrition Discharge Planning: Adequate intake on mechanical soft diet    Nutrition Risk Screen    Nutrition Risk Screen: dysphagia or difficulty swallowing    Nutrition/Diet History    Food Preferences: ice cream  Spiritual, Cultural Beliefs, Orthodox Practices, Values that Affect Care: no  Factors Affecting Nutritional Intake: (dementia)    Anthropometrics    Temp: 98 °F (36.7 °C)  Height Method: Stated  Height: 6' 2" " (188 cm)  Height (inches): 74 in  Weight Method: Bed Scale  Weight: 87.9 kg (193 lb 12.6 oz)  Weight (lb): 193.79 lb  Ideal Body Weight (IBW), Male: 190 lb  % Ideal Body Weight, Male (lb): 98.74 %  BMI (Calculated): 24.9  BMI Grade: 25 - 29.9 - overweight       Lab/Procedures/Meds    Pertinent Labs Reviewed: reviewed  Pertinent Labs Comments: H/H 11.3/34.0, BUN 53, Creat 4.3, GFR 11, Glucose 134, Ca 8.3, Alb 2.5  Pertinent Medications Reviewed: reviewed  Pertinent Medications Comments: amlopidine, atrovastatin, rocephin, heparin, levothyroxine, senna docusate, dextrose infusion    Estimated/Assessed Needs    Weight Used For Calorie Calculations: 87.9 kg (193 lb 12.6 oz)  Energy Calorie Requirements (kcal): 1758 kcal (20kcal/kg)  Energy Need Method: Kcal/kg  Protein Requirements: 70g (0.8g/kg) - avoid high protein intake  Weight Used For Protein Calculations: 87.9 kg (193 lb 12.6 oz)  Fluid Requirements (mL): 1 mL per kcal or per MD     RDA Method (mL): 1758  CHO Requirement: 220 g daily      Nutrition Prescription Ordered    Current Diet Order: Mechanical soft diet    Evaluation of Received Nutrient/Fluid Intake    I/O: no data is charted for this time  Energy Calories Required: not meeting needs  Protein Required: not meeting needs  Fluid Required: not meeting needs  Comments: LBM 11/14  % Intake of Estimated Energy Needs: 25 - 50 %  % Meal Intake: 25 - 50%    Nutrition Risk    Level of Risk/Frequency of Follow-up: low - moderate(1x/week)     Assessment and Plan    Nutrition Problem  Inadequate energy intake    Related to (etiology):   AMS    Signs and Symptoms (as evidenced by):   Poor PO intake     Interventions(treatment strategy):  1. Texture modified diet  2. Commercial beverage  3. Commercial food  4. Collaboration with other providers     Nutrition Diagnosis Status:   New       Monitor and Evaluation    Food and Nutrient Intake: energy intake, food and beverage intake  Food and Nutrient Adminstration: diet  order  Knowledge/Beliefs/Attitudes: food and nutrition knowledge/skill  Anthropometric Measurements: height/length, weight  Biochemical Data, Medical Tests and Procedures: electrolyte and renal panel, gastrointestinal profile, glucose/endocrine profile, inflammatory profile  Nutrition-Focused Physical Findings: overall appearance     Malnutrition Assessment          Pt has lost 8% weight in the last 5 months    Wt Readings from Last 7 Encounters:   11/16/20 87.9 kg (193 lb 12.6 oz)   10/27/20 86.9 kg (191 lb 9.3 oz)   09/08/20 86.9 kg (191 lb 9.3 oz)   08/12/20 88.2 kg (194 lb 7.1 oz)   06/15/20 92.2 kg (203 lb 4.2 oz)   06/08/20 95.3 kg (210 lb)   06/04/20 95.4 kg (210 lb 5.1 oz)           Nutrition Follow-Up    RD Follow-up?: Yes

## 2020-11-16 NOTE — SUBJECTIVE & OBJECTIVE
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    Parkinsons     Requires assistance with all daily activities     Seizure 11/12/2020    Sleep apnea     Spinal stenosis     Trigeminal neuralgia pain 1/20/2015    Trouble in sleeping     Unsteady gait     Vertigo 9/11/2012    Vestibular schwannoma 6/4/2015       Past Surgical History:   Procedure Laterality Date    bilateral total knee arthroplasties      CATARACT EXTRACTION W/  INTRAOCULAR LENS IMPLANT Bilateral     CHOLECYSTECTOMY      EYE SURGERY      GALLBLADDER SURGERY      JOINT REPLACEMENT Bilateral     TKR    THYROIDECTOMY      TONSILLECTOMY         Review of patient's allergies indicates:   Allergen Reactions    Carbamazepine Swelling    Trazodone Anxiety    Bactrim [sulfamethoxazole-trimethoprim]      Hallucinations     Demerol [meperidine] Hallucinations    Morphine Nausea Only       Medications:  Continuous Infusions:   dextrose 5 % and 0.45 % NaCl 100 mL/hr at 11/16/20 0839     Scheduled Meds:   amLODIPine  10 mg Oral Daily    atorvastatin  40 mg Oral QHS    cefTRIAXone (ROCEPHIN) IVPB  2 g Intravenous Q24H    heparin (porcine)  5,000 Units Subcutaneous Q8H    levothyroxine  75 mcg Oral Before breakfast    QUEtiapine  50 mg Oral QHS    senna-docusate 8.6-50 mg  1 tablet Oral Daily     PRN Meds:acetaminophen, dextrose 50%, dextrose 50%, glucagon (human recombinant), glucose,  glucose, hydrALAZINE, lorazepam, OLANZapine, sodium chloride 0.9%    Family History     Problem Relation (Age of Onset)    Heart disease Mother    No Known Problems Father, Sister, Brother, Daughter, Son, Sister, Brother, Brother, Brother, Maternal Aunt, Maternal Uncle, Paternal Aunt, Paternal Uncle, Maternal Grandmother, Maternal Grandfather, Paternal Grandmother, Paternal Grandfather, Daughter, Son, Son        Tobacco Use    Smoking status: Former Smoker    Smokeless tobacco: Never Used   Substance and Sexual Activity    Alcohol use: No     Alcohol/week: 0.0 standard drinks    Drug use: No    Sexual activity: Not Currently     Partners: Female       Review of Systems   Unable to perform ROS: Dementia   Constitutional: Positive for activity change and appetite change.   Musculoskeletal: Positive for arthralgias (right shoulder).   Psychiatric/Behavioral: Positive for sleep disturbance.     Objective:     Vital Signs (Most Recent):  Temp: 98 °F (36.7 °C) (11/16/20 1159)  Pulse: 72 (11/16/20 1159)  Resp: 18 (11/16/20 1159)  BP: (!) 142/71 (11/16/20 1159)  SpO2: 97 % (11/16/20 1159) Vital Signs (24h Range):  Temp:  [97.9 °F (36.6 °C)-98.6 °F (37 °C)] 98 °F (36.7 °C)  Pulse:  [70-88] 72  Resp:  [17-19] 18  SpO2:  [93 %-98 %] 97 %  BP: (120-201)/(55-91) 142/71     Weight: 87.9 kg (193 lb 12.6 oz)  Body mass index is 24.88 kg/m².    Physical Exam  Vitals signs and nursing note reviewed.   Constitutional:       General: He is not in acute distress.  HENT:      Head: Normocephalic and atraumatic.      Mouth/Throat:      Mouth: Mucous membranes are moist.   Cardiovascular:      Rate and Rhythm: Normal rate and regular rhythm.   Pulmonary:      Effort: Pulmonary effort is normal. No respiratory distress.   Abdominal:      General: Abdomen is flat.      Palpations: Abdomen is soft.   Musculoskeletal:      Right lower leg: No edema.      Left lower leg: No edema.   Skin:     General: Skin is warm and dry.      Findings:  Bruising present.   Neurological:      Mental Status: He is alert.      Comments: Oriented x 1, appropriate responses and clarity in conversation, able to follow commands, memory impairment         Review of Symptoms    Symptom Assessment (ESAS 0-10 Scale)  Pain:  0  Dyspnea:  0  Anxiety:  0  Nausea:  0  Depression:  0  Anorexia:  0  Fatigue:  0  Insomnia:  0  Restlessness:  0  Agitation:  0         Comments:  Patient unable to complete rating due to cognitive impairment but able to tell me his right shoulder hurts and that he does not sleep well             Advance Care Planning   Advance Directives:   Living Will: Yes (has AD per spouse; requested copy)    Medical Power of : Yes (Has AD per spouse; requested copy)      Decision Making:  Patient answered questions and Family answered questions (patient able to answer simple questions )         Significant Labs: All pertinent labs within the past 24 hours have been reviewed.  CBC:   Recent Labs   Lab 11/14/20  0515   WBC 13.18*   HGB 11.3*   HCT 34.0*   MCV 92        BMP:  Recent Labs   Lab 11/16/20  0522   *      K 3.7      CO2 19*   BUN 53*   CREATININE 4.3*   CALCIUM 8.3*   MG 2.3     LFT:  Lab Results   Component Value Date    AST 27 11/16/2020    ALKPHOS 87 11/16/2020    BILITOT 0.3 11/16/2020     Albumin:   Albumin   Date Value Ref Range Status   11/16/2020 2.5 (L) 3.5 - 5.2 g/dL Final     Protein:   Total Protein   Date Value Ref Range Status   11/16/2020 6.3 6.0 - 8.4 g/dL Final     Lactic acid:   Lab Results   Component Value Date    LACTATE 0.9 11/11/2020    LACTATE 0.9 11/11/2020       Significant Imaging: I have reviewed all pertinent imaging results/findings within the past 24 hours.     Discussed patient with Dr Lorenzana

## 2020-11-16 NOTE — HPI
"Principal Problem:Acute metabolic encephalopathy           HPI:  Mr. Rutherford is a 89 y.o. male who presents to the ED via EMS due to AMS per family. EMS states that the family called because the patient was "twitching and shaking" and he's not as talkative as he usually is. EMS reports that the patient is awake, but is not responding to questions. EMS states that he is also not making eye contact and has a blank stare. EMS notes that the patient is disoriented x4 and he has a PMHx of Dementia.      In the ER he was found to have lecucytosis, Cr 4.6 with poor response to IV fluid, UA with Leucocytes as well as very high Urine Protein to creatinine ratio.      The patient was also communicated with Neurology by ED physician, who suspect seizure for the patient has been on Tramadol.;      Pt also has elevation in the BNP and slight elevation in cardiac troponin.   "

## 2020-11-16 NOTE — NURSING
Pt arrive to the unit by wheel chair accompanied by transport   Assisted pt to bed. Tele monitoring initiated.   Pt is AAOx4. Oxygen at 5L NC.  NO distress noted.

## 2020-11-17 LAB
POCT GLUCOSE: 115 MG/DL (ref 70–110)
POCT GLUCOSE: 134 MG/DL (ref 70–110)
POCT GLUCOSE: 138 MG/DL (ref 70–110)
POCT GLUCOSE: 143 MG/DL (ref 70–110)

## 2020-11-17 PROCEDURE — 97530 THERAPEUTIC ACTIVITIES: CPT | Mod: HCNC

## 2020-11-17 PROCEDURE — 93010 ELECTROCARDIOGRAM REPORT: CPT | Mod: HCNC,,, | Performed by: INTERNAL MEDICINE

## 2020-11-17 PROCEDURE — 25000003 PHARM REV CODE 250: Mod: HCNC | Performed by: NURSE PRACTITIONER

## 2020-11-17 PROCEDURE — 99497 ADVNCD CARE PLAN 30 MIN: CPT | Mod: HCNC,,, | Performed by: NURSE PRACTITIONER

## 2020-11-17 PROCEDURE — 25000003 PHARM REV CODE 250: Mod: HCNC | Performed by: INTERNAL MEDICINE

## 2020-11-17 PROCEDURE — 93010 EKG 12-LEAD: ICD-10-PCS | Mod: HCNC,,, | Performed by: INTERNAL MEDICINE

## 2020-11-17 PROCEDURE — 99233 SBSQ HOSP IP/OBS HIGH 50: CPT | Mod: 25,HCNC,, | Performed by: NURSE PRACTITIONER

## 2020-11-17 PROCEDURE — 99233 PR SUBSEQUENT HOSPITAL CARE,LEVL III: ICD-10-PCS | Mod: 25,HCNC,, | Performed by: NURSE PRACTITIONER

## 2020-11-17 PROCEDURE — 93005 ELECTROCARDIOGRAM TRACING: CPT | Mod: HCNC

## 2020-11-17 PROCEDURE — S0166 INJ OLANZAPINE 2.5MG: HCPCS | Mod: HCNC | Performed by: NURSE PRACTITIONER

## 2020-11-17 PROCEDURE — S0166 INJ OLANZAPINE 2.5MG: HCPCS | Mod: HCNC | Performed by: INTERNAL MEDICINE

## 2020-11-17 PROCEDURE — 25000003 PHARM REV CODE 250: Mod: HCNC | Performed by: HOSPITALIST

## 2020-11-17 PROCEDURE — 63600175 PHARM REV CODE 636 W HCPCS: Mod: HCNC | Performed by: INTERNAL MEDICINE

## 2020-11-17 PROCEDURE — 97110 THERAPEUTIC EXERCISES: CPT | Mod: HCNC

## 2020-11-17 PROCEDURE — 21400001 HC TELEMETRY ROOM: Mod: HCNC

## 2020-11-17 PROCEDURE — 99497 PR ADVNCD CARE PLAN 30 MIN: ICD-10-PCS | Mod: HCNC,,, | Performed by: NURSE PRACTITIONER

## 2020-11-17 RX ORDER — OLANZAPINE 10 MG/2ML
5 INJECTION, POWDER, FOR SOLUTION INTRAMUSCULAR ONCE AS NEEDED
Status: COMPLETED | OUTPATIENT
Start: 2020-11-17 | End: 2020-11-17

## 2020-11-17 RX ADMIN — QUETIAPINE FUMARATE 75 MG: 25 TABLET ORAL at 08:11

## 2020-11-17 RX ADMIN — SODIUM BICARBONATE: 84 INJECTION, SOLUTION INTRAVENOUS at 02:11

## 2020-11-17 RX ADMIN — CEFTRIAXONE 2 G: 2 INJECTION, SOLUTION INTRAVENOUS at 06:11

## 2020-11-17 RX ADMIN — POLYETHYLENE GLYCOL 3350 17 G: 17 POWDER, FOR SOLUTION ORAL at 10:11

## 2020-11-17 RX ADMIN — OLANZAPINE 5 MG: 10 INJECTION, POWDER, LYOPHILIZED, FOR SOLUTION INTRAMUSCULAR at 01:11

## 2020-11-17 RX ADMIN — AMLODIPINE BESYLATE 10 MG: 5 TABLET ORAL at 10:11

## 2020-11-17 RX ADMIN — HEPARIN SODIUM 5000 UNITS: 5000 INJECTION INTRAVENOUS; SUBCUTANEOUS at 09:11

## 2020-11-17 RX ADMIN — ACETAMINOPHEN 650 MG: 160 SOLUTION ORAL at 10:11

## 2020-11-17 RX ADMIN — ACETAMINOPHEN 650 MG: 160 SOLUTION ORAL at 08:11

## 2020-11-17 RX ADMIN — HEPARIN SODIUM 5000 UNITS: 5000 INJECTION INTRAVENOUS; SUBCUTANEOUS at 02:11

## 2020-11-17 RX ADMIN — OLANZAPINE 2.5 MG: 10 INJECTION, POWDER, FOR SOLUTION INTRAMUSCULAR at 08:11

## 2020-11-17 RX ADMIN — HEPARIN SODIUM 5000 UNITS: 5000 INJECTION INTRAVENOUS; SUBCUTANEOUS at 05:11

## 2020-11-17 RX ADMIN — DOCUSATE SODIUM 50 MG AND SENNOSIDES 8.6 MG 1 TABLET: 8.6; 5 TABLET, FILM COATED ORAL at 10:11

## 2020-11-17 RX ADMIN — ATORVASTATIN CALCIUM 40 MG: 40 TABLET, FILM COATED ORAL at 08:11

## 2020-11-17 NOTE — PLAN OF CARE
Problem: Occupational Therapy Goal  Goal: Occupational Therapy Goal  Description: Goals to be met by: 11/27/20     Patient will increase functional independence with ADLs by performing:    Feeding with Set-up Assistance.  Grooming while standing at the sink with Stand-by assistance. (goal adjusted from bed level with set up 11/16/20)   Upper extremity exercise program x10 reps per handout, with assistance as needed.  Rolling L<>R with minimal assistance  Supine <> sit with Stand-by assistance (goal updated from MIN A 11/16/20)  Sit to stand with Stand-by assistance (goal updated from MIN A 11/16/20)   Sit EOB for 15 min with contact guard assistance  Step transfer with stand-by assistance (goal updated from MIN A 11/16/20)  Stand pivot transfer with minimal assistance   Wheelchair functional mobility with BUE or BLE household distance with minimal contact guard assistance     Outcome: Ongoing, Progressing     Supine>sit: MIN A; increased confusion getting back to bed, requiring MAX A. Pt stood 2x with MIN/MOD A and RW. Once pt realized his wife wasn't present, he was primarily focused on wanting to call her; able to re-direct ~75% of the time with exercises and son's assist.

## 2020-11-17 NOTE — PT/OT/SLP PROGRESS
"Physical Therapy Treatment    Patient Name:  Ifeanyi Rutherford Sr   MRN:  [id number]    Recommendations:     Discharge Recommendations:  home health PT(24hr care)   Discharge Equipment Recommendations: hospital bed, wheelchair, bedside commode   Barriers to discharge: Pt with confusion and decreased safety awareness.     Assessment:     Ifeanyi Rutherford Sr is a 89 y.o. male admitted with a medical diagnosis of Acute metabolic encephalopathy.  He presents with the following impairments/functional limitations: weakness, impaired balance, gt instability, decreased safety awareness, impaired cognition, impaired self care, impaired functional mobility, visual deficits, pain.     Sup to sit with min A, sat EOB x 20 min with SBA with bouts of mod/max A. Pt LOB posteriorly in sitting with son standing behind him. Pt needed max A to return to upright position and midline.  Pt very confused and had c/o hip and back pain.        Rehab Prognosis: Fair; patient would benefit from acute skilled PT services to address these deficits and reach maximum level of function.    Recent Surgery: * No surgery found *      Plan:     During this hospitalization, patient to be seen (3-5x/wk) to address the identified rehab impairments via gait training, therapeutic activities, therapeutic exercises, wheelchair management/training, neuromuscular re-education and progress toward the following goals:    · Plan of Care Expires:  11/27/20    Subjective     Chief Complaint: pain  Patient/Family Comments/goals: pt c/o hip pain and back pain in sitting.   Pain/Comfort:  · Pain Rating 1: (pt unable to rate 2/2 impaired cognition. pt continuosly rubbed his hip and said, "this leg really hurts and my back.")  · Location - Side 1: Right  · Location - Orientation 1: lateral  · Location 1: hip(and back)  · Pain Addressed 1: Pre-medicate for activity, Reposition, Distraction, Cessation of Activity, Nurse notified      Objective:     Communicated with nsg " prior to session.  Patient found HOB elevated with telemetry, peripheral IV upon PT entry to room.     General Precautions: Standard, fall, seizure   Orthopedic Precautions:N/A   Braces:       Functional Mobility:  · Bed Mobility:     · Rolling Left:  minimum assistance and moderate assistance  · Rolling Right: minimum assistance and moderate assistance  · Scooting: minimum assistance to EOB with vcs/tcs, total assistance to HOB with drawsheet and of 2 persons  · Supine to Sit: minimum assistance  · Sit to Supine: minimum assistance  · Balance: fair+/f-  sitting EOB unsupported      AM-PAC 6 CLICK MOBILITY  Turning over in bed (including adjusting bedclothes, sheets and blankets)?: 3  Sitting down on and standing up from a chair with arms (e.g., wheelchair, bedside commode, etc.): 3  Moving from lying on back to sitting on the side of the bed?: 3  Moving to and from a bed to a chair (including a wheelchair)?: 3  Need to walk in hospital room?: 3  Climbing 3-5 steps with a railing?: 2  Basic Mobility Total Score: 17       Therapeutic Activities and Exercises:   sat EOB X 20 MIN working on balance and trunk control    Patient left HOB elevated with all lines intact, call button in reach, bed alarm on and son present..    GOALS:   Multidisciplinary Problems     Physical Therapy Goals        Problem: Physical Therapy Goal    Goal Priority Disciplines Outcome Goal Variances Interventions   Physical Therapy Goal     PT, PT/OT Ongoing, Progressing     Description: Goals to be met by: 20     Patient will increase functional independence with mobility by performin. Supine to sit with SBA  2. Rolling to Left and Right with SBA  3. Sit to stand with SBA using RW  4. Bed to chair transfer with SBA using RW  5. Gait ~50 ft with SBA using RW    6. Lower extremity exercise program x10 reps per handout, with SBA                     Time Tracking:     PT Received On: 20  PT Start Time: 1212     PT Stop Time:  1243  PT Total Time (min): 31 min     Billable Minutes: Therapeutic Activity 31    Treatment Type: Treatment  PT/PTA: PTA     PTA Visit Number: 1     Bridger Davis PTA  11/17/2020

## 2020-11-17 NOTE — ASSESSMENT & PLAN NOTE
Per my conversation with patient's wife, he has underlying dementia but it is likely acute worsening due to possible seizure while on tramadol, medication induced due to long lasting effect of gabapentin and tramadol while renal function poor. Holding off on sedating agents except for atypical antipsychotics for episodes of non redirectable agitation and insomnia. Possible UTI. On day # 5 of rocephin empirically. UCx/BCx thus far negative. Needs to sleep better at night therefore increasing dose of quetiapine. Wife at bedside at all times. Speech/PT/OT. Delirium precautions in place.     Family would be unable to be with patient 24-7 if in a SNF. Family presence if very important for patient's mentation. Plan is for home with HH + palliative care vs home hospice. Has a walker. Will order hospital bed and commode.   More confused today ,appropriarte for hospice.

## 2020-11-17 NOTE — PLAN OF CARE
Patient admitted from home with acute metabolic encephalopathy thought to be caused by seizures; also found to have UTI that he is being treated for; pt has declined d/t progressive dementia; palliative care consulted and following for goals of care; stated that patient is appropriate for hospice    TN had spoken with spouse earlier; stated that she does not want patient to enter into a facility; she provides his care at home with assist of adult children and sitters twice a week; pt is now requiring 24 hour care which spouse can not provide; family is interested in hospice; TN spoke with son at bedside and provided with a list of hospice agencies from post acute; stated will discuss with other siblings and provide decision;  TN to f/u on hospice choice    Situation: lives at home with spouse; has support of adult children and sitters 2x's week    Services: active with [facility]     Equipment: has walker, w/c; rec: hospital bed    IMM: current    Referrals: pending decision from family    Consults: Palliative Care (hospice appropriate); PT/OT (HHPT)    Dispo: home with hospice vs home health     11/17/20 9151   Discharge Reassessment   Assessment Type Discharge Planning Reassessment   Provided patient/caregiver education on the expected discharge date and the discharge plan Yes   Do you have any problems affording any of your prescribed medications? No   Discharge Plan A Hospice/home   Discharge Plan B Home Health   DME Needed Upon Discharge  hospital bed   Patient choice form signed by patient/caregiver   (list of providers given to family)   Anticipated Discharge Disposition HospiceHome   Can the patient/caregiver answer the patient profile reliably? No, cognitively impaired   How does the patient rate their overall health at the present time? Poor   Describe the patient's ability to walk at the present time. Does not walk or unable to take any steps at all   How often would a person be available to care for the  patient? Whenever needed   Number of comorbid conditions (as recorded on the chart) Five or more   Post-Acute Status   Post-Acute Authorization Placement  (Hospice)   Post-Acute Placement Status Patient List Provided   Discharge Delays None known at this time

## 2020-11-17 NOTE — NURSING
Patient was very agitated and restless all night. He had verbal and visual hallucinations. Patient received 2.5 mg of Zyprexa at 2200 and was given an additional 5 mg of Zyprexa with minimal and brief reduction of anxiety and agitation. Patient was turning all over bed throwing his legs over the side rails and screaming at nonexistent people. He would laugh inappropriately and ramble jibberish. Staff took turns sitting with him attempting to relax him. Changed frequently for incontinence - skin remained intact. Telemetry AFib- WAP- 2nd degree heart blocks, SEGUNDO Buckley NP notified about telemetry changes. Stat EKG done- abnormal results called to Mallory BONILLA-no new orders. Remaining VS remained stable.

## 2020-11-17 NOTE — PLAN OF CARE
Problem: Physical Therapy Goal  Goal: Physical Therapy Goal  Description: Goals to be met by: 20     Patient will increase functional independence with mobility by performin. Supine to sit with SBA  2. Rolling to Left and Right with SBA  3. Sit to stand with SBA using RW  4. Bed to chair transfer with SBA using RW  5. Gait ~50 ft with SBA using RW    6. Lower extremity exercise program x10 reps per handout, with SBA    Outcome: Ongoing, Progressing   Sup to sit with min A, sat EOB x 20 min with SBA with bouts of mod/max A. Pt LOB posteriorly in sitting with son standing behind him. Pt needed max A to return to upright position and midline.  Pt very confused and had c/o hip and back pain.

## 2020-11-17 NOTE — SUBJECTIVE & OBJECTIVE
Interval History: patient had a restless night. Telemetry showed AFib- WAP- 2nd degree heart blocks. Patient is restless now with incoherent speech. Significant change from yesterday      Medications:  Continuous Infusions:   custom IV infusion builder 75 mL/hr at 11/17/20 1002     Scheduled Meds:   amLODIPine  10 mg Oral Daily    atorvastatin  40 mg Oral QHS    cefTRIAXone (ROCEPHIN) IVPB  2 g Intravenous Q24H    heparin (porcine)  5,000 Units Subcutaneous Q8H    levothyroxine  75 mcg Oral Before breakfast    polyethylene glycol  17 g Oral Daily    QUEtiapine  75 mg Oral QHS    senna-docusate 8.6-50 mg  1 tablet Oral Daily     PRN Meds:acetaminophen, dextrose 50%, glucagon (human recombinant), glucose, glucose, hydrALAZINE, lorazepam, OLANZapine, sodium chloride 0.9%    Objective:     Vital Signs (Most Recent):  Temp: 98 °F (36.7 °C) (11/17/20 0801)  Pulse: 60 (11/17/20 0801)  Resp: 18 (11/17/20 0801)  BP: (!) 186/75 (11/17/20 0801)  SpO2: 95 % (11/17/20 0801) Vital Signs (24h Range):  Temp:  [97.5 °F (36.4 °C)-99.2 °F (37.3 °C)] 98 °F (36.7 °C)  Pulse:  [60-72] 60  Resp:  [18] 18  SpO2:  [94 %-97 %] 95 %  BP: (133-186)/(63-77) 186/75     Weight: 87.9 kg (193 lb 12.6 oz)  Body mass index is 24.88 kg/m².    Physical Exam  Vitals signs and nursing note reviewed.   Constitutional:       General: He is in acute distress.      Appearance: He is ill-appearing.   Cardiovascular:      Rate and Rhythm: Rhythm irregular.      Comments: A fib on monitor  Pulmonary:      Effort: Pulmonary effort is normal. No respiratory distress.   Abdominal:      General: Abdomen is flat. There is no distension.      Palpations: Abdomen is soft.      Tenderness: There is no abdominal tenderness.   Genitourinary:     Comments: incontinent with brief  Musculoskeletal:         General: No swelling.      Right lower leg: No edema.      Left lower leg: No edema.   Skin:     General: Skin is warm and dry.      Capillary Refill: Capillary  refill takes 2 to 3 seconds.      Findings: Bruising present.   Neurological:      Mental Status: He is alert.      Comments: Dementia, delrium, agitation, unable to follow commands         Review of Symptoms    Symptom Assessment (ESAS 0-10 Scale)  Pain:  0  Dyspnea:  0  Anxiety:  0  Nausea:  0  Depression:  0  Anorexia:  0  Fatigue:  0  Insomnia:  0  Restlessness:  0  Agitation:  0     CAM / Delirium:  Positive    Comments:  Patient unable to complete ESAS due to severe cognitive impairment             Advance Care Planning   Advance Directives:   Living Will: Yes (obtained copy)        Copy on chart: Yes    Medical Power of : Yes (copy obtained today)    Agent's Name:  Krystal Rutherford spouse    Decision Making:  Family answered questions         Significant Labs: All pertinent labs within the past 24 hours have been reviewed.  CBC:   Recent Labs   Lab 11/14/20  0515   WBC 13.18*   HGB 11.3*   HCT 34.0*   MCV 92        BMP:  No results for input(s): GLU, NA, K, CL, CO2, BUN, CREATININE, CALCIUM, MG in the last 24 hours.  LFT:  Lab Results   Component Value Date    AST 27 11/16/2020    ALKPHOS 87 11/16/2020    BILITOT 0.3 11/16/2020     Albumin:   Albumin   Date Value Ref Range Status   11/16/2020 2.5 (L) 3.5 - 5.2 g/dL Final     Protein:   Total Protein   Date Value Ref Range Status   11/16/2020 6.3 6.0 - 8.4 g/dL Final     Lactic acid:   Lab Results   Component Value Date    LACTATE 0.9 11/11/2020    LACTATE 0.9 11/11/2020       Significant Imaging: I have reviewed all pertinent imaging results/findings within the past 24 hours.     Discussed patient with Dr Manrique [facility]   fever control, s/s dehydration, benadryl admin; pmd follow up

## 2020-11-17 NOTE — PROGRESS NOTES
[facility] [place]  Palliative Medicine  Progress Note    Patient Name: Ifeanyi Rutherford Sr  MRN: [id number]  Admission Date: 11/11/2020  Hospital Length of Stay: 6 days  Code Status: Full Code   Attending Provider: Vitaly Jo MD  Consulting Provider: Yolanda Del Valle NP  Primary Care Physician: Jason Levy MD  Principal Problem:Acute metabolic encephalopathy        Assessment/Plan:    See note below    Plan:  -continue current treatment  -copy of HPOA and Living Will obtained (states no CPR)   -Will change code status to DNR once all family have been updated  -Considering hospice (inpatient) at discharge if no improvement in 24-48  hours  -Palliative to continue to follow for supportive care      Advance Care Planning     GOC  Patient made a significant change over night and is now in bed, restless with  incoherent speech. Son Cedric Do at bedside currently. Spouse was here this am but had what seemed like a cardiac event and had to go down to ED. She has been very stressed about the decline of her spouse the patient. We discussed the event that occurred over night and that this can often progress Dementia, and may be his new baseline, but we will no more over the next 24 hours. We discussed patient's appropriateness for hospice and that HH will not provide much beenfit now if he cannot follow commands or get around. Patient now meets criteria for inpatient hospice and would be best for patient and family especially with the spouses health condition. Son agrees and will talk with family.  Son did give me copy of the patient's HPOA and Living Will and placed in blue folder to be scanned in. It is very specific. Discussed his documented wishes on his Living Will. Discussed CPR in detail pros and cons with son and he agrees that patient should be DNR and feels the rest of the family will agree as well but wants to talk with his mother when she gets out of ED and call his siblings.  "    I spent a total of 16 minutes engaging the patient in this advance care planning discussion.        Subjective:     Chief Complaint:   Chief Complaint   Patient presents with    Altered Mental Status     family states altered mental status.  pt awake but not responding to questions.  pt will not make eye contact.  Hx: Dementia.       HPI:   Principal Problem:Acute metabolic encephalopathy           HPI:  Mr. Rutherford is a 89 y.o. male who presents to the ED via EMS due to AMS per family. EMS states that the family called because the patient was "twitching and shaking" and he's not as talkative as he usually is. EMS reports that the patient is awake, but is not responding to questions. EMS states that he is also not making eye contact and has a blank stare. EMS notes that the patient is disoriented x4 and he has a PMHx of Dementia.      In the ER he was found to have lecucytosis, Cr 4.6 with poor response to IV fluid, UA with Leucocytes as well as very high Urine Protein to creatinine ratio.      The patient was also communicated with Neurology by ED physician, who suspect seizure for the patient has been on Tramadol.;      Pt also has elevation in the BNP and slight elevation in cardiac troponin.     Hospital Course:  No notes on file    Interval History: patient had a restless night. Telemetry showed AFib- WAP- 2nd degree heart blocks. Patient is restless now with incoherent speech. Significant change from yesterday      Medications:  Continuous Infusions:   custom IV infusion builder 75 mL/hr at 11/17/20 1002     Scheduled Meds:   amLODIPine  10 mg Oral Daily    atorvastatin  40 mg Oral QHS    cefTRIAXone (ROCEPHIN) IVPB  2 g Intravenous Q24H    heparin (porcine)  5,000 Units Subcutaneous Q8H    levothyroxine  75 mcg Oral Before breakfast    polyethylene glycol  17 g Oral Daily    QUEtiapine  75 mg Oral QHS    senna-docusate 8.6-50 mg  1 tablet Oral Daily     PRN Meds:acetaminophen, dextrose 50%, " glucagon (human recombinant), glucose, glucose, hydrALAZINE, lorazepam, OLANZapine, sodium chloride 0.9%    Objective:     Vital Signs (Most Recent):  Temp: 98 °F (36.7 °C) (11/17/20 0801)  Pulse: 60 (11/17/20 0801)  Resp: 18 (11/17/20 0801)  BP: (!) 186/75 (11/17/20 0801)  SpO2: 95 % (11/17/20 0801) Vital Signs (24h Range):  Temp:  [97.5 °F (36.4 °C)-99.2 °F (37.3 °C)] 98 °F (36.7 °C)  Pulse:  [60-72] 60  Resp:  [18] 18  SpO2:  [94 %-97 %] 95 %  BP: (133-186)/(63-77) 186/75     Weight: 87.9 kg (193 lb 12.6 oz)  Body mass index is 24.88 kg/m².    Physical Exam  Vitals signs and nursing note reviewed.   Constitutional:       General: He is in acute distress.      Appearance: He is ill-appearing.   Cardiovascular:      Rate and Rhythm: Rhythm irregular.      Comments: A fib on monitor  Pulmonary:      Effort: Pulmonary effort is normal. No respiratory distress.   Abdominal:      General: Abdomen is flat. There is no distension.      Palpations: Abdomen is soft.      Tenderness: There is no abdominal tenderness.   Genitourinary:     Comments: incontinent with brief  Musculoskeletal:         General: No swelling.      Right lower leg: No edema.      Left lower leg: No edema.   Skin:     General: Skin is warm and dry.      Capillary Refill: Capillary refill takes 2 to 3 seconds.      Findings: Bruising present.   Neurological:      Mental Status: He is alert.      Comments: Dementia, delrium, agitation, unable to follow commands         Review of Symptoms    Symptom Assessment (ESAS 0-10 Scale)  Pain:  0  Dyspnea:  0  Anxiety:  0  Nausea:  0  Depression:  0  Anorexia:  0  Fatigue:  0  Insomnia:  0  Restlessness:  0  Agitation:  0     CAM / Delirium:  Positive    Comments:  Patient unable to complete ESAS due to severe cognitive impairment             Advance Care Planning   Advance Directives:   Living Will: Yes (obtained copy)        Copy on chart: Yes    Medical Power of : Yes (copy obtained today)    Agent's  Name:  Krystal Rutherford spouse    Decision Making:  Family answered questions         Significant Labs: All pertinent labs within the past 24 hours have been reviewed.  CBC:   Recent Labs   Lab 11/14/20  0515   WBC 13.18*   HGB 11.3*   HCT 34.0*   MCV 92        BMP:  No results for input(s): GLU, NA, K, CL, CO2, BUN, CREATININE, CALCIUM, MG in the last 24 hours.  LFT:  Lab Results   Component Value Date    AST 27 11/16/2020    ALKPHOS 87 11/16/2020    BILITOT 0.3 11/16/2020     Albumin:   Albumin   Date Value Ref Range Status   11/16/2020 2.5 (L) 3.5 - 5.2 g/dL Final     Protein:   Total Protein   Date Value Ref Range Status   11/16/2020 6.3 6.0 - 8.4 g/dL Final     Lactic acid:   Lab Results   Component Value Date    LACTATE 0.9 11/11/2020    LACTATE 0.9 11/11/2020       Significant Imaging: I have reviewed all pertinent imaging results/findings within the past 24 hours.     Discussed patient with Dr Manrique \[facility] [place] [facility]\"" medicine      > 50% of 25 min visit spent in chart review, face to face discussion of goals of care,  symptom assessment, coordination of care and emotional support.    Yolanda Del Valle, NP  Palliative Medicine  [facility]

## 2020-11-17 NOTE — SUBJECTIVE & OBJECTIVE
Interval History: more confused today.    Review of Systems   Unable to perform ROS: Dementia     Objective:     Vital Signs (Most Recent):  Temp: 98 °F (36.7 °C) (11/17/20 0801)  Pulse: 60 (11/17/20 0801)  Resp: 18 (11/17/20 0801)  BP: (!) 186/75 (11/17/20 0801)  SpO2: 95 % (11/17/20 0801) Vital Signs (24h Range):  Temp:  [97.5 °F (36.4 °C)-99.2 °F (37.3 °C)] 98 °F (36.7 °C)  Pulse:  [60-72] 60  Resp:  [18] 18  SpO2:  [94 %-97 %] 95 %  BP: (133-186)/(63-77) 186/75     Weight: 87.9 kg (193 lb 12.6 oz)  Body mass index is 24.88 kg/m².    Intake/Output Summary (Last 24 hours) at 11/17/2020 1151  Last data filed at 11/16/2020 1800  Gross per 24 hour   Intake 1080 ml   Output --   Net 1080 ml      Physical Exam  Vitals signs and nursing note reviewed.   Constitutional:       General: He is not in acute distress.     Appearance: He is not toxic-appearing.   Cardiovascular:      Rate and Rhythm: Normal rate and regular rhythm.   Pulmonary:      Effort: Pulmonary effort is normal.      Breath sounds: No wheezing or rales.   Abdominal:      General: Abdomen is flat.      Palpations: Abdomen is soft.   Skin:     General: Skin is warm.      Capillary Refill: Capillary refill takes less than 2 seconds.   Neurological:      Mental Status: He is alert.      Comments: Oriented to self  Very hard of hearing. Has hearing aide to left ear in place     Psychiatric:         Attention and Perception: He is attentive. He does not perceive auditory or visual hallucinations.         Mood and Affect: Mood normal.         Speech: Speech is delayed.         Behavior: Behavior normal.         Cognition and Memory: Cognition is impaired. Memory is impaired.      Comments: Able to follow some commands with frequent redirection.          Significant Labs: All pertinent labs within the past 24 hours have been reviewed.    Significant Imaging: I have reviewed all pertinent imaging results/findings within the past 24 hours.  I have reviewed and  interpreted all pertinent imaging results/findings within the past 24 hours.

## 2020-11-17 NOTE — PLAN OF CARE
Important Message from Medicare and discharge appeal process reviewed with son at bedside; son was given opportunity to ask questions; after which, verbalized understanding of rights; copy was placed in medical record chart and one copy given to son for his review and records       11/17/20 0205   Medicare Message   Important Message from Medicare regarding Discharge Appeal Rights Given to patient/caregiver;Explained to patient/caregiver;Signed/date by patient/caregiver   Date IMM was signed 11/17/20   Time IMM was signed 0542

## 2020-11-17 NOTE — PT/OT/SLP PROGRESS
Occupational Therapy   Treatment    Name: Ifeanyi Rutherford Sr  MRN: [id number]  Admitting Diagnosis:  Acute metabolic encephalopathy       Recommendations:     Discharge Recommendations: home health OT(with 24 hr assist)  Discharge Equipment Recommendations:  wheelchair, hospital bed, bedside commode  Barriers to discharge:  (confusion; requires assist with ADLs and functional mobility; high risk of falls if unsupervised)    Assessment:     Ifeanyi Rutherford Sr is a 89 y.o. male with a medical diagnosis of Acute metabolic encephalopathy. Performance deficits affecting function are weakness, gait instability, decreased upper extremity function, decreased ROM, impaired endurance, impaired balance, decreased lower extremity function, decreased safety awareness, visual deficits, impaired cognition, impaired self care skills, impaired functional mobilty, decreased coordination.     Supine>sit: MIN A; increased confusion getting back to bed, requiring MAX A. Pt stood 2x with MIN/MOD A and RW. Once pt realized his wife wasn't present, he was primarily focused on wanting to call her; able to re-direct ~75% of the time with exercises and son's assist    Rehab Prognosis:  Fair; patient would benefit from acute skilled OT services to address these deficits and reach maximum level of function.       Plan:     Patient to be seen (5-6x/week) to address the above listed problems via self-care/home management, therapeutic activities, therapeutic exercises  · Plan of Care Expires: 11/27/20  · Plan of Care Reviewed with: patient, son    Subjective     Chief complaint: wanting to call his wife   Patient's comments/goals: son present- reports pt's wife is in the ED here and his brother is there with her     Pt was calm and confused throughout session. A little more confusion noted compared to yesterday. Pt followed simple cueing for exercises and counted reps. Towards the end of the session, it was more difficult to re-direct pt from  wanting to call his wife. OT informed son that if he starts to get agitated to let the nurse know; hopefully after PT then OT sessions, he will nap.     Pain/Comfort:  · Pain Rating 1: (pt initially rubbed his R hip and moaned 1x, but then never complained of pain again throughout session)  · Pain Addressed 1: Reposition, Cessation of Activity    Objective:     Communicated with: nurseHumza, prior to session.  Patient found HOB elevated with pillow under R side and BLE offloaded with peripheral IV, bed alarm, telemetry upon OT entry to room.    General Precautions: Standard, fall, seizure, hearing impaired, vision impaired(deaf R ear, blind R eye)   Orthopedic Precautions:N/A   Braces: N/A     Occupational Performance:     Bed Mobility:    · Patient completed Scooting anteriorly with contact guard assistance  · Patient completed Scooting in supine with maximal assistance x2  · Patient completed Supine to Sit with minimum assistance with HOB elevated and max verbal/tactile cueing   · Patient completed Sit to Supine with maximal assistance; increased confusion, requiring more assist     Functional Mobility/Transfers:  · Patient completed Sit <> Stand Transfer with moderate assistance  with  rolling walker on first trial; minimal assistance on second trial with RW   · Functional Mobility: on first trial of standing, pt took ~3 sidesteps with MOD A using RW with max verbal cueing- pt was unable to implement verbal cueing for safe hand placement with stand>sit t/f. After seated rest break, pt stood again with MIN A. Pt completed standing exercises and stood for ~ 1 min with MIN A and RW. Pt was able to achieve full upright posture with OT providing visual aides in the environment for pt to scan in order to increase posture.     Activities of Daily Living:  · Feeding:  set-up with cup in R hand and assist to guide straw into his mouth to take sips of soda (son added that pt requires assist with straw management at  baseline)     · Upper Body Dressing: moderate assistance to don back gown while seated EOB; SBA for seated balance      [facility] 6 Click ADL: 15    Treatment & Education:  · Pt re-educated on OT role/POC/re-oriented to therapy/place   · Importance of OOB activity with staff assistance   · Safety during functional t/f and mobility   · Standing, OT cued pt to complete shoulder elevation/depression; however, pt completed B/L squats within RW- MIN A for balance   · Seated EOB, pt completed the following therapeutic exercises:   · 2x15 BUE elbow flexion/extension      · x10 anterior trunk lean with CGA  · OT attempted dynamic reach for ADL object which he has done well in the past despite visual deficits; however, pt had increased confusion/distracted so OT stopped this activity after 2 trials   · Multiple self-care tasks/functional mobility completed- assistance level noted above   · SBA for ADLs seated EOB   · All questions/concerns answered within OT scope of practice       Patient left HOB elevated with pillow under L side and BLE offloaded with all lines intact, call button in reach, bed alarm on, nurse, Humza, notified and son presentEducation:      GOALS:   Multidisciplinary Problems     Occupational Therapy Goals        Problem: Occupational Therapy Goal    Goal Priority Disciplines Outcome Interventions   Occupational Therapy Goal     OT, PT/OT Ongoing, Progressing    Description: Goals to be met by: 11/27/20     Patient will increase functional independence with ADLs by performing:    Feeding with Set-up Assistance.  Grooming while standing at the sink with Stand-by assistance. (goal adjusted from bed level with set up 11/16/20)   Upper extremity exercise program x10 reps per handout, with assistance as needed.  Rolling L<>R with minimal assistance  Supine <> sit with Stand-by assistance (goal updated from MIN A 11/16/20)  Sit to stand with Stand-by assistance (goal updated from MIN A 11/16/20)   Sit EOB for 15  min with contact guard assistance  Step transfer with stand-by assistance (goal updated from MIN A 11/16/20)  Stand pivot transfer with minimal assistance   Wheelchair functional mobility with BUE or BLE household distance with minimal contact guard assistance                      Time Tracking:     OT Date of Treatment: 11/17/20  OT Start Time: 1352  OT Stop Time: 1420  OT Total Time (min): 28 min    Billable Minutes:Therapeutic Activity 14 min  Therapeutic Exercise 14 min  Total Time 28 min    Fawn Gann OT  11/17/2020

## 2020-11-17 NOTE — PROGRESS NOTES
Patient is hard of hearing, visual and auditory hallucinations requiring sitter to keep him from falling. Avasys not effective. On zyprexia.

## 2020-11-18 VITALS
SYSTOLIC BLOOD PRESSURE: 162 MMHG | OXYGEN SATURATION: 100 % | TEMPERATURE: 97 F | DIASTOLIC BLOOD PRESSURE: 70 MMHG | WEIGHT: 193.81 LBS | HEIGHT: 74 IN | BODY MASS INDEX: 24.87 KG/M2 | HEART RATE: 46 BPM | RESPIRATION RATE: 17 BRPM

## 2020-11-18 LAB
MAGNESIUM SERPL-MCNC: 2.4 MG/DL (ref 1.6–2.6)
PHOSPHATE SERPL-MCNC: 6.2 MG/DL (ref 2.7–4.5)
POCT GLUCOSE: 148 MG/DL (ref 70–110)
POCT GLUCOSE: 154 MG/DL (ref 70–110)

## 2020-11-18 PROCEDURE — 83735 ASSAY OF MAGNESIUM: CPT | Mod: HCNC

## 2020-11-18 PROCEDURE — 36415 COLL VENOUS BLD VENIPUNCTURE: CPT | Mod: HCNC

## 2020-11-18 PROCEDURE — 25000003 PHARM REV CODE 250: Mod: HCNC | Performed by: HOSPITALIST

## 2020-11-18 PROCEDURE — 84100 ASSAY OF PHOSPHORUS: CPT | Mod: HCNC

## 2020-11-18 PROCEDURE — 63600175 PHARM REV CODE 636 W HCPCS: Mod: HCNC | Performed by: INTERNAL MEDICINE

## 2020-11-18 PROCEDURE — 25000003 PHARM REV CODE 250: Mod: HCNC | Performed by: INTERNAL MEDICINE

## 2020-11-18 RX ORDER — POLYETHYLENE GLYCOL 3350 17 G/17G
17 POWDER, FOR SOLUTION ORAL DAILY
Refills: 0
Start: 2020-11-18

## 2020-11-18 RX ORDER — HYDRALAZINE HYDROCHLORIDE 100 MG/1
100 TABLET, FILM COATED ORAL EVERY 8 HOURS
Qty: 90 TABLET | Refills: 0
Start: 2020-11-18 | End: 2020-12-18

## 2020-11-18 RX ORDER — HYDRALAZINE HYDROCHLORIDE 20 MG/ML
10 INJECTION INTRAMUSCULAR; INTRAVENOUS EVERY 4 HOURS PRN
Status: DISCONTINUED | OUTPATIENT
Start: 2020-11-18 | End: 2020-11-18 | Stop reason: HOSPADM

## 2020-11-18 RX ADMIN — HYDRALAZINE HYDROCHLORIDE 10 MG: 20 INJECTION, SOLUTION INTRAMUSCULAR; INTRAVENOUS at 01:11

## 2020-11-18 RX ADMIN — ACETAMINOPHEN 650 MG: 160 SOLUTION ORAL at 06:11

## 2020-11-18 RX ADMIN — LEVOTHYROXINE SODIUM 75 MCG: 75 TABLET ORAL at 06:11

## 2020-11-18 RX ADMIN — HEPARIN SODIUM 5000 UNITS: 5000 INJECTION INTRAVENOUS; SUBCUTANEOUS at 06:11

## 2020-11-18 RX ADMIN — AMLODIPINE BESYLATE 10 MG: 5 TABLET ORAL at 09:11

## 2020-11-18 RX ADMIN — SODIUM BICARBONATE: 84 INJECTION, SOLUTION INTRAVENOUS at 01:11

## 2020-11-18 NOTE — PLAN OF CARE
[facility]  Department of Hospital Medicine  [address] [place] [address]  [phone] after hours  [phone] fax    HOSPICE  ORDERS    11/18/2020    Admit to Hospice:Inpatient Service    Diagnoses:   Active Hospital Problems    Diagnosis  POA    *Acute metabolic encephalopathy [G93.41]  Yes    Advance care planning [Z71.89]  Not Applicable    Palliative care encounter [Z51.5]  Not Applicable    BPH (benign prostatic hyperplasia) [N40.0]  Yes    CKD stage 3 [N18.30]  Yes     Chronic    Elevated troponin [R77.8]  Yes    Mixed hyperlipidemia [E78.2]  Yes    Trigeminal neuralgia of right side of face [G50.0]  Yes     Chronic     02/27/18 right percutaneous glycerol trigeminal ganglionotomy      Parkinsonism [G20]  Yes     Chronic     Lower-body parkinsonism.  nph w/u negative in 2014.  2019- walker      Dementia [F03.90]  Yes     Chronic    Hypothyroidism [E03.9]  Yes     Chronic    Debility [R53.81]  Yes    AMS (altered mental status) [R41.82]  Yes    Essential hypertension [I10]  Yes     Chronic      Resolved Hospital Problems   No resolved problems to display.       Hospice Qualifying Diagnoses: ARF,severe dementia     Vital Signs: Routine per Hospice Protocol.    Code Status: DNR     Allergies:   Review of patient's allergies indicates:   Allergen Reactions    Carbamazepine Swelling    Trazodone Anxiety    Bactrim [sulfamethoxazole-trimethoprim]      Hallucinations     Demerol [meperidine] Hallucinations    Morphine Nausea Only       Diet: mechanical soft ,aspiration precaution     Activities: As tolerated    Nursing: Per Hospice Routine.        Routine Skin for Bedridden Patients: Apply moisture barrier cream to all skin folds and   wet areas in perineal area daily and after baths and all bowel movements.             Ifeanyi Rutherford Sr   Home Medication Instructions LUIS:[id number]    Printed on:11/18/20 2565   Medication Information                       acetaminophen (TYLENOL) 325 MG tablet  Take 2 tablets (650 mg total) by mouth every 6 (six) hours as needed for Pain.             amLODIPine (NORVASC) 10 MG tablet  TAKE 1 TABLET (10 MG TOTAL) BY MOUTH ONCE DAILY.             aspirin (ECOTRIN) 81 MG EC tablet  Take 1 tablet (81 mg total) by mouth once daily.             atorvastatin (LIPITOR) 40 MG tablet  Take 1 tablet (40 mg total) by mouth every evening.             docusate calcium (SURFAK) 240 mg capsule  Take 240 mg by mouth 2 (two) times daily.             donepeziL (ARICEPT) 10 MG tablet  Take 1 tablet (10 mg total) by mouth every evening.             gabapentin (NEURONTIN) 300 MG capsule  Take 1 capsule (300 mg total) by mouth 3 (three) times daily.              hydrALAZINE (APRESOLINE) 100 MG tablet  Take 1 tablet (100 mg total) by mouth every 8 (eight) hours.             ipratropium (ATROVENT) 0.03 % nasal spray  2 sprays by Nasal route before meals as needed for Rhinitis.                          levothyroxine (SYNTHROID) 75 MCG tablet  TAKE 1 TABLET BEFORE BREAKFAST                          polyethylene glycol (GLYCOLAX) 17 gram PwPk  Take 17 g by mouth once daily.             QUEtiapine (SEROQUEL) 50 MG tablet  Take 1 tablet (50 mg total) by mouth every evening.                          venlafaxine (EFFEXOR-XR) 75 MG 24 hr capsule  TAKE 2 CAPSULES EVERY DAY                         Future Orders:  Hospice Medical Director may dictate new orders for comfortable care measures & sign death certificate.        _________________________________  Vitaly Jo MD  11/18/2020

## 2020-11-18 NOTE — PLAN OF CARE
Yoana Kirkpatrick with [facility] in to speak with family; stated that patient will qualify for at least 5 days of Respite care and agency with assist family to transition to home hospice vs facility hospice; pt will transfer to their IP sanctuary once arrangements have been made       11/18/20 1404   Post-Acute Status   Post-Acute Authorization Hospice   Hospice Status Pending Service Contract   Patient choice form signed by patient/caregiver List from System Post-Acute Care   Discharge Delays None known at this time   Discharge Plan   Discharge Plan A Inpatient Hospice

## 2020-11-18 NOTE — PROGRESS NOTES
TN attempted discharge education but patient to hospice..Angeles Pathak, RN, BSN, STN [facility]  11/18/2020

## 2020-11-18 NOTE — PT/OT/SLP PROGRESS
Occupational Therapy      Patient Name:  Ifeanyi Rutherford Sr   MRN:  [id number]     OT followed up with pt's wife and son if pt/family had any needs- family appreciative, but declined. Pt scheduled to d/c to hospice.    Fawn Gann, OT  11/18/2020

## 2020-11-18 NOTE — PLAN OF CARE
Problem: Fall Injury Risk  Goal: Absence of Fall and Fall-Related Injury  Outcome: Ongoing, Progressing     Problem: Coping Ineffective  Goal: Effective Coping  Outcome: Ongoing, Progressing

## 2020-11-18 NOTE — DISCHARGE SUMMARY
"[facility] Medicine  Discharge Summary      Patient Name: Ifeanyi Rutherford Sr  MRN: [id number]  Admission Date: 11/11/2020  Hospital Length of Stay: 7 days  Discharge Date and Time:  11/18/2020 10:20 AM  Attending Physician: Vitaly Jo MD   Discharging Provider: Vitaly Jo MD  Primary Care Provider: Jason Levy MD      HPI:   Mr. Rutherford is a 89 y.o. male who presents to the ED via EMS due to AMS per family. EMS states that the family called because the patient was "twitching and shaking" and he's not as talkative as he usually is. EMS reports that the patient is awake, but is not responding to questions. EMS states that he is also not making eye contact and has a blank stare. EMS notes that the patient is disoriented x4 and he has a PMHx of Dementia.      In the ER he was found to have lecucytosis, Cr 4.6 with poor response to IV fluid, UA with Leucocytes as well as very high Urine Protein to creatinine ratio.     The patient was also communicated with Neurology by ED physician, who suspect seizure for the patient has been on Tramadol.;     Pt also has elevation in the BNP and slight elevation in cardiac troponin.     -Dr Kwabena Adame           * No surgery found *      Hospital Course:   Mr Rutherford presented with acute metabolic encephalopathy. Seizures and/or post-ictal state highly suspected given worsening of mental status overtime, "staring into space", frequent use of tramadol and gabapentin at home. Workup also significant with worsened renal function with moderate uremia and abnormal urinalysis possible suggesting urinary tract infection. Given dose of levetiracetam IV, intravenous fluids and empiric ceftriaxone. Urine cultures obtained.grow proteus, CT of brain with no acute pathology. EEG negative for epileptiform/seizure activity. Neurology and nephrology consulted. Although renal function remained unchanged despite fluids, patient became more " interactive but did require plenty of redirection and frequently disoriented. Speech evaluated and cleared for pureed then mechanical soft. . Completed 5 days of abx therapy while inpatient. PT/OT recommending HH with 24 hour assistance. Patient may also have progressive dementia of unknown type (has Parkinson's features). Palliative care consulted for goals of care.his mental status was fluctuating,likley worsening dementia,worsening CKD,he was also not any candidate  for HD,spoke in long with wife and son,all agree with Hospice placement,patient was discharged to inpatient Hospice.family also agree with DNR status.       Consults:   Consults (From admission, onward)        Status Ordering Provider     Inpatient consult to Nephrology  Once     Provider:  Estevan Mayfield MD    Completed JULIO HANSEN     Inpatient consult to neurology  Once     Provider:  Quinn Velasquez MD    Completed JULIO HANSEN     Inpatient consult to Palliative Care  Once     Provider:  Yolanda Del Valle NP    Completed JULIO HANSEN     Inpatient consult to Registered Dietitian/Nutritionist  Once     Provider:  (Not yet assigned)    Completed JULIO HANSEN     IP consult to case management  Once     Provider:  (Not yet assigned)    Completed PAM SR          No new Assessment & Plan notes have been filed under this hospital service since the last note was generated.  Service: Hospital Medicine    Final Active Diagnoses:    Diagnosis Date Noted POA    PRINCIPAL PROBLEM:  Acute metabolic encephalopathy [G93.41] 11/12/2020 Yes    Advance care planning [Z71.89]  Not Applicable    Palliative care encounter [Z51.5]  Not Applicable    BPH (benign prostatic hyperplasia) [N40.0] 01/27/2020 Yes    CKD stage 3 [N18.30] 09/13/2019 Yes     Chronic    Elevated troponin [R77.8] 09/13/2019 Yes    Mixed hyperlipidemia [E78.2] 09/13/2019 Yes    Trigeminal neuralgia of right side of face [G50.0] 01/20/2015  "Yes     Chronic    Parkinsonism [G20] 10/22/2014 Yes     Chronic    Dementia [F03.90] 09/10/2014 Yes     Chronic    Hypothyroidism [E03.9] 09/10/2014 Yes     Chronic    Debility [R53.81] 09/10/2014 Yes    AMS (altered mental status) [R41.82]  Yes    Essential hypertension [I10] 07/23/2012 Yes     Chronic      Problems Resolved During this Admission:       Discharged Condition: stable    Disposition: Hospice/Medical Facility    Follow Up:  Follow-up Information     Jason Levy MD In 1 week.    Specialty: Internal Medicine  Contact information:  [address]  [place] [address]  [phone]                 Patient Instructions:      COMMODE FOR HOME USE     Order Specific Question Answer Comments   Type: Standard    Height: 6' 2" (1.88 m)    Weight: 87.9 kg (193 lb 12.6 oz)    Does patient have medical equipment at home? raised toilet transport w/c / transport w/c / transport w/c   Does patient have medical equipment at home? rollator    Does patient have medical equipment at home? walker, rolling    Length of need (1-99 months): 99      HOSPITAL BED FOR HOME USE     Order Specific Question Answer Comments   Type: Manual    Length of need (1-99 months): 99    Does patient have medical equipment at home? raised toilet transport w/c / transport w/c / transport w/c   Does patient have medical equipment at home? rollator    Does patient have medical equipment at home? walker, rolling    Height: 6' 2" (1.88 m)    Weight: 87.9 kg (193 lb 12.6 oz)    Please check all that apply: Patient requires positioning of the body in ways not feasible in an ordinary bed due to a medical condition which is expected to last at least one month.    Please check all that apply: Patient requires the head of bed to be elevated more than 30 degrees most of the time due to congestive heart failure, chronic pulmonary disease, or aspiration.  Pillows and wedges have been considered and ruled out.    Please check all that apply: " Patient requires a bed height different than a fixed height hospital bed to permit transfers to chair, wheelchair, or standing.    Please check all that apply: Patient requires frequent changes in body position and/or has an immediate need for a change in body position.      Activity as tolerated       Significant Diagnostic Studies: Labs:   BMP:   Recent Labs   Lab 11/18/20  0532   MG 2.4   , CMP No results for input(s): NA, K, CL, CO2, GLU, BUN, CREATININE, CALCIUM, PROT, ALBUMIN, BILITOT, ALKPHOS, AST, ALT, ANIONGAP, ESTGFRAFRICA, EGFRNONAA in the last 48 hours. and CBC No results for input(s): WBC, HGB, HCT, PLT in the last 48 hours.  Microbiology:   Blood Culture   Lab Results   Component Value Date    LABBLOO No growth after 5 days. 11/11/2020    and Urine Culture    Lab Results   Component Value Date    LABURIN No growth 11/04/2020     Radiology: X-Ray: CXR: X-Ray Chest 1 View (CXR):   Results for orders placed or performed during the hospital encounter of 11/11/20   X-Ray Chest 1 View    Narrative    EXAMINATION:  XR CHEST 1 VIEW    CLINICAL HISTORY:  Chest Pain;    TECHNIQUE:  Single frontal view of the chest was performed.    COMPARISON:  09/13/2019    FINDINGS:  Cardiac silhouette is magnified but is probably borderline enlarged.  There is atherosclerosis of the thoracic aorta.    Lung volumes are low with resultant crowding pulmonary vascular markings.  Elevation of right hemidiaphragm and basilar compressive atelectasis redemonstrated.    No focal airspace opacity.      Impression    No acute cardiopulmonary disease      Electronically signed by: Maged Phoenix Jr  Date:    11/11/2020  Time:    15:38    and X-Ray Chest PA and Lateral (CXR): No results found for this visit on 11/11/20.  CT scan:head     Pending Diagnostic Studies:     Procedure Component Value Units Date/Time    EKG 12-lead [[id number]] Collected: 11/17/20 9850    Order Status: Sent Lab Status: In process Updated: 11/17/20 0676     Narrative:      Test Reason : R46.89,    Vent. Rate : 062 BPM     Atrial Rate : 041 BPM     P-R Int : 376 ms          QRS Dur : 112 ms      QT Int : 504 ms       P-R-T Axes : 026 -18 -58 degrees     QTc Int : 511 ms    Marked sinus bradycardia with 1st degree A-V block with frequent Premature  ventricular complexes and Fusion complexes  Incomplete right bundle branch block  ST and T wave abnormality, consider inferior ischemia  Prolonged QT  Abnormal ECG  When compared with ECG of 17-NOV-2020 04:30,  Significant changes have occurred    Referred By: ARELY   SELF           Confirmed By:          Medications:  Reconciled Home Medications:      Medication List      START taking these medications    hydrALAZINE 100 MG tablet  Commonly known as: APRESOLINE  Take 1 tablet (100 mg total) by mouth every 8 (eight) hours.     polyethylene glycol 17 gram Pwpk  Commonly known as: GLYCOLAX  Take 17 g by mouth once daily.        CONTINUE taking these medications    acetaminophen 325 MG tablet  Commonly known as: TYLENOL  Take 2 tablets (650 mg total) by mouth every 6 (six) hours as needed for Pain.     amLODIPine 10 MG tablet  Commonly known as: NORVASC  TAKE 1 TABLET (10 MG TOTAL) BY MOUTH ONCE DAILY.     aspirin 81 MG EC tablet  Commonly known as: ECOTRIN  Take 1 tablet (81 mg total) by mouth once daily.     atorvastatin 40 MG tablet  Commonly known as: LIPITOR  Take 1 tablet (40 mg total) by mouth every evening.     docusate calcium 240 mg capsule  Commonly known as: SURFAK  Take 240 mg by mouth 2 (two) times daily.     donepeziL 10 MG tablet  Commonly known as: ARICEPT  Take 1 tablet (10 mg total) by mouth every evening.     gabapentin 300 MG capsule  Commonly known as: NEURONTIN  Take 1 capsule (300 mg total) by mouth 3 (three) times daily. Take 2 capsules (600 mg) by mouth three times daily.     ipratropium 0.03 % nasal spray  Commonly known as: ATROVENT  2 sprays by Nasal route before meals as needed for Rhinitis.      ketoconazole 2 % cream  Commonly known as: NIZORAL  Apply topically once daily.     levothyroxine 75 MCG tablet  Commonly known as: SYNTHROID  TAKE 1 TABLET BEFORE BREAKFAST     lidocaine HCl 2% 2 % Soln  Commonly known as: LIDOCAINE VISCOUS  by Mucous Membrane route every 6 (six) hours.     QUEtiapine 50 MG tablet  Commonly known as: SEROQUEL  Take 1 tablet (50 mg total) by mouth every evening.     sennosides-docusate sodium 8.6-50 mg Cap  Take by mouth.     venlafaxine 75 MG 24 hr capsule  Commonly known as: EFFEXOR-XR  TAKE 2 CAPSULES EVERY DAY        STOP taking these medications    diazePAM 5 MG tablet  Commonly known as: VALIUM     furosemide 20 MG tablet  Commonly known as: LASIX     HYDROcodone-acetaminophen 5-325 mg per tablet  Commonly known as: NORCO     traMADoL 50 mg tablet  Commonly known as: ULTRAM            Indwelling Lines/Drains at time of discharge:   Lines/Drains/Airways     None                 Time spent on the discharge of patient: over 30  minutes  Patient was seen and examined on the date of discharge and determined to be suitable for discharge.         Vitaly Jo MD  Department of Hospital Medicine  [facility]

## 2020-11-18 NOTE — PLAN OF CARE
Pt to transfer to [facility] IP Unit; family at bedside and are in agreement; TN confirmed with [facility] of [facility] that all clinicals were received and patient has been accepted to their IP [place];     Nurse Reji informed that all CM needs have been met and patient can discharge from CM standpoint       11/18/20 1605   Final Note   Assessment Type Final Discharge Note   Anticipated Discharge Disposition [facility]   What phone number can be called within the next 1-3 days to see how you are doing after discharge? [phone]   Right Care Referral Info   Post Acute Recommendation Other   Referral Type  Hospice   Facility Name [facility]   Post-Acute Status   Post-Acute Authorization Hospice   Hospice Status Set-up Complete   Patient choice form signed by patient/caregiver List from System Post-Acute Care  (signed choice letter placed in chart)   Discharge Delays None known at this time

## 2020-11-18 NOTE — NURSING
Pt had 6 beat run of Vtach. Pt sleeping.  /70, RR 18, HR: 71, O2 96% on RA.   Notified. Orders for mag and phos placed.  Will continue to monitor.

## 2020-11-18 NOTE — PLAN OF CARE
Patient to be transferred to [facility] IP   Will be admitted to Room # TBD  Nurse to call report to [phone]  Transportation time scheduled 1700    Call report information forwarded to nurse Reji Jimenez order placed for [facility] Transportation.  Requested  time: 1700  If transportation does not arrive at ETA time nurse will be instructed to follow protocol for transportation below:   How can I get in touch directly with dispatch, if needed?                 · Non-emergent (stretcher): [phone]    · Emergent dispatch: [phone]    ++NURSING:  If Stretcher does not arrive at requested time please call the above Non Emergent Dispatcher.  If issue not resolved please escalate to your charge nurse for further instructions.

## 2020-11-18 NOTE — PLAN OF CARE
Pt ordered to discharge to IP hospice; TN previously provided list of providers to family yesterday; in to speak with wife at  for final decision; stated that they are interested in IP Hospice with possible transitioning to home; informed of the hospice agencies from list that offer IP hospice; wife requested for [facility] to be called for information; TN contacted Jovanni with [facility] to inform of referral; clinicals sent via [employer]       11/18/20 1103   Post-Acute Status   Post-Acute Authorization Hospice   Post-Acute Placement Status Referrals Sent   Hospice Status Referrals Sent   Patient choice form signed by patient/caregiver List from [facility]   Discharge Delays None known at this time   Discharge Plan   Discharge Plan A Inpatient Hospice   Discharge Plan B Hospice/home

## 2020-11-19 NOTE — PT/OT/SLP DISCHARGE
Physical Therapy Discharge Summary    Name: Ifeanyi Rutherford   MRN: [id number]   Principal Problem: Acute metabolic encephalopathy     Patient Discharged from acute Physical Therapy on 20.  Please refer to prior PT notes for functional status.     Assessment:     Patient appropriate for care in another setting.    Objective:     GOALS:   Multidisciplinary Problems     Physical Therapy Goals        Problem: Physical Therapy Goal    Goal Priority Disciplines Outcome Goal Variances Interventions   Physical Therapy Goal     PT, PT/OT Ongoing, Progressing     Description: Goals to be met by: 20     Patient will increase functional independence with mobility by performin. Supine to sit with SBA  2. Rolling to Left and Right with SBA  3. Sit to stand with SBA using RW  4. Bed to chair transfer with SBA using RW  5. Gait ~50 ft with SBA using RW    6. Lower extremity exercise program x10 reps per handout, with SBA                     Reasons for Discontinuation of Therapy Services  Transfer to alternate level of care.      Plan:     Patient Discharged to: Palliative Care/Hospice (inpatient hospice).    Tiffanie Solorzano, PT  2020

## 2020-11-19 NOTE — NURSING
Pt discharged and transported via [facility] ambulance [facility] to [facility]. NAD noted. Wife with patient.

## 2020-11-19 NOTE — PHYSICIAN QUERY
"PT Name: Ifeanyi Rutherford Sr  MR #: [id number]     Etiology of Condition Clarification      CDS: Kathy Martinez RN, CCDS  Contact information: [email or web address]  This form is a permanent document in the medical record.     Query Date: November 19, 2020    By submitting this query, we are merely seeking further clarification of documentation.  Please utilize your independent clinical judgment when addressing the question(s) below.     The Medical Record contains the following:    Clinical Information Location in Medical Record   More confused today ,appropriate for hospice.    PN 11/17   Mr Rutherford presented with acute metabolic encephalopathy. Seizures and/or post-ictal state highly suspected given worsening of mental status overtime, "staring into space", frequent use of tramadol and gabapentin at home. Workup also significant with worsened renal function with moderate uremia and abnormal urinalysis possible suggesting urinary tract infection. Given dose of levetiracetam IV, intravenous fluids and empiric ceftriaxone. Urine cultures obtained. CT of brain with no acute pathology. EEG negative for epileptiform/seizure activity. Neurology and nephrology consulted. Although renal function remained unchanged despite fluids, patient became more interactive but did require plenty of redirection and frequently disoriented    Patient may also have progressive dementia of unknown type (has Parkinson's features). Palliative care consulted for goals of care.his mental status was fluctuating,likley worsening dementia,worsening CKD,he was also not any candidate for HD,spoke in long with wife and son,all agree with Hospice placement,patient was discharged to inpatient Hospice.family also agree with DNR status.  Dc summary 11/17     Please document your best medical opinion regarding the etiology of acute metabolic encephalopathy?    [ x  ] Seizures/post- ictal state on admit with continued metabolic encephalopathy 2/2 FADI   "   [   ] Metabolic encephalopathy ruled out;  Post-ictal state on admit with continued AMS 2/2 progressive dementia     [   ] Other etiology (please specify):___________________     [  ] Clinically Undetermined     Please document in your progress notes daily for the duration of treatment, until resolved, and include in your discharge summary.

## 2020-11-19 NOTE — PT/OT/SLP DISCHARGE
Occupational Therapy Discharge Summary    Ifeanyi Rutherford   MRN: [id number]   Principal Problem: Acute metabolic encephalopathy      Patient Discharged from acute Occupational Therapy on 11/18/20.  Please refer to prior OT notes for functional status.    Assessment:      Patient appropriate for care in another setting.    Objective:     GOALS:   Multidisciplinary Problems     Occupational Therapy Goals        Problem: Occupational Therapy Goal    Goal Priority Disciplines Outcome Interventions   Occupational Therapy Goal     OT, PT/OT Ongoing, Progressing    Description: Goals to be met by: 11/27/20     Patient will increase functional independence with ADLs by performing:    Feeding with Set-up Assistance.  Grooming while standing at the sink with Stand-by assistance. (goal adjusted from bed level with set up 11/16/20)   Upper extremity exercise program x10 reps per handout, with assistance as needed.  Rolling L<>R with minimal assistance  Supine <> sit with Stand-by assistance (goal updated from MIN A 11/16/20)  Sit to stand with Stand-by assistance (goal updated from MIN A 11/16/20)   Sit EOB for 15 min with contact guard assistance  Step transfer with stand-by assistance (goal updated from MIN A 11/16/20)  Stand pivot transfer with minimal assistance   Wheelchair functional mobility with BUE or BLE household distance with minimal contact guard assistance                      Reasons for Discontinuation of Therapy Services  Transfer to alternate level of care.      Plan:     Patient Discharged to: Palliative Care/Hospice    Fawn Gann OT  11/19/2020

## 2020-11-20 ENCOUNTER — EXTERNAL HOME HEALTH (OUTPATIENT)
Dept: HOME HEALTH SERVICES | Facility: HOSPITAL | Age: 85
End: 2020-11-20
Payer: MEDICARE

## 2020-11-20 NOTE — PROGRESS NOTES
"[facility]  Progress Note    Patient Name: Ifeanyi Rutherford   MRN: [id number]  Patient Class: IP- Inpatient   Admission Date: 11/11/2020  Length of Stay: 3 days  Attending Physician: Alma Medina MD  Primary Care Provider: Jason Levy MD        Subjective:     Principal Problem:Acute metabolic encephalopathy        HPI:  Mr. Rutherford is a 89 y.o. male who presents to the ED via EMS due to AMS per family. EMS states that the family called because the patient was "twitching and shaking" and he's not as talkative as he usually is. EMS reports that the patient is awake, but is not responding to questions. EMS states that he is also not making eye contact and has a blank stare. EMS notes that the patient is disoriented x4 and he has a PMHx of Dementia.      In the ER he was found to have lecucytosis, Cr 4.6 with poor response to IV fluid, UA with Leucocytes as well as very high Urine Protein to creatinine ratio.     The patient was also communicated with Neurology by ED physician, who suspect seizure for the patient has been on Tramadol.;     Pt also has elevation in the BNP and slight elevation in cardiac troponin.     -Dr Kwaebna Adame           Overview/Hospital Course:        Interval History: clinically improved     Review of Systems   Unable to perform ROS: Dementia     Objective:     Vital Signs (Most Recent):  Temp: 99.3 °F (37.4 °C) (11/14/20 1100)  Pulse: 84 (11/14/20 1100)  Resp: 17 (11/14/20 1100)  BP: (!) 143/64 (11/14/20 1100)  SpO2: 96 % (11/14/20 1100) Vital Signs (24h Range):  Temp:  [97.5 °F (36.4 °C)-100.2 °F (37.9 °C)] 99.3 °F (37.4 °C)  Pulse:  [81-92] 84  Resp:  [16-20] 17  SpO2:  [95 %-98 %] 96 %  BP: (143-225)/(64-98) 143/64     Weight: 83.1 kg (183 lb 3.2 oz)  Body mass index is 23.52 kg/m².    Intake/Output Summary (Last 24 hours) at 11/14/2020 1402  Last data filed at 11/13/2020 1917  Gross per 24 hour   Intake 576.67 ml   Output --   Net 576.67 ml "      Physical Exam  Vitals signs and nursing note reviewed.   Constitutional:       General: He is not in acute distress.     Appearance: He is not toxic-appearing.   Cardiovascular:      Rate and Rhythm: Normal rate and regular rhythm.   Pulmonary:      Effort: Pulmonary effort is normal.      Breath sounds: No wheezing or rales.   Abdominal:      General: Abdomen is flat.      Palpations: Abdomen is soft.   Skin:     General: Skin is warm.      Capillary Refill: Capillary refill takes less than 2 seconds.   Neurological:      Mental Status: He is alert.      Comments: Oriented to self  Very hard of hearing. Has hearing aide to left ear in place     Psychiatric:         Attention and Perception: He is attentive. He does not perceive auditory or visual hallucinations.         Mood and Affect: Mood normal.         Speech: Speech is delayed.         Behavior: Behavior normal.         Cognition and Memory: Cognition is impaired. Memory is impaired.      Comments: Able to follow some commands with frequent redirection.          Significant Labs: All pertinent labs within the past 24 hours have been reviewed.    Significant Imaging: I have reviewed all pertinent imaging results/findings within the past 24 hours.  I have reviewed and interpreted all pertinent imaging results/findings within the past 24 hours.      Assessment/Plan:      * Acute metabolic encephalopathy  Per my conversation with patient's wife, he has underlying dementia but it is likely acute worsening due to possible seizure while on tramadol, medication induced due to long lasting effect of gabapentin and tramadol while renal function poor. Holding off on sedating agents except for atypical antipsychotics for episodes of non redirectable agitation and insomnia. Possible UTI. On rocephin empirically. UCx/BCx thus far negative.  Is gradually improving. Wife at bedside at all times. Speech/PT/OT. Delirium precautions in place.     Patient has significantly  "improved. Will attempt SNF. Hopefully we can upgrade diet soon.       BPH (benign prostatic hyperplasia)  No acute issues. Will treat constipation  Bladder scan PRN        CKD stage 3  With acute renal failure  Continue intravenous fluids for supportive treatment  Encouraged PO intake      Mixed hyperlipidemia  Resume statin    Elevated troponin  Likely demand      Trigeminal neuralgia of right side of face  Will hold off on tramadol and gabapentin  Will use use acetaminophen PRN instead      Parkinsonism  Per exam       Hypothyroidism  Resume oral home regimen      Dementia  Unsure stage or type. Has some parkinsonism. Will discuss with neurology  Would he benefit from any medication? Is off aricept         Debility  Per wife, patient able to ambulate with moderate assistance but lately more often on his wheelchair due to "jerking" movements he had prior to presentation. PT/OT for possible SNF as encephalopathy appears to be improving.         Essential hypertension  At goal on amlodipine 10 mg daily   Vitals every 4 hours        VTE Risk Mitigation (From admission, onward)         Ordered     heparin (porcine) injection 5,000 Units  Every 8 hours      11/11/20 1753     IP VTE HIGH RISK PATIENT  Once      11/11/20 1753     Place sequential compression device  Until discontinued      11/11/20 1753                Discharge Planning   YOLA:      Code Status: DNR   Is the patient medically ready for discharge?:     Reason for patient still in hospital (select all that apply): Treatment  Discharge Plan A: Home Health                  Alma Lorenzana MD  Department of Hospital Medicine   [facility]    " n/a

## 2020-11-23 ENCOUNTER — EXTERNAL HOME HEALTH (OUTPATIENT)
Dept: HOME HEALTH SERVICES | Facility: HOSPITAL | Age: 85
End: 2020-11-23
Payer: MEDICARE

## 2020-11-25 ENCOUNTER — PATIENT MESSAGE (OUTPATIENT)
Dept: NEUROSURGERY | Facility: CLINIC | Age: 85
End: 2020-11-25

## 2020-11-25 ENCOUNTER — PATIENT MESSAGE (OUTPATIENT)
Dept: FAMILY MEDICINE | Facility: CLINIC | Age: 85
End: 2020-11-25

## 2022-03-28 NOTE — TELEPHONE ENCOUNTER
Patient attended Phase 2 Cardiac Rehab Exercise Session. Further documentation will be completed in Cardiac [facility]/[employer] [facility] and will be scanned into the medical record upon discharge.     Please call and get more details, if indeed the foot is red, swollen and obviously infected, we will definitely start on antibiotics, keep the foot elevated.    If it looks severely infected or it has been spreading, the redness spreading up the foot, he may need to go to urgent care today, tomorrow etcetera if it worsens    Definitely would need to go to urgent care or the emergency room if he has fever    Clindamycin sent to the [facility] on [facility]

## 2022-04-25 NOTE — TELEPHONE ENCOUNTER
----- Message from Gigi Mata sent at 8/13/2020 12:25 PM CDT -----  Regarding: refill/ discuss weightloss the patient has  Type: RX Refill Request    Who Called: Krystal(wife)    Refill or New Rx:refill     RX Name and Strength:diazePAM (VALIUM) 5 MG tablet, lidocaine HCl 2% (LIDOCAINE VISCOUS) 2 % Soln    Is this a 30 day or 90 day Rx:30 day     Preferred Pharmacy with phone number:[facility] [address]    Would the patient rather a call back or a response via [facility]? Call back    Best Call Back Number:[phone]    Additional Information:  Krystal, wife of the patient is also requesting a call back from the staff. She stated that the patient has lost 11lbs since his last visit in June. She wants to know should he be concerned. She stated that the patient eats well and nothing has changed about his appetite.         Awake alert appropriate behavior for age and situation. Well nourished. No distress noted.

## 2022-09-26 NOTE — TELEPHONE ENCOUNTER
----- Message from Payal Rodriguez sent at 9/1/2017  9:50 AM CDT -----  Patient's wife, Krystal, asking to speak with you. Please call at [phone] Thank you!   Tissue Cultured Epidermal Autograft Text: The defect edges were debeveled with a #15 scalpel blade.  Given the location of the defect, shape of the defect and the proximity to free margins a tissue cultured epidermal autograft was deemed most appropriate.  The graft was then trimmed to fit the size of the defect.  The graft was then placed in the primary defect and oriented appropriately.

## 2024-06-22 NOTE — PROGRESS NOTES
Patient Seen in: [facility] Emergency Department      History     Chief Complaint   Patient presents with    Abdomen/Flank Pain     Stated Complaint: abd pain for a couple months, has had problems getting into PCP; has been losin*    Subjective:   HPI    Patient is a 25-year-old male presenting to the ED with abdominal pain associated with intermittent nausea and vomiting as well as intermittent constipation.  The history is obtained from patient who is a good historian.  Symptoms have been present for approximately a month and a half.  When asked the patient why he came this evening into the ED or what made his symptoms worse, the patient reports that \"he had some time\" to finally be evaluated.  The patient states that he has had intermittent constipation and has been MiraLAX.  His last bowel movement was today.  He states he wakes up feeling nauseous in the morning.  He experiences intermittent vomiting.  He also describes weight loss over the last month and a half but is uncertain how much.  He states he does not weigh himself but is close have felt looser.  He has had an appetite but states he has not been eating as much.  The patient states that he was concerned because his girlfriend has H. pylori.  He attempted to make an appointment with his primary care provider but could not be seen within a timely manner.  He has never been evaluated by GI.  No fevers or chills.  No associated urinary symptoms.  When present, the pain is located in the upper abdomen or left upper quadrant, not well-characterized, without radiation, no identified exacerbating or alleviating factors.  The patient denies heavy alcohol use.  He does drink caffeine.  He has not been taking any PPIs or H2 blockers.  He also states he noted a \"bump\" on his butt but states that this is improved.  When I asked him whether or not it was a hemorrhoid, the patient states he is uncertain.  No reported black or bloody stool.  No reported  "[facility]  Progress Note    Patient Name: Ifeanyi Rutherford   MRN: [id number]  Patient Class: IP- Inpatient   Admission Date: 11/11/2020  Length of Stay: 6 days  Attending Physician: Vitaly Jo MD  Primary Care Provider: Jason Levy MD        Subjective:     Principal Problem:Acute metabolic encephalopathy        HPI:  Mr. Rutherford is a 89 y.o. male who presents to the ED via EMS due to AMS per family. EMS states that the family called because the patient was "twitching and shaking" and he's not as talkative as he usually is. EMS reports that the patient is awake, but is not responding to questions. EMS states that he is also not making eye contact and has a blank stare. EMS notes that the patient is disoriented x4 and he has a PMHx of Dementia.      In the ER he was found to have lecucytosis, Cr 4.6 with poor response to IV fluid, UA with Leucocytes as well as very high Urine Protein to creatinine ratio.     The patient was also communicated with Neurology by ED physician, who suspect seizure for the patient has been on Tramadol.;     Pt also has elevation in the BNP and slight elevation in cardiac troponin.     -Dr Kwabena Adame           Overview/Hospital Course:  Mr Rutherford presented with acute metabolic encephalopathy. Seizures and/or post-ictal state highly suspected given worsening of mental status overtime, "staring into space", frequent use of tramadol and gabapentin at home. Workup also significant with worsened renal function with moderate uremia and abnormal urinalysis possible suggesting urinary tract infection. Given dose of levetiracetam IV, intravenous fluids and empiric ceftriaxone. Urine cultures obtained. CT of brain with no acute pathology. EEG negative for epileptiform/seizure activity. Neurology and nephrology consulted. Although renal function remained unchanged despite fluids, patient became more interactive but did require plenty of " hematemesis or coffee-ground emesis.  No prior abdominal surgeries.    Objective:   No pertinent past medical history.            No pertinent past surgical history.              No pertinent social history.            Review of Systems    Positive for stated complaint: abd pain for a couple months, has had problems getting into PCP; has been losin*  Other systems are as noted in HPI.  Constitutional and vital signs reviewed.      All other systems reviewed and negative except as noted above.    Physical Exam     ED Triage Vitals [06/21/24 2157]   /84   Pulse 88   Resp 18   Temp 98.7 °F (37.1 °C)   Temp src Temporal   SpO2 97 %   O2 Device None (Room air)       Current Vitals:   Vital Signs  BP: 139/84  Pulse: 88  Resp: 18  Temp: 98.7 °F (37.1 °C)  Temp src: Temporal    Oxygen Therapy  SpO2: 97 %  O2 Device: None (Room air)            Physical Exam  Vitals and nursing note reviewed.   Constitutional:       General: He is not in acute distress.     Appearance: Normal appearance. He is well-developed. He is not ill-appearing.      Comments:  of plugged into the wall.  Patient is currently on a laptop.  He is comfortable and well-appearing.  No distress.   HENT:      Head: Normocephalic and atraumatic.      Right Ear: External ear normal.      Left Ear: External ear normal.      Nose: Nose normal.      Mouth/Throat:      Mouth: Mucous membranes are moist.      Pharynx: Oropharynx is clear. No posterior oropharyngeal erythema.   Eyes:      Conjunctiva/sclera: Conjunctivae normal.   Cardiovascular:      Rate and Rhythm: Normal rate and regular rhythm.   Pulmonary:      Effort: Pulmonary effort is normal. No respiratory distress.      Breath sounds: Normal breath sounds.   Abdominal:      General: Abdomen is flat. Bowel sounds are normal. There is no distension.      Palpations: Abdomen is soft.      Tenderness: There is abdominal tenderness. There is no guarding or rebound.      Comments: Minimal tenderness  redirection and frequently disoriented. Speech evaluated and cleared for pureed then mechanical soft. UCx/BCx remained negative. Completed 5 days of abx therapy while inpatient. PT/OT recommending HH with 24 hour assistance. Patient may also have progressive dementia of unknown type (has Parkinson's features). Palliative care consulted for goals of care.   More confused today ,appropriarte for hospice.    Interval History: more confused today.    Review of Systems   Unable to perform ROS: Dementia     Objective:     Vital Signs (Most Recent):  Temp: 98 °F (36.7 °C) (11/17/20 0801)  Pulse: 60 (11/17/20 0801)  Resp: 18 (11/17/20 0801)  BP: (!) 186/75 (11/17/20 0801)  SpO2: 95 % (11/17/20 0801) Vital Signs (24h Range):  Temp:  [97.5 °F (36.4 °C)-99.2 °F (37.3 °C)] 98 °F (36.7 °C)  Pulse:  [60-72] 60  Resp:  [18] 18  SpO2:  [94 %-97 %] 95 %  BP: (133-186)/(63-77) 186/75     Weight: 87.9 kg (193 lb 12.6 oz)  Body mass index is 24.88 kg/m².    Intake/Output Summary (Last 24 hours) at 11/17/2020 1151  Last data filed at 11/16/2020 1800  Gross per 24 hour   Intake 1080 ml   Output --   Net 1080 ml      Physical Exam  Vitals signs and nursing note reviewed.   Constitutional:       General: He is not in acute distress.     Appearance: He is not toxic-appearing.   Cardiovascular:      Rate and Rhythm: Normal rate and regular rhythm.   Pulmonary:      Effort: Pulmonary effort is normal.      Breath sounds: No wheezing or rales.   Abdominal:      General: Abdomen is flat.      Palpations: Abdomen is soft.   Skin:     General: Skin is warm.      Capillary Refill: Capillary refill takes less than 2 seconds.   Neurological:      Mental Status: He is alert.      Comments: Oriented to self  Very hard of hearing. Has hearing aide to left ear in place     Psychiatric:         Attention and Perception: He is attentive. He does not perceive auditory or visual hallucinations.         Mood and Affect: Mood normal.         Speech: Speech is  delayed.         Behavior: Behavior normal.         Cognition and Memory: Cognition is impaired. Memory is impaired.      Comments: Able to follow some commands with frequent redirection.          Significant Labs: All pertinent labs within the past 24 hours have been reviewed.    Significant Imaging: I have reviewed all pertinent imaging results/findings within the past 24 hours.  I have reviewed and interpreted all pertinent imaging results/findings within the past 24 hours.      Assessment/Plan:      * Acute metabolic encephalopathy  Per my conversation with patient's wife, he has underlying dementia but it is likely acute worsening due to possible seizure while on tramadol, medication induced due to long lasting effect of gabapentin and tramadol while renal function poor. Holding off on sedating agents except for atypical antipsychotics for episodes of non redirectable agitation and insomnia. Possible UTI. On day # 5 of rocephin empirically. UCx/BCx thus far negative. Needs to sleep better at night therefore increasing dose of quetiapine. Wife at bedside at all times. Speech/PT/OT. Delirium precautions in place.     Family would be unable to be with patient 24-7 if in a SNF. Family presence if very important for patient's mentation. Plan is for home with HH + palliative care vs home hospice. Has a walker. Will order hospital bed and commode.   More confused today ,appropriarte for hospice.    BPH (benign prostatic hyperplasia)  No acute issues. Will treat constipation  Bladder scan PRN        CKD stage 3  With acute renal failure  Continue intravenous fluids for supportive treatment  Encouraged PO intake      Mixed hyperlipidemia  Resume statin    Elevated troponin  Likely demand      Trigeminal neuralgia of right side of face  Will hold off on tramadol and gabapentin  Will use use acetaminophen PRN instead      Parkinsonism  Per exam       Hypothyroidism  Resume oral home regimen      Dementia  Unsure stage or  in the left upper quadrant   Genitourinary:     Comments: Patient is refusing rectal and anal exam at this time.  Musculoskeletal:      Right lower leg: No edema.      Left lower leg: No edema.   Skin:     General: Skin is warm.      Capillary Refill: Capillary refill takes less than 2 seconds.      Findings: No rash.   Neurological:      General: No focal deficit present.      Mental Status: He is alert and oriented to person, place, and time.   Psychiatric:      Comments: Anxious mood.               ED Course     Labs Reviewed   COMP METABOLIC PANEL (14) - Abnormal; Notable for the following components:       Result Value    Glucose 102 (*)     All other components within normal limits   URINALYSIS WITH CULTURE REFLEX - Abnormal; Notable for the following components:    Clarity Urine Turbid (*)     RBC Urine 3-5 (*)     All other components within normal limits   CBC W/ DIFFERENTIAL - Abnormal; Notable for the following components:    RBC 4.27 (*)     HCT 36.8 (*)     All other components within normal limits   LIPASE - Normal   CBC WITH DIFFERENTIAL WITH PLATELET    Narrative:     The following orders were created for panel order CBC With Differential With Platelet.  Procedure                               Abnormality         Status                     ---------                               -----------         ------                     CBC W/ DIFFERENTIAL[[id number]]          Abnormal            Final result                 Please view results for these tests on the individual orders.   RAINBOW DRAW LAVENDER   RAINBOW DRAW LIGHT GREEN   RAINBOW DRAW BLUE   RAINBOW DRAW GOLD                      MDM      History obtained from patient.     Differential diagnosis includes gastritis, pancreatitis, malignancy, constipation, obstruction, ileus, gastroparesis    Previous records reviewed.  She was seen at a walk-in dermatology clinic for a rash recently June 13, 2024.  At that time he was also complaining of stomach  "type. Has some parkinsonism. Will discuss with neurology  Would he benefit from any medication? Is off aricept         Debility  Per wife, patient able to ambulate with moderate assistance but lately more often on his wheelchair due to "jerking" movements he had prior to presentation. PT/OT         Essential hypertension  At goal on amlodipine 10 mg daily   Vitals every 4 hours        VTE Risk Mitigation (From admission, onward)         Ordered     heparin (porcine) injection 5,000 Units  Every 8 hours      11/11/20 1753     IP VTE HIGH RISK PATIENT  Once      11/11/20 1753     Place sequential compression device  Until discontinued      11/11/20 1753                Discharge Planning   YOLA:      Code Status: Full Code   Is the patient medically ready for discharge?:     Reason for patient still in hospital (select all that apply): Patient trending condition  Discharge Plan A: Home Health                  Vitaly Jo MD  Department of Hospital Medicine   [facility]    " pain for a couple of weeks and had been vomiting intermittently for the last couple of weeks.  At that time he had also reported a decrease in appetite.  The patient had also reported a cyst on his back and possible rash on his shoulder and requested a referral to dermatology.  Patient had also requested a refill on Zofran at that time.  The assessment and plan was chronic vague epigastric tenderness exacerbating by eating and chronic nausea.  It was recommended at that time that he follow-up with GI department but he had declined.  He states he was getting get connected with a new primary care provider to start from there.  He just request a refill on his Zofran's that have worked in the past.    Testing considered and ordered includes CBC, CMP, lipase, UA, CT scan of the abdomen and pelvis.    I reviewed all results.  CBC is unremarkable without any significant findings.  CMP also unremarkable.  Lipase is normal.  UA unremarkable other than RBC 3-5.    I also reviewed the official report which shows   CT ABDOMEN+PELVIS(CONTRAST ONLY)(CPT=74177)    Result Date: 6/22/2024  PROCEDURE:  CT ABDOMEN+PELVIS (CONTRAST ONLY) (CPT=74177)  COMPARISON:  None.  INDICATIONS:  abd pain for a couple months, has had problems getting into PCP; has been losing weight  TECHNIQUE:  CT scanning was performed from the dome of the diaphragm to the pubic symphysis with non-ionic intravenous contrast material. Post contrast coronal MPR imaging was performed.  Dose reduction techniques were used. Dose information is transmitted to the ACR (American College of Radiology) NRDR (National Radiology Data Registry) which includes the Dose Index Registry.  PATIENT STATED HISTORY:(As transcribed by Technologist)  C/o left sided abd pain with nausea & vomiting.   CONTRAST USED:  90cc of Isovue 370  FINDINGS:  LIVER:  No enlargement, atrophy, abnormal density, or significant focal lesion.  Small 5 mm low-density lesion along the dome is too small to  accurately characterize. BILIARY:  Gallbladder is contracted.  No visible dilatation or calcification.  PANCREAS:  No lesion, fluid collection, ductal dilatation, or atrophy.  SPLEEN:  No enlargement or focal lesion.  KIDNEYS:  Small low-density cortical foci within the left kidney are too small to accurately characterize.  No mass, obstruction, or calcification.  ADRENALS:  No mass or enlargement.  AORTA/VASCULAR:  No aneurysm or dissection.  RETROPERITONEUM:  No mass or adenopathy.  BOWEL/MESENTERY:  Normal caliber small and large bowel including the appendix.  A short segment measuring 3.5 cm in length of mild wall thickening involves a portion of the terminal ileum.  No infiltration of the adjacent fat.  Several scattered mesenteric nodes are present a few of which are in the right lower quadrant which measure 8 mm in short axis dimension.  No free fluid ABDOMINAL WALL:  No mass or hernia.  URINARY BLADDER:  No visible focal wall thickening, lesion, or calculus.  PELVIC NODES:  No adenopathy.  PELVIC ORGANS:  Pelvic organs appropriate for patient age.  BONES:  No bony lesion or fracture.  LUNG BASES:  No visible pulmonary or pleural disease.             CONCLUSION:  1. Mild bowel wall thickening involves a portion of the terminal ileum and is of uncertain clinical significance.  Correlate clinically. 2. Scattered small mesenteric nodes are also present within the right lower quadrant and may be reactive.   LOCATION:  [facility]   Dictated by (CST): Silvia Arellano MD on 6/22/2024 at 0:08 AM     Finalized by (CST): Silvia Arellano MD on 6/22/2024 at 0:13 AM        Discussed all results with patient at length.  Patient did not require any further intervention in the ED.  Discussed plan for outpatient follow-up with GI.  He declined an exam of the perianal region or rectal exam at this time.  States he would prefer to follow-up with GI for further evaluation at this time.  Will provide prescription for Zofran.  Will also provide  Protonix.   Return to ED if any symptoms worsen, persist, or new symptoms develop.  Avoid alcohol or excessive caffeine use.  Maintain adequate hydration.  Sampson diet.  Return to ED if any symptoms worsen, persist, or new symptoms develop.  Also recommend outpatient follow-up with primary care provider.                                         Medical Decision Making      Disposition and Plan     Clinical Impression:  1. Chronic abdominal pain    2. Nausea and vomiting in adult         Disposition:  There is no disposition on file for this visit.  There is no disposition time on file for this visit.    Follow-up:  [facility] - [address] [address]  Schedule an appointment as soon as possible for a visit      YOUR PRIMARY CARE PROVIDER    Schedule an appointment as soon as possible for a visit in 2 day(s)      [facility]  [address] [facility] [address]  [phone]  Follow up  IF SYMPTOMS WORSEN, PERSIST, OR NEW SYMPTOMS DEVEL          Medications Prescribed:  Current Discharge Medication List        START taking these medications    Details   ondansetron 4 MG Oral Tablet Dispersible Take 1 tablet (4 mg total) by mouth every 4 (four) hours as needed for Nausea.  Qty: 10 tablet, Refills: 0      pantoprazole 40 MG Oral Tab EC Take 1 tablet (40 mg total) by mouth daily.  Qty: 30 tablet, Refills: 0

## 2024-08-19 NOTE — ED TRIAGE NOTES
"Ifeanyi Rutherford, a 89 y.o. male presents to the ED w/ complaint of  Generalized " bad abdominal pain"  for 4-5 days with constipation. Last BM was 5 days ago. Patient tried miralax and other bowel regimens with no relief.    Patient's wife reports elevated BP at home 220/97. Patient took lisinopril this am     Patient denies shortness of breath, chest pain        Triage note:  Chief Complaint   Patient presents with    Fecal Impaction     Per pt wife pt has c/o abd pain and has been constipated x4-5 days, no relief w/ mutiple laxatives and high fiber diet. Per wife pt BP also elevated at 220/97 at home. 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
[Negative] : Heme/Lymph

## (undated) DEVICE — SYR SLIP TIP 1CC

## (undated) DEVICE — SPONGE DERMACEA GAUZE 4X4

## (undated) DEVICE — SYR B-D DISP CONTROL 10CC100/C

## (undated) DEVICE — MARKER SKIN STND TIP BLUE BARR

## (undated) DEVICE — GAUZE SPONGE 4X4 12PLY

## (undated) DEVICE — SEE MEDLINE ITEM 152622

## (undated) DEVICE — BANDAGE ADHESIVE

## (undated) DEVICE — CUP MEDICINE STERILE 2OZ

## (undated) DEVICE — NDL 18GA X1 1/2 REG BEVEL

## (undated) DEVICE — GOWN SURGICAL X-LARGE

## (undated) DEVICE — SYR 3CC LUER LOC

## (undated) DEVICE — CONTAINER SPECIMEN STRL 4OZ

## (undated) DEVICE — NDL SPINAL 20GX3.5 HUB